# Patient Record
Sex: MALE | Race: WHITE | Employment: OTHER | URBAN - METROPOLITAN AREA
[De-identification: names, ages, dates, MRNs, and addresses within clinical notes are randomized per-mention and may not be internally consistent; named-entity substitution may affect disease eponyms.]

---

## 2020-11-24 ENCOUNTER — APPOINTMENT (OUTPATIENT)
Dept: GENERAL RADIOLOGY | Age: 85
End: 2020-11-24
Attending: PHYSICIAN ASSISTANT
Payer: MEDICARE

## 2020-11-24 ENCOUNTER — APPOINTMENT (OUTPATIENT)
Dept: CT IMAGING | Age: 85
End: 2020-11-24
Attending: PHYSICIAN ASSISTANT
Payer: MEDICARE

## 2020-11-24 ENCOUNTER — HOSPITAL ENCOUNTER (EMERGENCY)
Age: 85
Discharge: ANOTHER HOSPITAL | End: 2020-11-24
Payer: MEDICARE

## 2020-11-24 VITALS
BODY MASS INDEX: 29.59 KG/M2 | RESPIRATION RATE: 20 BRPM | SYSTOLIC BLOOD PRESSURE: 165 MMHG | HEART RATE: 85 BPM | DIASTOLIC BLOOD PRESSURE: 88 MMHG | WEIGHT: 167 LBS | TEMPERATURE: 97.5 F | OXYGEN SATURATION: 100 % | HEIGHT: 63 IN

## 2020-11-24 DIAGNOSIS — G91.2 NORMAL PRESSURE HYDROCEPHALUS (HCC): ICD-10-CM

## 2020-11-24 DIAGNOSIS — J44.1 COPD EXACERBATION (HCC): ICD-10-CM

## 2020-11-24 DIAGNOSIS — N30.00 ACUTE CYSTITIS WITHOUT HEMATURIA: ICD-10-CM

## 2020-11-24 DIAGNOSIS — I50.9 ACUTE ON CHRONIC CONGESTIVE HEART FAILURE, UNSPECIFIED HEART FAILURE TYPE (HCC): ICD-10-CM

## 2020-11-24 DIAGNOSIS — R41.0 CONFUSION: Primary | ICD-10-CM

## 2020-11-24 LAB
ALBUMIN SERPL-MCNC: 4 G/DL (ref 3.5–5)
ALBUMIN/GLOB SERPL: 1.2 {RATIO} (ref 1.1–2.2)
ALP SERPL-CCNC: 67 U/L (ref 45–117)
ALT SERPL-CCNC: 16 U/L (ref 12–78)
ANION GAP SERPL CALC-SCNC: 3 MMOL/L (ref 5–15)
APPEARANCE UR: ABNORMAL
ARTERIAL PATENCY WRIST A: ABNORMAL
AST SERPL W P-5'-P-CCNC: 35 U/L (ref 15–37)
BACTERIA URNS QL MICRO: ABNORMAL /HPF
BASE EXCESS BLDA CALC-SCNC: 4.7 MMOL/L (ref 0–2)
BASOPHILS # BLD: 0 K/UL (ref 0–0.1)
BASOPHILS NFR BLD: 0 % (ref 0–1)
BDY SITE: ABNORMAL
BILIRUB SERPL-MCNC: 1.7 MG/DL (ref 0.2–1)
BILIRUB UR QL: NEGATIVE
BNP SERPL-MCNC: 1008 PG/ML
BUN SERPL-MCNC: 23 MG/DL (ref 6–20)
BUN/CREAT SERPL: 25 (ref 12–20)
CA-I BLD-MCNC: 8.9 MG/DL (ref 8.5–10.1)
CHLORIDE SERPL-SCNC: 109 MMOL/L (ref 97–108)
CO2 SERPL-SCNC: 30 MMOL/L (ref 21–32)
COLOR UR: ABNORMAL
CREAT SERPL-MCNC: 0.93 MG/DL (ref 0.7–1.3)
DIFFERENTIAL METHOD BLD: ABNORMAL
EOSINOPHIL # BLD: 0 K/UL (ref 0–0.4)
EOSINOPHIL NFR BLD: 0 % (ref 0–7)
ERYTHROCYTE [DISTWIDTH] IN BLOOD BY AUTOMATED COUNT: 12.9 % (ref 11.5–14.5)
FIO2 ON VENT: 21 %
GLOBULIN SER CALC-MCNC: 3.4 G/DL (ref 2–4)
GLUCOSE SERPL-MCNC: 104 MG/DL (ref 65–100)
GLUCOSE UR STRIP.AUTO-MCNC: NEGATIVE MG/DL
HCO3 BLDA-SCNC: 29 MMOL/L (ref 22–26)
HCT VFR BLD AUTO: 37.8 % (ref 36.6–50.3)
HGB BLD-MCNC: 12.3 G/DL (ref 12.1–17)
HGB UR QL STRIP: ABNORMAL
IMM GRANULOCYTES # BLD AUTO: 0 K/UL (ref 0–0.04)
IMM GRANULOCYTES NFR BLD AUTO: 0 % (ref 0–0.5)
KETONES UR QL STRIP.AUTO: 5 MG/DL
LACTATE SERPL-SCNC: 1.2 MMOL/L (ref 0.4–2)
LEUKOCYTE ESTERASE UR QL STRIP.AUTO: ABNORMAL
LYMPHOCYTES # BLD: 1 K/UL (ref 0.8–3.5)
LYMPHOCYTES NFR BLD: 9 % (ref 12–49)
MCH RBC QN AUTO: 33 PG (ref 26–34)
MCHC RBC AUTO-ENTMCNC: 32.5 G/DL (ref 30–36.5)
MCV RBC AUTO: 101.3 FL (ref 80–99)
MONOCYTES # BLD: 1.4 K/UL (ref 0–1)
MONOCYTES NFR BLD: 13 % (ref 5–13)
MUCOUS THREADS URNS QL MICRO: ABNORMAL /LPF
NEUTS SEG # BLD: 8.5 K/UL (ref 1.8–8)
NEUTS SEG NFR BLD: 78 % (ref 32–75)
NITRITE UR QL STRIP.AUTO: POSITIVE
PCO2 BLDA: 40 MMHG (ref 35–45)
PH BLDA: 7.47 [PH] (ref 7.35–7.45)
PH UR STRIP: 7 [PH] (ref 5–8)
PLATELET # BLD AUTO: 175 K/UL (ref 150–400)
PMV BLD AUTO: 10.5 FL (ref 8.9–12.9)
PO2 BLDA: 73 MMHG (ref 75–100)
POTASSIUM SERPL-SCNC: 3.8 MMOL/L (ref 3.5–5.1)
PROT SERPL-MCNC: 7.4 G/DL (ref 6.4–8.2)
PROT UR STRIP-MCNC: NEGATIVE MG/DL
RBC # BLD AUTO: 3.73 M/UL (ref 4.1–5.7)
RBC #/AREA URNS HPF: ABNORMAL /HPF (ref 0–5)
SAO2 % BLD: 96 %
SAO2% DEVICE SAO2% SENSOR NAME: ABNORMAL
SODIUM SERPL-SCNC: 142 MMOL/L (ref 136–145)
SP GR UR REFRACTOMETRY: 1.01 (ref 1–1.03)
TROPONIN I SERPL-MCNC: 0.06 NG/ML
UROBILINOGEN UR QL STRIP.AUTO: 0.1 EU/DL (ref 0.1–1)
WBC # BLD AUTO: 10.8 K/UL (ref 4.1–11.1)
WBC URNS QL MICRO: >100 /HPF (ref 0–4)

## 2020-11-24 PROCEDURE — 71045 X-RAY EXAM CHEST 1 VIEW: CPT

## 2020-11-24 PROCEDURE — 99285 EMERGENCY DEPT VISIT HI MDM: CPT

## 2020-11-24 PROCEDURE — 96375 TX/PRO/DX INJ NEW DRUG ADDON: CPT

## 2020-11-24 PROCEDURE — 82803 BLOOD GASES ANY COMBINATION: CPT

## 2020-11-24 PROCEDURE — 70450 CT HEAD/BRAIN W/O DYE: CPT

## 2020-11-24 PROCEDURE — 36415 COLL VENOUS BLD VENIPUNCTURE: CPT

## 2020-11-24 PROCEDURE — 72125 CT NECK SPINE W/O DYE: CPT

## 2020-11-24 PROCEDURE — 80053 COMPREHEN METABOLIC PANEL: CPT

## 2020-11-24 PROCEDURE — 83880 ASSAY OF NATRIURETIC PEPTIDE: CPT

## 2020-11-24 PROCEDURE — 83605 ASSAY OF LACTIC ACID: CPT

## 2020-11-24 PROCEDURE — 94640 AIRWAY INHALATION TREATMENT: CPT

## 2020-11-24 PROCEDURE — 81001 URINALYSIS AUTO W/SCOPE: CPT

## 2020-11-24 PROCEDURE — 74011000258 HC RX REV CODE- 258: Performed by: PHYSICIAN ASSISTANT

## 2020-11-24 PROCEDURE — 74011250636 HC RX REV CODE- 250/636: Performed by: PHYSICIAN ASSISTANT

## 2020-11-24 PROCEDURE — 84484 ASSAY OF TROPONIN QUANT: CPT

## 2020-11-24 PROCEDURE — 74011000250 HC RX REV CODE- 250: Performed by: PHYSICIAN ASSISTANT

## 2020-11-24 PROCEDURE — 85025 COMPLETE CBC W/AUTO DIFF WBC: CPT

## 2020-11-24 PROCEDURE — 96374 THER/PROPH/DIAG INJ IV PUSH: CPT

## 2020-11-24 PROCEDURE — 93005 ELECTROCARDIOGRAM TRACING: CPT

## 2020-11-24 RX ORDER — FUROSEMIDE 10 MG/ML
40 INJECTION INTRAMUSCULAR; INTRAVENOUS ONCE
Status: COMPLETED | OUTPATIENT
Start: 2020-11-24 | End: 2020-11-24

## 2020-11-24 RX ORDER — IPRATROPIUM BROMIDE AND ALBUTEROL SULFATE 2.5; .5 MG/3ML; MG/3ML
3 SOLUTION RESPIRATORY (INHALATION)
Status: COMPLETED | OUTPATIENT
Start: 2020-11-24 | End: 2020-11-24

## 2020-11-24 RX ADMIN — FUROSEMIDE 40 MG: 10 INJECTION, SOLUTION INTRAMUSCULAR; INTRAVENOUS at 12:26

## 2020-11-24 RX ADMIN — IPRATROPIUM BROMIDE AND ALBUTEROL SULFATE 3 ML: .5; 2.5 SOLUTION RESPIRATORY (INHALATION) at 10:57

## 2020-11-24 RX ADMIN — METHYLPREDNISOLONE SODIUM SUCCINATE 125 MG: 125 INJECTION, POWDER, FOR SOLUTION INTRAMUSCULAR; INTRAVENOUS at 11:10

## 2020-11-24 RX ADMIN — CEFTRIAXONE 1 G: 1 INJECTION, POWDER, FOR SOLUTION INTRAMUSCULAR; INTRAVENOUS at 14:12

## 2020-11-24 NOTE — ED TRIAGE NOTES
EMS called for fall at home. Pt fell from couch to floor. Denies complaints. EMS states they were called yesterday for same. Pt has had increased confusion and weakness since Thursday. Incontinent on arrival. Auditory wheezes noted during triage. Usually wears 2L NC at home, increased to 3L due to O2 sats 87% per EMS.  Bilateral lower extremity edema 3+ noted

## 2020-11-24 NOTE — ED PROVIDER NOTES
EMERGENCY DEPARTMENT HISTORY AND PHYSICAL EXAM      Date: 11/24/2020  Patient Name: Sandeep Bishop    History of Presenting Illness     Chief Complaint   Patient presents with    Fall       History Provided By: Patient and EMS    HPI: Sandeep Bishop, 80 y.o. male with a past medical history significant hypertension and CHF, prostate cancer presents to the ED with cc of ground-level fall onset just prior to arrival, history of seen yesterday. EMS was called to his assisted living home where he was found on the floor after falling off of the couch, short amount of time on the ground. According to assisted living facility, patient has been increasingly confused and weak over the last few days. Incontinent of urine on arrival.  He is complaining of shortness of breath and wheezing with chills. History of COPD, wears 2 L of oxygen via nasal cannula at all times. Hypoxic when EMS arrived, placed on 3 L. He currently denies chest pain, abdominal pain, hip pain, shoulder pain, leg pain, recent illness, fever, chills, body aches, nausea, vomiting, diarrhea. There are no other complaints, changes, or physical findings at this time. PCP: UNKNOWN        Past History     Past Medical History:  Past Medical History:   Diagnosis Date    [facility]F (congestive heart failure) ([facility]     HTN (hypertension)     Prostate CA ([facility] [address]        Past Surgical History:  History reviewed. No pertinent surgical history. Family History:  History reviewed. No pertinent family history. Social History:  Social History     Tobacco Use    Smoking status: Former Smoker    Smokeless tobacco: Never Used   Substance Use Topics    Alcohol use: Never     Frequency: Never    Drug use: Never       Allergies:  No Known Allergies      Review of Systems   Review of Systems   Constitutional: Positive for activity change, chills and fatigue. Negative for fever. HENT: Negative for congestion, ear pain, rhinorrhea and trouble swallowing. Eyes: Negative for pain and visual disturbance. Respiratory: Positive for shortness of breath and wheezing. Negative for cough. Cardiovascular: Negative for chest pain. Gastrointestinal: Negative for abdominal pain, diarrhea, nausea and vomiting. Genitourinary: Positive for difficulty urinating. Negative for decreased urine volume, dysuria and hematuria. Musculoskeletal: Negative for arthralgias and myalgias. Skin: Negative for rash. Neurological: Positive for weakness. Negative for dizziness, syncope and headaches. Hematological: Negative for adenopathy. Psychiatric/Behavioral: The patient is not nervous/anxious. All other systems reviewed and are negative. Physical Exam   Physical Exam  Vitals signs and nursing note reviewed. Constitutional:       General: He is not in acute distress. Appearance: He is well-developed. HENT:      Head: Normocephalic and atraumatic. Mouth/Throat:      Mouth: Mucous membranes are moist.   Eyes:      Extraocular Movements: Extraocular movements intact. Pupils: Pupils are equal, round, and reactive to light. Cardiovascular:      Rate and Rhythm: Normal rate and regular rhythm. Heart sounds: Normal heart sounds. Pulmonary:      Effort: Pulmonary effort is normal. Tachypnea present. No respiratory distress. Breath sounds: Decreased breath sounds and wheezing (hi-pitched, right-sided, expiratory) present. No rhonchi. Abdominal:      General: Abdomen is flat. Bowel sounds are normal.      Palpations: Abdomen is soft. Tenderness: There is no abdominal tenderness. Musculoskeletal:      Right lower leg: 3+ Pitting Edema present. Left lower leg: 3+ Pitting Edema present. Skin:     General: Skin is warm and dry. Capillary Refill: Capillary refill takes less than 2 seconds. Findings: No rash. Neurological:      General: No focal deficit present.       Mental Status: He is alert and oriented to person, place, and time. He is confused. GCS: GCS eye subscore is 4. GCS verbal subscore is 5. GCS motor subscore is 6. Cranial Nerves: No cranial nerve deficit. Sensory: Sensation is intact. Motor: Motor function is intact. Coordination: Coordination is intact. Psychiatric:         Mood and Affect: Mood normal.         Behavior: Behavior normal.         Diagnostic Study Results     Labs -     Recent Results (from the past 48 hour(s))   BNP    Collection Time: 11/24/20  9:45 AM   Result Value Ref Range    NT pro-BNP 1,008 (H) <824 pg/mL   METABOLIC PANEL, COMPREHENSIVE    Collection Time: 11/24/20  9:45 AM   Result Value Ref Range    Sodium 142 136 - 145 mmol/L    Potassium 3.8 3.5 - 5.1 mmol/L    Chloride 109 (H) 97 - 108 mmol/L    CO2 30 21 - 32 mmol/L    Anion gap 3 (L) 5 - 15 mmol/L    Glucose 104 (H) 65 - 100 mg/dL    BUN 23 (H) 6 - 20 mg/dL    Creatinine 0.93 0.70 - 1.30 mg/dL    BUN/Creatinine ratio 25 (H) 12 - 20      GFR est AA >60 >60 ml/min/1.73m2    GFR est non-AA >60 >60 ml/min/1.73m2    Calcium 8.9 8.5 - 10.1 mg/dL    Bilirubin, total 1.7 (H) 0.2 - 1.0 mg/dL    AST (SGOT) 35 15 - 37 U/L    ALT (SGPT) 16 12 - 78 U/L    Alk.  phosphatase 67 45 - 117 U/L    Protein, total 7.4 6.4 - 8.2 g/dL    Albumin 4.0 3.5 - 5.0 g/dL    Globulin 3.4 2.0 - 4.0 g/dL    A-G Ratio 1.2 1.1 - 2.2     TROPONIN I    Collection Time: 11/24/20  9:45 AM   Result Value Ref Range    Troponin-I, Qt. 0.06 (H) <0.05 ng/mL   LACTIC ACID    Collection Time: 11/24/20  9:45 AM   Result Value Ref Range    Lactic acid 1.2 0.4 - 2.0 mmol/L   CBC WITH AUTOMATED DIFF    Collection Time: 11/24/20 11:30 AM   Result Value Ref Range    WBC 10.8 4.1 - 11.1 K/uL    RBC 3.73 (L) 4.10 - 5.70 M/uL    HGB 12.3 12.1 - 17.0 g/dL    HCT 37.8 36.6 - 50.3 %    .3 (H) 80.0 - 99.0 FL    MCH 33.0 26.0 - 34.0 PG    MCHC 32.5 30.0 - 36.5 g/dL    RDW 12.9 11.5 - 14.5 %    PLATELET 103 598 - 321 K/uL    MPV 10.5 8.9 - 12.9 FL    NEUTROPHILS 78 (H) 32 - 75 %    LYMPHOCYTES 9 (L) 12 - 49 %    MONOCYTES 13 5 - 13 %    EOSINOPHILS 0 0 - 7 %    BASOPHILS 0 0 - 1 %    IMMATURE GRANULOCYTES 0 0.0 - 0.5 %    ABS. NEUTROPHILS 8.5 (H) 1.8 - 8.0 K/UL    ABS. LYMPHOCYTES 1.0 0.8 - 3.5 K/UL    ABS. MONOCYTES 1.4 (H) 0.0 - 1.0 K/UL    ABS. EOSINOPHILS 0.0 0.0 - 0.4 K/UL    ABS. BASOPHILS 0.0 0.0 - 0.1 K/UL    ABS. IMM. GRANS. 0.0 0.00 - 0.04 K/UL    DF AUTOMATED     URINALYSIS W/MICROSCOPIC    Collection Time: 11/24/20 12:45 PM   Result Value Ref Range    Color Yellow/Straw      Appearance Turbid (A) Clear      Specific gravity 1.008 1.003 - 1.030      pH (UA) 7.0 5.0 - 8.0      Protein Negative Negative mg/dL    Glucose Negative Negative mg/dL    Ketone 5 (A) Negative mg/dL    Bilirubin Negative Negative      Blood Moderate (A) Negative      Urobilinogen 0.1 0.1 - 1.0 EU/dL    Nitrites Positive (A) Negative      Leukocyte Esterase Large (A) Negative      WBC >100 (H) 0 - 4 /hpf    RBC 0-5 0 - 5 /hpf    Bacteria 1+ (A) Negative /hpf    Mucus 1+ /lpf       Radiologic Studies -   XR Results (most recent):  Results from Hospital Encounter encounter on 11/24/20   XR CHEST PORT    Narrative Chest single view. Coarse reticular markings present through the lungs. No interstitial or alveolar  pulmonary edema. Elevation left hemidiaphragm. Cardiac silhouette within normal  limits. Atherosclerotic change ectatic thoracic aorta. No pneumothorax or  sizable pleural effusion. Right shoulder hardware noted. Impression IMPRESSION: No plain film evidence for CHF or pneumonia. Advanced  atherosclerotic change thoracic aorta. Elevation left hemidiaphragm. CT Results  (Last 48 hours)               11/24/20 1008  CT HEAD WO CONT Final result    Impression:  IMPRESSION: No acute intracranial abnormality. Mild ventriculomegaly. Recommendations as above. Narrative:  CT SCAN OF THE BRAIN WITHOUT CONTRAST:               CLINICAL HISTORY: Head injury.        Thin axial and coronal and sagittal reconstructions were obtained. All CT scans at this facility are performed using dose reduction optimization   techniques as appropriate to a performed exam including the following: Automated   exposure control, adjustments of the mA and/or kV according to patient size, or   use of iterative reconstruction technique. Mild central and cortical atrophy is noted. Ventricles are midline. Mild   ventriculomegaly mainly involving the lateral and third ventricles probably due   to atrophy rather than normal pressure hydrocephalus. Clinical correlation is   recommended. Mild microvascular ischemic changes are noted. No intra or   extra-axial hemorrhage or acute infarction in a major arterial distribution is   demonstrated. Bony calvarium is intact. Scans through the posterior fossa show no major infarction or hemorrhage. The cerebellar tonsils are above the foramen magnum in a satisfactory position. Mild mucosal changes in the posterior weight air cells are noted. Rest of the   paranasal sinuses are clear. Mastoid air cells are clear. Both orbits have a normal CT appearance. 11/24/20 1008  CT SPINE CERV WO CONT Final result    Impression:  IMPRESSION: Degenerative disc disease and mild cervical spondylolisthesis C3-C4,   C4-C5, C5-C6 and C6-C7 levels. 2 mm anterior subluxation C7 on T1 that is more pronounced on the left and   probably due to laxity of ligaments due to moderate to advanced degenerative   changes of the left facet joints. Mild compression deformity of the superior   endplate T1 vertebral body probably chronic. Recommendations as above. Narrative:  CT CERVICAL SPINE:       CLINICAL HISTORY: Injury to the neck.        All CT scans at this facility are performed using dose reduction optimization   techniques as appropriate to a performed exam including the following: Automated   exposure control, adjustments of the mA and/or kV according to patient size, or   use of iterative reconstruction technique. Thin axial and coronal and sagittal reconstructions were obtained. Narrowing of the C3-C4, C4-C5, C5-C6 and C6-C7 disc spaces is noted. Mild   foraminal stenoses at these levels is noted. There are moderate to advanced   degenerative changes of the left C7-T1 facet with 2 mm anterior subluxation of   the C7 on the T1 vertebral body that is more pronounced on the left. There is   mild compression deformity of the superior endplate of the T1 vertebral body   probably representing a chronic injury as no acute fracture lines are   identified. If the patient remains symptomatic MRI of the cervical spine is   recommended to exclude a subtle refracture of the superior endplate. Medical Decision Making and ED Course   I am the first provider for this patient. I reviewed the vital signs, available nursing notes, past medical history, past surgical history, family history and social history. Vital Signs-Reviewed the patient's vital signs.   Patient Vitals for the past 12 hrs:   Temp Pulse Resp BP SpO2   11/24/20 1226 -- 74 -- 137/78 --   11/24/20 1058 -- -- -- -- 100 %   11/24/20 0936 97.7 °F (36.5 °C) 90 26 (!) 161/101 100 %       EKG interpretation: (Preliminary)  EKG with wandering baseline, normal sinus rhythm at 88 bpm, incomplete right bundle branch block, abnormal EKG    Records Reviewed: Nursing Notes and Old Medical Records    Provider Notes (Medical Decision Making):       MDM  Number of Diagnoses or Management Options  Acute on chronic congestive heart failure, unspecified heart failure type [facility]):   Confusion:   COPD exacerbation ([address]):   Normal pressure hydrocephalus [facility]):   Diagnosis management comments: Differentials include UTI, pneumonia, COPD exacerbation, CHF exacerbation, electrolyte disturbance, normal pressure hydrocephalus, intracranial hemorrhage, subdural hematoma    49-year-old male presenting with mild confusion from baseline, ground-level fall x2, urinary incontinence on arrival.  CT head concerning for normal pressure hydrocephalus. Lower extremity edema bilaterally. BNP over 1000 with some shortness of breath on arrival and hypoxia. Improved after DuoNeb. Normal kidney function. Suspect CHF exacerbation. He has been given IV Lasix. He will be admitted to the hospitalist.       Amount and/or Complexity of Data Reviewed  Clinical lab tests: ordered and reviewed  Tests in the radiology section of CPT®: ordered and reviewed  Discuss the patient with other providers: yes          ED Course:   Initial assessment performed. The patients presenting problems have been discussed, and they are in agreement with the care plan formulated and outlined with them. I have encouraged them to ask questions as they arise throughout their visit. Consult Note:  12:49 PM  Ima Gudino PA-C spoke with Dr. Christine Camejo  Specialty: Internal Medicine  Discussed pt's hx, disposition, and available diagnostic and imaging results. Reviewed care plans. Agrees with admission. Admit Note:  12:49 PM  Pt is being admitted by Dr. Christine Camejo. The results of their tests and reason(s) for their admission have been discussed with pt and/or available family. They convey agreement and understanding for the need to be admitted and for admission diagnosis. 4:07 PM  Family requesting Transfer to [facility]    Consult Note:  4:07 PM  Iam Gudino PA-C spoke with Dr. Ruthann Goodwin  Specialty: ED Attending at [address] [facility]  Discussed pt's hx, disposition, and available diagnostic and imaging results. Reviewed care plans. Will accept pt for ED to ED transfer, requesting ABG.    4:07 PM  TRANSFER OUT NOTE  Patient has been accepted for transfer to [facility] by Dr. Kaden Guy for Confusion, UTI, Frequent falls. Patient is stable for transfer at this time.            Procedures       Aurelia Berg Gaetano Wilkerson PA-C    Procedures     Moisés Cardoso PA-C        Disposition       Transferred to Another Facility        Diagnosis     Clinical Impression:   1. Confusion    2. Normal pressure hydrocephalus (HCC)    3. Acute on chronic congestive heart failure, unspecified heart failure type ([address]    4. COPD exacerbation ([facility] [address]    5. Acute cystitis without hematuria        Attestations:    Moisés Cardoso PA-C    Please note that this dictation was completed with [employer], the computer voice recognition software. Quite often unanticipated grammatical, syntax, homophones, and other interpretive errors are inadvertently transcribed by the computer software. Please disregard these errors. Please excuse any errors that have escaped final proofreading. Thank you.

## 2020-11-25 LAB
ATRIAL RATE: 97 BPM
CALCULATED R AXIS, ECG10: -69 DEGREES
CALCULATED T AXIS, ECG11: 58 DEGREES
DIAGNOSIS, 93000: NORMAL
Q-T INTERVAL, ECG07: 396 MS
QRS DURATION, ECG06: 104 MS
QTC CALCULATION (BEZET), ECG08: 479 MS
VENTRICULAR RATE, ECG03: 88 BPM

## 2021-06-24 ENCOUNTER — APPOINTMENT (OUTPATIENT)
Dept: CT IMAGING | Age: 86
End: 2021-06-24
Attending: EMERGENCY MEDICINE
Payer: MEDICARE

## 2021-06-24 ENCOUNTER — APPOINTMENT (OUTPATIENT)
Dept: GENERAL RADIOLOGY | Age: 86
End: 2021-06-24
Attending: EMERGENCY MEDICINE
Payer: MEDICARE

## 2021-06-24 ENCOUNTER — HOSPITAL ENCOUNTER (EMERGENCY)
Age: 86
Discharge: HOME | End: 2021-06-24
Attending: EMERGENCY MEDICINE
Payer: MEDICARE

## 2021-06-24 VITALS
OXYGEN SATURATION: 100 % | SYSTOLIC BLOOD PRESSURE: 142 MMHG | TEMPERATURE: 97.8 F | DIASTOLIC BLOOD PRESSURE: 76 MMHG | RESPIRATION RATE: 20 BRPM | HEART RATE: 86 BPM

## 2021-06-24 DIAGNOSIS — S41.112A ARM LACERATION, LEFT, INITIAL ENCOUNTER: ICD-10-CM

## 2021-06-24 DIAGNOSIS — S09.90XA MINOR HEAD INJURY, INITIAL ENCOUNTER: Primary | ICD-10-CM

## 2021-06-24 LAB
ANION GAP SERPL CALC-SCNC: 5 MMOL/L (ref 5–15)
BASOPHILS # BLD: 0 K/UL (ref 0–0.1)
BASOPHILS NFR BLD: 0 % (ref 0–1)
BUN SERPL-MCNC: 20 MG/DL (ref 6–20)
BUN/CREAT SERPL: 19 (ref 12–20)
CA-I BLD-MCNC: 8.5 MG/DL (ref 8.5–10.1)
CHLORIDE SERPL-SCNC: 103 MMOL/L (ref 97–108)
CO2 SERPL-SCNC: 29 MMOL/L (ref 21–32)
CREAT SERPL-MCNC: 1.06 MG/DL (ref 0.7–1.3)
DIFFERENTIAL METHOD BLD: ABNORMAL
EOSINOPHIL # BLD: 0.1 K/UL (ref 0–0.4)
EOSINOPHIL NFR BLD: 1 % (ref 0–7)
ERYTHROCYTE [DISTWIDTH] IN BLOOD BY AUTOMATED COUNT: 13.3 % (ref 11.5–14.5)
GLUCOSE SERPL-MCNC: 91 MG/DL (ref 65–100)
HCT VFR BLD AUTO: 41.6 % (ref 36.6–50.3)
HGB BLD-MCNC: 13.5 G/DL (ref 12.1–17)
IMM GRANULOCYTES # BLD AUTO: 0 K/UL (ref 0–0.04)
IMM GRANULOCYTES NFR BLD AUTO: 0 % (ref 0–0.5)
LYMPHOCYTES # BLD: 0.9 K/UL (ref 0.8–3.5)
LYMPHOCYTES NFR BLD: 10 % (ref 12–49)
MCH RBC QN AUTO: 32.5 PG (ref 26–34)
MCHC RBC AUTO-ENTMCNC: 32.5 G/DL (ref 30–36.5)
MCV RBC AUTO: 100 FL (ref 80–99)
MONOCYTES # BLD: 0.7 K/UL (ref 0–1)
MONOCYTES NFR BLD: 8 % (ref 5–13)
NEUTS SEG # BLD: 7.2 K/UL (ref 1.8–8)
NEUTS SEG NFR BLD: 81 % (ref 32–75)
NRBC # BLD: 0 K/UL (ref 0–0.01)
NRBC BLD-RTO: 0 PER 100 WBC
PLATELET # BLD AUTO: 220 K/UL (ref 150–400)
PMV BLD AUTO: 9.4 FL (ref 8.9–12.9)
POTASSIUM SERPL-SCNC: 4.5 MMOL/L (ref 3.5–5.1)
RBC # BLD AUTO: 4.16 M/UL (ref 4.1–5.7)
SODIUM SERPL-SCNC: 137 MMOL/L (ref 136–145)
WBC # BLD AUTO: 8.8 K/UL (ref 4.1–11.1)

## 2021-06-24 PROCEDURE — 70450 CT HEAD/BRAIN W/O DYE: CPT

## 2021-06-24 PROCEDURE — 74011250636 HC RX REV CODE- 250/636: Performed by: EMERGENCY MEDICINE

## 2021-06-24 PROCEDURE — 73090 X-RAY EXAM OF FOREARM: CPT

## 2021-06-24 PROCEDURE — 36415 COLL VENOUS BLD VENIPUNCTURE: CPT

## 2021-06-24 PROCEDURE — 80048 BASIC METABOLIC PNL TOTAL CA: CPT

## 2021-06-24 PROCEDURE — 90471 IMMUNIZATION ADMIN: CPT

## 2021-06-24 PROCEDURE — 96374 THER/PROPH/DIAG INJ IV PUSH: CPT

## 2021-06-24 PROCEDURE — 75810000293 HC SIMP/SUPERF WND  RPR

## 2021-06-24 PROCEDURE — 85025 COMPLETE CBC W/AUTO DIFF WBC: CPT

## 2021-06-24 PROCEDURE — 74011000250 HC RX REV CODE- 250: Performed by: EMERGENCY MEDICINE

## 2021-06-24 PROCEDURE — 99284 EMERGENCY DEPT VISIT MOD MDM: CPT

## 2021-06-24 PROCEDURE — 90715 TDAP VACCINE 7 YRS/> IM: CPT | Performed by: EMERGENCY MEDICINE

## 2021-06-24 RX ORDER — CEPHALEXIN 500 MG/1
500 CAPSULE ORAL 3 TIMES DAILY
Qty: 21 CAPSULE | Refills: 0 | Status: SHIPPED | OUTPATIENT
Start: 2021-06-24 | End: 2021-06-24 | Stop reason: CLARIF

## 2021-06-24 RX ORDER — LIDOCAINE HYDROCHLORIDE AND EPINEPHRINE 10; 10 MG/ML; UG/ML
1.5 INJECTION, SOLUTION INFILTRATION; PERINEURAL
Status: COMPLETED | OUTPATIENT
Start: 2021-06-24 | End: 2021-06-24

## 2021-06-24 RX ORDER — CEPHALEXIN 500 MG/1
500 CAPSULE ORAL 3 TIMES DAILY
Qty: 21 CAPSULE | Refills: 0 | OUTPATIENT
Start: 2021-06-24 | End: 2021-06-24 | Stop reason: SDUPTHER

## 2021-06-24 RX ADMIN — CEFAZOLIN 2 G: 1 INJECTION, POWDER, FOR SOLUTION INTRAMUSCULAR; INTRAVENOUS at 09:46

## 2021-06-24 RX ADMIN — LIDOCAINE HYDROCHLORIDE,EPINEPHRINE BITARTRATE 15 MG: 10; .01 INJECTION, SOLUTION INFILTRATION; PERINEURAL at 08:48

## 2021-06-24 RX ADMIN — TETANUS TOXOID, REDUCED DIPHTHERIA TOXOID AND ACELLULAR PERTUSSIS VACCINE, ADSORBED 0.5 ML: 5; 2.5; 8; 8; 2.5 SUSPENSION INTRAMUSCULAR at 10:40

## 2021-06-24 NOTE — ED PROVIDER NOTES
HPI   Chief Complaint   Patient presents with    Fall   77-year-old male with history of CHF, COPD, hypertension, prostate cancer presenting with fall. Patient reports this morning he was trying to get up from the toilet in the bathroom, he lost balance and slid, in order to catch himself his left arm hit the countertop, patient has a large cut on the left arm that is bleeding. Patient reports he lost a great deal of blood, the blood was enough to cover his entire bathroom. Patient reports left forearm moderate intensity constant sharp pain. He denies any headache, neck pain, back pain, bilateral leg pain. Patient denies any blood thinners. Patient denies any loss of consciousness. Patient has a home health aide. Past Medical History:   Diagnosis Date    CHF (congestive heart failure) (HCC)     COPD (chronic obstructive pulmonary disease) (HCC)     HTN (hypertension)     Prostate CA (HCC)        No past surgical history on file. No family history on file. Social History     Socioeconomic History    Marital status:      Spouse name: Not on file    Number of children: Not on file    Years of education: Not on file    Highest education level: Not on file   Occupational History    Not on file   Tobacco Use    Smoking status: Former Smoker    Smokeless tobacco: Never Used   Substance and Sexual Activity    Alcohol use: Never    Drug use: Never    Sexual activity: Not on file   Other Topics Concern    Not on file   Social History Narrative    Not on file     Social Determinants of Health     Financial Resource Strain:     Difficulty of Paying Living Expenses:    Food Insecurity:     Worried About [address] in the Last Year:     [address] in the Last Year:    Transportation Needs:     Lack of Transportation (Medical):      Lack of Transportation (Non-Medical):    Physical Activity:     Days of Exercise per Week:     Minutes of Exercise per Session:    Stress:  Feeling of Stress :    Social Connections:     Frequency of Communication with Friends and Family:     Frequency of Social Gatherings with Friends and Family:     Attends [employer] Services:     Active Member of Clubs or Organizations:     Attends Club or Organization Meetings:     Marital Status:    Intimate Partner Violence:     Fear of Current or Ex-Partner:     Emotionally Abused:     Physically Abused:     Sexually Abused: ALLERGIES: Patient has no known allergies. Review of Systems   Musculoskeletal:        Left arm pain. Skin: Positive for wound. Hematological: Bruises/bleeds easily. All other systems reviewed and are negative. Vitals:    06/24/21 0835 06/24/21 0837 06/24/21 1044   BP: (!) 159/67  (!) 142/76   Pulse: 67  86   Resp: 20  20   Temp: 97.8 °F (36.6 °C)     SpO2: 94% 95% 100%            Physical Exam   Patient Vitals for the past 8 hrs:   Temp Pulse Resp BP SpO2   06/24/21 1044 -- 86 20 (!) 142/76 100 %   06/24/21 0837 -- -- -- -- 95 %   06/24/21 0835 97.8 °F (36.6 °C) 67 20 (!) 159/67 94 %        Nursing note and vitals reviewed. Constitutional: NAD. Head: Normocephalic. Left cheek small abrasion. Mouth/Throat: Airway patent. Moist mucous membranes. Eyes: EOMI. No scleral icterus. Neck: Neck supple. No C-spine tenderness or step-off. Cardiovascular: Normal rate, irregular rhythm. Distant heart sounds. Good pulses throughout. Pulmonary/Chest: No respiratory distress. Diffuse mild wheezing. Abdominal/GI: BS normal, Soft, non-tender, non-distended. No rebound or guarding. Musculoskeletal: Left forearm and hand ecchymosis and soft swelling. Left proximal forearm has a 15 cm curved laceration down to muscle, no joint or bony involvement. No gross injuries or deformities in all other extremities. No T or L-spine tenderness or step-off. No pelvic tenderness or instability. Neurological: Alert and oriented to person, place, and time.  Cranial Nerves 2-12 intact. Moving all extremities. No gross deficits. Psych: Pleasant, cooperative. Skin: Skin is warm and dry. No rash noted. MDM   Ddx = head injury, left forearm injury, anemia, metabolic derangements. I cleaned his left forearm laceration and sutured it. Patient is given 2 g Ancef. Tetanus is updated. CT head negative. Forearm left x-ray negative for bony injury. Patient is discharged with wound care instructions, return precautions, instructions to follow-up with PCP. Labs Reviewed   CBC WITH AUTOMATED DIFF - Abnormal; Notable for the following components:       Result Value    .0 (*)     NEUTROPHILS 81 (*)     LYMPHOCYTES 10 (*)     All other components within normal limits   METABOLIC PANEL, BASIC     CT HEAD WO CONT   Final Result   1. No acute intracranial findings. 2. Atrophy and small vessel ischemic disease. 3. Minimal right ethmoid sinus disease. XR FOREARM LT AP/LAT   Final Result   No acute osseous abnormality. Focal soft tissue swelling along the mid to distal   radial diaphysis. Medications   lidocaine-EPINEPHrine (XYLOCAINE) 1 %-1:100,000 injection 15 mg (15 mg IntraDERMal Given 6/24/21 0848)   ceFAZolin (ANCEF) 2 g in sterile water (preservative free) 20 mL IV syringe (2 g IntraVENous New Bag 6/24/21 0946)   diph,Pertuss(AC),Tet Vac-PF (BOOSTRIX) suspension 0.5 mL (0.5 mL IntraMUSCular Given 6/24/21 1040)     Kenan MENG MD, am  the first and primary ED provider for this patient.           Wound Repair    Date/Time: 6/24/2021 9:12 AM  Performed by: attendingLocation details: left arm  Wound length:12.6 - 20 cm  Anesthesia: local infiltration    Anesthesia:  Local Anesthetic: lidocaine 1% with epinephrine  Anesthetic total: 10 mL  Foreign bodies: no foreign bodies  Irrigation solution: saline  Irrigation method: tap  Skin closure: 4-0 nylon  Number of sutures: 12  Technique: running  Approximation: close  Patient tolerance: patient tolerated the procedure well with no immediate complications  My total time at bedside, performing this procedure was 16-30 minutes.

## 2021-06-24 NOTE — ED TRIAGE NOTES
Pt had a glf, this morning he fell getting off of toliet in bathroom, blind, caught arm on counter top , has a skin tear present on arm. NO LOC, NO blood thinners, NO head trauma. Bleeding is controlled. Pt is alert and oriented, hard of hearing.

## 2022-01-02 ENCOUNTER — HOSPITAL ENCOUNTER (INPATIENT)
Age: 87
LOS: 8 days | Discharge: NURSING, REHAB OR LONG-TERM CARE | DRG: 291 | End: 2022-01-10
Attending: STUDENT IN AN ORGANIZED HEALTH CARE EDUCATION/TRAINING PROGRAM | Admitting: HOSPITALIST
Payer: MEDICARE

## 2022-01-02 ENCOUNTER — APPOINTMENT (OUTPATIENT)
Dept: CT IMAGING | Age: 87
DRG: 291 | End: 2022-01-02
Attending: HOSPITALIST
Payer: MEDICARE

## 2022-01-02 ENCOUNTER — APPOINTMENT (OUTPATIENT)
Dept: GENERAL RADIOLOGY | Age: 87
DRG: 291 | End: 2022-01-02
Attending: STUDENT IN AN ORGANIZED HEALTH CARE EDUCATION/TRAINING PROGRAM
Payer: MEDICARE

## 2022-01-02 DIAGNOSIS — J44.9 CHRONIC OBSTRUCTIVE PULMONARY DISEASE, UNSPECIFIED COPD TYPE (HCC): ICD-10-CM

## 2022-01-02 DIAGNOSIS — J18.9 COMMUNITY ACQUIRED PNEUMONIA, UNSPECIFIED LATERALITY: Primary | ICD-10-CM

## 2022-01-02 PROBLEM — J96.90 RESPIRATORY FAILURE (HCC): Status: ACTIVE | Noted: 2022-01-02

## 2022-01-02 LAB
ALBUMIN SERPL-MCNC: 3.3 G/DL (ref 3.5–5)
ALBUMIN/GLOB SERPL: 0.7 {RATIO} (ref 1.1–2.2)
ALP SERPL-CCNC: 77 U/L (ref 45–117)
ALT SERPL-CCNC: 20 U/L (ref 12–78)
ANION GAP SERPL CALC-SCNC: 4 MMOL/L (ref 5–15)
ARTERIAL PATENCY WRIST A: ABNORMAL
AST SERPL W P-5'-P-CCNC: 42 U/L (ref 15–37)
BASE EXCESS BLDA CALC-SCNC: 0.9 MMOL/L (ref 0–2)
BASOPHILS # BLD: 0 K/UL (ref 0–0.1)
BASOPHILS NFR BLD: 0 % (ref 0–1)
BDY SITE: ABNORMAL
BILIRUB SERPL-MCNC: 0.8 MG/DL (ref 0.2–1)
BNP SERPL-MCNC: 1944 PG/ML
BUN SERPL-MCNC: 31 MG/DL (ref 6–20)
BUN/CREAT SERPL: 20 (ref 12–20)
CA-I BLD-MCNC: 8.6 MG/DL (ref 8.5–10.1)
CHLORIDE SERPL-SCNC: 106 MMOL/L (ref 97–108)
CO2 SERPL-SCNC: 27 MMOL/L (ref 21–32)
COVID-19 RAPID TEST, COVR: NOT DETECTED
CREAT SERPL-MCNC: 1.57 MG/DL (ref 0.7–1.3)
DIFFERENTIAL METHOD BLD: ABNORMAL
EOSINOPHIL # BLD: 0 K/UL (ref 0–0.4)
EOSINOPHIL NFR BLD: 0 % (ref 0–7)
EPAP/CPAP/PEEP, PAPEEP: 6
ERYTHROCYTE [DISTWIDTH] IN BLOOD BY AUTOMATED COUNT: 13.5 % (ref 11.5–14.5)
FIO2 ON VENT: 50 %
FLUAV AG NPH QL IA: NEGATIVE
FLUBV AG NOSE QL IA: NEGATIVE
GLOBULIN SER CALC-MCNC: 4.5 G/DL (ref 2–4)
GLUCOSE SERPL-MCNC: 123 MG/DL (ref 65–100)
HCO3 BLDA-SCNC: 25 MMOL/L (ref 22–26)
HCT VFR BLD AUTO: 41.7 % (ref 36.6–50.3)
HGB BLD-MCNC: 13.4 G/DL (ref 12.1–17)
IMM GRANULOCYTES # BLD AUTO: 0 K/UL (ref 0–0.04)
IMM GRANULOCYTES NFR BLD AUTO: 0 % (ref 0–0.5)
IPAP/PIP, IPAPIP: 16
LYMPHOCYTES # BLD: 0.5 K/UL (ref 0.8–3.5)
LYMPHOCYTES NFR BLD: 6 % (ref 12–49)
MCH RBC QN AUTO: 33.7 PG (ref 26–34)
MCHC RBC AUTO-ENTMCNC: 32.1 G/DL (ref 30–36.5)
MCV RBC AUTO: 104.8 FL (ref 80–99)
MONOCYTES # BLD: 1 K/UL (ref 0–1)
MONOCYTES NFR BLD: 11 % (ref 5–13)
NEUTS SEG # BLD: 7.5 K/UL (ref 1.8–8)
NEUTS SEG NFR BLD: 83 % (ref 32–75)
NRBC # BLD: 0 K/UL (ref 0–0.01)
NRBC BLD-RTO: 0 PER 100 WBC
PCO2 BLDA: 48 MMHG (ref 35–45)
PH BLDA: 7.36 [PH] (ref 7.35–7.45)
PLATELET # BLD AUTO: 172 K/UL (ref 150–400)
PMV BLD AUTO: 10.1 FL (ref 8.9–12.9)
PO2 BLDA: 88 MMHG (ref 75–100)
POTASSIUM SERPL-SCNC: 5.6 MMOL/L (ref 3.5–5.1)
PROCALCITONIN SERPL-MCNC: <0.05 NG/ML
PROT SERPL-MCNC: 7.8 G/DL (ref 6.4–8.2)
RBC # BLD AUTO: 3.98 M/UL (ref 4.1–5.7)
SAO2 % BLD: 97 %
SAO2% DEVICE SAO2% SENSOR NAME: ABNORMAL
SARS-COV-2, COV2: NORMAL
SODIUM SERPL-SCNC: 137 MMOL/L (ref 136–145)
SPECIMEN SOURCE: NORMAL
TROPONIN-HIGH SENSITIVITY: 47 NG/L (ref 0–76)
WBC # BLD AUTO: 9.1 K/UL (ref 4.1–11.1)

## 2022-01-02 PROCEDURE — 74011000250 HC RX REV CODE- 250: Performed by: HOSPITALIST

## 2022-01-02 PROCEDURE — 65270000029 HC RM PRIVATE

## 2022-01-02 PROCEDURE — 74011250636 HC RX REV CODE- 250/636: Performed by: HOSPITALIST

## 2022-01-02 PROCEDURE — 36600 WITHDRAWAL OF ARTERIAL BLOOD: CPT

## 2022-01-02 PROCEDURE — 74011000250 HC RX REV CODE- 250: Performed by: INTERNAL MEDICINE

## 2022-01-02 PROCEDURE — 84484 ASSAY OF TROPONIN QUANT: CPT

## 2022-01-02 PROCEDURE — 87804 INFLUENZA ASSAY W/OPTIC: CPT

## 2022-01-02 PROCEDURE — 84145 PROCALCITONIN (PCT): CPT

## 2022-01-02 PROCEDURE — 99284 EMERGENCY DEPT VISIT MOD MDM: CPT

## 2022-01-02 PROCEDURE — 74011250636 HC RX REV CODE- 250/636: Performed by: INTERNAL MEDICINE

## 2022-01-02 PROCEDURE — 87635 SARS-COV-2 COVID-19 AMP PRB: CPT

## 2022-01-02 PROCEDURE — 71045 X-RAY EXAM CHEST 1 VIEW: CPT

## 2022-01-02 PROCEDURE — 5A09457 ASSISTANCE WITH RESPIRATORY VENTILATION, 24-96 CONSECUTIVE HOURS, CONTINUOUS POSITIVE AIRWAY PRESSURE: ICD-10-PCS | Performed by: INTERNAL MEDICINE

## 2022-01-02 PROCEDURE — 96374 THER/PROPH/DIAG INJ IV PUSH: CPT

## 2022-01-02 PROCEDURE — 85025 COMPLETE CBC W/AUTO DIFF WBC: CPT

## 2022-01-02 PROCEDURE — 74011000258 HC RX REV CODE- 258: Performed by: HOSPITALIST

## 2022-01-02 PROCEDURE — 94640 AIRWAY INHALATION TREATMENT: CPT

## 2022-01-02 PROCEDURE — 74011000250 HC RX REV CODE- 250: Performed by: STUDENT IN AN ORGANIZED HEALTH CARE EDUCATION/TRAINING PROGRAM

## 2022-01-02 PROCEDURE — 82803 BLOOD GASES ANY COMBINATION: CPT

## 2022-01-02 PROCEDURE — 70450 CT HEAD/BRAIN W/O DYE: CPT

## 2022-01-02 PROCEDURE — 36415 COLL VENOUS BLD VENIPUNCTURE: CPT

## 2022-01-02 PROCEDURE — 74011250636 HC RX REV CODE- 250/636: Performed by: STUDENT IN AN ORGANIZED HEALTH CARE EDUCATION/TRAINING PROGRAM

## 2022-01-02 PROCEDURE — 80053 COMPREHEN METABOLIC PANEL: CPT

## 2022-01-02 PROCEDURE — 83880 ASSAY OF NATRIURETIC PEPTIDE: CPT

## 2022-01-02 RX ORDER — HEPARIN SODIUM 5000 [USP'U]/ML
5000 INJECTION, SOLUTION INTRAVENOUS; SUBCUTANEOUS EVERY 8 HOURS
Status: DISCONTINUED | OUTPATIENT
Start: 2022-01-02 | End: 2022-01-10 | Stop reason: HOSPADM

## 2022-01-02 RX ORDER — IPRATROPIUM BROMIDE AND ALBUTEROL SULFATE 2.5; .5 MG/3ML; MG/3ML
3 SOLUTION RESPIRATORY (INHALATION)
Status: DISCONTINUED | OUTPATIENT
Start: 2022-01-02 | End: 2022-01-10 | Stop reason: HOSPADM

## 2022-01-02 RX ORDER — IPRATROPIUM BROMIDE AND ALBUTEROL SULFATE 2.5; .5 MG/3ML; MG/3ML
3 SOLUTION RESPIRATORY (INHALATION)
Status: COMPLETED | OUTPATIENT
Start: 2022-01-02 | End: 2022-01-02

## 2022-01-02 RX ORDER — POLYETHYLENE GLYCOL 3350 17 G/17G
17 POWDER, FOR SOLUTION ORAL DAILY PRN
Status: DISCONTINUED | OUTPATIENT
Start: 2022-01-02 | End: 2022-01-10 | Stop reason: HOSPADM

## 2022-01-02 RX ORDER — SODIUM CHLORIDE 0.9 % (FLUSH) 0.9 %
5-40 SYRINGE (ML) INJECTION EVERY 8 HOURS
Status: DISCONTINUED | OUTPATIENT
Start: 2022-01-02 | End: 2022-01-10 | Stop reason: HOSPADM

## 2022-01-02 RX ORDER — ACETAMINOPHEN 650 MG/1
650 SUPPOSITORY RECTAL
Status: DISCONTINUED | OUTPATIENT
Start: 2022-01-02 | End: 2022-01-10 | Stop reason: HOSPADM

## 2022-01-02 RX ORDER — FUROSEMIDE 10 MG/ML
40 INJECTION INTRAMUSCULAR; INTRAVENOUS EVERY 8 HOURS
Status: DISCONTINUED | OUTPATIENT
Start: 2022-01-02 | End: 2022-01-07

## 2022-01-02 RX ORDER — BUDESONIDE AND FORMOTEROL FUMARATE DIHYDRATE 160; 4.5 UG/1; UG/1
2 AEROSOL RESPIRATORY (INHALATION)
Status: DISCONTINUED | OUTPATIENT
Start: 2022-01-02 | End: 2022-01-02

## 2022-01-02 RX ORDER — SODIUM CHLORIDE 0.9 % (FLUSH) 0.9 %
5-40 SYRINGE (ML) INJECTION AS NEEDED
Status: DISCONTINUED | OUTPATIENT
Start: 2022-01-02 | End: 2022-01-10 | Stop reason: HOSPADM

## 2022-01-02 RX ORDER — ACETAMINOPHEN 325 MG/1
650 TABLET ORAL
Status: DISCONTINUED | OUTPATIENT
Start: 2022-01-02 | End: 2022-01-10 | Stop reason: HOSPADM

## 2022-01-02 RX ORDER — ONDANSETRON 2 MG/ML
4 INJECTION INTRAMUSCULAR; INTRAVENOUS
Status: DISCONTINUED | OUTPATIENT
Start: 2022-01-02 | End: 2022-01-10 | Stop reason: HOSPADM

## 2022-01-02 RX ORDER — ONDANSETRON 4 MG/1
4 TABLET, ORALLY DISINTEGRATING ORAL
Status: DISCONTINUED | OUTPATIENT
Start: 2022-01-02 | End: 2022-01-10 | Stop reason: HOSPADM

## 2022-01-02 RX ADMIN — CEFTRIAXONE 1 G: 1 INJECTION, POWDER, FOR SOLUTION INTRAMUSCULAR; INTRAVENOUS at 17:55

## 2022-01-02 RX ADMIN — IPRATROPIUM BROMIDE AND ALBUTEROL SULFATE 3 ML: .5; 3 SOLUTION RESPIRATORY (INHALATION) at 15:35

## 2022-01-02 RX ADMIN — SODIUM CHLORIDE, PRESERVATIVE FREE 10 ML: 5 INJECTION INTRAVENOUS at 22:40

## 2022-01-02 RX ADMIN — METHYLPREDNISOLONE SODIUM SUCCINATE 125 MG: 125 INJECTION, POWDER, FOR SOLUTION INTRAMUSCULAR; INTRAVENOUS at 15:57

## 2022-01-02 RX ADMIN — IPRATROPIUM BROMIDE AND ALBUTEROL SULFATE 3 ML: .5; 2.5 SOLUTION RESPIRATORY (INHALATION) at 20:00

## 2022-01-02 RX ADMIN — AZITHROMYCIN MONOHYDRATE 500 MG: 500 INJECTION, POWDER, LYOPHILIZED, FOR SOLUTION INTRAVENOUS at 17:55

## 2022-01-02 RX ADMIN — PIPERACILLIN SODIUM AND TAZOBACTAM SODIUM 3.38 G: 3; .375 INJECTION, POWDER, LYOPHILIZED, FOR SOLUTION INTRAVENOUS at 19:28

## 2022-01-02 RX ADMIN — METHYLPREDNISOLONE SODIUM SUCCINATE 40 MG: 40 INJECTION, POWDER, FOR SOLUTION INTRAMUSCULAR; INTRAVENOUS at 18:03

## 2022-01-02 RX ADMIN — FUROSEMIDE 40 MG: 10 INJECTION, SOLUTION INTRAMUSCULAR; INTRAVENOUS at 21:30

## 2022-01-02 NOTE — ED PROVIDER NOTES
EMERGENCY DEPARTMENT HISTORY AND PHYSICAL EXAM      Date: 1/2/2022  Patient Name: Erickson Solorzano      History of Presenting Illness     Chief Complaint   Patient presents with    Shortness of Breath       History Provided By: Patient and EMS    HPI: Erickson Solorzano, 80 y.o. male with PMH of CHF, COPD, HTN, prostate cancer and recently diagnosed UTI on antibiotic presents to the ED with acute onset of shortness of breath has been worsening over the last 2 days that is worsened by exertion and no relieving factors with no associated chest pain. Patient reporting coughing with last several days. He does have lower extremity edema and that has not worsened from baseline. Reports compliance with his medications. There is no runny nose, congestion, fever, chest pain, abdominal pain or vomiting. There are no other complaints, changes, or physical findings at this time.     PCP: Unknown (Inactive)    Current Facility-Administered Medications   Medication Dose Route Frequency Provider Last Rate Last Admin    azithromycin (ZITHROMAX) 500 mg in 0.9% sodium chloride 250 mL (VIAL-MATE)  500 mg IntraVENous ONCE Al Alina Jeffery  mL/hr at 01/02/22 1755 500 mg at 01/02/22 1755    piperacillin-tazobactam (ZOSYN) 3.375 g in 0.9% sodium chloride (MBP/ADV) 100 mL MBP  3.375 g IntraVENous Q8H Anna Baldwin MD        methylPREDNISolone (PF) (SOLU-MEDROL) injection 40 mg  40 mg IntraVENous Q6H Jaison Cortez MD   40 mg at 01/02/22 1803    albuterol-ipratropium (DUO-NEB) 2.5 MG-0.5 MG/3 ML  3 mL Nebulization Q4H PRN Jaison Cortez MD        budesonide-formoteroL (SYMBICORT) 160-4.5 mcg/actuation HFA inhaler 2 Puff  2 Puff Inhalation BID RT Jaison Cortez MD        sodium chloride (NS) flush 5-40 mL  5-40 mL IntraVENous Q8H Jaison Cortez MD        sodium chloride (NS) flush 5-40 mL  5-40 mL IntraVENous PRN Jaison Cortez MD        acetaminophen (TYLENOL) tablet 650 mg  650 mg Oral Q6H PRN Alberto Sanchez MD        Or    acetaminophen (TYLENOL) suppository 650 mg  650 mg Rectal Q6H PRN Alberto Sanchez MD        polyethylene glycol (MIRALAX) packet 17 g  17 g Oral DAILY PRN Alberto Sanchez MD        ondansetron (ZOFRAN ODT) tablet 4 mg  4 mg Oral Q8H PRN Alberto Sanchez MD        Or    ondansetron [facility]) injection 4 mg  4 mg IntraVENous Q6H PRN Alberto Sanchez MD        heparin (porcine) injection 5,000 Units  5,000 Units SubCUTAneous Q8H Alberto Sanchez MD       Citlalli Serrano ON 1/3/2022] azithromycin (ZITHROMAX) 500 mg in 0.9% sodium chloride 250 mL (VIAL-MATE)  500 mg IntraVENous Q24H Alberto Sanchez MD           Past History     Past Medical History:  Past Medical History:   Diagnosis Date    CHF (congestive heart failure) (HCC)     COPD (chronic obstructive pulmonary disease) (HCC)     HTN (hypertension)     Prostate CA [facility])        Past Surgical History:  No past surgical history on file. Family History:  No family history on file. Social History:  Social History     Tobacco Use    Smoking status: Former Smoker    Smokeless tobacco: Never Used   Substance Use Topics    Alcohol use: Never    Drug use: Never       Allergies:  No Known Allergies      Review of Systems     Review of Systems   Constitutional: Negative for diaphoresis and fatigue. HENT: Negative for congestion and rhinorrhea. Eyes: Negative for discharge and redness. Respiratory: Positive for shortness of breath. Negative for chest tightness. Cardiovascular: Positive for leg swelling. Negative for chest pain. Gastrointestinal: Negative for abdominal pain and vomiting. Genitourinary: Negative for flank pain and hematuria. Musculoskeletal: Negative for back pain and gait problem. Skin: Negative for color change and pallor. Neurological: Negative for seizures and headaches. Physical Exam     Physical Exam  Constitutional:       General: He is in acute distress. Appearance: Normal appearance. HENT:      Head: Normocephalic and atraumatic. Eyes:      Extraocular Movements: Extraocular movements intact. Conjunctiva/sclera: Conjunctivae normal.   Cardiovascular:      Rate and Rhythm: Normal rate and regular rhythm. Pulmonary:      Effort: Tachypnea and respiratory distress present. No accessory muscle usage. Breath sounds: Wheezing present. Abdominal:      General: Abdomen is flat. Palpations: Abdomen is soft. Tenderness: There is no abdominal tenderness. Musculoskeletal:         General: No tenderness or deformity. Cervical back: Normal range of motion and neck supple. Right lower leg: No tenderness. Edema present. Left lower leg: No tenderness. Edema present. Neurological:      General: No focal deficit present. Mental Status: He is alert and oriented to person, place, and time. Lab and Diagnostic Study Results     Labs -     Recent Results (from the past 12 hour(s))   CBC WITH AUTOMATED DIFF    Collection Time: 01/02/22  3:45 PM   Result Value Ref Range    WBC 9.1 4.1 - 11.1 K/uL    RBC 3.98 (L) 4.10 - 5.70 M/uL    HGB 13.4 12.1 - 17.0 g/dL    HCT 41.7 36.6 - 50.3 %    .8 (H) 80.0 - 99.0 FL    MCH 33.7 26.0 - 34.0 PG    MCHC 32.1 30.0 - 36.5 g/dL    RDW 13.5 11.5 - 14.5 %    PLATELET 924 399 - 448 K/uL    MPV 10.1 8.9 - 12.9 FL    NRBC 0.0 0.0  WBC    ABSOLUTE NRBC 0.00 0.00 - 0.01 K/uL    NEUTROPHILS 83 (H) 32 - 75 %    LYMPHOCYTES 6 (L) 12 - 49 %    MONOCYTES 11 5 - 13 %    EOSINOPHILS 0 0 - 7 %    BASOPHILS 0 0 - 1 %    IMMATURE GRANULOCYTES 0 0 - 0.5 %    ABS. NEUTROPHILS 7.5 1.8 - 8.0 K/UL    ABS. LYMPHOCYTES 0.5 (L) 0.8 - 3.5 K/UL    ABS. MONOCYTES 1.0 0.0 - 1.0 K/UL    ABS. EOSINOPHILS 0.0 0.0 - 0.4 K/UL    ABS. BASOPHILS 0.0 0.0 - 0.1 K/UL    ABS. IMM.  GRANS. 0.0 0.00 - 0.04 K/UL    DF AUTOMATED     METABOLIC PANEL, COMPREHENSIVE    Collection Time: 01/02/22  3:45 PM   Result Value Ref Range Sodium 137 136 - 145 mmol/L    Potassium 5.6 (H) 3.5 - 5.1 mmol/L    Chloride 106 97 - 108 mmol/L    CO2 27 21 - 32 mmol/L    Anion gap 4 (L) 5 - 15 mmol/L    Glucose 123 (H) 65 - 100 mg/dL    BUN 31 (H) 6 - 20 mg/dL    Creatinine 1.57 (H) 0.70 - 1.30 mg/dL    BUN/Creatinine ratio 20 12 - 20      GFR est AA 50 (L) >60 ml/min/1.73m2    GFR est non-AA 42 (L) >60 ml/min/1.73m2    Calcium 8.6 8.5 - 10.1 mg/dL    Bilirubin, total 0.8 0.2 - 1.0 mg/dL    AST (SGOT) 42 (H) 15 - 37 U/L    ALT (SGPT) 20 12 - 78 U/L    Alk. phosphatase 77 45 - 117 U/L    Protein, total 7.8 6.4 - 8.2 g/dL    Albumin 3.3 (L) 3.5 - 5.0 g/dL    Globulin 4.5 (H) 2.0 - 4.0 g/dL    A-G Ratio 0.7 (L) 1.1 - 2.2     NT-PRO BNP    Collection Time: 01/02/22  3:45 PM   Result Value Ref Range    NT pro-BNP 1,944 (H) <450 pg/mL   SARS-COV-2    Collection Time: 01/02/22  3:45 PM   Result Value Ref Range    SARS-CoV-2 Nasopharyngeal     INFLUENZA A & B AG (RAPID TEST)    Collection Time: 01/02/22  3:45 PM   Result Value Ref Range    Influenza A Antigen Negative Negative      Influenza B Antigen Negative Negative     COVID-19 RAPID TEST    Collection Time: 01/02/22  3:45 PM   Result Value Ref Range    Specimen source Nasopharyngeal      COVID-19 rapid test Not Detected Not Detected     BLOOD GAS, ARTERIAL    Collection Time: 01/02/22  5:43 PM   Result Value Ref Range    pH 7.36 7.35 - 7.45      PCO2 48 (H) 35 - 45 mmHg    PO2 88 75 - 100 mmHg    O2 SAT 97 >95 %    BICARBONATE 25 22 - 26 mmol/L    BASE EXCESS 0.9 0 - 2 mmol/L    O2 METHOD Non-invasive ventilation      FIO2 50.0 %    MODE PENDING     IPAP/PIP 16      EPAP/CPAP/PEEP 6      SITE Right Radial      OLIVIA'S TEST PASS         Radiologic Studies -   [unfilled]  CT Results  (Last 48 hours)    None        CXR Results  (Last 48 hours)               01/02/22 1551  XR CHEST PORT Final result    Impression:  Faint patchy airspace disease within the right lung suspicious for   pneumonia. Narrative:  AP upright view of the chest compared to prior exam dated 11/24/2020. Elevation   of the left hemidiaphragm is again noted. Cardiac silhouette is magnified by   projection and is not significantly changed. Thoracic aorta is tortuous. There   is patchy airspace disease within the right lung not seen previously. No   pneumothorax or pleural effusion is seen. Postoperative changes involving the   right shoulder are again noted. No acute bony abnormality is demonstrated. Medical Decision Making and ED Course   - I am the first and primary provider for this patient AND AM THE PRIMARY PROVIDER OF RECORD. - I reviewed the vital signs, available nursing notes, past medical history, past surgical history, family history and social history. - Initial assessment performed. The patients presenting problems have been discussed, and the staff are in agreement with the care plan formulated and outlined with them. I have encouraged them to ask questions as they arise throughout their visit. Vital Signs-Reviewed the patient's vital signs. Patient Vitals for the past 24 hrs:   Temp Pulse Resp BP SpO2   01/02/22 1636 -- (!) 107 (!) 35 95/81 94 %   01/02/22 1600 -- -- -- -- 94 %   01/02/22 1536 -- -- -- (!) 150/83 --   01/02/22 1528 98.7 °F (37.1 °C) 91 (!) 35 -- --       Records Reviewed: Nursing Notes    Provider Notes (Medical Decision Making):     Presented to ED with respiratory distress. Has diffuse wheezing throughout lung exam with tachypnea but no accessory muscle use. I suspect this patient is having COPD exacerbation. Probably with history of CHF may be a component of both. So the plan is to obtain blood work, chest x-ray and start the patient with DuoNeb and intravenous methylprednisone and reassess the patient. Currently he is on oxygen and is connected to a cardiac monitor. He does have bilateral lower extremity swelling but reports no worsening from baseline.     ED Course: Chest x-ray showing findings consistent with pneumonia. Patient has some improvement with DuoNeb. However, I did note that the patient is still tachypneic and has some wheezing. Thus, started on BiPAP due to persistence of respiratory distress and will continue treatment with albuterol. Additionally, we will start the patient ceftriaxone azithromycin. Patient will need to be admitted to the hospital.          Disposition     Disposition: Condition stable  Admitted to Floor Medical Floor the case was discussed with the admitting physician Dr. Marley Rivera    Admitted      Diagnosis     Clinical Impression:   1. Community acquired pneumonia, unspecified laterality    2. Chronic obstructive pulmonary disease, unspecified [facility]        Attestations: Aakash Yanez MD    Please note that this dictation was completed with [employer], the computer voice recognition software. Quite often unanticipated grammatical, syntax, homophones, and other interpretive errors are inadvertently transcribed by the computer software. Please disregard these errors. Please excuse any errors that have escaped final proofreading. Thank you.

## 2022-01-02 NOTE — ED NOTES
Care assumed and bedside SBAR report endorsed on [place]. Pt cardiac monitoring continued , spO2 Monitoring continued, IV reassed, call bell within reach, side rails up x2, resting comfortable but easily arousable, no signs of acute distress. , bed in lowest position, MAR reviewed, Labs reviewed, will continue to monitor

## 2022-01-03 ENCOUNTER — APPOINTMENT (OUTPATIENT)
Dept: NON INVASIVE DIAGNOSTICS | Age: 87
DRG: 291 | End: 2022-01-03
Attending: INTERNAL MEDICINE
Payer: MEDICARE

## 2022-01-03 ENCOUNTER — APPOINTMENT (OUTPATIENT)
Dept: GENERAL RADIOLOGY | Age: 87
DRG: 291 | End: 2022-01-03
Attending: INTERNAL MEDICINE
Payer: MEDICARE

## 2022-01-03 LAB
ANION GAP SERPL CALC-SCNC: 5 MMOL/L (ref 5–15)
BUN SERPL-MCNC: 33 MG/DL (ref 6–20)
BUN/CREAT SERPL: 21 (ref 12–20)
CA-I BLD-MCNC: 7.9 MG/DL (ref 8.5–10.1)
CHLORIDE SERPL-SCNC: 106 MMOL/L (ref 97–108)
CO2 SERPL-SCNC: 29 MMOL/L (ref 21–32)
CREAT SERPL-MCNC: 1.58 MG/DL (ref 0.7–1.3)
ECHO AV MEAN GRADIENT: 4 MMHG
ECHO AV MEAN VELOCITY: 0.9 M/S
ECHO AV PEAK GRADIENT: 9 MMHG
ECHO AV PEAK VELOCITY: 1.5 M/S
ECHO AV VELOCITY RATIO: 0.93
ECHO AV VTI: 32.2 CM
ECHO LA AREA 2C: 17.5 CM2
ECHO LA AREA 4C: 16.5 CM2
ECHO LA MAJOR AXIS: 5.2 CM
ECHO LA MINOR AXIS: 6.2 CM
ECHO LA VOL BP: 35 ML (ref 18–58)
ECHO LA VOL/BSA BIPLANE: 18 ML/M2 (ref 16–34)
ECHO LV E' LATERAL VELOCITY: 7 CM/S
ECHO LV E' SEPTAL VELOCITY: 4 CM/S
ECHO LV EDV A2C: 26 ML
ECHO LV EDV A4C: 48 ML
ECHO LV EDV BP: 37 ML (ref 67–155)
ECHO LV EDV INDEX A4C: 25 ML/M2
ECHO LV EDV INDEX BP: 19 ML/M2
ECHO LV EDV NDEX A2C: 13 ML/M2
ECHO LV EJECTION FRACTION A2C: 62 %
ECHO LV EJECTION FRACTION A4C: 75 %
ECHO LV EJECTION FRACTION BIPLANE: 70 % (ref 55–100)
ECHO LV ESV A2C: 9 ML
ECHO LV ESV A4C: 12 ML
ECHO LV ESV INDEX A2C: 5 ML/M2
ECHO LV ESV INDEX A4C: 6 ML/M2
ECHO LV FRACTIONAL SHORTENING: 27 % (ref 28–44)
ECHO LV INTERNAL DIMENSION DIASTOLE INDEX: 1.55 CM/M2
ECHO LV INTERNAL DIMENSION DIASTOLIC: 3 CM (ref 4.2–5.9)
ECHO LV INTERNAL DIMENSION SYSTOLIC INDEX: 1.14 CM/M2
ECHO LV INTERNAL DIMENSION SYSTOLIC: 2.2 CM
ECHO LV IVSD: 1.1 CM (ref 0.6–1)
ECHO LV MASS 2D: 82.1 G (ref 88–224)
ECHO LV MASS INDEX 2D: 42.6 G/M2 (ref 49–115)
ECHO LV POSTERIOR WALL DIASTOLIC: 0.9 CM (ref 0.6–1)
ECHO LV RELATIVE WALL THICKNESS RATIO: 0.6
ECHO LVOT AV VTI INDEX: 0.88
ECHO LVOT MEAN GRADIENT: 4 MMHG
ECHO LVOT PEAK GRADIENT: 7 MMHG
ECHO LVOT PEAK VELOCITY: 1.4 M/S
ECHO LVOT VTI: 28.3 CM
ECHO MV A VELOCITY: 0.96 M/S
ECHO MV E VELOCITY: 1.12 M/S
ECHO MV E/A RATIO: 1.17
ECHO MV E/E' LATERAL: 16
ECHO MV E/E' RATIO (AVERAGED): 22
ECHO MV E/E' SEPTAL: 28
ECHO MV LVOT VTI INDEX: 1.46
ECHO MV MAX VELOCITY: 1.2 M/S
ECHO MV MEAN GRADIENT: 2 MMHG
ECHO MV MEAN VELOCITY: 0.7 M/S
ECHO MV PEAK GRADIENT: 6 MMHG
ECHO MV REGURGITANT PEAK GRADIENT: 27 MMHG
ECHO MV REGURGITANT PEAK VELOCITY: 2.6 M/S
ECHO MV VTI: 41.3 CM
ECHO RV TAPSE: 2.1 CM (ref 1.5–2)
ECHO TV REGURGITANT MAX VELOCITY: 3.31 M/S
ECHO TV REGURGITANT PEAK GRADIENT: 44 MMHG
ERYTHROCYTE [DISTWIDTH] IN BLOOD BY AUTOMATED COUNT: 13.5 % (ref 11.5–14.5)
GLUCOSE BLD STRIP.AUTO-MCNC: 100 MG/DL (ref 65–117)
GLUCOSE SERPL-MCNC: 131 MG/DL (ref 65–100)
HCT VFR BLD AUTO: 37.4 % (ref 36.6–50.3)
HGB BLD-MCNC: 11.9 G/DL (ref 12.1–17)
MAGNESIUM SERPL-MCNC: 2.4 MG/DL (ref 1.6–2.4)
MCH RBC QN AUTO: 33.1 PG (ref 26–34)
MCHC RBC AUTO-ENTMCNC: 31.8 G/DL (ref 30–36.5)
MCV RBC AUTO: 104.2 FL (ref 80–99)
NRBC # BLD: 0 K/UL (ref 0–0.01)
NRBC BLD-RTO: 0 PER 100 WBC
PERFORMED BY, TECHID: NORMAL
PHOSPHATE SERPL-MCNC: 3.1 MG/DL (ref 2.6–4.7)
PLATELET # BLD AUTO: 150 K/UL (ref 150–400)
PMV BLD AUTO: 9.7 FL (ref 8.9–12.9)
POTASSIUM SERPL-SCNC: 4 MMOL/L (ref 3.5–5.1)
RBC # BLD AUTO: 3.59 M/UL (ref 4.1–5.7)
SODIUM SERPL-SCNC: 140 MMOL/L (ref 136–145)
WBC # BLD AUTO: 7.2 K/UL (ref 4.1–11.1)

## 2022-01-03 PROCEDURE — 71045 X-RAY EXAM CHEST 1 VIEW: CPT

## 2022-01-03 PROCEDURE — 94640 AIRWAY INHALATION TREATMENT: CPT

## 2022-01-03 PROCEDURE — 93306 TTE W/DOPPLER COMPLETE: CPT

## 2022-01-03 PROCEDURE — 94660 CPAP INITIATION&MGMT: CPT

## 2022-01-03 PROCEDURE — 85027 COMPLETE CBC AUTOMATED: CPT

## 2022-01-03 PROCEDURE — 80048 BASIC METABOLIC PNL TOTAL CA: CPT

## 2022-01-03 PROCEDURE — 74011000250 HC RX REV CODE- 250: Performed by: HOSPITALIST

## 2022-01-03 PROCEDURE — 74011000250 HC RX REV CODE- 250: Performed by: INTERNAL MEDICINE

## 2022-01-03 PROCEDURE — 74011000258 HC RX REV CODE- 258: Performed by: HOSPITALIST

## 2022-01-03 PROCEDURE — 83735 ASSAY OF MAGNESIUM: CPT

## 2022-01-03 PROCEDURE — 74011250636 HC RX REV CODE- 250/636: Performed by: HOSPITALIST

## 2022-01-03 PROCEDURE — 84100 ASSAY OF PHOSPHORUS: CPT

## 2022-01-03 PROCEDURE — 74011250636 HC RX REV CODE- 250/636: Performed by: INTERNAL MEDICINE

## 2022-01-03 PROCEDURE — 87086 URINE CULTURE/COLONY COUNT: CPT

## 2022-01-03 PROCEDURE — 82962 GLUCOSE BLOOD TEST: CPT

## 2022-01-03 PROCEDURE — 65270000029 HC RM PRIVATE

## 2022-01-03 PROCEDURE — 36415 COLL VENOUS BLD VENIPUNCTURE: CPT

## 2022-01-03 RX ADMIN — METHYLPREDNISOLONE SODIUM SUCCINATE 40 MG: 40 INJECTION, POWDER, FOR SOLUTION INTRAMUSCULAR; INTRAVENOUS at 11:30

## 2022-01-03 RX ADMIN — PIPERACILLIN SODIUM AND TAZOBACTAM SODIUM 3.38 G: 3; .375 INJECTION, POWDER, LYOPHILIZED, FOR SOLUTION INTRAVENOUS at 02:50

## 2022-01-03 RX ADMIN — AZITHROMYCIN MONOHYDRATE 500 MG: 500 INJECTION, POWDER, LYOPHILIZED, FOR SOLUTION INTRAVENOUS at 20:52

## 2022-01-03 RX ADMIN — PIPERACILLIN SODIUM AND TAZOBACTAM SODIUM 3.38 G: 3; .375 INJECTION, POWDER, LYOPHILIZED, FOR SOLUTION INTRAVENOUS at 20:54

## 2022-01-03 RX ADMIN — SODIUM CHLORIDE, PRESERVATIVE FREE 10 ML: 5 INJECTION INTRAVENOUS at 08:04

## 2022-01-03 RX ADMIN — IPRATROPIUM BROMIDE AND ALBUTEROL SULFATE 3 ML: .5; 2.5 SOLUTION RESPIRATORY (INHALATION) at 21:00

## 2022-01-03 RX ADMIN — SODIUM CHLORIDE, PRESERVATIVE FREE 10 ML: 5 INJECTION INTRAVENOUS at 13:40

## 2022-01-03 RX ADMIN — FUROSEMIDE 40 MG: 10 INJECTION, SOLUTION INTRAMUSCULAR; INTRAVENOUS at 13:41

## 2022-01-03 RX ADMIN — SODIUM CHLORIDE, PRESERVATIVE FREE 20 MG: 5 INJECTION INTRAVENOUS at 20:52

## 2022-01-03 RX ADMIN — IPRATROPIUM BROMIDE AND ALBUTEROL SULFATE 3 ML: .5; 2.5 SOLUTION RESPIRATORY (INHALATION) at 14:08

## 2022-01-03 RX ADMIN — SODIUM CHLORIDE, PRESERVATIVE FREE 10 ML: 5 INJECTION INTRAVENOUS at 22:00

## 2022-01-03 RX ADMIN — HEPARIN SODIUM 5000 UNITS: 5000 INJECTION INTRAVENOUS; SUBCUTANEOUS at 22:17

## 2022-01-03 RX ADMIN — HEPARIN SODIUM 5000 UNITS: 5000 INJECTION INTRAVENOUS; SUBCUTANEOUS at 08:05

## 2022-01-03 RX ADMIN — IPRATROPIUM BROMIDE AND ALBUTEROL SULFATE 3 ML: .5; 2.5 SOLUTION RESPIRATORY (INHALATION) at 02:00

## 2022-01-03 RX ADMIN — METHYLPREDNISOLONE SODIUM SUCCINATE 40 MG: 40 INJECTION, POWDER, FOR SOLUTION INTRAMUSCULAR; INTRAVENOUS at 20:53

## 2022-01-03 RX ADMIN — HEPARIN SODIUM 5000 UNITS: 5000 INJECTION INTRAVENOUS; SUBCUTANEOUS at 17:17

## 2022-01-03 RX ADMIN — PIPERACILLIN SODIUM AND TAZOBACTAM SODIUM 3.38 G: 3; .375 INJECTION, POWDER, LYOPHILIZED, FOR SOLUTION INTRAVENOUS at 11:27

## 2022-01-03 RX ADMIN — IPRATROPIUM BROMIDE AND ALBUTEROL SULFATE 3 ML: .5; 3 SOLUTION RESPIRATORY (INHALATION) at 08:58

## 2022-01-03 RX ADMIN — METHYLPREDNISOLONE SODIUM SUCCINATE 40 MG: 40 INJECTION, POWDER, FOR SOLUTION INTRAMUSCULAR; INTRAVENOUS at 00:45

## 2022-01-03 NOTE — ED NOTES
Pt cleaned up. New male purwick placed. Pt placed on waffle mattress. No other needs at this time.  Tolerating bipap well

## 2022-01-03 NOTE — CONSULTS
Pulmonology and Critical Care Consult    Subjective:   Consult Note: 1/2/2022 8:43 PM    Chief Complaint:   Chief Complaint   Patient presents with    Shortness of Breath        This patient has been seen and evaluated at the request of Dr. Levy Lagos. Patient is a 72-year-old  male with a history of CHF (EF unknown), remote prostate cancer, and hypertension who gets most of his care through the [facility] and presented to the hospital with wheezing, altered mental status, and shortness of breath requiring BiPAP therapy. History is mainly limited to the chart as the patient is confused on BiPAP therapy. He does wake up and tries to answer questions but I cannot really understand what he saying. His COVID-19 testing is negative, influenza screen negative, CBC normal, BMP showing a potassium level 5.6 and a creatinine about 1.57, proBNP level 1944, ABG 7.36/48/88. Currently on BiPAP 14/8/50% with a sat of 88%, I increased his FiO2 to 60% in response. On exam he has significant lower extremity edema, likely a lot of his issues are related to acute on chronic heart failure. I will start him on Lasix 40 mg IV every 8 hours along with strict I's/O's and daily weights. Continue to monitor his renal function, his acute kidney injury is more likely related to cardiorenal physiology as opposed to him being dry. His urinalysis shows a large leuk esterase, greater than 100 white blood cells, and positive nitrates all consistent with a UTI, agree with IV Zosyn. Also added IV azithromycin for possible COPD exacerbation, primary team added Solu-Medrol 40 mg IV every 6 hours and I will start him on every 6 hours duo nebs. I will check a TTE as well. Admit to the ICU as he is critically ill. Repeat ABG and chest x-ray in the morning. His chest x-ray on admission showed right greater than left patchy airspace disease which is increased from his previous chest x-ray. Of note, patient is DNR/DNI.       Past Medical History:   Diagnosis Date    CHF (congestive heart failure) (HCC)     COPD (chronic obstructive pulmonary disease) (HCC)     HTN (hypertension)     Prostate CA (HCC)      No past surgical history on file. No family history on file.   Social History     Tobacco Use    Smoking status: Former Smoker    Smokeless tobacco: Never Used   Substance Use Topics    Alcohol use: Never      Current Facility-Administered Medications   Medication Dose Route Frequency Provider Last Rate Last Admin    piperacillin-tazobactam (ZOSYN) 3.375 g in 0.9% sodium chloride (MBP/ADV) 100 mL MBP  3.375 g IntraVENous Q8H Jessica Whittaker MD   IV Completed at 01/02/22 2020    methylPREDNISolone (PF) (SOLU-MEDROL) injection 40 mg  40 mg IntraVENous Q6H Jessica Whittaker MD   40 mg at 01/02/22 1803    albuterol-ipratropium (DUO-NEB) 2.5 MG-0.5 MG/3 ML  3 mL Nebulization Q4H PRN Jessica Whittaker MD        budesonide-formoteroL (SYMBICORT) 160-4.5 mcg/actuation HFA inhaler 2 Puff  2 Puff Inhalation BID RT Jessica Whittaker MD        sodium chloride (NS) flush 5-40 mL  5-40 mL IntraVENous Q8H Jessica Whittaker MD        sodium chloride (NS) flush 5-40 mL  5-40 mL IntraVENous PRN Jessica Whittaker MD        acetaminophen (TYLENOL) tablet 650 mg  650 mg Oral Q6H PRN Jessica Whittaker MD        Or    acetaminophen (TYLENOL) suppository 650 mg  650 mg Rectal Q6H PRN Jessica Whittaker MD        polyethylene glycol (MIRALAX) packet 17 g  17 g Oral DAILY PRN Jessica Whittaker MD        ondansetron (ZOFRAN ODT) tablet 4 mg  4 mg Oral Q8H PRN Jessica Whittaker MD        Or    ondansetron [facility]) injection 4 mg  4 mg IntraVENous Q6H PRN Jessica Whittaker MD        heparin (porcine) injection 5,000 Units  5,000 Units SubCUTAneous Tyrese David MD       [facility] ON 1/3/2022] azithromycin (ZITHROMAX) 500 mg in 0.9% sodium chloride 250 mL (VIAL-MATE)  500 mg IntraVENous Q24H Jessica Whittaker MD       [facility] albuterol-ipratropium (DUO-NEB) 2.5 MG-0.5 MG/3 ML  3 mL Nebulization Q6H RT Yadira Chi,             Home Meds:  No current facility-administered medications on file prior to encounter. No current outpatient medications on file prior to encounter. No Known Allergies    Review of Systems:  Review of systems not obtained due to patient factors. Objective:     Blood pressure 106/60, pulse 79, temperature 98.7 °F (37.1 °C), resp. rate 27, height 5' 8\" (1.727 m), weight 79.4 kg (175 lb), SpO2 99 %. Temp (24hrs), Av.7 °F (37.1 °C), Min:98.7 °F (37.1 °C), Max:98.7 °F (37.1 °C)      Intake and Output:  Current Shift: 1901 -  0700  In: 100 [I.V.:100]  Out: -   Last 3 Shifts: 701 - 1900  In: 250 [I.V.:250]  Out: -     Physical Exam:   General appearance: no distress, appears older than stated age, mildly obese, confused, disoriented  Head: Normocephalic, without obvious abnormality, atraumatic  Eyes: negative  Neck: supple, symmetrical, trachea midline and no adenopathy  Lungs: Mild rhonchi bilaterally, prolonged exhalation phase, currently on BiPAP  Heart: regular rate and rhythm, S1, S2 normal, no murmur, click, rub or gallop  Abdomen: soft, non-tender. Bowel sounds normal. No masses,  no organomegaly  Extremities: extremities normal, atraumatic, no cyanosis, +3 pitting edema in lower extremities bilaterally  Pulses: 2+ and symmetric  Skin: Skin color, texture, turgor normal. No rashes or lesions  Lymph nodes: Cervical, supraclavicular, and axillary nodes normal.  Neurologic: Altered mental status, does wake up to verbal stimulation however    Additional comments:None    Lab/Data Review: All lab results for the last 24 hours reviewed.   Recent Results (from the past 24 hour(s))   CBC WITH AUTOMATED DIFF    Collection Time: 22  3:45 PM   Result Value Ref Range    WBC 9.1 4.1 - 11.1 K/uL    RBC 3.98 (L) 4.10 - 5.70 M/uL    HGB 13.4 12.1 - 17.0 g/dL    HCT 41.7 36.6 - 50.3 % .8 (H) 80.0 - 99.0 FL    MCH 33.7 26.0 - 34.0 PG    MCHC 32.1 30.0 - 36.5 g/dL    RDW 13.5 11.5 - 14.5 %    PLATELET 755 934 - 232 K/uL    MPV 10.1 8.9 - 12.9 FL    NRBC 0.0 0.0  WBC    ABSOLUTE NRBC 0.00 0.00 - 0.01 K/uL    NEUTROPHILS 83 (H) 32 - 75 %    LYMPHOCYTES 6 (L) 12 - 49 %    MONOCYTES 11 5 - 13 %    EOSINOPHILS 0 0 - 7 %    BASOPHILS 0 0 - 1 %    IMMATURE GRANULOCYTES 0 0 - 0.5 %    ABS. NEUTROPHILS 7.5 1.8 - 8.0 K/UL    ABS. LYMPHOCYTES 0.5 (L) 0.8 - 3.5 K/UL    ABS. MONOCYTES 1.0 0.0 - 1.0 K/UL    ABS. EOSINOPHILS 0.0 0.0 - 0.4 K/UL    ABS. BASOPHILS 0.0 0.0 - 0.1 K/UL    ABS. IMM. GRANS. 0.0 0.00 - 0.04 K/UL    DF AUTOMATED     METABOLIC PANEL, COMPREHENSIVE    Collection Time: 01/02/22  3:45 PM   Result Value Ref Range    Sodium 137 136 - 145 mmol/L    Potassium 5.6 (H) 3.5 - 5.1 mmol/L    Chloride 106 97 - 108 mmol/L    CO2 27 21 - 32 mmol/L    Anion gap 4 (L) 5 - 15 mmol/L    Glucose 123 (H) 65 - 100 mg/dL    BUN 31 (H) 6 - 20 mg/dL    Creatinine 1.57 (H) 0.70 - 1.30 mg/dL    BUN/Creatinine ratio 20 12 - 20      GFR est AA 50 (L) >60 ml/min/1.73m2    GFR est non-AA 42 (L) >60 ml/min/1.73m2    Calcium 8.6 8.5 - 10.1 mg/dL    Bilirubin, total 0.8 0.2 - 1.0 mg/dL    AST (SGOT) 42 (H) 15 - 37 U/L    ALT (SGPT) 20 12 - 78 U/L    Alk.  phosphatase 77 45 - 117 U/L    Protein, total 7.8 6.4 - 8.2 g/dL    Albumin 3.3 (L) 3.5 - 5.0 g/dL    Globulin 4.5 (H) 2.0 - 4.0 g/dL    A-G Ratio 0.7 (L) 1.1 - 2.2     NT-PRO BNP    Collection Time: 01/02/22  3:45 PM   Result Value Ref Range    NT pro-BNP 1,944 (H) <450 pg/mL   SARS-COV-2    Collection Time: 01/02/22  3:45 PM   Result Value Ref Range    SARS-CoV-2 Nasopharyngeal     INFLUENZA A & B AG (RAPID TEST)    Collection Time: 01/02/22  3:45 PM   Result Value Ref Range    Influenza A Antigen Negative Negative      Influenza B Antigen Negative Negative     COVID-19 RAPID TEST    Collection Time: 01/02/22  3:45 PM   Result Value Ref Range    Specimen source Nasopharyngeal      COVID-19 rapid test Not Detected Not Detected     BLOOD GAS, ARTERIAL    Collection Time: 01/02/22  5:43 PM   Result Value Ref Range    pH 7.36 7.35 - 7.45      PCO2 48 (H) 35 - 45 mmHg    PO2 88 75 - 100 mmHg    O2 SAT 97 >95 %    BICARBONATE 25 22 - 26 mmol/L    BASE EXCESS 0.9 0 - 2 mmol/L    O2 METHOD Non-invasive ventilation      FIO2 50.0 %    MODE PENDING     IPAP/PIP 16      EPAP/CPAP/PEEP 6      SITE Right Radial      OLIVIA'S TEST PASS     TROPONIN-HIGH SENSITIVITY    Collection Time: 01/02/22  7:34 PM   Result Value Ref Range    Troponin-High Sensitivity 47 0 - 76 ng/L         Chest X-Ray:   XR CHEST PORT   Final Result   Faint patchy airspace disease within the right lung suspicious for   pneumonia. CT HEAD WO CONT    (Results Pending)   XR CHEST PORT    (Results Pending)   XR CHEST PORT    (Results Pending)       CT imaging:  CT Results  (Last 48 hours)    None          PFTs:   PFT Results  (Last 3 results in the past 10 years)    None            Assessment:     Patient is a 51-year-old  male with a history of CHF (EF unknown), remote prostate cancer, and hypertension who gets most of his care through the [facility] and presented to the hospital with wheezing, altered mental status, and shortness of breath requiring BiPAP therapy.     Plan:     1.)  Acute hypoxic respiratory failure  -Likely combination of acute on chronic heart failure as well as acute exacerbation of COPD, targeted therapies as outlined below  -Doing well on the BiPAP 14/8/60%, hopefully can get him to nasal cannula  -Patient is critically ill, requires ICU level of care at this time  -DNR/DNI    2.)  Acute on chronic heart failure  -EF unknown  -I feel that this is likely the main etiology of his breathing issues given that he is significantly volume overloaded on exam.  -We will start the patient on Lasix 40 mg IV every 8 hours and monitor strict I's/O's and daily weights  -Continue BiPAP therapy for now but his gas actually looks pretty good, repeat a chest x-ray and an ABG in the morning.  -Hopefully can get him to nasal cannula in the morning  -TTE pending    3.)  Acute exacerbation of COPD  -Unclear history of smoking/COPD, but this is what is in the record, can ask him more once his mental status improves.   -Agree with duo nebs every 6 hours and Solu-Medrol 40 mg IV every 6 hours, agree with IV azithromycin for now  -Continue to monitor on BiPAP, repeat chest x-ray and an ABG in the morning    4.)  Acute kidney injury/hyperkalemia  -Creatinine 1.57, potassium of 5.6 on arrival.  Previous creatinine in June 2021 was 1.06.  -He appears volume overloaded on exam, likely cardiorenal physiology  -Diuresis as above  -Repeat BMP in the morning    5.)  UTI  -Urinalysis very suspect for urinary tract infection, likely contributing to altered mental status  -Urine culture pending  -Agree with IV biotic choices          CODE STATUS: DNR/DNI    Lines/Tubes: Peripheral IV x1    Prophylaxis:  Stress Ulcer Protocol Active: Yes  Deep Vein Thrombosis Protocol Active: Yes  Nutrition: NPO  Activity: Bedrest      Disposition and Family: Admit to the ICU    Total time spent with patient: 60 minutes      Nicole Cyr DO  [facility]  1/2/2022  8:43 PM

## 2022-01-03 NOTE — ED NOTES
Care assumed and bedside SBAR report endorsed on [address]. Pt cardiac monitoring continued , spO2 Monitoring continued, IV reassed, call bell within reach, side rails up x2, resting comfortable but easily arousable, no signs of acute distress. , bed in lowest position, MAR reviewed, Labs reviewed, will continue to monitor

## 2022-01-03 NOTE — PROGRESS NOTES
Reason for Admission:  Respiratory Failure                     RUR Score:  12%                   Plan for utilizing home health: None. Daughter signed Choice Letter for pt to discharge to ( [id number]  ( [phone]) - arranged by family for pt to have admitted this week). Facility not connected to [place] - documents to be faxed to  Xiang Guillaume @ [phone])         PCP: First and Last name:   [facility] @ [address] [place] [address]. Name of Practice:    Are you a current patient: Yes/No: Yes   Approximate date of last visit: Seen in October. Can you participate in a virtual visit with your PCP: Yes/Call                    Current Advanced Directive/Advance Care Plan: DNR      Healthcare Decision Maker:              Primary Decision Maker: Kapil Loredo Daughter - [phone]                  Transition of Care Plan: D/C Plan is to [place] [address] [id number])  & pt will need transportation.

## 2022-01-03 NOTE — PROGRESS NOTES
1/3/21. PCP is Dr. Nuvia Mcdonald @ [address] [place] [address] - seen in October. D/C Plan is for pt to transfer to [facility] @ [place] [address]. #  [phone] fax # [phone]. Daughter verbally consented to Choice Letter & pt aware of 2 week trail @ Asst living. Facility Liaison ( Opal Clarke ) working form home today - will fax needed document list to CM on 1/4/21. CM informed pt will need Covid test/results & PPD/CXR - Nsg informed. Pt from [address] [place] [address] toured/met with staff @ [facility] last week for planned admit this week. Pt uses no DME/no home health.

## 2022-01-03 NOTE — PROGRESS NOTES
Pulmonary/CC Progress Note  Patient is a 60-year-old  male with a history of CHF (EF unknown), remote prostate cancer, and hypertension who gets most of his care through the [address] [place] [address] and presented to the hospital with wheezing, altered mental status, and shortness of breath requiring BiPAP therapy. Subjective:   Daily Progress Note: 1/3/2022 10:27 AM    Patient examined in the ER is awaiting for a bed. He is currently on BiPAP. He looks portable without any significant distress. Alert and responding appropriately. ABG and chest x-ray results were reviewed    Current Facility-Administered Medications   Medication Dose Route Frequency    piperacillin-tazobactam (ZOSYN) 3.375 g in 0.9% sodium chloride (MBP/ADV) 100 mL MBP  3.375 g IntraVENous Q8H    methylPREDNISolone (PF) (SOLU-MEDROL) injection 40 mg  40 mg IntraVENous Q6H    albuterol-ipratropium (DUO-NEB) 2.5 MG-0.5 MG/3 ML  3 mL Nebulization Q4H PRN    sodium chloride (NS) flush 5-40 mL  5-40 mL IntraVENous Q8H    sodium chloride (NS) flush 5-40 mL  5-40 mL IntraVENous PRN    acetaminophen (TYLENOL) tablet 650 mg  650 mg Oral Q6H PRN    Or    acetaminophen (TYLENOL) suppository 650 mg  650 mg Rectal Q6H PRN    polyethylene glycol (MIRALAX) packet 17 g  17 g Oral DAILY PRN    ondansetron (ZOFRAN ODT) tablet 4 mg  4 mg Oral Q8H PRN    Or    ondansetron (ZOFRAN) injection 4 mg  4 mg IntraVENous Q6H PRN    heparin (porcine) injection 5,000 Units  5,000 Units SubCUTAneous Q8H    azithromycin (ZITHROMAX) 500 mg in 0.9% sodium chloride 250 mL (VIAL-MATE)  500 mg IntraVENous Q24H    albuterol-ipratropium (DUO-NEB) 2.5 MG-0.5 MG/3 ML  3 mL Nebulization Q6H RT    furosemide (LASIX) injection 40 mg  40 mg IntraVENous Q8H    famotidine (PF) (PEPCID) 20 mg in 0.9% sodium chloride 10 mL injection  20 mg IntraVENous Q24H     No current outpatient medications on file. Review of Systems  Omitted review of system. Patient is alert.   Denied any chest pains. Objective:     Visit Vitals  BP (!) 91/50   Pulse 65   Temp 98.7 °F (37.1 °C)   Resp 20   Ht 5' 8\" (1.727 m)   Wt 79.4 kg (175 lb)   SpO2 (!) 71%   BMI 26.61 kg/m²      O2 Device: BIPAP    Temp (24hrs), Av.7 °F (37.1 °C), Min:98.7 °F (37.1 °C), Max:98.7 °F (37.1 °C)      No intake/output data recorded.  1901 -  0700  In: 350 [I.V.:350]  Out: -     General appearance: no distress, appears older than stated age, mildly obese, confused, disoriented  Head: Normocephalic, without obvious abnormality, atraumatic  Eyes: negative  Neck: supple, symmetrical, trachea midline and no adenopathy  Lungs: Mild rhonchi bilaterally, prolonged exhalation phase, currently on BiPAP  Heart: regular rate and rhythm, S1, S2 normal, no murmur, click, rub or gallop  Abdomen: soft, non-tender. Bowel sounds normal. No masses,  no organomegaly  Extremities: extremities normal, atraumatic, no cyanosis, +3 pitting edema in lower extremities bilaterally  Pulses: 2+ and symmetric  Skin: Skin color, texture, turgor normal. No rashes or lesions  Lymph nodes: Cervical, supraclavicular, and axillary nodes normal.  Neurologic: Altered mental status, does wake up to verbal stimulation however    Lab/Data Review:  Recent Labs     22  0728 22  1545   WBC 7.2 9.1   HGB 11.9* 13.4   HCT 37.4 41.7    172     Recent Labs     22  0728 22  1545    137   K 4.0 5.6*    106   CO2 29 27   * 123*   BUN 33* 31*   CREA 1.58* 1.57*   CA 7.9* 8.6   MG 2.4  --    PHOS 3.1  --    ALB  --  3.3*   TBILI  --  0.8   ALT  --  20     Recent Labs     22  1743   PH 7.36   PCO2 48*   PO2 88   HCO3 25   FIO2 50.0         Diagnostics   CXR Results  (Last 48 hours)               22 0611  XR CHEST PORT Final result    Impression:  Decreased lung volumes. No significant interval change otherwise. Narrative:  Chest, frontal view, 1/3/2022       History: BiPAP support. COPD. Comparison: Including chest 1/2/2022. Findings: The cardiac silhouette is stable. The lungs remain underexpanded with   bibasilar atelectasis and crowding of bronchovascular structures. Lung volumes   are decreased as compared to the study performed one day prior. Elevation of   the left hemidiaphragm is again noted. Hydrostatic edema is not excluded. No   pleural effusion or pneumothorax is identified. The osseous structures are   stable. 01/02/22 1551  XR CHEST PORT Final result    Impression:  Faint patchy airspace disease within the right lung suspicious for   pneumonia. Narrative:  AP upright view of the chest compared to prior exam dated 11/24/2020. Elevation   of the left hemidiaphragm is again noted. Cardiac silhouette is magnified by   projection and is not significantly changed. Thoracic aorta is tortuous. There   is patchy airspace disease within the right lung not seen previously. No   pneumothorax or pleural effusion is seen. Postoperative changes involving the   right shoulder are again noted. No acute bony abnormality is demonstrated. Assessment/Plan:     Active Problems:    Respiratory failure ([address] (1/2/2022)    Patient is a 80-year-old  male with a history of CHF (EF unknown), remote prostate cancer, and hypertension who gets most of his care through the [facility] and presented to the hospital with wheezing, altered mental status, and shortness of breath requiring BiPAP therapy.     Plan:      1.)  Acute hypoxic respiratory failure  -Likely combination of acute on chronic heart failure as well as acute exacerbation of COPD, targeted therapies as outlined below  -Doing well on the BiPAP 14/8/60%, hopefully can get nasal cannula oxygen and nasal cannula  -Patient is critically ill, requires ICU level of care at this time  Once patient is moved to ICU then will switch him to nasal cannula oxygen and assess his response.   -DNR/DNI     2.) Acute on chronic heart failure  -EF unknown  -I feel that this is likely the main etiology of his breathing issues given that he is significantly volume overloaded on exam.  -Got started on diuretics every 8 hours. monitor strict I's/O's and daily weights  -Continue BiPAP therapy for now but his gas actually looks pretty good, repeat a chest x-ray and an ABG in the morning.  -Hopefully can get him to nasal cannula later today   -TTE pending     3.)  Acute exacerbation of COPD  -Unclear history of smoking/COPD, but this is what is in the record, can ask him more once his mental status improves. -Agree with duo nebs every 6 hours and Solu-Medrol 40 mg IV every 6 hours, agree with IV azithromycin for now  -Continue to monitor on BiPAP, repeat chest x-ray and an ABG in the morning     4.)  Acute kidney injury/hyperkalemia  -Creatinine 1.58, potassium come down to 4 today. Previous creatinine in June 2021 was 1.06.  -He appears volume overloaded on exam, likely cardiorenal physiology  -Diuresis as above  -Repeat BMP in the morning     5.)  UTI  -Urinalysis very suspect for urinary tract infection,   Mentation has shown improvement. -Urine culture pending  -New IV Zosyn.           CODE STATUS: DNR/DNI     Lines/Tubes: Peripheral IV x1     Prophylaxis:  Stress Ulcer Protocol Active: Yes  Deep Vein Thrombosis Protocol Active: Yes  Nutrition: NPO  Activity: Bedrest           Care Plan discussed with: pt and covering nurse  Total time spent with patient: 50 minutes.     Thais Saldaña MD  [facility]

## 2022-01-03 NOTE — PROGRESS NOTES
Hospitalist Progress Note         Mendel Homer, MD          Daily Progress Note: 1/3/2022      Subjective: The patient is seen for follow  up.  49-year-old years old with history of CHF, prostate cancer and essential hypertension admitted for shortness of breath. Initial blood gas indicates a PCO2 of 48. He was placed on BIPAP with improvement. 2D Echo completed and results pending. Patient seen in follow-up. Awake but appears confused.     Problem List:  Problem List as of 1/3/2022 Never Reviewed          Codes Class Noted - Resolved    Respiratory failure [facility]) ICD-10-CM: J96.90  ICD-9-CM: 518.81  1/2/2022 - Present              Medications reviewed  Current Facility-Administered Medications   Medication Dose Route Frequency    piperacillin-tazobactam (ZOSYN) 3.375 g in 0.9% sodium chloride (MBP/ADV) 100 mL MBP  3.375 g IntraVENous Q8H    methylPREDNISolone (PF) (SOLU-MEDROL) injection 40 mg  40 mg IntraVENous Q6H    albuterol-ipratropium (DUO-NEB) 2.5 MG-0.5 MG/3 ML  3 mL Nebulization Q4H PRN    sodium chloride (NS) flush 5-40 mL  5-40 mL IntraVENous Q8H    sodium chloride (NS) flush 5-40 mL  5-40 mL IntraVENous PRN    acetaminophen (TYLENOL) tablet 650 mg  650 mg Oral Q6H PRN    Or    acetaminophen (TYLENOL) suppository 650 mg  650 mg Rectal Q6H PRN    polyethylene glycol (MIRALAX) packet 17 g  17 g Oral DAILY PRN    ondansetron (ZOFRAN ODT) tablet 4 mg  4 mg Oral Q8H PRN    Or    ondansetron (ZOFRAN) injection 4 mg  4 mg IntraVENous Q6H PRN    heparin (porcine) injection 5,000 Units  5,000 Units SubCUTAneous Q8H    azithromycin (ZITHROMAX) 500 mg in 0.9% sodium chloride 250 mL (VIAL-MATE)  500 mg IntraVENous Q24H    albuterol-ipratropium (DUO-NEB) 2.5 MG-0.5 MG/3 ML  3 mL Nebulization Q6H RT    furosemide (LASIX) injection 40 mg  40 mg IntraVENous Q8H    famotidine (PF) (PEPCID) 20 mg in 0.9% sodium chloride 10 mL injection  20 mg IntraVENous Q24H     No current outpatient medications on file. Review of Systems:   A comprehensive review of systems was negative except for that written in the HPI. Objective:   Physical Exam:     Visit Vitals  BP (!) 99/51   Pulse 66   Temp 98.7 °F (37.1 °C)   Resp 28   Ht 5' 8\" (1.727 m)   Wt 79.4 kg (175 lb)   SpO2 100%   BMI 26.61 kg/m²      O2 Device: BIPAP    Temp (24hrs), Av.7 °F (37.1 °C), Min:98.7 °F (37.1 °C), Max:98.7 °F (37.1 °C)    No intake/output data recorded.  1901 -  0700  In: 350 [I.V.:350]  Out: -     General:  Alert, cooperative, no distress, appears stated age. Lungs:   Clear to auscultation bilaterally. Chest wall:  No tenderness or deformity. Heart:  Regular rate and rhythm, S1, S2 normal, no murmur, click, rub or gallop. Abdomen:   Soft, non-tender. Bowel sounds normal. No masses,  No organomegaly. Extremities: Extremities normal, atraumatic, no cyanosis or edema. Pulses: 2+ and symmetric all extremities. Skin: Skin color, texture, turgor normal. No rashes or lesions   Neurologic: CNII-XII intact.  No gross sensory or motor deficits     Data Review:       Recent Days:  Recent Labs     22  0728 22  1545   WBC 7.2 9.1   HGB 11.9* 13.4   HCT 37.4 41.7    172     Recent Labs     22  0728 22  1545    137   K 4.0 5.6*    106   CO2 29 27   * 123*   BUN 33* 31*   CREA 1.58* 1.57*   CA 7.9* 8.6   MG 2.4  --    PHOS 3.1  --    ALB  --  3.3*   TBILI  --  0.8   ALT  --  20     Recent Labs     22  1743   PH 7.36   PCO2 48*   PO2 88   HCO3 25   FIO2 50.0       24 Hour Results:  Recent Results (from the past 24 hour(s))   CBC WITH AUTOMATED DIFF    Collection Time: 22  3:45 PM   Result Value Ref Range    WBC 9.1 4.1 - 11.1 K/uL    RBC 3.98 (L) 4.10 - 5.70 M/uL    HGB 13.4 12.1 - 17.0 g/dL    HCT 41.7 36.6 - 50.3 %    .8 (H) 80.0 - 99.0 FL    MCH 33.7 26.0 - 34.0 PG    MCHC 32.1 30.0 - 36.5 g/dL    RDW 13.5 11.5 - 14.5 % PLATELET 603 035 - 315 K/uL    MPV 10.1 8.9 - 12.9 FL    NRBC 0.0 0.0  WBC    ABSOLUTE NRBC 0.00 0.00 - 0.01 K/uL    NEUTROPHILS 83 (H) 32 - 75 %    LYMPHOCYTES 6 (L) 12 - 49 %    MONOCYTES 11 5 - 13 %    EOSINOPHILS 0 0 - 7 %    BASOPHILS 0 0 - 1 %    IMMATURE GRANULOCYTES 0 0 - 0.5 %    ABS. NEUTROPHILS 7.5 1.8 - 8.0 K/UL    ABS. LYMPHOCYTES 0.5 (L) 0.8 - 3.5 K/UL    ABS. MONOCYTES 1.0 0.0 - 1.0 K/UL    ABS. EOSINOPHILS 0.0 0.0 - 0.4 K/UL    ABS. BASOPHILS 0.0 0.0 - 0.1 K/UL    ABS. IMM. GRANS. 0.0 0.00 - 0.04 K/UL    DF AUTOMATED     METABOLIC PANEL, COMPREHENSIVE    Collection Time: 01/02/22  3:45 PM   Result Value Ref Range    Sodium 137 136 - 145 mmol/L    Potassium 5.6 (H) 3.5 - 5.1 mmol/L    Chloride 106 97 - 108 mmol/L    CO2 27 21 - 32 mmol/L    Anion gap 4 (L) 5 - 15 mmol/L    Glucose 123 (H) 65 - 100 mg/dL    BUN 31 (H) 6 - 20 mg/dL    Creatinine 1.57 (H) 0.70 - 1.30 mg/dL    BUN/Creatinine ratio 20 12 - 20      GFR est AA 50 (L) >60 ml/min/1.73m2    GFR est non-AA 42 (L) >60 ml/min/1.73m2    Calcium 8.6 8.5 - 10.1 mg/dL    Bilirubin, total 0.8 0.2 - 1.0 mg/dL    AST (SGOT) 42 (H) 15 - 37 U/L    ALT (SGPT) 20 12 - 78 U/L    Alk.  phosphatase 77 45 - 117 U/L    Protein, total 7.8 6.4 - 8.2 g/dL    Albumin 3.3 (L) 3.5 - 5.0 g/dL    Globulin 4.5 (H) 2.0 - 4.0 g/dL    A-G Ratio 0.7 (L) 1.1 - 2.2     NT-PRO BNP    Collection Time: 01/02/22  3:45 PM   Result Value Ref Range    NT pro-BNP 1,944 (H) <450 pg/mL   SARS-COV-2    Collection Time: 01/02/22  3:45 PM   Result Value Ref Range    SARS-CoV-2 Nasopharyngeal     INFLUENZA A & B AG (RAPID TEST)    Collection Time: 01/02/22  3:45 PM   Result Value Ref Range    Influenza A Antigen Negative Negative      Influenza B Antigen Negative Negative     COVID-19 RAPID TEST    Collection Time: 01/02/22  3:45 PM   Result Value Ref Range    Specimen source Nasopharyngeal      COVID-19 rapid test Not Detected Not Detected     BLOOD GAS, ARTERIAL    Collection Time: 01/02/22  5:43 PM   Result Value Ref Range    pH 7.36 7.35 - 7.45      PCO2 48 (H) 35 - 45 mmHg    PO2 88 75 - 100 mmHg    O2 SAT 97 >95 %    BICARBONATE 25 22 - 26 mmol/L    BASE EXCESS 0.9 0 - 2 mmol/L    O2 METHOD Non-invasive ventilation      FIO2 50.0 %    MODE PENDING     IPAP/PIP 16      EPAP/CPAP/PEEP 6      SITE Right Radial      OLIVIA'S TEST PASS     TROPONIN-HIGH SENSITIVITY    Collection Time: 01/02/22  7:34 PM   Result Value Ref Range    Troponin-High Sensitivity 47 0 - 76 ng/L   PROCALCITONIN    Collection Time: 01/02/22  7:34 PM   Result Value Ref Range    Procalcitonin <0.05 (H) 0 ng/mL   ECHO ADULT COMPLETE    Collection Time: 01/02/22  7:53 PM   Result Value Ref Range    LV EDV A2C 26 mL    LV EDV A4C 48 mL    LV EDV BP 37 (A) 67 - 155 mL    LV ESV A2C 9 mL    LV ESV A4C 12 mL    IVSd 1.1 (A) 0.6 - 1.0 cm    LVIDd 3.0 (A) 4.2 - 5.9 cm    LVIDs 2.2 cm    LVOT Mean Gradient 4 mmHg    LVOT VTI 28.3 cm    LVOT Peak Velocity 1.4 m/s    LVOT Peak Gradient 7 mmHg    LVPWd 0.9 0.6 - 1.0 cm    LV E' Lateral Velocity 7 cm/s    LV E' Septal Velocity 4 cm/s    LV Ejection Fraction A2C 62 %    LV Ejection Fraction A4C 75 %    EF BP 70 55 - 100 %    LA Minor Axis 6.2 cm    LA Major [place] 5.2 cm    LA Area 2C 17.5 cm2    LA Area 4C 16.5 cm2    LA Volume BP 44 18 - 58 mL    AV Mean Gradient 4 mmHg    AV VTI 32.2 cm    AV Mean Velocity 0.9 m/s    AV Peak Velocity 1.5 m/s    AV Peak Gradient 9 mmHg    MR Peak Velocity 2.6 m/s    MR Peak Gradient 27 mmHg    MV Max Velocity 1.2 m/s    MV Peak Gradient 6 mmHg    MV A Velocity 0.96 m/s    MV E Velocity 1.12 m/s    MV Mean Gradient 2 mmHg    MV VTI 41.3 cm    MV Mean Velocity 0.7 m/s    MV Area by PHT 3.2 cm2    TR Max Velocity 3.31 m/s    TR Peak Gradient 44 mmHg    TAPSE 2.1 (A) 1.5 - 2.0 cm   METABOLIC PANEL, BASIC    Collection Time: 01/03/22  7:28 AM   Result Value Ref Range    Sodium 140 136 - 145 mmol/L    Potassium 4.0 3.5 - 5.1 mmol/L    Chloride 106 97 - 108 mmol/L    CO2 29 21 - 32 mmol/L    Anion gap 5 5 - 15 mmol/L    Glucose 131 (H) 65 - 100 mg/dL    BUN 33 (H) 6 - 20 mg/dL    Creatinine 1.58 (H) 0.70 - 1.30 mg/dL    BUN/Creatinine ratio 21 (H) 12 - 20      GFR est AA 50 (L) >60 ml/min/1.73m2    GFR est non-AA 41 (L) >60 ml/min/1.73m2    Calcium 7.9 (L) 8.5 - 10.1 mg/dL   CBC W/O DIFF    Collection Time: 01/03/22  7:28 AM   Result Value Ref Range    WBC 7.2 4.1 - 11.1 K/uL    RBC 3.59 (L) 4.10 - 5.70 M/uL    HGB 11.9 (L) 12.1 - 17.0 g/dL    HCT 37.4 36.6 - 50.3 %    .2 (H) 80.0 - 99.0 FL    MCH 33.1 26.0 - 34.0 PG    MCHC 31.8 30.0 - 36.5 g/dL    RDW 13.5 11.5 - 14.5 %    PLATELET 486 154 - 130 K/uL    MPV 9.7 8.9 - 12.9 FL    NRBC 0.0 0.0  WBC    ABSOLUTE NRBC 0.00 0.00 - 0.01 K/uL   MAGNESIUM    Collection Time: 01/03/22  7:28 AM   Result Value Ref Range    Magnesium 2.4 1.6 - 2.4 mg/dL   PHOSPHORUS    Collection Time: 01/03/22  7:28 AM   Result Value Ref Range    Phosphorus 3.1 2.6 - 4.7 mg/dL       XR CHEST PORT   Final Result   Decreased lung volumes. No significant interval change otherwise. CT HEAD WO CONT   Final Result   Age-appropriate atrophy. No acute findings. XR CHEST PORT   Final Result   Faint patchy airspace disease within the right lung suspicious for   pneumonia. XR CHEST PORT    (Results Pending)   XR CHEST PORT    (Results Pending)   XR CHEST PORT    (Results Pending)         Assessment/     Acute hypoxic respiratory failure likely secondary to COPD exacerbation  Acute on chronic heart failure unspecified  Acute COPD exacerbation  Acute kidney injury  Urinary tract infection      Plan:  Continue supportive care including IV antibiotic  Monitor routine electrolyte  Follow-up 2D echocardiogram  Obtain PT OT evaluation    Care Plan discussed with: Nurse    Total time spent with patient: 30 minutes.     Dayne Zepeda MD

## 2022-01-03 NOTE — ACP (ADVANCE CARE PLANNING)
Advance Care Planning   Healthcare Decision Maker:       Primary Decision Maker: Fabián Thacker [facility] - [phone]

## 2022-01-04 ENCOUNTER — APPOINTMENT (OUTPATIENT)
Dept: GENERAL RADIOLOGY | Age: 87
DRG: 291 | End: 2022-01-04
Attending: INTERNAL MEDICINE
Payer: MEDICARE

## 2022-01-04 LAB
ANION GAP SERPL CALC-SCNC: 5 MMOL/L (ref 5–15)
ARTERIAL PATENCY WRIST A: ABNORMAL
BACTERIA SPEC CULT: NORMAL
BASE EXCESS BLDA CALC-SCNC: 4.8 MMOL/L (ref 0–2)
BASOPHILS # BLD: 0 K/UL (ref 0–0.1)
BASOPHILS NFR BLD: 0 % (ref 0–1)
BDY SITE: ABNORMAL
BUN SERPL-MCNC: 40 MG/DL (ref 6–20)
BUN/CREAT SERPL: 34 (ref 12–20)
CA-I BLD-MCNC: 7.7 MG/DL (ref 8.5–10.1)
CHLORIDE SERPL-SCNC: 109 MMOL/L (ref 97–108)
CO2 SERPL-SCNC: 30 MMOL/L (ref 21–32)
CREAT SERPL-MCNC: 1.18 MG/DL (ref 0.7–1.3)
DIFFERENTIAL METHOD BLD: ABNORMAL
EOSINOPHIL # BLD: 0 K/UL (ref 0–0.4)
EOSINOPHIL NFR BLD: 0 % (ref 0–7)
EPAP/CPAP/PEEP, PAPEEP: 9
ERYTHROCYTE [DISTWIDTH] IN BLOOD BY AUTOMATED COUNT: 13.4 % (ref 11.5–14.5)
FIO2 ON VENT: 50 %
GAS FLOW.O2 SETTING OXYMISER: 14 L/MIN
GLUCOSE SERPL-MCNC: 91 MG/DL (ref 65–100)
HCO3 BLDA-SCNC: 29 MMOL/L (ref 22–26)
HCT VFR BLD AUTO: 38.7 % (ref 36.6–50.3)
HGB BLD-MCNC: 12.3 G/DL (ref 12.1–17)
IMM GRANULOCYTES # BLD AUTO: 0.1 K/UL (ref 0–0.04)
IMM GRANULOCYTES NFR BLD AUTO: 1 % (ref 0–0.5)
IPAP/PIP, IPAPIP: 14
LYMPHOCYTES # BLD: 0.4 K/UL (ref 0.8–3.5)
LYMPHOCYTES NFR BLD: 4 % (ref 12–49)
MCH RBC QN AUTO: 32.9 PG (ref 26–34)
MCHC RBC AUTO-ENTMCNC: 31.8 G/DL (ref 30–36.5)
MCV RBC AUTO: 103.5 FL (ref 80–99)
MONOCYTES # BLD: 0.5 K/UL (ref 0–1)
MONOCYTES NFR BLD: 5 % (ref 5–13)
NEUTS SEG # BLD: 8.9 K/UL (ref 1.8–8)
NEUTS SEG NFR BLD: 90 % (ref 32–75)
NRBC # BLD: 0 K/UL (ref 0–0.01)
NRBC BLD-RTO: 0 PER 100 WBC
PCO2 BLDA: 44 MMHG (ref 35–45)
PH BLDA: 7.43 [PH] (ref 7.35–7.45)
PLATELET # BLD AUTO: 184 K/UL (ref 150–400)
PMV BLD AUTO: 9.8 FL (ref 8.9–12.9)
PO2 BLDA: 96 MMHG (ref 75–100)
POTASSIUM SERPL-SCNC: 4.1 MMOL/L (ref 3.5–5.1)
RBC # BLD AUTO: 3.74 M/UL (ref 4.1–5.7)
SAO2 % BLD: 99 %
SAO2% DEVICE SAO2% SENSOR NAME: ABNORMAL
SODIUM SERPL-SCNC: 144 MMOL/L (ref 136–145)
SPECIAL REQUESTS,SREQ: NORMAL
VENTILATION MODE VENT: ABNORMAL
WBC # BLD AUTO: 9.8 K/UL (ref 4.1–11.1)

## 2022-01-04 PROCEDURE — 74011250636 HC RX REV CODE- 250/636: Performed by: PHYSICIAN ASSISTANT

## 2022-01-04 PROCEDURE — 82803 BLOOD GASES ANY COMBINATION: CPT

## 2022-01-04 PROCEDURE — 94640 AIRWAY INHALATION TREATMENT: CPT

## 2022-01-04 PROCEDURE — 80048 BASIC METABOLIC PNL TOTAL CA: CPT

## 2022-01-04 PROCEDURE — 65270000029 HC RM PRIVATE

## 2022-01-04 PROCEDURE — 85025 COMPLETE CBC W/AUTO DIFF WBC: CPT

## 2022-01-04 PROCEDURE — 36600 WITHDRAWAL OF ARTERIAL BLOOD: CPT

## 2022-01-04 PROCEDURE — 94660 CPAP INITIATION&MGMT: CPT

## 2022-01-04 PROCEDURE — 74011000250 HC RX REV CODE- 250: Performed by: INTERNAL MEDICINE

## 2022-01-04 PROCEDURE — 74011000250 HC RX REV CODE- 250: Performed by: HOSPITALIST

## 2022-01-04 PROCEDURE — 74011000258 HC RX REV CODE- 258: Performed by: HOSPITALIST

## 2022-01-04 PROCEDURE — 74011250636 HC RX REV CODE- 250/636: Performed by: HOSPITALIST

## 2022-01-04 PROCEDURE — 74011250636 HC RX REV CODE- 250/636: Performed by: INTERNAL MEDICINE

## 2022-01-04 PROCEDURE — 36415 COLL VENOUS BLD VENIPUNCTURE: CPT

## 2022-01-04 PROCEDURE — 71045 X-RAY EXAM CHEST 1 VIEW: CPT

## 2022-01-04 RX ORDER — MORPHINE SULFATE 2 MG/ML
INJECTION, SOLUTION INTRAMUSCULAR; INTRAVENOUS
Status: DISPENSED
Start: 2022-01-04 | End: 2022-01-05

## 2022-01-04 RX ORDER — FUROSEMIDE 10 MG/ML
80 INJECTION INTRAMUSCULAR; INTRAVENOUS ONCE
Status: COMPLETED | OUTPATIENT
Start: 2022-01-04 | End: 2022-01-05

## 2022-01-04 RX ORDER — MORPHINE SULFATE 2 MG/ML
2 INJECTION, SOLUTION INTRAMUSCULAR; INTRAVENOUS ONCE
Status: COMPLETED | OUTPATIENT
Start: 2022-01-04 | End: 2022-01-04

## 2022-01-04 RX ORDER — MORPHINE SULFATE 2 MG/ML
2 INJECTION, SOLUTION INTRAMUSCULAR; INTRAVENOUS
Status: ACTIVE | OUTPATIENT
Start: 2022-01-04 | End: 2022-01-06

## 2022-01-04 RX ORDER — HYDROXYZINE HYDROCHLORIDE 25 MG/ML
25 INJECTION, SOLUTION INTRAMUSCULAR
Status: DISCONTINUED | OUTPATIENT
Start: 2022-01-04 | End: 2022-01-10 | Stop reason: HOSPADM

## 2022-01-04 RX ADMIN — METHYLPREDNISOLONE SODIUM SUCCINATE 40 MG: 40 INJECTION, POWDER, FOR SOLUTION INTRAMUSCULAR; INTRAVENOUS at 05:38

## 2022-01-04 RX ADMIN — HEPARIN SODIUM 5000 UNITS: 5000 INJECTION INTRAVENOUS; SUBCUTANEOUS at 23:09

## 2022-01-04 RX ADMIN — SODIUM CHLORIDE, PRESERVATIVE FREE 10 ML: 5 INJECTION INTRAVENOUS at 14:00

## 2022-01-04 RX ADMIN — HEPARIN SODIUM 5000 UNITS: 5000 INJECTION INTRAVENOUS; SUBCUTANEOUS at 05:38

## 2022-01-04 RX ADMIN — MORPHINE SULFATE 2 MG: 2 INJECTION, SOLUTION INTRAMUSCULAR; INTRAVENOUS at 18:34

## 2022-01-04 RX ADMIN — HYDROXYZINE HYDROCHLORIDE 25 MG: 25 INJECTION, SOLUTION INTRAMUSCULAR at 23:00

## 2022-01-04 RX ADMIN — IPRATROPIUM BROMIDE AND ALBUTEROL SULFATE 3 ML: .5; 2.5 SOLUTION RESPIRATORY (INHALATION) at 08:46

## 2022-01-04 RX ADMIN — HEPARIN SODIUM 5000 UNITS: 5000 INJECTION INTRAVENOUS; SUBCUTANEOUS at 15:17

## 2022-01-04 RX ADMIN — PIPERACILLIN SODIUM AND TAZOBACTAM SODIUM 3.38 G: 3; .375 INJECTION, POWDER, LYOPHILIZED, FOR SOLUTION INTRAVENOUS at 05:38

## 2022-01-04 RX ADMIN — METHYLPREDNISOLONE SODIUM SUCCINATE 40 MG: 40 INJECTION, POWDER, FOR SOLUTION INTRAMUSCULAR; INTRAVENOUS at 09:39

## 2022-01-04 RX ADMIN — IPRATROPIUM BROMIDE AND ALBUTEROL SULFATE 3 ML: .5; 2.5 SOLUTION RESPIRATORY (INHALATION) at 01:23

## 2022-01-04 RX ADMIN — IPRATROPIUM BROMIDE AND ALBUTEROL SULFATE 3 ML: .5; 2.5 SOLUTION RESPIRATORY (INHALATION) at 21:24

## 2022-01-04 RX ADMIN — FUROSEMIDE 40 MG: 10 INJECTION, SOLUTION INTRAMUSCULAR; INTRAVENOUS at 05:38

## 2022-01-04 RX ADMIN — METHYLPREDNISOLONE SODIUM SUCCINATE 40 MG: 40 INJECTION, POWDER, FOR SOLUTION INTRAMUSCULAR; INTRAVENOUS at 15:18

## 2022-01-04 RX ADMIN — SODIUM CHLORIDE, PRESERVATIVE FREE 10 ML: 5 INJECTION INTRAVENOUS at 06:00

## 2022-01-04 RX ADMIN — IPRATROPIUM BROMIDE AND ALBUTEROL SULFATE 3 ML: .5; 2.5 SOLUTION RESPIRATORY (INHALATION) at 14:10

## 2022-01-04 RX ADMIN — PIPERACILLIN SODIUM AND TAZOBACTAM SODIUM 3.38 G: 3; .375 INJECTION, POWDER, LYOPHILIZED, FOR SOLUTION INTRAVENOUS at 15:24

## 2022-01-04 RX ADMIN — FUROSEMIDE 40 MG: 10 INJECTION, SOLUTION INTRAMUSCULAR; INTRAVENOUS at 15:18

## 2022-01-04 NOTE — PROGRESS NOTES
Pulmonary/CC Progress Note  Patient is a 20-year-old  male with a history of CHF (EF unknown), remote prostate cancer, and hypertension who gets most of his care through the [facility] and presented to the hospital with wheezing, altered mental status, and shortness of breath requiring BiPAP therapy. Subjective:   Daily Progress Note: 1/4/2022 10:27 AM    Patient examined in the ER is awaiting for a bed. He is currently on BiPAP. He looks comfortable without any significant distress. Alert and responding appropriately. ABG were reviewed    Current Facility-Administered Medications   Medication Dose Route Frequency    piperacillin-tazobactam (ZOSYN) 3.375 g in 0.9% sodium chloride (MBP/ADV) 100 mL MBP  3.375 g IntraVENous Q8H    methylPREDNISolone (PF) (SOLU-MEDROL) injection 40 mg  40 mg IntraVENous Q6H    albuterol-ipratropium (DUO-NEB) 2.5 MG-0.5 MG/3 ML  3 mL Nebulization Q4H PRN    sodium chloride (NS) flush 5-40 mL  5-40 mL IntraVENous Q8H    sodium chloride (NS) flush 5-40 mL  5-40 mL IntraVENous PRN    acetaminophen (TYLENOL) tablet 650 mg  650 mg Oral Q6H PRN    Or    acetaminophen (TYLENOL) suppository 650 mg  650 mg Rectal Q6H PRN    polyethylene glycol (MIRALAX) packet 17 g  17 g Oral DAILY PRN    ondansetron (ZOFRAN ODT) tablet 4 mg  4 mg Oral Q8H PRN    Or    ondansetron (ZOFRAN) injection 4 mg  4 mg IntraVENous Q6H PRN    heparin (porcine) injection 5,000 Units  5,000 Units SubCUTAneous Q8H    azithromycin (ZITHROMAX) 500 mg in 0.9% sodium chloride 250 mL (VIAL-MATE)  500 mg IntraVENous Q24H    albuterol-ipratropium (DUO-NEB) 2.5 MG-0.5 MG/3 ML  3 mL Nebulization Q6H RT    furosemide (LASIX) injection 40 mg  40 mg IntraVENous Q8H    famotidine (PF) (PEPCID) 20 mg in 0.9% sodium chloride 10 mL injection  20 mg IntraVENous Q24H     No current outpatient medications on file. Review of Systems  Omitted review of system. Patient is alert.   Denied any chest pains.    Objective:     Visit Vitals  /71   Pulse 84   Temp 98.2 °F (36.8 °C)   Resp 20   Ht 5' 8\" (1.727 m)   Wt 79.4 kg (175 lb)   SpO2 96%   BMI 26.61 kg/m²      O2 Device: Ventimask    Temp (24hrs), Av.2 °F (36.8 °C), Min:98.2 °F (36.8 °C), Max:98.2 °F (36.8 °C)      No intake/output data recorded.  190 -  0700  In: 100 [I.V.:100]  Out: -     General appearance: no distress, appears older than stated age, mildly obese, confused, on BiPAP  Head: Normocephalic, without obvious abnormality, atraumatic  Eyes: negative  Neck: supple, symmetrical, trachea midline and no adenopathy  Lungs: Mild rhonchi bilaterally, prolonged exhalation phase, currently on BiPAP  Heart: regular rate and rhythm, S1, S2 normal, no murmur, click, rub or gallop  Abdomen: soft, non-tender.  Bowel sounds normal. No masses,  no organomegaly  Extremities: extremities normal, atraumatic, no cyanosis, +3 pitting edema in lower extremities bilaterally  Pulses: 2+ and symmetric  Skin: Skin color, texture, turgor normal. No rashes or lesions  Lymph nodes: Cervical, supraclavicular, and axillary nodes normal.  Neurologic: Altered mental status, does wake up to verbal stimulation however    Lab/Data Review:  Recent Labs     22  0406 22  0728 22  1545   WBC 9.8 7.2 9.1   HGB 12.3 11.9* 13.4   HCT 38.7 37.4 41.7    150 172     Recent Labs     22  0406 22  0728 22  1545    140 137   K 4.1 4.0 5.6*   * 106 106   CO2 30 29 27   GLU 91 131* 123*   BUN 40* 33* 31*   CREA 1.18 1.58* 1.57*   CA 7.7* 7.9* 8.6   MG  --  2.4  --    PHOS  --  3.1  --    ALB  --   --  3.3*   TBILI  --   --  0.8   ALT  --   --  20     Recent Labs     22  0430 22  1743   PH 7.43 7.36   PCO2 44 48*   PO2 96 88   HCO3 29* 25   FIO2 50.0 50.0         Diagnostics   CXR Results  (Last 48 hours)               22 0504  XR CHEST PORT Final result    Impression:  I suspect a degree of chronic interstitial lung disease which has   been present on multiple prior exams and without superimposed focal airspace   disease on today's exam. Other chronic findings as above. Narrative:  HISTORY:  Respiratory failure       TECHNIQUE:  XR CHEST PORT       COMPARISON: 1/3/2022   LIMITATIONS: None       TUBES/LINES: None       LUNG PARENCHYMA: Overall stable appearance of the lungs with persistent   interstitial marking prominence but no new focal airspace process. Overall   hypoinflated appearance as on prior with left hemidiaphragm elevation. TRACHEA/BRONCHI: Normal   PULMONARY VESSELS: Normal   PLEURA: Normal   HEART: Normal   AORTIC SHADOW:Normal.     MEDIASTINUM: Normal   BONE/SOFT TISSUES: No acute abnormality. Total right shoulder arthroplasty       OTHER: None           01/03/22 0611  XR CHEST PORT Final result    Impression:  Decreased lung volumes. No significant interval change otherwise. Narrative:  Chest, frontal view, 1/3/2022       History: BiPAP support. COPD. Comparison: Including chest 1/2/2022. Findings: The cardiac silhouette is stable. The lungs remain underexpanded with   bibasilar atelectasis and crowding of bronchovascular structures. Lung volumes   are decreased as compared to the study performed one day prior. Elevation of   the left hemidiaphragm is again noted. Hydrostatic edema is not excluded. No   pleural effusion or pneumothorax is identified. The osseous structures are   stable. 01/02/22 1551  XR CHEST PORT Final result    Impression:  Faint patchy airspace disease within the right lung suspicious for   pneumonia. Narrative:  AP upright view of the chest compared to prior exam dated 11/24/2020. Elevation   of the left hemidiaphragm is again noted. Cardiac silhouette is magnified by   projection and is not significantly changed. Thoracic aorta is tortuous. There   is patchy airspace disease within the right lung not seen previously.  No pneumothorax or pleural effusion is seen. Postoperative changes involving the   right shoulder are again noted. No acute bony abnormality is demonstrated. Assessment/Plan:     Active Problems:    Respiratory failure ([address] (1/2/2022)    Patient is a 20-year-old  male with a history of CHF (EF unknown), remote prostate cancer, and hypertension who gets most of his care through the [facility] and presented to the hospital with wheezing, altered mental status, and shortness of breath requiring BiPAP therapy.     Plan:      1.)  Acute hypoxic respiratory failure  -Likely combination of acute on chronic heart failure as well as acute exacerbation of COPD, targeted therapies as outlined below  -Doing well on the BiPAP 14/8/60%,   ABG from this morning showed 7.4 3/44/96/20 9/99% on 50% FiO2 with BiPAP setting of 14/9. I have discussed with respiratory therapist, will switch him to 40% Ventimask. -If he can tolerate Ventimask and he can be downgraded to telemetry unit. -DNR/DNI     2.)  Acute on chronic heart failure  -EF unknown  -I feel that this is likely the main etiology of his breathing issues given that he is significantly volume overloaded on exam.  -Patient on dialysis. Echocardiogram results were reviewed which showed EF of 60 to 65%. Normal diastolic function.  -Hopefully can get him to nasal cannula later today      3.)  Acute exacerbation of COPD  -Unclear history of smoking/COPD, but this is what is in the record, can ask him more once his mental status improves. -Agree with duo nebs every 6 hours and Solu-Medrol 40 mg IV every 6 hours, agree with IV azithromycin for now  -Continue to monitor on BiPAP, repeat chest x-ray and an ABG in the morning     4.)  Acute kidney injury/hyperkalemia  -Creatinine 1.58, potassium come down to 4 today.     Previous creatinine in June 2021 was 1.06.  -He appears volume overloaded on exam, likely cardiorenal physiology  -Diuresis as above  -Repeat BMP in the morning     5.)  UTI  -Urinalysis very suspect for urinary tract infection,   Mentation has shown improvement. -Urine culture pending  -Now IV Zosyn.           CODE STATUS: DNR/DNI     Lines/Tubes: Peripheral IV x1     Prophylaxis:  Stress Ulcer Protocol Active: Yes  Deep Vein Thrombosis Protocol Active: Yes  Nutrition: NPO  Activity: Bedrest  Discussed with covering nurse in the ER. Discussed with respiratory therapist and the      Care Plan discussed with: pt and covering nurse  Total time spent with patient: 50 minutes.     Yudelka Villa MD  [facility]

## 2022-01-04 NOTE — ED NOTES
Pt's wet linen changed at this time, pt cleaned and covered in warm blankets, positioned and left comfortable.

## 2022-01-04 NOTE — PROGRESS NOTES
1/4/22. CM received 8 page packet from [facility] to be completed upon discharge. Forms placed on pts chart for MD to complete for placement/discharge.

## 2022-01-04 NOTE — PROGRESS NOTES
Skin assessment completed with Malorie Raymond RN. Some bruising noted on both arms. Swelling of the left hip. Dry scaly skin on lower legs and bilateral feet. No pressure ulcers noted.

## 2022-01-04 NOTE — PROGRESS NOTES
Hospitalist Progress Note    Subjective:   Daily Progress Note: 1/4/2022 11:46 AM    Hospital Course:  Criss Dakins is a 42-year-old year male with history of CHF, prostate cancer and essential hypertension admitted for shortness of breath, wheezing, and altered mental status. Initial blood gas indicates a PCO2 of 48. He was placed on BIPAP with improvement. CT head negative for acute findings. Covid and Influenza negative. Urinalysis shows leuk esterase, > 100 WBCs, and positive nitrates all consistent with a UTI. Started on IV Zosyn. Pulmonary consulted for acute hypoxic respiratory failure. IV azithromycin added for possible COPD exacerbation. Also started on IV solu-medrol and scheduled duonebs. Patient admitted to ICU, however no beds available and therefor on hold in the ED. Of note, patient is a DNR/DNI. Echocardiogram showing LVEF 60-65%. Subjective:    Patient seen and examined in the ED. Remains on BiPAP. Blood gas improving. No acute distress.      Current Facility-Administered Medications   Medication Dose Route Frequency    piperacillin-tazobactam (ZOSYN) 3.375 g in 0.9% sodium chloride (MBP/ADV) 100 mL MBP  3.375 g IntraVENous Q8H    methylPREDNISolone (PF) (SOLU-MEDROL) injection 40 mg  40 mg IntraVENous Q6H    albuterol-ipratropium (DUO-NEB) 2.5 MG-0.5 MG/3 ML  3 mL Nebulization Q4H PRN    sodium chloride (NS) flush 5-40 mL  5-40 mL IntraVENous Q8H    sodium chloride (NS) flush 5-40 mL  5-40 mL IntraVENous PRN    acetaminophen (TYLENOL) tablet 650 mg  650 mg Oral Q6H PRN    Or    acetaminophen (TYLENOL) suppository 650 mg  650 mg Rectal Q6H PRN    polyethylene glycol (MIRALAX) packet 17 g  17 g Oral DAILY PRN    ondansetron (ZOFRAN ODT) tablet 4 mg  4 mg Oral Q8H PRN    Or    ondansetron (ZOFRAN) injection 4 mg  4 mg IntraVENous Q6H PRN    heparin (porcine) injection 5,000 Units  5,000 Units SubCUTAneous Q8H    azithromycin (ZITHROMAX) 500 mg in 0.9% sodium chloride 250 mL (VIAL-MATE)  500 mg IntraVENous Q24H    albuterol-ipratropium (DUO-NEB) 2.5 MG-0.5 MG/3 ML  3 mL Nebulization Q6H RT    furosemide (LASIX) injection 40 mg  40 mg IntraVENous Q8H    famotidine (PF) (PEPCID) 20 mg in 0.9% sodium chloride 10 mL injection  20 mg IntraVENous Q24H     No current outpatient medications on file. Review of Systems  Unable to obtain secondary to patient condition. Objective:     Visit Vitals  /71   Pulse 84   Temp 98.2 °F (36.8 °C)   Resp 20   Ht 5' 8\" (1.727 m)   Wt 79.4 kg (175 lb)   SpO2 96%   BMI 26.61 kg/m²      O2 Device: Ventimask    Temp (24hrs), Av.2 °F (36.8 °C), Min:98.2 °F (36.8 °C), Max:98.2 °F (36.8 °C)      No intake/output data recorded.  1901 -  0700  In: 100 [I.V.:100]  Out: -     PHYSICAL EXAM:  Constitutional: No acute distress  Skin: Extremities and face reveal no rashes. HEENT: Sclerae anicteric. PERRL. The neck is supple and no masses. Cardiovascular: Regular rate and rhythm. +S1/S2. NO murmur or gallop. Respiratory:  Symmetric chest wall expansion. Few rhonchi bilaterally. On BiPAP. No wheezing or rales. GI: Abdomen nondistended, soft, and nontender. Normal active bowel sounds. Rectal: Deferred   Musculoskeletal: No pitting edema of the lower legs. Able to move all ext  Neurological:  Patient with AMS.  Cranial nerves II-XII grossly intact  Psychiatric: Unable to obtain       Data Review    Recent Results (from the past 24 hour(s))   GLUCOSE, POC    Collection Time: 22  9:48 PM   Result Value Ref Range    Glucose (POC) 100 65 - 117 mg/dL    Performed by [address] [place] [address],[address], BASIC    Collection Time: 22  4:06 AM   Result Value Ref Range    Sodium 144 136 - 145 mmol/L    Potassium 4.1 3.5 - 5.1 mmol/L    Chloride 109 (H) 97 - 108 mmol/L    CO2 30 21 - 32 mmol/L    Anion gap 5 5 - 15 mmol/L    Glucose 91 65 - 100 mg/dL    BUN 40 (H) 6 - 20 mg/dL    Creatinine 1.18 0.70 - 1.30 mg/dL    BUN/Creatinine ratio 34 (H) 12 - 20      GFR est AA >60 >60 ml/min/1.73m2    GFR est non-AA 58 (L) >60 ml/min/1.73m2    Calcium 7.7 (L) 8.5 - 10.1 mg/dL   CBC WITH AUTOMATED DIFF    Collection Time: 01/04/22  4:06 AM   Result Value Ref Range    WBC 9.8 4.1 - 11.1 K/uL    RBC 3.74 (L) 4.10 - 5.70 M/uL    HGB 12.3 12.1 - 17.0 g/dL    HCT 38.7 36.6 - 50.3 %    .5 (H) 80.0 - 99.0 FL    MCH 32.9 26.0 - 34.0 PG    MCHC 31.8 30.0 - 36.5 g/dL    RDW 13.4 11.5 - 14.5 %    PLATELET 990 281 - 016 K/uL    MPV 9.8 8.9 - 12.9 FL    NRBC 0.0 0.0  WBC    ABSOLUTE NRBC 0.00 0.00 - 0.01 K/uL    NEUTROPHILS 90 (H) 32 - 75 %    LYMPHOCYTES 4 (L) 12 - 49 %    MONOCYTES 5 5 - 13 %    EOSINOPHILS 0 0 - 7 %    BASOPHILS 0 0 - 1 %    IMMATURE GRANULOCYTES 1 (H) 0 - 0.5 %    ABS. NEUTROPHILS 8.9 (H) 1.8 - 8.0 K/UL    ABS. LYMPHOCYTES 0.4 (L) 0.8 - 3.5 K/UL    ABS. MONOCYTES 0.5 0.0 - 1.0 K/UL    ABS. EOSINOPHILS 0.0 0.0 - 0.4 K/UL    ABS. BASOPHILS 0.0 0.0 - 0.1 K/UL    ABS. IMM. GRANS. 0.1 (H) 0.00 - 0.04 K/UL    DF AUTOMATED     BLOOD GAS, ARTERIAL    Collection Time: 01/04/22  4:30 AM   Result Value Ref Range    pH 7.43 7.35 - 7.45      PCO2 44 35 - 45 mmHg    PO2 96 75 - 100 mmHg    O2 SAT 99 >95 %    BICARBONATE 29 (H) 22 - 26 mmol/L    BASE EXCESS 4.8 (H) 0 - 2 mmol/L    O2 METHOD Non-invasive ventilation      FIO2 50.0 %    MODE BiPAP      SET RATE 14      IPAP/PIP 14      EPAP/CPAP/PEEP 9      SITE Left Radial      OLIVIA'S TEST PASS         XR CHEST PORT   Final Result   I suspect a degree of chronic interstitial lung disease which has   been present on multiple prior exams and without superimposed focal airspace   disease on today's exam. Other chronic findings as above. XR CHEST PORT   Final Result   Decreased lung volumes. No significant interval change otherwise. CT HEAD WO CONT   Final Result   Age-appropriate atrophy. No acute findings.          XR CHEST PORT   Final Result   Faint patchy airspace disease within the right lung suspicious for   pneumonia. XR CHEST PORT    (Results Pending)   XR CHEST PORT    (Results Pending)   XR CHEST PORT    (Results Pending)       Active Problems:    Respiratory failure ([address] (1/2/2022)        Assessment/Plan:     1. Acute hypoxic respiratory failure likely secondary to COPD exacerbation  - Remains on BiPAP  - Covid and Influenza negative  - Continue IV azithromycin and IV Zosyn  - Continue IV solu-medrol and scheduled duonebs  - Echocardiogram showing LVEF 60-65%. - Pulmonary consult  - Wean oxygen as tolerated    2. Acute on chronic heart failure unspecified  - Continue IV lasix 40 mg daily    3. Acute kidney injury  - Improving. Likely secondary to UTI. 4. Urinary tract infection  - Urinalysis shows leuk esterase, > 100 WBCs, and positive nitrates all consistent with a UTI  - Started on IV Zosyn  - Urine culture pending    DVT Prophylaxis: heparin subcu  Code Status: DNR/DNI  GI prophylaxis: Pepcid  POA:    Care Plan discussed with: patient and nursing    Total time spent with patient: >35 minutes.

## 2022-01-05 ENCOUNTER — APPOINTMENT (OUTPATIENT)
Dept: GENERAL RADIOLOGY | Age: 87
DRG: 291 | End: 2022-01-05
Attending: INTERNAL MEDICINE
Payer: MEDICARE

## 2022-01-05 LAB
ARTERIAL PATENCY WRIST A: ABNORMAL
BASE EXCESS BLDA CALC-SCNC: 7.9 MMOL/L (ref 0–2)
BDY SITE: ABNORMAL
FIO2 ON VENT: 35 %
GAS FLOW.O2 O2 DELIVERY SYS: 12 L/MIN
HCO3 BLDA-SCNC: 32 MMOL/L (ref 22–26)
PCO2 BLDA: 48 MMHG (ref 35–45)
PH BLDA: 7.45 [PH] (ref 7.35–7.45)
PO2 BLDA: 66 MMHG (ref 75–100)
SAO2 % BLD: 94 %
SAO2% DEVICE SAO2% SENSOR NAME: ABNORMAL

## 2022-01-05 PROCEDURE — 74011250636 HC RX REV CODE- 250/636: Performed by: HOSPITALIST

## 2022-01-05 PROCEDURE — 77010033678 HC OXYGEN DAILY

## 2022-01-05 PROCEDURE — 74011000250 HC RX REV CODE- 250: Performed by: HOSPITALIST

## 2022-01-05 PROCEDURE — 74011250636 HC RX REV CODE- 250/636: Performed by: INTERNAL MEDICINE

## 2022-01-05 PROCEDURE — 94640 AIRWAY INHALATION TREATMENT: CPT

## 2022-01-05 PROCEDURE — 94760 N-INVAS EAR/PLS OXIMETRY 1: CPT

## 2022-01-05 PROCEDURE — 36600 WITHDRAWAL OF ARTERIAL BLOOD: CPT

## 2022-01-05 PROCEDURE — 74011250636 HC RX REV CODE- 250/636: Performed by: PHYSICIAN ASSISTANT

## 2022-01-05 PROCEDURE — 74011000258 HC RX REV CODE- 258: Performed by: HOSPITALIST

## 2022-01-05 PROCEDURE — 82803 BLOOD GASES ANY COMBINATION: CPT

## 2022-01-05 PROCEDURE — 71045 X-RAY EXAM CHEST 1 VIEW: CPT

## 2022-01-05 PROCEDURE — 74011000250 HC RX REV CODE- 250: Performed by: INTERNAL MEDICINE

## 2022-01-05 PROCEDURE — 65270000029 HC RM PRIVATE

## 2022-01-05 RX ADMIN — PIPERACILLIN SODIUM AND TAZOBACTAM SODIUM 3.38 G: 3; .375 INJECTION, POWDER, LYOPHILIZED, FOR SOLUTION INTRAVENOUS at 23:56

## 2022-01-05 RX ADMIN — AZITHROMYCIN MONOHYDRATE 500 MG: 500 INJECTION, POWDER, LYOPHILIZED, FOR SOLUTION INTRAVENOUS at 02:58

## 2022-01-05 RX ADMIN — METHYLPREDNISOLONE SODIUM SUCCINATE 40 MG: 40 INJECTION, POWDER, FOR SOLUTION INTRAMUSCULAR; INTRAVENOUS at 10:17

## 2022-01-05 RX ADMIN — METHYLPREDNISOLONE SODIUM SUCCINATE 40 MG: 40 INJECTION, POWDER, FOR SOLUTION INTRAMUSCULAR; INTRAVENOUS at 22:30

## 2022-01-05 RX ADMIN — HEPARIN SODIUM 5000 UNITS: 5000 INJECTION INTRAVENOUS; SUBCUTANEOUS at 22:31

## 2022-01-05 RX ADMIN — IPRATROPIUM BROMIDE AND ALBUTEROL SULFATE 3 ML: .5; 2.5 SOLUTION RESPIRATORY (INHALATION) at 01:29

## 2022-01-05 RX ADMIN — PIPERACILLIN SODIUM AND TAZOBACTAM SODIUM 3.38 G: 3; .375 INJECTION, POWDER, LYOPHILIZED, FOR SOLUTION INTRAVENOUS at 17:27

## 2022-01-05 RX ADMIN — FUROSEMIDE 80 MG: 10 INJECTION, SOLUTION INTRAMUSCULAR; INTRAVENOUS at 03:06

## 2022-01-05 RX ADMIN — IPRATROPIUM BROMIDE AND ALBUTEROL SULFATE 3 ML: .5; 2.5 SOLUTION RESPIRATORY (INHALATION) at 07:44

## 2022-01-05 RX ADMIN — SODIUM CHLORIDE, PRESERVATIVE FREE 10 ML: 5 INJECTION INTRAVENOUS at 06:00

## 2022-01-05 RX ADMIN — PIPERACILLIN SODIUM AND TAZOBACTAM SODIUM 3.38 G: 3; .375 INJECTION, POWDER, LYOPHILIZED, FOR SOLUTION INTRAVENOUS at 06:33

## 2022-01-05 RX ADMIN — IPRATROPIUM BROMIDE AND ALBUTEROL SULFATE 3 ML: .5; 2.5 SOLUTION RESPIRATORY (INHALATION) at 20:13

## 2022-01-05 RX ADMIN — IPRATROPIUM BROMIDE AND ALBUTEROL SULFATE 3 ML: .5; 2.5 SOLUTION RESPIRATORY (INHALATION) at 13:47

## 2022-01-05 RX ADMIN — FUROSEMIDE 40 MG: 10 INJECTION, SOLUTION INTRAMUSCULAR; INTRAVENOUS at 17:28

## 2022-01-05 RX ADMIN — SODIUM CHLORIDE, PRESERVATIVE FREE 10 ML: 5 INJECTION INTRAVENOUS at 17:07

## 2022-01-05 RX ADMIN — SODIUM CHLORIDE, PRESERVATIVE FREE 20 MG: 5 INJECTION INTRAVENOUS at 22:30

## 2022-01-05 RX ADMIN — AZITHROMYCIN MONOHYDRATE 500 MG: 500 INJECTION, POWDER, LYOPHILIZED, FOR SOLUTION INTRAVENOUS at 22:31

## 2022-01-05 RX ADMIN — METHYLPREDNISOLONE SODIUM SUCCINATE 40 MG: 40 INJECTION, POWDER, FOR SOLUTION INTRAMUSCULAR; INTRAVENOUS at 02:57

## 2022-01-05 RX ADMIN — HEPARIN SODIUM 5000 UNITS: 5000 INJECTION INTRAVENOUS; SUBCUTANEOUS at 17:28

## 2022-01-05 RX ADMIN — METHYLPREDNISOLONE SODIUM SUCCINATE 40 MG: 40 INJECTION, POWDER, FOR SOLUTION INTRAMUSCULAR; INTRAVENOUS at 17:28

## 2022-01-05 RX ADMIN — HEPARIN SODIUM 5000 UNITS: 5000 INJECTION INTRAVENOUS; SUBCUTANEOUS at 06:32

## 2022-01-05 RX ADMIN — FUROSEMIDE 40 MG: 10 INJECTION, SOLUTION INTRAMUSCULAR; INTRAVENOUS at 22:31

## 2022-01-05 RX ADMIN — SODIUM CHLORIDE, PRESERVATIVE FREE 10 ML: 5 INJECTION INTRAVENOUS at 22:46

## 2022-01-05 NOTE — PROGRESS NOTES
Hospitalist called and informed that patient is very agitated and taking O2 off, sat's dropping into the 70s and attempting to get out of his bed. Orders were received for Vistaril 25 q 6hrs prn, Morphine 2 mg q 6hrs prn, 80 mg of lasix once, and restraints.

## 2022-01-05 NOTE — PROGRESS NOTES
Physician Progress Note      Kadeem Barajas  [facility] #:                  [id number]  :                       6/3/1929  ADMIT DATE:       2022 3:24 PM  [address] [place] DATE:  RESPONDING  PROVIDER #:        Ellen Covington MD          QUERY TEXT:    Pt admitted with Resp Failure and has encephalopathy documented. If possible, please document in progress notes and discharge summary further specificity regarding the type of encephalopathy:    The medical record reflects the following:  Risk Factors: Pneumonia, Resp Failure, COPD, CHF, HTN, UTI, LYNNETTE  Clinical Indicators: H&P: \"(1) Acute encephalopathy. \" \" Confusion and short of breath since 1 day. \"  Pulmo PN: \"Altered mental status, does wake up to verbal stimulation. \"  Treatment: Pulmo Consulted, BiPAP placed, CXR, CT of Head, Lab monitoring. Thank you,  LORIE Baumann, RN, [place], [facility] Specialist  [phone] or [email or web address]  Options provided:  -- Anoxic encephalopathy  -- Metabolic encephalopathy  -- Septic encephalopathy  -- Encephalopathy due to, please specify. -- Other - I will add my own diagnosis  -- Disagree - Not applicable / Not valid  -- Disagree - Clinically unable to determine / Unknown  -- Refer to Clinical Documentation Reviewer    PROVIDER RESPONSE TEXT:    This patient has metabolic encephalopathy. Query created by: Chen Marino on 2022 6:03 PM      QUERY TEXT:    Pt admitted with Pneumonia, Resp Failure and has CHF documented. If possible, please document in progress notes and discharge summary further specificity regarding the type and acuity of CHF:    The medical record reflects the following:  Risk Factors: CHF, LYNNETTE, RESP FAILURE, PNEUMONIA, UTI, HTN, COPD  Clinical Indicators:  PN: \"2. Acute on chronic heart failure unspecified. \" ECHO:  LVEF 60-65%. BNP: 1,944  Treatment: PULMO CONSULTED, EKG, CXR, LASIX IV, I&O MONITORING, LAB MONITORING.     Thank you,  LORIE Baumann, RN, [facility] [place] Specialist  [phone] or [email or web address]  Options provided:  -- Acute on Chronic Diastolic CHF/HFpEF  -- Acute on Chronic Systolic CHF/HFrEF  -- Acute on Chronic Systolic and Diastolic CHF  -- Other - I will add my own diagnosis  -- Disagree - Not applicable / Not valid  -- Disagree - Clinically unable to determine / Unknown  -- Refer to Clinical Documentation Reviewer    PROVIDER RESPONSE TEXT:    This patient is in acute on chronic diastolic CHF/HFpEF.     Query created by: Marlene Zepeda on 1/4/2022 6:17 PM      Electronically signed by:  Ignacio Boland MD 1/5/2022 2:54 PM

## 2022-01-05 NOTE — PROGRESS NOTES
Hospitalist Progress Note    Subjective:   Daily Progress Note: 1/5/2022 11:46 AM    Hospital Course:  Xenia Kolb is a 80-year-old year male with history of CHF, prostate cancer and essential hypertension admitted for shortness of breath, wheezing, and altered mental status. Initial blood gas indicates a PCO2 of 48. He was placed on BIPAP with improvement. CT head negative for acute findings. Covid and Influenza negative. Urinalysis shows leuk esterase, > 100 WBCs, and positive nitrates all consistent with a UTI. Started on IV Zosyn. Pulmonary consulted for acute hypoxic respiratory failure. IV azithromycin added for possible COPD exacerbation. Also started on IV solu-medrol and scheduled duonebs. Patient admitted to ICU, however no beds available and therefor on hold in the ED. Of note, patient is a DNR/DNI. Echocardiogram showing LVEF 60-65%. Subjective:    Patient seen and examined. On nc 4 l now 97%    No acute distress.      Current Facility-Administered Medications   Medication Dose Route Frequency    hydrOXYzine (VISTARIL) 25 mg/mL injection 25 mg  25 mg IntraMUSCular Q6H PRN    morphine injection 2 mg  2 mg IntraVENous Q6H PRN    piperacillin-tazobactam (ZOSYN) 3.375 g in 0.9% sodium chloride (MBP/ADV) 100 mL MBP  3.375 g IntraVENous Q8H    methylPREDNISolone (PF) (SOLU-MEDROL) injection 40 mg  40 mg IntraVENous Q6H    albuterol-ipratropium (DUO-NEB) 2.5 MG-0.5 MG/3 ML  3 mL Nebulization Q4H PRN    sodium chloride (NS) flush 5-40 mL  5-40 mL IntraVENous Q8H    sodium chloride (NS) flush 5-40 mL  5-40 mL IntraVENous PRN    acetaminophen (TYLENOL) tablet 650 mg  650 mg Oral Q6H PRN    Or    acetaminophen (TYLENOL) suppository 650 mg  650 mg Rectal Q6H PRN    polyethylene glycol (MIRALAX) packet 17 g  17 g Oral DAILY PRN    ondansetron (ZOFRAN ODT) tablet 4 mg  4 mg Oral Q8H PRN    Or    ondansetron (ZOFRAN) injection 4 mg  4 mg IntraVENous Q6H PRN    heparin (porcine) injection 5,000 Units  5,000 Units SubCUTAneous Q8H    azithromycin (ZITHROMAX) 500 mg in 0.9% sodium chloride 250 mL (VIAL-MATE)  500 mg IntraVENous Q24H    albuterol-ipratropium (DUO-NEB) 2.5 MG-0.5 MG/3 ML  3 mL Nebulization Q6H RT    furosemide (LASIX) injection 40 mg  40 mg IntraVENous Q8H    famotidine (PF) (PEPCID) 20 mg in 0.9% sodium chloride 10 mL injection  20 mg IntraVENous Q24H        Review of Systems  Unable to obtain secondary to patient condition. Objective:     Visit Vitals  /68 (BP 1 Location: Left upper arm, BP Patient Position: At rest)   Pulse 72   Temp 97.5 °F (36.4 °C)   Resp 20   Ht 5' 8\" (1.727 m)   Wt 77.6 kg (171 lb 1.2 oz)   SpO2 95%   BMI 26.01 kg/m²    O2 Flow Rate (L/min): 4 l/min O2 Device: Nasal cannula    Temp (24hrs), Av.5 °F (36.4 °C), Min:96.9 °F (36.1 °C), Max:98.1 °F (36.7 °C)      701 - 1900  In: -   Out: 265 [WGKYT:468]  1901 -  07  In: -   Out: 1300 [Urine:1300]    PHYSICAL EXAM:  Constitutional: No acute distress  Skin: Extremities and face reveal no rashes. HEENT: Sclerae anicteric. PERRL. The neck is supple and no masses. Cardiovascular: Regular rate and rhythm. +S1/S2. NO murmur or gallop. Respiratory:  Symmetric chest wall expansion. Few rhonchi bilaterally. No wheezing or rales. GI: Abdomen nondistended, soft, and nontender. Normal active bowel sounds. Rectal: Deferred   Musculoskeletal: No pitting edema of the lower legs. Able to move all ext  Neurological:  Patient with AMS.  Cranial nerves II-XII grossly intact  Psychiatric: Unable to obtain       Data Review    Recent Results (from the past 24 hour(s))   BLOOD GAS, ARTERIAL    Collection Time: 22  3:20 PM   Result Value Ref Range    pH 7.45 7.35 - 7.45      PCO2 48 (H) 35 - 45 mmHg    PO2 66 (L) 75 - 100 mmHg    O2 SAT 94 (L) >95 %    BICARBONATE 32 (H) 22 - 26 mmol/L    BASE EXCESS 7.9 (H) 0 - 2 mmol/L    O2 METHOD Venti mask      O2 FLOW RATE 12 L/min    FIO2 35.0 % SITE Right Radial      OLIVIA'S TEST PASS         XR CHEST PORT   Final Result      XR CHEST PORT   Final Result   I suspect a degree of chronic interstitial lung disease which has   been present on multiple prior exams and without superimposed focal airspace   disease on today's exam. Other chronic findings as above. XR CHEST PORT   Final Result   Decreased lung volumes. No significant interval change otherwise. CT HEAD WO CONT   Final Result   Age-appropriate atrophy. No acute findings. XR CHEST PORT   Final Result   Faint patchy airspace disease within the right lung suspicious for   pneumonia. XR CHEST PORT    (Results Pending)   XR CHEST PORT    (Results Pending)   XR CHEST PORT    (Results Pending)       Active Problems:    Respiratory failure ([facility] [address] (1/2/2022)        Assessment/Plan:     1. Acute hypoxic respiratory failure likely secondary to COPD exacerbation  - weaned to 4L  - Covid and Influenza negative  - Continue IV azithromycin and IV Zosyn  - Continue IV solu-medrol and scheduled duonebs  - Echocardiogram showing LVEF 60-65%. - Pulmonary consult  - Wean oxygen as tolerated    2. Acute on chronic heart failure unspecified, likely main etio of his breathing issues   - Continue IV lasix 40 mg daily  - echo ef 73-95%, nl diastolic fxn    3. Acute kidney injury  - Improving. Likely secondary to UTI. 4. Urinary tract infection  - Urinalysis shows leuk esterase, > 100 WBCs, and positive nitrates all consistent with a UTI  - ucs no growth  - cont IV Zosyn for now      DVT Prophylaxis: heparin subcu  Code Status: DNR/DNI  GI prophylaxis: Pepcid  POA:    Care Plan discussed with: patient and nursing    Total time spent with patient: >35 minutes.

## 2022-01-05 NOTE — PROGRESS NOTES
Pulmonary/CC Progress Note  Patient is a 20-year-old  male with a history of CHF (EF unknown), remote prostate cancer, and hypertension who gets most of his care through the [facility] and presented to the hospital with wheezing, altered mental status, and shortness of breath requiring BiPAP therapy. Subjective:   Daily Progress Note: 1/5/2022 10:27 AM    Patient examined at bedside. He is on telemetry unit. Patient has been on a Ventimask. Patient required a sitter at bedside because of fears of agitation. Current Facility-Administered Medications   Medication Dose Route Frequency    hydrOXYzine (VISTARIL) 25 mg/mL injection 25 mg  25 mg IntraMUSCular Q6H PRN    morphine injection 2 mg  2 mg IntraVENous Q6H PRN    piperacillin-tazobactam (ZOSYN) 3.375 g in 0.9% sodium chloride (MBP/ADV) 100 mL MBP  3.375 g IntraVENous Q8H    methylPREDNISolone (PF) (SOLU-MEDROL) injection 40 mg  40 mg IntraVENous Q6H    albuterol-ipratropium (DUO-NEB) 2.5 MG-0.5 MG/3 ML  3 mL Nebulization Q4H PRN    sodium chloride (NS) flush 5-40 mL  5-40 mL IntraVENous Q8H    sodium chloride (NS) flush 5-40 mL  5-40 mL IntraVENous PRN    acetaminophen (TYLENOL) tablet 650 mg  650 mg Oral Q6H PRN    Or    acetaminophen (TYLENOL) suppository 650 mg  650 mg Rectal Q6H PRN    polyethylene glycol (MIRALAX) packet 17 g  17 g Oral DAILY PRN    ondansetron (ZOFRAN ODT) tablet 4 mg  4 mg Oral Q8H PRN    Or    ondansetron (ZOFRAN) injection 4 mg  4 mg IntraVENous Q6H PRN    heparin (porcine) injection 5,000 Units  5,000 Units SubCUTAneous Q8H    azithromycin (ZITHROMAX) 500 mg in 0.9% sodium chloride 250 mL (VIAL-MATE)  500 mg IntraVENous Q24H    albuterol-ipratropium (DUO-NEB) 2.5 MG-0.5 MG/3 ML  3 mL Nebulization Q6H RT    furosemide (LASIX) injection 40 mg  40 mg IntraVENous Q8H    famotidine (PF) (PEPCID) 20 mg in 0.9% sodium chloride 10 mL injection  20 mg IntraVENous Q24H        Review of Systems  .   Patient is alert. Denied any chest pains. Still slightly confused. Objective:     Visit Vitals  /60 (BP 1 Location: Left upper arm, BP Patient Position: Semi fowlers)   Pulse 67   Temp 96.9 °F (36.1 °C)   Resp 18   Ht 5' 8\" (1.727 m)   Wt 77.6 kg (171 lb 1.2 oz)   SpO2 97%   BMI 26.01 kg/m²    O2 Flow Rate (L/min): 15 l/min O2 Device: Ventimask    Temp (24hrs), Av.7 °F (36.5 °C), Min:96.9 °F (36.1 °C), Max:98.6 °F (37 °C)      No intake/output data recorded.  1901 -  0700  In: -   Out: 1300 [Urine:1300]    General appearance: no distress, appears older than stated age, mildly obese, confused, on Ventimask   Head: Normocephalic, without obvious abnormality, atraumatic  Eyes: negative  Neck: supple, symmetrical, trachea midline and no adenopathy  Lungs: Mild rhonchi bilaterally, prolonged exhalation phase, currently on BiPAP  Heart: regular rate and rhythm, S1, S2 normal, no murmur, click, rub or gallop  Abdomen: soft, non-tender.  Bowel sounds normal. No masses,  no organomegaly  Extremities: extremities normal, atraumatic, no cyanosis, +3 pitting edema in lower extremities bilaterally  Pulses: 2+ and symmetric  Skin: Skin color, texture, turgor normal. No rashes or lesions  Lymph nodes: Cervical, supraclavicular, and axillary nodes normal.  Neurologic: Altered mental status, does wake up to verbal stimulation however    Lab/Data Review:  Recent Labs     22  0406 22  0728 22  1545   WBC 9.8 7.2 9.1   HGB 12.3 11.9* 13.4   HCT 38.7 37.4 41.7    150 172     Recent Labs     22  0406 22  0728 22  1545    140 137   K 4.1 4.0 5.6*   * 106 106   CO2 30 29 27   GLU 91 131* 123*   BUN 40* 33* 31*   CREA 1.18 1.58* 1.57*   CA 7.7* 7.9* 8.6   MG  --  2.4  --    PHOS  --  3.1  --    ALB  --   --  3.3*   TBILI  --   --  0.8   ALT  --   --  20     Recent Labs     22  0430 22  1743   PH 7.43 7.36   PCO2 44 48*   PO2 96 88   HCO3 29* 25   FIO2 50.0 50.0 Diagnostics   CXR Results  (Last 48 hours)               01/05/22 0913  XR CHEST PORT Final result    Narrative:  Chest single view. Comparison single view chest January 4, 2022. Reduced lung volumes. Coarse reticular markings through lungs similar compared   to prior imaging. Cardiac and mediastinal structures unchanged. Thoracic aorta   atherosclerosis. No pneumothorax or sizable pleural effusion. 01/04/22 0504  XR CHEST PORT Final result    Impression:  I suspect a degree of chronic interstitial lung disease which has   been present on multiple prior exams and without superimposed focal airspace   disease on today's exam. Other chronic findings as above. Narrative:  HISTORY:  Respiratory failure       TECHNIQUE:  XR CHEST PORT       COMPARISON: 1/3/2022   LIMITATIONS: None       TUBES/LINES: None       LUNG PARENCHYMA: Overall stable appearance of the lungs with persistent   interstitial marking prominence but no new focal airspace process. Overall   hypoinflated appearance as on prior with left hemidiaphragm elevation. TRACHEA/BRONCHI: Normal   PULMONARY VESSELS: Normal   PLEURA: Normal   HEART: Normal   AORTIC SHADOW:Normal.     MEDIASTINUM: Normal   BONE/SOFT TISSUES: No acute abnormality. Total right shoulder arthroplasty       OTHER: None                    Assessment/Plan:     Active Problems:    Respiratory failure ([address] (1/2/2022)    Patient is a 49-year-old  male with a history of CHF (EF unknown), remote prostate cancer, and hypertension who gets most of his care through the [facility] and presented to the hospital with wheezing, altered mental status, and shortness of breath requiring BiPAP therapy.     Plan:      1.)  Acute hypoxic respiratory failure  -Likely combination of acute on chronic heart failure as well as acute exacerbation of COPD, targeted therapies as outlined below  -Yesterday was switched to Ventimask. Patient is still confused.   He had periods of agitation therefore required sitter at bedside. Attempt to nasal cannula oxygen at 4 L/min. We will get a blood gas later today. -DNR/DNI       2.)  Acute on chronic heart failure  -EF unknown  -I feel that this is likely the main etiology of his breathing issues given that he is significantly volume overloaded on exam.  Echocardiogram results were reviewed which showed EF of 60 to 65%. Normal diastolic function.  -Hopefully can get him to nasal cannula later today      3.)  Acute exacerbation of COPD  -Unclear history of smoking/COPD, but this is what is in the record, can ask him more once his mental status improves. -Agree with duo nebs every 6 hours and Solu-Medrol 40 mg IV every 6 hours, agree with IV azithromycin for now  -Continue to monitor on BiPAP, repeat chest x-ray and an ABG in the morning     4.)  Acute kidney injury/hyperkalemia  -Creatinine 1.58, potassium come down to 4 today. Previous creatinine in June 2021 was 1.06.  -He appears volume overloaded on exam, likely cardiorenal physiology  -Diuresis as above     5.)  UTI  -Urinalysis very suspect for urinary tract infection,   On IV Zosyn.           CODE STATUS: DNR/DNI     Lines/Tubes: Peripheral IV x1     Prophylaxis:  Stress Ulcer Protocol Active: Yes  Deep Vein Thrombosis Protocol Active: Yes  Nutrition: NPO  Activity: Bedrest  Discussed with covering nurse in the ER. Discussed with respiratory therapist and the      Care Plan discussed with: pt and covering nurse  Total time spent with patient: 50 minutes.     Terrie Arreola MD  [facility]

## 2022-01-05 NOTE — PROGRESS NOTES
PARAMJIT received call from [place] at [facility] [phone] regarding patient. She asked that the H&P paperwork be completed and faxed to [phone]. Ms. Cushing requested that they come and visit patient to determine if he would be eligible for admission today or tomorrow, CM stated that would fine. CM contacted patient's daughter, Eunice Guzman [phone], to discuss DCP. PARAMJIT explained that patient will need to be evaluated by PT and OT in order to determine if patient will be strong enough to d/c directly to long-term but that patient may need to go to SNF prior to long-term, Ms. Eunice Anibalnedra verbalized understanding. DCP pending RALPH evaluation of patient and PT/OT evaluation.

## 2022-01-06 ENCOUNTER — APPOINTMENT (OUTPATIENT)
Dept: GENERAL RADIOLOGY | Age: 87
DRG: 291 | End: 2022-01-06
Attending: INTERNAL MEDICINE
Payer: MEDICARE

## 2022-01-06 LAB
ANION GAP SERPL CALC-SCNC: 8 MMOL/L (ref 5–15)
BASOPHILS # BLD: 0 K/UL (ref 0–0.1)
BASOPHILS NFR BLD: 0 % (ref 0–1)
BUN SERPL-MCNC: 43 MG/DL (ref 6–20)
BUN/CREAT SERPL: 28 (ref 12–20)
CA-I BLD-MCNC: 7.7 MG/DL (ref 8.5–10.1)
CHLORIDE SERPL-SCNC: 102 MMOL/L (ref 97–108)
CO2 SERPL-SCNC: 31 MMOL/L (ref 21–32)
CREAT SERPL-MCNC: 1.55 MG/DL (ref 0.7–1.3)
DIFFERENTIAL METHOD BLD: ABNORMAL
EOSINOPHIL # BLD: 0 K/UL (ref 0–0.4)
EOSINOPHIL NFR BLD: 0 % (ref 0–7)
ERYTHROCYTE [DISTWIDTH] IN BLOOD BY AUTOMATED COUNT: 13.1 % (ref 11.5–14.5)
GLUCOSE SERPL-MCNC: 118 MG/DL (ref 65–100)
HCT VFR BLD AUTO: 42.8 % (ref 36.6–50.3)
HGB BLD-MCNC: 13.9 G/DL (ref 12.1–17)
IMM GRANULOCYTES # BLD AUTO: 0.1 K/UL (ref 0–0.04)
IMM GRANULOCYTES NFR BLD AUTO: 1 % (ref 0–0.5)
LYMPHOCYTES # BLD: 0.6 K/UL (ref 0.8–3.5)
LYMPHOCYTES NFR BLD: 7 % (ref 12–49)
MCH RBC QN AUTO: 32.9 PG (ref 26–34)
MCHC RBC AUTO-ENTMCNC: 32.5 G/DL (ref 30–36.5)
MCV RBC AUTO: 101.4 FL (ref 80–99)
MONOCYTES # BLD: 0.5 K/UL (ref 0–1)
MONOCYTES NFR BLD: 6 % (ref 5–13)
NEUTS SEG # BLD: 7.7 K/UL (ref 1.8–8)
NEUTS SEG NFR BLD: 86 % (ref 32–75)
NRBC # BLD: 0 K/UL (ref 0–0.01)
NRBC BLD-RTO: 0 PER 100 WBC
PLATELET # BLD AUTO: 251 K/UL (ref 150–400)
PMV BLD AUTO: 9.5 FL (ref 8.9–12.9)
POTASSIUM SERPL-SCNC: 3.6 MMOL/L (ref 3.5–5.1)
RBC # BLD AUTO: 4.22 M/UL (ref 4.1–5.7)
SODIUM SERPL-SCNC: 141 MMOL/L (ref 136–145)
WBC # BLD AUTO: 8.9 K/UL (ref 4.1–11.1)

## 2022-01-06 PROCEDURE — 74011000250 HC RX REV CODE- 250: Performed by: HOSPITALIST

## 2022-01-06 PROCEDURE — 65270000029 HC RM PRIVATE

## 2022-01-06 PROCEDURE — 74011250636 HC RX REV CODE- 250/636: Performed by: PHYSICIAN ASSISTANT

## 2022-01-06 PROCEDURE — 71045 X-RAY EXAM CHEST 1 VIEW: CPT

## 2022-01-06 PROCEDURE — 74011000250 HC RX REV CODE- 250: Performed by: INTERNAL MEDICINE

## 2022-01-06 PROCEDURE — 94640 AIRWAY INHALATION TREATMENT: CPT

## 2022-01-06 PROCEDURE — 85025 COMPLETE CBC W/AUTO DIFF WBC: CPT

## 2022-01-06 PROCEDURE — 74011000258 HC RX REV CODE- 258: Performed by: HOSPITALIST

## 2022-01-06 PROCEDURE — 74011250636 HC RX REV CODE- 250/636: Performed by: HOSPITALIST

## 2022-01-06 PROCEDURE — 77010033678 HC OXYGEN DAILY

## 2022-01-06 PROCEDURE — 74011250636 HC RX REV CODE- 250/636: Performed by: INTERNAL MEDICINE

## 2022-01-06 PROCEDURE — 80048 BASIC METABOLIC PNL TOTAL CA: CPT

## 2022-01-06 PROCEDURE — 94760 N-INVAS EAR/PLS OXIMETRY 1: CPT

## 2022-01-06 PROCEDURE — 36415 COLL VENOUS BLD VENIPUNCTURE: CPT

## 2022-01-06 RX ADMIN — METHYLPREDNISOLONE SODIUM SUCCINATE 40 MG: 40 INJECTION, POWDER, FOR SOLUTION INTRAMUSCULAR; INTRAVENOUS at 09:00

## 2022-01-06 RX ADMIN — PIPERACILLIN SODIUM AND TAZOBACTAM SODIUM 3.38 G: 3; .375 INJECTION, POWDER, LYOPHILIZED, FOR SOLUTION INTRAVENOUS at 23:17

## 2022-01-06 RX ADMIN — HEPARIN SODIUM 5000 UNITS: 5000 INJECTION INTRAVENOUS; SUBCUTANEOUS at 05:07

## 2022-01-06 RX ADMIN — HYDROXYZINE HYDROCHLORIDE 25 MG: 25 INJECTION, SOLUTION INTRAMUSCULAR at 00:45

## 2022-01-06 RX ADMIN — HEPARIN SODIUM 5000 UNITS: 5000 INJECTION INTRAVENOUS; SUBCUTANEOUS at 13:25

## 2022-01-06 RX ADMIN — IPRATROPIUM BROMIDE AND ALBUTEROL SULFATE 3 ML: .5; 2.5 SOLUTION RESPIRATORY (INHALATION) at 07:39

## 2022-01-06 RX ADMIN — IPRATROPIUM BROMIDE AND ALBUTEROL SULFATE 3 ML: .5; 2.5 SOLUTION RESPIRATORY (INHALATION) at 20:27

## 2022-01-06 RX ADMIN — AZITHROMYCIN MONOHYDRATE 500 MG: 500 INJECTION, POWDER, LYOPHILIZED, FOR SOLUTION INTRAVENOUS at 21:22

## 2022-01-06 RX ADMIN — HEPARIN SODIUM 5000 UNITS: 5000 INJECTION INTRAVENOUS; SUBCUTANEOUS at 21:22

## 2022-01-06 RX ADMIN — FUROSEMIDE 40 MG: 10 INJECTION, SOLUTION INTRAMUSCULAR; INTRAVENOUS at 05:07

## 2022-01-06 RX ADMIN — IPRATROPIUM BROMIDE AND ALBUTEROL SULFATE 3 ML: .5; 2.5 SOLUTION RESPIRATORY (INHALATION) at 01:13

## 2022-01-06 RX ADMIN — FUROSEMIDE 40 MG: 10 INJECTION, SOLUTION INTRAMUSCULAR; INTRAVENOUS at 21:22

## 2022-01-06 RX ADMIN — SODIUM CHLORIDE, PRESERVATIVE FREE 10 ML: 5 INJECTION INTRAVENOUS at 21:35

## 2022-01-06 RX ADMIN — METHYLPREDNISOLONE SODIUM SUCCINATE 40 MG: 40 INJECTION, POWDER, FOR SOLUTION INTRAMUSCULAR; INTRAVENOUS at 21:22

## 2022-01-06 RX ADMIN — METHYLPREDNISOLONE SODIUM SUCCINATE 40 MG: 40 INJECTION, POWDER, FOR SOLUTION INTRAMUSCULAR; INTRAVENOUS at 05:07

## 2022-01-06 RX ADMIN — IPRATROPIUM BROMIDE AND ALBUTEROL SULFATE 3 ML: .5; 2.5 SOLUTION RESPIRATORY (INHALATION) at 13:09

## 2022-01-06 RX ADMIN — SODIUM CHLORIDE, PRESERVATIVE FREE 10 ML: 5 INJECTION INTRAVENOUS at 13:25

## 2022-01-06 RX ADMIN — HYDROXYZINE HYDROCHLORIDE 25 MG: 25 INJECTION, SOLUTION INTRAMUSCULAR at 13:42

## 2022-01-06 RX ADMIN — FUROSEMIDE 40 MG: 10 INJECTION, SOLUTION INTRAMUSCULAR; INTRAVENOUS at 13:25

## 2022-01-06 RX ADMIN — PIPERACILLIN SODIUM AND TAZOBACTAM SODIUM 3.38 G: 3; .375 INJECTION, POWDER, LYOPHILIZED, FOR SOLUTION INTRAVENOUS at 05:07

## 2022-01-06 RX ADMIN — SODIUM CHLORIDE, PRESERVATIVE FREE 20 MG: 5 INJECTION INTRAVENOUS at 21:22

## 2022-01-06 RX ADMIN — PIPERACILLIN SODIUM AND TAZOBACTAM SODIUM 3.38 G: 3; .375 INJECTION, POWDER, LYOPHILIZED, FOR SOLUTION INTRAVENOUS at 13:25

## 2022-01-06 RX ADMIN — SODIUM CHLORIDE, PRESERVATIVE FREE 10 ML: 5 INJECTION INTRAVENOUS at 05:16

## 2022-01-06 RX ADMIN — METHYLPREDNISOLONE SODIUM SUCCINATE 40 MG: 40 INJECTION, POWDER, FOR SOLUTION INTRAMUSCULAR; INTRAVENOUS at 15:02

## 2022-01-06 NOTE — PROGRESS NOTES
OT eval order received and acknowledged. Pt in restraints and currently on a 1:1. Per PT note, pt has been agitated this morning and inappropriate for therapy. OT will continue to follow and attempt eval at a later time. Thank you.

## 2022-01-06 NOTE — PROGRESS NOTES
Problem: Falls - Risk of  Goal: *Absence of Falls  Description: Document Sofia Fothergill Fall Risk and appropriate interventions in the flowsheet. Outcome: Progressing Towards Goal  Note: Fall Risk Interventions:  Mobility Interventions: Bed/chair exit alarm,Patient to call before getting OOB,Utilize walker, cane, or other assistive device    Mentation Interventions: Bed/chair exit alarm,Family/sitter at bedside,Door open when patient unattended,Adequate sleep, hydration, pain control    Medication Interventions: Bed/chair exit alarm,Patient to call before getting OOB    Elimination Interventions: Bed/chair exit alarm,Call light in reach,Patient to call for help with toileting needs              Problem: Patient Education: Go to Patient Education Activity  Goal: Patient/Family Education  Outcome: Progressing Towards Goal     Problem: Pressure Injury - Risk of  Goal: *Prevention of pressure injury  Description: Document Demarcus Scale and appropriate interventions in the flowsheet.   Outcome: Progressing Towards Goal  Note: Pressure Injury Interventions:  Sensory Interventions: Assess changes in LOC,Keep linens dry and wrinkle-free,Maintain/enhance activity level,Minimize linen layers    Moisture Interventions: Absorbent underpads,Apply protective barrier, creams and emollients,Internal/External urinary devices    Activity Interventions: Increase time out of bed,PT/OT evaluation    Mobility Interventions: HOB 30 degrees or less,PT/OT evaluation    Nutrition Interventions: Document food/fluid/supplement intake    Friction and Shear Interventions: Apply protective barrier, creams and emollients,HOB 30 degrees or less                Problem: Patient Education: Go to Patient Education Activity  Goal: Patient/Family Education  Outcome: Progressing Towards Goal

## 2022-01-06 NOTE — PROGRESS NOTES
Hospitalist Progress Note    Subjective:   Daily Progress Note: 1/6/2022 11:46 AM    Hospital Course:  Flora Ojeda is a 80-year-old year male with history of CHF, prostate cancer and essential hypertension admitted for shortness of breath, wheezing, and altered mental status. Initial blood gas indicates a PCO2 of 48. He was placed on BIPAP with improvement. CT head negative for acute findings. Covid and Influenza negative. Urinalysis shows leuk esterase, > 100 WBCs, and positive nitrates all consistent with a UTI. Started on IV Zosyn. Pulmonary consulted for acute hypoxic respiratory failure. IV azithromycin added for possible COPD exacerbation. Also started on IV solu-medrol and scheduled duonebs. Patient admitted to ICU, however no beds available and therefor on hold in the ED. Of note, patient is a DNR/DNI. Echocardiogram showing LVEF 60-65%. Subjective:    Patient seen and examined. On nc 4 l now 97%    No acute distress.      Current Facility-Administered Medications   Medication Dose Route Frequency    hydrOXYzine (VISTARIL) 25 mg/mL injection 25 mg  25 mg IntraMUSCular Q6H PRN    piperacillin-tazobactam (ZOSYN) 3.375 g in 0.9% sodium chloride (MBP/ADV) 100 mL MBP  3.375 g IntraVENous Q8H    methylPREDNISolone (PF) (SOLU-MEDROL) injection 40 mg  40 mg IntraVENous Q6H    albuterol-ipratropium (DUO-NEB) 2.5 MG-0.5 MG/3 ML  3 mL Nebulization Q4H PRN    sodium chloride (NS) flush 5-40 mL  5-40 mL IntraVENous Q8H    sodium chloride (NS) flush 5-40 mL  5-40 mL IntraVENous PRN    acetaminophen (TYLENOL) tablet 650 mg  650 mg Oral Q6H PRN    Or    acetaminophen (TYLENOL) suppository 650 mg  650 mg Rectal Q6H PRN    polyethylene glycol (MIRALAX) packet 17 g  17 g Oral DAILY PRN    ondansetron (ZOFRAN ODT) tablet 4 mg  4 mg Oral Q8H PRN    Or    ondansetron (ZOFRAN) injection 4 mg  4 mg IntraVENous Q6H PRN    heparin (porcine) injection 5,000 Units  5,000 Units SubCUTAneous Q8H    azithromycin (ZITHROMAX) 500 mg in 0.9% sodium chloride 250 mL (VIAL-MATE)  500 mg IntraVENous Q24H    albuterol-ipratropium (DUO-NEB) 2.5 MG-0.5 MG/3 ML  3 mL Nebulization Q6H RT    furosemide (LASIX) injection 40 mg  40 mg IntraVENous Q8H    famotidine (PF) (PEPCID) 20 mg in 0.9% sodium chloride 10 mL injection  20 mg IntraVENous Q24H        Review of Systems  Unable to obtain secondary to patient condition. Objective:     Visit Vitals  /74 (BP 1 Location: Left upper arm, BP Patient Position: At rest)   Pulse (!) 111   Temp 98.6 °F (37 °C)   Resp 18   Ht 5' 8\" (1.727 m)   Wt 74.2 kg (163 lb 9.3 oz)   SpO2 97%   BMI 24.87 kg/m²    O2 Flow Rate (L/min): 4 l/min O2 Device: Nasal cannula    Temp (24hrs), Av.1 °F (36.7 °C), Min:97.6 °F (36.4 °C), Max:98.6 °F (37 °C)      701 - 1900  In: 814 [P.O.:840]  Out: -   1901 -  0700  In: -   Out: 2900 [Urine:2900]    PHYSICAL EXAM:  Constitutional: No acute distress  Skin: Extremities and face reveal no rashes. HEENT: Sclerae anicteric. PERRL. The neck is supple and no masses. Cardiovascular: Regular rate and rhythm. +S1/S2. NO murmur or gallop. Respiratory:  Symmetric chest wall expansion. Few rhonchi bilaterally. No wheezing or rales. GI: Abdomen nondistended, soft, and nontender. Normal active bowel sounds. Rectal: Deferred   Musculoskeletal: No pitting edema of the lower legs. Able to move all ext  Neurological:  Patient with AMS.  Cranial nerves II-XII grossly intact  Psychiatric: Unable to obtain       Data Review    Recent Results (from the past 24 hour(s))   METABOLIC PANEL, BASIC    Collection Time: 22  7:38 AM   Result Value Ref Range    Sodium 141 136 - 145 mmol/L    Potassium 3.6 3.5 - 5.1 mmol/L    Chloride 102 97 - 108 mmol/L    CO2 31 21 - 32 mmol/L    Anion gap 8 5 - 15 mmol/L    Glucose 118 (H) 65 - 100 mg/dL    BUN 43 (H) 6 - 20 mg/dL    Creatinine 1.55 (H) 0.70 - 1.30 mg/dL    BUN/Creatinine ratio 28 (H) 12 - 20 GFR est AA 51 (L) >60 ml/min/1.73m2    GFR est non-AA 42 (L) >60 ml/min/1.73m2    Calcium 7.7 (L) 8.5 - 10.1 mg/dL   CBC WITH AUTOMATED DIFF    Collection Time: 01/06/22  7:38 AM   Result Value Ref Range    WBC 8.9 4.1 - 11.1 K/uL    RBC 4.22 4.10 - 5.70 M/uL    HGB 13.9 12.1 - 17.0 g/dL    HCT 42.8 36.6 - 50.3 %    .4 (H) 80.0 - 99.0 FL    MCH 32.9 26.0 - 34.0 PG    MCHC 32.5 30.0 - 36.5 g/dL    RDW 13.1 11.5 - 14.5 %    PLATELET 113 001 - 626 K/uL    MPV 9.5 8.9 - 12.9 FL    NRBC 0.0 0.0  WBC    ABSOLUTE NRBC 0.00 0.00 - 0.01 K/uL    NEUTROPHILS 86 (H) 32 - 75 %    LYMPHOCYTES 7 (L) 12 - 49 %    MONOCYTES 6 5 - 13 %    EOSINOPHILS 0 0 - 7 %    BASOPHILS 0 0 - 1 %    IMMATURE GRANULOCYTES 1 (H) 0 - 0.5 %    ABS. NEUTROPHILS 7.7 1.8 - 8.0 K/UL    ABS. LYMPHOCYTES 0.6 (L) 0.8 - 3.5 K/UL    ABS. MONOCYTES 0.5 0.0 - 1.0 K/UL    ABS. EOSINOPHILS 0.0 0.0 - 0.4 K/UL    ABS. BASOPHILS 0.0 0.0 - 0.1 K/UL    ABS. IMM. GRANS. 0.1 (H) 0.00 - 0.04 K/UL    DF AUTOMATED         XR CHEST PORT   Final Result   The cardiomediastinal silhouette is appropriate for age, technique,   and lung expansion. Pulmonary vasculature is not congested. The lungs are   essentially clear. No effusion or pneumothorax is seen. XR CHEST PORT   Final Result      XR CHEST PORT   Final Result   I suspect a degree of chronic interstitial lung disease which has   been present on multiple prior exams and without superimposed focal airspace   disease on today's exam. Other chronic findings as above. XR CHEST PORT   Final Result   Decreased lung volumes. No significant interval change otherwise. CT HEAD WO CONT   Final Result   Age-appropriate atrophy. No acute findings. XR CHEST PORT   Final Result   Faint patchy airspace disease within the right lung suspicious for   pneumonia.       XR CHEST PORT    (Results Pending)   XR CHEST PORT    (Results Pending)   XR CHEST PORT    (Results Pending)       Active Problems: Respiratory failure ([facility] [address] (1/2/2022)        Assessment/Plan:     1. Acute hypoxic respiratory failure likely secondary to COPD exacerbation  - weaned to 4L  - Covid and Influenza negative  - Continue IV azithromycin and IV Zosyn  - Continue IV solu-medrol and scheduled duonebs  - Echocardiogram showing LVEF 60-65%. - Pulmonary consult  - Wean oxygen as tolerated    2. Acute on chronic heart failure unspecified, likely main etio of his breathing issues   - Continue IV lasix 40 mg daily  - echo ef 96-10%, nl diastolic fxn    3. Acute kidney injury  - Improving. Likely secondary to UTI. 4. Urinary tract infection  - Urinalysis shows leuk esterase, > 100 WBCs, and positive nitrates all consistent with a UTI  - ucs no growth  - cont IV Zosyn for now      DVT Prophylaxis: heparin subcu  Code Status: DNR/DNI  GI prophylaxis: Pepcid  POA:    Care Plan discussed with: patient and nursing    Total time spent with patient: >35 minutes.

## 2022-01-06 NOTE — PROGRESS NOTES
PT consult received and eval attempted. Pt in restraints and currently on 1:1. Per sitter in room pt has been very agitated this morning and not appropriate to work with. Will attempt PT eval again at a later time.

## 2022-01-06 NOTE — PROGRESS NOTES
Pulmonary/CC Progress Note  Patient is a 49-year-old  male with a history of CHF (EF unknown), remote prostate cancer, and hypertension who gets most of his care through the [address] [place] [address] and presented to the hospital with wheezing, altered mental status, and shortness of breath requiring BiPAP therapy. Subjective:   Daily Progress Note: 1/6/2022 10:27 AM    Patient examined at bedside. He is on telemetry unit. Patient has been on a Ventimask. Patient required a sitter at bedside because of agitation , restlessness, confusion fears of agitation. Current Facility-Administered Medications   Medication Dose Route Frequency    hydrOXYzine (VISTARIL) 25 mg/mL injection 25 mg  25 mg IntraMUSCular Q6H PRN    piperacillin-tazobactam (ZOSYN) 3.375 g in 0.9% sodium chloride (MBP/ADV) 100 mL MBP  3.375 g IntraVENous Q8H    methylPREDNISolone (PF) (SOLU-MEDROL) injection 40 mg  40 mg IntraVENous Q6H    albuterol-ipratropium (DUO-NEB) 2.5 MG-0.5 MG/3 ML  3 mL Nebulization Q4H PRN    sodium chloride (NS) flush 5-40 mL  5-40 mL IntraVENous Q8H    sodium chloride (NS) flush 5-40 mL  5-40 mL IntraVENous PRN    acetaminophen (TYLENOL) tablet 650 mg  650 mg Oral Q6H PRN    Or    acetaminophen (TYLENOL) suppository 650 mg  650 mg Rectal Q6H PRN    polyethylene glycol (MIRALAX) packet 17 g  17 g Oral DAILY PRN    ondansetron (ZOFRAN ODT) tablet 4 mg  4 mg Oral Q8H PRN    Or    ondansetron (ZOFRAN) injection 4 mg  4 mg IntraVENous Q6H PRN    heparin (porcine) injection 5,000 Units  5,000 Units SubCUTAneous Q8H    azithromycin (ZITHROMAX) 500 mg in 0.9% sodium chloride 250 mL (VIAL-MATE)  500 mg IntraVENous Q24H    albuterol-ipratropium (DUO-NEB) 2.5 MG-0.5 MG/3 ML  3 mL Nebulization Q6H RT    furosemide (LASIX) injection 40 mg  40 mg IntraVENous Q8H    famotidine (PF) (PEPCID) 20 mg in 0.9% sodium chloride 10 mL injection  20 mg IntraVENous Q24H        Review of Systems  . Patient is alert.   Denied any chest pains. Still slightly confused. Objective:     Visit Vitals  /74 (BP 1 Location: Left upper arm, BP Patient Position: At rest)   Pulse 86   Temp 98.4 °F (36.9 °C)   Resp 20   Ht 5' 8\" (1.727 m)   Wt 74.2 kg (163 lb 9.3 oz)   SpO2 95%   BMI 24.87 kg/m²    O2 Flow Rate (L/min): 4 l/min O2 Device: Nasal cannula    Temp (24hrs), Av.9 °F (36.6 °C), Min:97.5 °F (36.4 °C), Max:98.4 °F (36.9 °C)      701 - 1900  In: 146 [P.O.:840]  Out: -   1901 -  07  In: -   Out: 2900 [Urine:2900]    General appearance: no distress, appears older than stated age, mildly obese, confused, on Ventimask   Head: Normocephalic, without obvious abnormality, atraumatic  Eyes: negative  Neck: supple, symmetrical, trachea midline and no adenopathy  Lungs: Mild rhonchi bilaterally, prolonged exhalation phase, currently on BiPAP  Heart: regular rate and rhythm, S1, S2 normal, no murmur, click, rub or gallop  Abdomen: soft, non-tender.  Bowel sounds normal. No masses,  no organomegaly  Extremities: extremities normal, atraumatic, no cyanosis, +3 pitting edema in lower extremities bilaterally  Pulses: 2+ and symmetric  Skin: Skin color, texture, turgor normal. No rashes or lesions  Lymph nodes: Cervical, supraclavicular, and axillary nodes normal.  Neurologic: Altered mental status, does wake up to verbal stimulation however    Lab/Data Review:  Recent Labs     22  0738 22  0406   WBC 8.9 9.8   HGB 13.9 12.3   HCT 42.8 38.7    184     Recent Labs     22  0738 22  0406    144   K 3.6 4.1    109*   CO2 31 30   * 91   BUN 43* 40*   CREA 1.55* 1.18   CA 7.7* 7.7*     Recent Labs     22  1520 22  0430   PH 7.45 7.43   PCO2 48* 44   PO2 66* 96   HCO3 32* 29*   FIO2 35.0 50.0         Diagnostics   CXR Results  (Last 48 hours)               22 0841  XR CHEST PORT Final result    Impression:  The cardiomediastinal silhouette is appropriate for age, technique,   and lung expansion. Pulmonary vasculature is not congested. The lungs are   essentially clear. No effusion or pneumothorax is seen. Narrative:  1 view comparison yesterday           01/05/22 0913  XR CHEST PORT Final result    Narrative:  Chest single view. Comparison single view chest January 4, 2022. Reduced lung volumes. Coarse reticular markings through lungs similar compared   to prior imaging. Cardiac and mediastinal structures unchanged. Thoracic aorta   atherosclerosis. No pneumothorax or sizable pleural effusion. Assessment/Plan:     Active Problems:    Respiratory failure ([address] (1/2/2022)    Patient is a 41-year-old  male with a history of CHF (EF unknown), remote prostate cancer, and hypertension who gets most of his care through the [address] [place] [address] and presented to the hospital with wheezing, altered mental status, and shortness of breath requiring BiPAP therapy.     Plan:      1.)  Acute hypoxic respiratory failure  -Likely combination of acute on chronic heart failure as well as acute exacerbation of COPD,  Patient has shown improvement in hypoxia. Continue nasal cannula oxygen. -DNR/DNI     2.)  Acute on chronic heart failure    Echocardiogram results were reviewed which showed EF of 60 to 65%. Normal diastolic function.  -Hopefully can get him to nasal cannula later today      3.)  Acute exacerbation of COPD  -Unclear history of smoking/COPD, but this is what is in the record, can ask him more once his mental status improves.   -Agree with duo nebs every 6 hours and Solu-Medrol 40 mg IV every 6 hours, agree with IV azithromycin for now  -Continue to monitor on BiPAP, repeat chest x-ray and an ABG in the morning     4.)  Acute kidney injury/hyperkalemia  -Creatinine 1.55,  Previous creatinine in June 2021 was 1.06.  -Diuresis as above     5.)  UTI  -Urinalysis very suspect for urinary tract infection,   On IV Zosyn.           CODE STATUS: DNR/DNI       Care Plan discussed with: pt and covering nurse  Total time spent with patient: 30 minutes.     Mert Gonzalez MD  [facility]

## 2022-01-07 ENCOUNTER — APPOINTMENT (OUTPATIENT)
Dept: GENERAL RADIOLOGY | Age: 87
DRG: 291 | End: 2022-01-07
Attending: INTERNAL MEDICINE
Payer: MEDICARE

## 2022-01-07 LAB
ANION GAP SERPL CALC-SCNC: 5 MMOL/L (ref 5–15)
BASOPHILS # BLD: 0 K/UL (ref 0–0.1)
BASOPHILS NFR BLD: 0 % (ref 0–1)
BUN SERPL-MCNC: 44 MG/DL (ref 6–20)
BUN/CREAT SERPL: 29 (ref 12–20)
CA-I BLD-MCNC: 7.5 MG/DL (ref 8.5–10.1)
CHLORIDE SERPL-SCNC: 103 MMOL/L (ref 97–108)
CO2 SERPL-SCNC: 32 MMOL/L (ref 21–32)
CREAT SERPL-MCNC: 1.54 MG/DL (ref 0.7–1.3)
DIFFERENTIAL METHOD BLD: ABNORMAL
EOSINOPHIL # BLD: 0 K/UL (ref 0–0.4)
EOSINOPHIL NFR BLD: 0 % (ref 0–7)
ERYTHROCYTE [DISTWIDTH] IN BLOOD BY AUTOMATED COUNT: 13.2 % (ref 11.5–14.5)
GLUCOSE SERPL-MCNC: 142 MG/DL (ref 65–100)
HCT VFR BLD AUTO: 43.3 % (ref 36.6–50.3)
HGB BLD-MCNC: 14 G/DL (ref 12.1–17)
IMM GRANULOCYTES # BLD AUTO: 0 K/UL
IMM GRANULOCYTES NFR BLD AUTO: 0 %
LYMPHOCYTES # BLD: 0.8 K/UL (ref 0.8–3.5)
LYMPHOCYTES NFR BLD: 10 % (ref 12–49)
MCH RBC QN AUTO: 32.9 PG (ref 26–34)
MCHC RBC AUTO-ENTMCNC: 32.3 G/DL (ref 30–36.5)
MCV RBC AUTO: 101.6 FL (ref 80–99)
MONOCYTES # BLD: 0.7 K/UL (ref 0–1)
MONOCYTES NFR BLD: 9 % (ref 5–13)
NEUTS SEG # BLD: 6.5 K/UL (ref 1.8–8)
NEUTS SEG NFR BLD: 81 % (ref 32–75)
NRBC # BLD: 0 K/UL (ref 0–0.01)
NRBC BLD-RTO: 0 PER 100 WBC
PLATELET # BLD AUTO: 248 K/UL (ref 150–400)
PMV BLD AUTO: 9.6 FL (ref 8.9–12.9)
POTASSIUM SERPL-SCNC: 3.5 MMOL/L (ref 3.5–5.1)
RBC # BLD AUTO: 4.26 M/UL (ref 4.1–5.7)
RBC MORPH BLD: ABNORMAL
SODIUM SERPL-SCNC: 140 MMOL/L (ref 136–145)
WBC # BLD AUTO: 8 K/UL (ref 4.1–11.1)

## 2022-01-07 PROCEDURE — 36415 COLL VENOUS BLD VENIPUNCTURE: CPT

## 2022-01-07 PROCEDURE — 80048 BASIC METABOLIC PNL TOTAL CA: CPT

## 2022-01-07 PROCEDURE — 97530 THERAPEUTIC ACTIVITIES: CPT

## 2022-01-07 PROCEDURE — 77010033678 HC OXYGEN DAILY

## 2022-01-07 PROCEDURE — 74011000250 HC RX REV CODE- 250: Performed by: HOSPITALIST

## 2022-01-07 PROCEDURE — 71045 X-RAY EXAM CHEST 1 VIEW: CPT

## 2022-01-07 PROCEDURE — 74011250636 HC RX REV CODE- 250/636: Performed by: INTERNAL MEDICINE

## 2022-01-07 PROCEDURE — 97165 OT EVAL LOW COMPLEX 30 MIN: CPT

## 2022-01-07 PROCEDURE — 74011000258 HC RX REV CODE- 258: Performed by: HOSPITALIST

## 2022-01-07 PROCEDURE — 65270000029 HC RM PRIVATE

## 2022-01-07 PROCEDURE — 85025 COMPLETE CBC W/AUTO DIFF WBC: CPT

## 2022-01-07 PROCEDURE — 74011250636 HC RX REV CODE- 250/636: Performed by: HOSPITALIST

## 2022-01-07 PROCEDURE — 94640 AIRWAY INHALATION TREATMENT: CPT

## 2022-01-07 PROCEDURE — 94760 N-INVAS EAR/PLS OXIMETRY 1: CPT

## 2022-01-07 PROCEDURE — 97161 PT EVAL LOW COMPLEX 20 MIN: CPT

## 2022-01-07 PROCEDURE — 74011000250 HC RX REV CODE- 250: Performed by: INTERNAL MEDICINE

## 2022-01-07 RX ORDER — FUROSEMIDE 10 MG/ML
40 INJECTION INTRAMUSCULAR; INTRAVENOUS EVERY 12 HOURS
Status: DISCONTINUED | OUTPATIENT
Start: 2022-01-07 | End: 2022-01-08

## 2022-01-07 RX ORDER — PREDNISONE 20 MG/1
40 TABLET ORAL
Status: DISCONTINUED | OUTPATIENT
Start: 2022-01-08 | End: 2022-01-09

## 2022-01-07 RX ORDER — FAMOTIDINE 20 MG/1
20 TABLET, FILM COATED ORAL DAILY
Status: DISCONTINUED | OUTPATIENT
Start: 2022-01-08 | End: 2022-01-10 | Stop reason: HOSPADM

## 2022-01-07 RX ADMIN — AZITHROMYCIN MONOHYDRATE 500 MG: 500 INJECTION, POWDER, LYOPHILIZED, FOR SOLUTION INTRAVENOUS at 21:54

## 2022-01-07 RX ADMIN — IPRATROPIUM BROMIDE AND ALBUTEROL SULFATE 3 ML: .5; 2.5 SOLUTION RESPIRATORY (INHALATION) at 08:01

## 2022-01-07 RX ADMIN — SODIUM CHLORIDE, PRESERVATIVE FREE 10 ML: 5 INJECTION INTRAVENOUS at 21:55

## 2022-01-07 RX ADMIN — FUROSEMIDE 40 MG: 10 INJECTION, SOLUTION INTRAMUSCULAR; INTRAVENOUS at 05:00

## 2022-01-07 RX ADMIN — IPRATROPIUM BROMIDE AND ALBUTEROL SULFATE 3 ML: .5; 2.5 SOLUTION RESPIRATORY (INHALATION) at 01:24

## 2022-01-07 RX ADMIN — HEPARIN SODIUM 5000 UNITS: 5000 INJECTION INTRAVENOUS; SUBCUTANEOUS at 14:17

## 2022-01-07 RX ADMIN — METHYLPREDNISOLONE SODIUM SUCCINATE 40 MG: 40 INJECTION, POWDER, FOR SOLUTION INTRAMUSCULAR; INTRAVENOUS at 04:38

## 2022-01-07 RX ADMIN — PIPERACILLIN SODIUM AND TAZOBACTAM SODIUM 3.38 G: 3; .375 INJECTION, POWDER, LYOPHILIZED, FOR SOLUTION INTRAVENOUS at 04:49

## 2022-01-07 RX ADMIN — METHYLPREDNISOLONE SODIUM SUCCINATE 40 MG: 40 INJECTION, POWDER, FOR SOLUTION INTRAMUSCULAR; INTRAVENOUS at 09:22

## 2022-01-07 RX ADMIN — IPRATROPIUM BROMIDE AND ALBUTEROL SULFATE 3 ML: .5; 2.5 SOLUTION RESPIRATORY (INHALATION) at 13:12

## 2022-01-07 RX ADMIN — SODIUM CHLORIDE, PRESERVATIVE FREE 10 ML: 5 INJECTION INTRAVENOUS at 05:00

## 2022-01-07 RX ADMIN — PIPERACILLIN SODIUM AND TAZOBACTAM SODIUM 3.38 G: 3; .375 INJECTION, POWDER, LYOPHILIZED, FOR SOLUTION INTRAVENOUS at 21:54

## 2022-01-07 RX ADMIN — HEPARIN SODIUM 5000 UNITS: 5000 INJECTION INTRAVENOUS; SUBCUTANEOUS at 21:55

## 2022-01-07 RX ADMIN — SODIUM CHLORIDE, PRESERVATIVE FREE 10 ML: 5 INJECTION INTRAVENOUS at 14:18

## 2022-01-07 RX ADMIN — METHYLPREDNISOLONE SODIUM SUCCINATE 40 MG: 40 INJECTION, POWDER, FOR SOLUTION INTRAMUSCULAR; INTRAVENOUS at 14:17

## 2022-01-07 RX ADMIN — HEPARIN SODIUM 5000 UNITS: 5000 INJECTION INTRAVENOUS; SUBCUTANEOUS at 05:00

## 2022-01-07 RX ADMIN — PIPERACILLIN SODIUM AND TAZOBACTAM SODIUM 3.38 G: 3; .375 INJECTION, POWDER, LYOPHILIZED, FOR SOLUTION INTRAVENOUS at 18:00

## 2022-01-07 RX ADMIN — IPRATROPIUM BROMIDE AND ALBUTEROL SULFATE 3 ML: .5; 2.5 SOLUTION RESPIRATORY (INHALATION) at 21:39

## 2022-01-07 NOTE — PROGRESS NOTES
OCCUPATIONAL THERAPY EVALUATION  Patient: aDnna Dixon (6 y.o. male)  Date: 1/7/2022  Primary Diagnosis: Respiratory failure ([facility] [address] [J96.90]        Precautions: fall risk       ASSESSMENT  Pt is a 81 y/o M with PMH of CHF, COPD, HTN, and prostate cancer presenting to [facility] with c/o SOB, admitted 01/02/22 and being treated for community acquired pneumonia and COPD. Pt received seated EOB with PT upon OT arrival, AXO to person, and agreeable to OT/PT evaluation at this time. Per pt report, pt lives house. Pt reports needing assistance with ADLs at [facility]. Pt appeared confused and is a poor historian. Pt currently has a 1:1. Based on current observations, pt presents with deficits in generalized strength/AROM, balance, functional activity tolerance, vision, and cognition impacting overall performance of ADLs and functional transfers/mobility. Per PT, pt currently requires CGA with additional time for bed mobility including rolling, sup>sit EOB, and scooting. Pt requires mod A with additional time for sit>stand from EOB. Pt ambulated to bathroom with min A/CGA and completed toilet transfer with mod A and additional time while using grab bar. Pt ambulated to sink with min A/CGA. Pt washed hands while standing at sink with CGA and holding sink for stability. Pt required verbal cuing while washing hands to locate soap dispenser and accurately turn off the water faucet. Pt stood at sink with CGA and brushed teeth with set up to put toothpaste on toothbrush. Pt ambulated to chair and completed stand with RW>sit in recliner with mod A and additional time. Pt able to eat jello with total A to open container, however, pt is IND to hold the spoon and bring the jello to his mouth. Overall, pt tolerates session fair. Pt would benefit from continued skilled OT services to address current impairments and improve IND and safety with self cares and functional transfers/mobility.  Recommend discharge to SNF once medically appropriate. Other factors to consider for discharge: severity of deficits, current level of assist     Patient will benefit from skilled therapy intervention to address the above noted impairments. PLAN :  Recommendations and Planned Interventions: self care training, functional mobility training, therapeutic exercise, therapeutic activities, endurance activities and patient education    Frequency/Duration: Patient will be followed by occupational therapy:  3-5x/week to address goals. Recommendation for discharge: (in order for the patient to meet his/her long term goals)  Dennis Pickard    This discharge recommendation:  Has been made in collaboration with the attending provider and/or case management    IF patient discharges home will need the following DME: bedside commode, shower chair and walker: rolling       SUBJECTIVE:   Patient stated I would love some coffee to drink.     OBJECTIVE DATA SUMMARY:   HISTORY:   Past Medical History:   Diagnosis Date    CHF (congestive heart failure) ([facility]     [facility]ch[facility] obstructive pulmonary disease) [facility]     HTN (hypertension)     Prostate CA (HCC)    No past surgical history on file. Expanded or extensive additional review of patient history:     Home Situation  Home Environment: Private residence  One/Two Story Residence: One story  Living Alone: Yes  Support Systems: Child(rio)  Patient Expects to be Discharged to[de-identified] Skilled nursing facility  Current DME Used/Available at Home: Walker    Hand dominance: Right    EXAMINATION OF PERFORMANCE DEFICITS:  Cognitive/Behavioral Status:  Neurologic State: Alert;Confused  Orientation Level: Oriented to person;Disoriented to place; Disoriented to situation;Disoriented to time  Cognition: Poor safety awareness; Follows commands         Hearing:   Auditory  Auditory Impairment: Hearing aid(s)      Range of Motion:  AROM: Generally decreased, functional  PROM: Generally decreased, functional Strength:  Strength: Generally decreased, functional                Coordination:     Fine Motor Skills-Upper: Left Intact; Right Intact    Gross Motor Skills-Upper: Left Intact; Right Intact                            Balance:  Sitting: Intact; Without support  Standing: Impaired; Without support  Standing - Static: Fair;Constant support  Standing - Dynamic : Fair;Constant support    Functional Mobility and Transfers for ADLs:  Bed Mobility:  Rolling: Contact guard assistance; Additional time  Supine to Sit: Contact guard assistance; Additional time  Scooting: Contact guard assistance; Additional time    Transfers:  Sit to Stand: Moderate assistance; Additional time  Stand to Sit: Moderate assistance; Additional time  Bathroom Mobility: Minimum assistance;Contact guard assistance  Toilet Transfer : Moderate assistance; Additional time      ADL Intervention and task modifications:  Feeding  Container Management: Total assistance (dependent)  Utensil Management: Independent  Food to Mouth: Independent    Grooming  Position Performed: Standing (At sink)  Washing Hands: Contact guard assistance  Brushing Teeth: Set-up (To put toothpaste on toothbrush)         Therapeutic Exercise:  Pt would benefit from UE HEP initiated at next session. Functional Measure:    [facility] AM-PACTM \"6 Clicks\"                                                       Daily Activity Inpatient Short Form  How much help from another person does the patient currently need. .. Total; A Lot A Little None   1. Putting on and taking off regular lower body clothing? [x]  1 []  2 []  3 []  4   2. Bathing (including washing, rinsing, drying)? []  1 [x]  2 []  3 []  4   3. Toileting, which includes using toilet, bedpan or urinal? [] 1 []  2 [x]  3 []  4   4. Putting on and taking off regular upper body clothing? []  1 []  2 []  3 [x]  4   5. Taking care of personal grooming such as brushing teeth? []  1 []  2 []  3 [x]  4   6. Eating meals? []  1 []  2 [x]  3 []  4   © , Trustees of [address] [place] [address] [address] [address], under license to [employer]. All rights reserved     Score: 17/24     Interpretation of Tool:  Represents clinically-significant functional categories (i.e. Activities of daily living). Percentage of Impairment CH    0%   CI    1-19% CJ    20-39% CK    40-59% CL    60-79% CM    80-99% CN     100%   AMPAC  Score 6-24 24 23 20-22 15-19 10-14 7-9 6         Occupational Therapy Evaluation Charge Determination   History Examination Decision-Making   LOW Complexity : Brief history review  LOW Complexity : 1-3 performance deficits relating to physical, cognitive , or psychosocial skils that result in activity limitations and / or participation restrictions  LOW Complexity : No comorbidities that affect functional and no verbal or physical assistance needed to complete eval tasks       Based on the above components, the patient evaluation is determined to be of the following complexity level: LOW   Pain Ratin/10    Activity Tolerance:   Fair  Please refer to the flowsheet for vital signs taken during this treatment. After treatment patient left in no apparent distress:    Sitting in chair, Call bell within reach and 1:1 present and eating jello    COMMUNICATION/EDUCATION:   The patients plan of care was discussed with: Physical therapist, Registered nurse and Certified nursing assistant/patient care technician. Patient/family agree to work toward stated goals and plan of care. This patients plan of care is appropriate for delegation to [facility]. PT/OT sessions occurred together for increased safety of pt and clinician. Thank you for this referral.  Francois Tate OT  Time Calculation: 23 mins    Problem: Self Care Deficits Care Plan (Adult)  Goal: *Acute Goals and Plan of Care (Insert Text)  Description: 1. Pt will be mod I sup <> sit in prep for EOB ADLs  2. Pt will be mod I grooming sitting EOB  3.  Pt will be mod I LE dressing sitting EOB/long sit  4. Pt will be mod I sit <>  prep for toileting LRAD  5. Pt will be mod I toileting/toilet transfer/cloth mgmt LRAD  6.  Pt will be IND following UE HEP in prep for self care tasks      Outcome: Not Met

## 2022-01-07 NOTE — PROGRESS NOTES
PHYSICAL THERAPY EVALUATION  Patient: Ashley Graf (47 y.o. male)  Date: 1/7/2022  Primary Diagnosis: Respiratory failure ([facility] [address] [J96.90]        Precautions: falls       ASSESSMENT  Pt is a 79 yo male admitted on 1/2/2022 for confusion and SOB x 1 day; pt currently being treated for acute encephalopathy, aspiration PNA, acute hypoxic respiratory failure, and UTI. PMH: CHF, COPD, HTN, prostate CA. Pt A&O x self and type of place but not city or year. Per pt report pt resides alone but was unable to give any information regarding PLOF or home envirnoment stating he \"wasn't sure where I live now\". Pt did report assistance from \"aide\" 1x/week. Pt also reports being on 2-3L O2 via NC at home, currently on 1.5L. Based on the objective data described below, the patient presents with generalized weakness, impaired functional mobility, impaired amb, impaired balance, and decreased activity tolerance. Pt semi-supine in bed with 1:1 present upon PT arrival, agreeable to evaluation. Bilateral mitts removed with permission from nsg given. Pt required CGA with additional time for bed mobility, CGA with additional time supine > sit, mod A with additional time sit <> stand transfers. Pt amb 10 feet x2 (bed>bathroom>sink>bedside recliner) with gt belt, RW, and CGA; demonstrating decreased stance time on left LE with short, shuffling, step to gt pattern with no LOB or knee buckling noted. Pt did fair with session today with SOB noted following amb to bathroom, O2 saturations 80%, nsg increased O2 to 4L, saturations improved to 92% with pursed lip breathing. Pt will benefit from continued skilled PT to address above deficits and return to PLOF. Current PT DC recommendation SNF.      Current Level of Function Impacting Discharge (mobility/balance): CGA to mod A with additional time    Other factors to consider for discharge: severity of deficits, unconfirmed PLOF     PLAN :  Recommendations and Planned Interventions: bed mobility training, transfer training, gait training, therapeutic exercises, patient and family training/education and therapeutic activities      Frequency/Duration: Patient will be followed by physical therapy:  3-5x/week to address goals. Recommendation for discharge: (in order for the patient to meet his/her long term goals)  Dennis Pickard    This discharge recommendation:  Has been made in collaboration with the attending provider and/or case management    IF patient discharges home will need the following DME: to be determined (TBD)         SUBJECTIVE:   Patient stated i'm not sure where I live at anymore.     OBJECTIVE DATA SUMMARY:   HISTORY:    Past Medical History:   Diagnosis Date    CHF (congestive heart failure) ([facility] [address]     COPD (chronic obstructive pulmonary disease) (HCC)     HTN (hypertension)     Prostate CA (HCC)    No past surgical history on file. Home Situation  Home Environment: Private residence  One/Two Story Residence: One story  Living Alone: Yes  Support Systems: Child(rio)  Patient Expects to be Discharged to[de-identified] Skilled nursing facility  Current DME Used/Available at Home: Gary Villatoro    EXAMINATION/PRESENTATION/DECISION MAKING:   Critical Behavior:  Neurologic State: Alert,Confused  Orientation Level: Oriented to person,Disoriented to place,Disoriented to situation,Disoriented to time  Cognition: Poor safety awareness,Follows commands     Hearing: Auditory  Auditory Impairment: Hearing aid(s)  Skin:  bruising noted along bilateral UE  Edema: right forearm in area of IV, nsg in room and aware  Range Of Motion:  AROM: Generally decreased, functional           PROM: Generally decreased, functional           Strength:    Strength: Generally decreased, functional                    Tone & Sensation:                                  Coordination:     Vision:      Functional Mobility:  Bed Mobility:  Rolling: Contact guard assistance; Additional time  Supine to Sit: Contact guard assistance; Additional time     Scooting: Contact guard assistance; Additional time  Transfers:  Sit to Stand: Moderate assistance; Additional time  Stand to Sit: Moderate assistance; Additional time                       Balance:   Sitting: Intact; Without support  Standing: Impaired; Without support  Standing - Static: Fair;Constant support  Standing - Dynamic : Fair;Constant support  Ambulation/Gait Training:  Distance (ft): 20 Feet (ft) (10 x2)  Assistive Device: Gait belt;Walker, rolling  Ambulation - Level of Assistance: Contact guard assistance; Additional time     Gait Description (WDL): Exceptions to [facility]           Base of Support: Narrowed     Speed/Mirian: Slow;Shuffled             Therapeutic Exercises:   Not completed this session    Functional Measure:  [facility] [address] [address] AM-PAC 6 Clicks         Basic Mobility Inpatient Short Form  How much difficulty does the patient currently have. .. Unable A Lot A Little None   1. Turning over in bed (including adjusting bedclothes, sheets and blankets)? [] 1   [] 2   [x] 3   [] 4   2. Sitting down on and standing up from a chair with arms ( e.g., wheelchair, bedside commode, etc.)   [] 1   [x] 2   [] 3   [] 4   3. Moving from lying on back to sitting on the side of the bed? [] 1   [] 2   [x] 3   [] 4          How much help from another person does the patient currently need. .. Total A Lot A Little None   4. Moving to and from a bed to a chair (including a wheelchair)? [] 1   [x] 2   [] 3   [] 4   5. Need to walk in hospital room? [] 1   [x] 2   [] 3   [] 4   6. Climbing 3-5 steps with a railing? [] 1   [x] 2   [] 3   [] 4   © 2007, Trustees of [facility] [address] [address], under license to [employer]. All rights reserved     Score:  Initial: 14/24 Most Recent: X (Date: 1/7/22 )   Interpretation of Tool:  Represents activities that are increasingly more difficult (i.e. Bed mobility, Transfers, Gait).   Score 24 23 22-20 19-15 14-10 9-7 6   Modifier CH CI CJ CK CL PARAMJIT ANTONIO         Physical Therapy Evaluation Charge Determination   History Examination Presentation Decision-Making   HIGH Complexity :3+ comorbidities / personal factors will impact the outcome/ POC  HIGH Complexity : 4+ Standardized tests and measures addressing body structure, function, activity limitation and / or participation in recreation  LOW Complexity : Stable, uncomplicated  Other outcome measures ampac 6  mod      Based on the above components, the patient evaluation is determined to be of the following complexity level: LOW     Pain Ratin/10 reported    Activity Tolerance:   Fair, desaturates with exertion and requires oxygen, requires rest breaks and observed SOB with activity    After treatment patient left in no apparent distress:   Sitting in chair, Call bell within reach and 1:1 present and nsg updated, nsg advised to leave mitts off of pt at this time    GOALS:    Problem: Mobility Impaired (Adult and Pediatric)  Goal: *Acute Goals and Plan of Care (Insert Text)  Description: Pt will be I with LE HEP in 7 days. Pt will perform bed mobility with mod I in 7 days. Pt will perform transfers with mod I in 7 days. Pt will amb  feet with LRAD safely with mod I in 7 days. Outcome: Not Met       COMMUNICATION/EDUCATION:   The patients plan of care was discussed with: Occupational therapist, Registered nurse, and Case management. Fall prevention education was provided and the patient/caregiver indicated understanding., Patient/family have participated as able in goal setting and plan of care. , and Patient/family agree to work toward stated goals and plan of care. PT/OT sessions occurred together for increased safety of pt and clinician.        Thank you for this referral.  Abdulkadir Babin, PT, DPT   Time Calculation: 46 mins

## 2022-01-07 NOTE — PROGRESS NOTES
Pulmonary/CC Progress Note  Patient is a 80-year-old  male with a history of CHF (EF unknown), remote prostate cancer, and hypertension who gets most of his care through the [address] [place] [address] and presented to the hospital with wheezing, altered mental status, and shortness of breath requiring BiPAP therapy. Subjective:   Daily Progress Note: 1/7/2022 10:27 AM    Patient examined at bedside. He is on telemetry unit. Patient is sitting out in the recliner. He is on nasal cannula oxygen. Looks much more composed today. Current Facility-Administered Medications   Medication Dose Route Frequency    furosemide (LASIX) injection 40 mg  40 mg IntraVENous Q12H    hydrOXYzine (VISTARIL) 25 mg/mL injection 25 mg  25 mg IntraMUSCular Q6H PRN    piperacillin-tazobactam (ZOSYN) 3.375 g in 0.9% sodium chloride (MBP/ADV) 100 mL MBP  3.375 g IntraVENous Q8H    methylPREDNISolone (PF) (SOLU-MEDROL) injection 40 mg  40 mg IntraVENous Q6H    albuterol-ipratropium (DUO-NEB) 2.5 MG-0.5 MG/3 ML  3 mL Nebulization Q4H PRN    sodium chloride (NS) flush 5-40 mL  5-40 mL IntraVENous Q8H    sodium chloride (NS) flush 5-40 mL  5-40 mL IntraVENous PRN    acetaminophen (TYLENOL) tablet 650 mg  650 mg Oral Q6H PRN    Or    acetaminophen (TYLENOL) suppository 650 mg  650 mg Rectal Q6H PRN    polyethylene glycol (MIRALAX) packet 17 g  17 g Oral DAILY PRN    ondansetron (ZOFRAN ODT) tablet 4 mg  4 mg Oral Q8H PRN    Or    ondansetron (ZOFRAN) injection 4 mg  4 mg IntraVENous Q6H PRN    heparin (porcine) injection 5,000 Units  5,000 Units SubCUTAneous Q8H    azithromycin (ZITHROMAX) 500 mg in 0.9% sodium chloride 250 mL (VIAL-MATE)  500 mg IntraVENous Q24H    albuterol-ipratropium (DUO-NEB) 2.5 MG-0.5 MG/3 ML  3 mL Nebulization Q6H RT    famotidine (PF) (PEPCID) 20 mg in 0.9% sodium chloride 10 mL injection  20 mg IntraVENous Q24H        Review of Systems  . Patient is alert. Denied any chest pains.   Still slightly confused. Objective:     Visit Vitals  /70   Pulse 64   Temp 97.3 °F (36.3 °C)   Resp 16   Ht 5' 8\" (1.727 m)   Wt 70.5 kg (155 lb 6.8 oz)   SpO2 96%   BMI 23.63 kg/m²    O2 Flow Rate (L/min): 1.5 l/min O2 Device: Nasal cannula    Temp (24hrs), Av °F (36.7 °C), Min:97.3 °F (36.3 °C), Max:98.6 °F (37 °C)      701 - 1900  In: -   Out: 800 [Urine:800]  1901 - 700  In: 840 [P.O.:840]  Out: 1800 [Urine:1800]    General appearance: no distress, confused, on Ventimask   Head: Normocephalic, without obvious abnormality, atraumatic  Eyes: negative  Neck: supple, symmetrical, trachea midline and no adenopathy  Lungs: Mild rhonchi bilaterally,  Heart: regular rate and rhythm, S1, S2 normal, no murmur, click, rub or gallop  Abdomen: soft, non-tender. Bowel sounds normal. No masses,  no organomegaly  Extremities: extremities normal, atraumatic, no cyanosis, +3 pitting edema in lower extremities bilaterally  Pulses: 2+ and symmetric  Skin: Skin color, texture, turgor normal. No rashes or lesions  Lymph nodes: Cervical, supraclavicular, and axillary nodes normal.  Neurologic: Altered mental status, does wake up to verbal stimulation however    Lab/Data Review:  Recent Labs     22  0706 22  0738   WBC 8.0 8.9   HGB 14.0 13.9   HCT 43.3 42.8    251     Recent Labs     22  0706 22  0738    141   K 3.5 3.6    102   CO2 32 31   * 118*   BUN 44* 43*   CREA 1.54* 1.55*   CA 7.5* 7.7*     Recent Labs     22  1520   PH 7.45   PCO2 48*   PO2 66*   HCO3 32*   FIO2 35.0         Diagnostics   CXR Results  (Last 48 hours)               22 0941  XR CHEST PORT Final result    Impression:  No significant interval change. Narrative:  Chest, 2 frontal view, 2022       History: Respiratory failure. Comparison: Including chest 2022. Findings: The cardiac silhouette is stable.   The lungs remain underexpanded with   bibasilar atelectasis/opacities and crowding of bronchovascular structures. Elevation of the left hemidiaphragm is again noted. Hydrostatic edema is not   excluded. No pleural effusion or pneumothorax is identified. The osseous   structures are stable. 01/06/22 0841  XR CHEST PORT Final result    Impression:  The cardiomediastinal silhouette is appropriate for age, technique,   and lung expansion. Pulmonary vasculature is not congested. The lungs are   essentially clear. No effusion or pneumothorax is seen. Narrative:  1 view comparison yesterday                    Assessment/Plan:     Active Problems:    Respiratory failure ([address] (1/2/2022)    Patient is a 42-year-old  male with a history of CHF (EF unknown), remote prostate cancer, and hypertension who gets most of his care through the [facility] and presented to the hospital with wheezing, altered mental status, and shortness of breath requiring BiPAP therapy.     Plan:      1.)  Acute hypoxic respiratory failure  -Likely combination of acute on chronic heart failure as well as acute exacerbation of COPD,  Patient has shown improvement in hypoxia. Continue nasal cannula oxygen. -DNR/DNI     2.)  Acute on chronic heart failure    Echocardiogram results were reviewed which showed EF of 60 to 65%. Normal diastolic function.  -Hopefully can get him to nasal cannula later today      3.)  Acute exacerbation of COPD  -Unclear history of smoking/COPD, but this is what is in the record, can ask him more once his mental status improves. -Agree with duo nebs every 6 hours and Solu-Medrol 40 mg IV every 6 hours, agree with IV azithromycin for now  -Continue to monitor on BiPAP, repeat chest x-ray and an ABG in the morning     4.)  Acute kidney injury/hyperkalemia  -Creatinine 1.55,  Previous creatinine in June 2021 was 1.06.  -Diuresis as above     5.)  UTI  -Urinalysis very suspect for urinary tract infection,   On IV Zosyn.     6. Confusion:  Patient has periods of confusion and agitation. Has a sitter at bedside. CODE STATUS: DNR/DNI       Care Plan discussed with: pt and covering nurse  Total time spent with patient: 20 minutes. Patient is stable from pulmonary perspective. We will see him on as-needed basis.   Nikky Kim MD  [facility]

## 2022-01-07 NOTE — PROGRESS NOTES
Hospitalist Progress Note    Subjective:   Daily Progress Note: 1/6/2022 11:46 AM    Hospital Course:  Angel Landry is a 80-year-old year male with history of CHF, prostate cancer and essential hypertension admitted for shortness of breath, wheezing, and altered mental status. Initial blood gas indicates a PCO2 of 48. He was placed on BIPAP with improvement. CT head negative for acute findings. Covid and Influenza negative. Urinalysis shows leuk esterase, > 100 WBCs, and positive nitrates all consistent with a UTI. Started on IV Zosyn. Pulmonary consulted for acute hypoxic respiratory failure. IV azithromycin added for possible COPD exacerbation. Also started on IV solu-medrol and scheduled duonebs. Patient admitted to ICU, however no beds available and therefor on hold in the ED. Of note, patient is a DNR/DNI. Echocardiogram showing LVEF 60-65%. Subjective:    Patient seen and examined. On nc 4 l now 97%    No acute distress.    Confused, no direct interaction  Sitter 2/2 agitation, getting oob    Current Facility-Administered Medications   Medication Dose Route Frequency    hydrOXYzine (VISTARIL) 25 mg/mL injection 25 mg  25 mg IntraMUSCular Q6H PRN    piperacillin-tazobactam (ZOSYN) 3.375 g in 0.9% sodium chloride (MBP/ADV) 100 mL MBP  3.375 g IntraVENous Q8H    methylPREDNISolone (PF) (SOLU-MEDROL) injection 40 mg  40 mg IntraVENous Q6H    albuterol-ipratropium (DUO-NEB) 2.5 MG-0.5 MG/3 ML  3 mL Nebulization Q4H PRN    sodium chloride (NS) flush 5-40 mL  5-40 mL IntraVENous Q8H    sodium chloride (NS) flush 5-40 mL  5-40 mL IntraVENous PRN    acetaminophen (TYLENOL) tablet 650 mg  650 mg Oral Q6H PRN    Or    acetaminophen (TYLENOL) suppository 650 mg  650 mg Rectal Q6H PRN    polyethylene glycol (MIRALAX) packet 17 g  17 g Oral DAILY PRN    ondansetron (ZOFRAN ODT) tablet 4 mg  4 mg Oral Q8H PRN    Or    ondansetron (ZOFRAN) injection 4 mg  4 mg IntraVENous Q6H PRN    heparin (porcine) injection 5,000 Units  5,000 Units SubCUTAneous Q8H    azithromycin (ZITHROMAX) 500 mg in 0.9% sodium chloride 250 mL (VIAL-MATE)  500 mg IntraVENous Q24H    albuterol-ipratropium (DUO-NEB) 2.5 MG-0.5 MG/3 ML  3 mL Nebulization Q6H RT    furosemide (LASIX) injection 40 mg  40 mg IntraVENous Q8H    famotidine (PF) (PEPCID) 20 mg in 0.9% sodium chloride 10 mL injection  20 mg IntraVENous Q24H        Review of Systems  Unable to obtain secondary to patient condition. Objective:     Visit Vitals  /74 (BP 1 Location: Left upper arm, BP Patient Position: At rest)   Pulse (!) 111   Temp 98.6 °F (37 °C)   Resp 18   Ht 5' 8\" (1.727 m)   Wt 74.2 kg (163 lb 9.3 oz)   SpO2 97%   BMI 24.87 kg/m²    O2 Flow Rate (L/min): 4 l/min O2 Device: Nasal cannula    Temp (24hrs), Av.1 °F (36.7 °C), Min:97.6 °F (36.4 °C), Max:98.6 °F (37 °C)      No intake/output data recorded.  0701 -  1900  In: 840 [P.O.:840]  Out: 1600 [Urine:1600]    PHYSICAL EXAM:  Constitutional: No acute distress  Skin: Extremities and face reveal no rashes. HEENT: Sclerae anicteric. PERRL. The neck is supple and no masses. Cardiovascular: Regular rate and rhythm. +S1/S2. NO murmur or gallop. Respiratory:  Symmetric chest wall expansion. Few rhonchi bilaterally. No wheezing or rales. GI: Abdomen nondistended, soft, and nontender. Normal active bowel sounds. Rectal: Deferred   Musculoskeletal: No pitting edema of the lower legs. Able to move all ext  Neurological:  Patient with AMS.  Cranial nerves II-XII grossly intact  Psychiatric: Unable to obtain       Data Review    Recent Results (from the past 24 hour(s))   METABOLIC PANEL, BASIC    Collection Time: 22  7:38 AM   Result Value Ref Range    Sodium 141 136 - 145 mmol/L    Potassium 3.6 3.5 - 5.1 mmol/L    Chloride 102 97 - 108 mmol/L    CO2 31 21 - 32 mmol/L    Anion gap 8 5 - 15 mmol/L    Glucose 118 (H) 65 - 100 mg/dL    BUN 43 (H) 6 - 20 mg/dL    Creatinine 1.55 (H) 0.70 - 1.30 mg/dL    BUN/Creatinine ratio 28 (H) 12 - 20      GFR est AA 51 (L) >60 ml/min/1.73m2    GFR est non-AA 42 (L) >60 ml/min/1.73m2    Calcium 7.7 (L) 8.5 - 10.1 mg/dL   CBC WITH AUTOMATED DIFF    Collection Time: 01/06/22  7:38 AM   Result Value Ref Range    WBC 8.9 4.1 - 11.1 K/uL    RBC 4.22 4.10 - 5.70 M/uL    HGB 13.9 12.1 - 17.0 g/dL    HCT 42.8 36.6 - 50.3 %    .4 (H) 80.0 - 99.0 FL    MCH 32.9 26.0 - 34.0 PG    MCHC 32.5 30.0 - 36.5 g/dL    RDW 13.1 11.5 - 14.5 %    PLATELET 238 155 - 868 K/uL    MPV 9.5 8.9 - 12.9 FL    NRBC 0.0 0.0  WBC    ABSOLUTE NRBC 0.00 0.00 - 0.01 K/uL    NEUTROPHILS 86 (H) 32 - 75 %    LYMPHOCYTES 7 (L) 12 - 49 %    MONOCYTES 6 5 - 13 %    EOSINOPHILS 0 0 - 7 %    BASOPHILS 0 0 - 1 %    IMMATURE GRANULOCYTES 1 (H) 0 - 0.5 %    ABS. NEUTROPHILS 7.7 1.8 - 8.0 K/UL    ABS. LYMPHOCYTES 0.6 (L) 0.8 - 3.5 K/UL    ABS. MONOCYTES 0.5 0.0 - 1.0 K/UL    ABS. EOSINOPHILS 0.0 0.0 - 0.4 K/UL    ABS. BASOPHILS 0.0 0.0 - 0.1 K/UL    ABS. IMM. GRANS. 0.1 (H) 0.00 - 0.04 K/UL    DF AUTOMATED         XR CHEST PORT   Final Result   The cardiomediastinal silhouette is appropriate for age, technique,   and lung expansion. Pulmonary vasculature is not congested. The lungs are   essentially clear. No effusion or pneumothorax is seen. XR CHEST PORT   Final Result      XR CHEST PORT   Final Result   I suspect a degree of chronic interstitial lung disease which has   been present on multiple prior exams and without superimposed focal airspace   disease on today's exam. Other chronic findings as above. XR CHEST PORT   Final Result   Decreased lung volumes. No significant interval change otherwise. CT HEAD WO CONT   Final Result   Age-appropriate atrophy. No acute findings. XR CHEST PORT   Final Result   Faint patchy airspace disease within the right lung suspicious for   pneumonia.       XR CHEST PORT    (Results Pending)   XR CHEST PORT    (Results Pending) XR CHEST PORT    (Results Pending)       Active Problems:    Respiratory failure ([facility] [address] (1/2/2022)        Assessment/Plan:     1. Acute hypoxic respiratory failure likely secondary to COPD exacerbation  - weaned to 4L  - Covid and Influenza negative  - Continue IV azithromycin and IV Zosyn  - Continue IV solu-medrol and scheduled duonebs  - Echocardiogram showing LVEF 60-65%. - Pulmonary consult appreciated  - Wean oxygen as tolerated    2. Acute on chronic heart failure unspecified, likely main etio of his breathing issues   - Continue IV lasix 40 mg daily  - echo ef 10-71%, nl diastolic fxn    3. Acute kidney injury  - cr 1.55, heber from 1.18    4. Urinary tract infection  - Urinalysis shows leuk esterase, > 100 WBCs, and positive nitrates all consistent with a UTI  - ucs no growth  - cont IV Zosyn for now      DVT Prophylaxis: heparin subcu  Code Status: DNR/DNI  GI prophylaxis: Pepcid  POA:    Care Plan discussed with: patient and nursing    Total time spent with patient: >35 minutes.

## 2022-01-07 NOTE — PROGRESS NOTES
CM went to speak with patient at his bedside to determine if he was able to give consent to placement. Pt was non-verbal and unable to give consent at the time. CM then proceeded to call Patsy Galloway [phone] (daughter) to inform her of her fathers continued need for care. .Pt's daughter informed CM that patient is blind and can only see shadows or silhouettes and if he does not have his hearing aid in he is unable to hear. Pt daughter was then educated on various SNF's and told of different ones within close proximity to her fathers residence. Pt's daughter requests that CM send referrals to both [facility] and [facility]. Referrals were sent to both and daughter to informed that both agencies will reach out to her for additional information.

## 2022-01-07 NOTE — PROGRESS NOTES
Comprehensive Nutrition Assessment    Type and Reason for Visit: Initial,NPO/clear liquid    Nutrition Recommendations/Plan:   Consult SLP for swallow eval    Advance to goal of Low sodium diet, chopped meats   Prune juice daily for bowel regularity     Nutrition Assessment:  Admitted for SOB, AMS. +UTI, possible COPD exacerbation. On 1.5 L NC. RD spoke to pt at bedside said his appetite was \"too good\" pta, likes foods like corned beef and cabbage, chicken soup, and Dr. Tara Bravo. Endorses intentional wt loss of 20# x3 months (12%, severe), ?veracity vs intentionality. RD observed 100% of Clear liquids lunch consumed, pt endorsed being ready to try solids. Discussed with MD, would like SLP richie first. Labs: BUN 44, CR 1.54, BG , Ca 7.5. Meds:  Azithromycin, pepcid, solumedrol, zosyn. Malnutrition Assessment:  Malnutrition Status:  Mild malnutrition    Context:  Chronic illness     Findings of the 6 clinical characteristics of malnutrition:   Energy Intake:  No significant decrease in energy intake  Weight Loss:  7.00 - Greater than 10% over 6 months     Body Fat Loss:  No significant body fat loss,     Muscle Mass Loss:  No significant muscle mass loss,    Fluid Accumulation:  No significant fluid accumulation,        Estimated Daily Nutrient Needs:  Energy (kcal): 1595kcals (MSJ x1.2); Weight Used for Energy Requirements: Current  Protein (g): 85g (1.2g/kg); Weight Used for Protein Requirements: Current  Fluid (ml/day): 1600ml; Method Used for Fluid Requirements: 1 ml/kcal      Nutrition Related Findings:  Unable to complete NFPE, pt eating at time of assessment. Endorses difficulty chewing whole cuts of meat, no issues with swallowing. No N/V/D/C, last BM pta? Will start prune juice. No edema.       Wounds:    None       Current Nutrition Therapies:  ADULT DIET Clear Liquid; 3 carb choices (45 gm/meal)    Anthropometric Measures:  · Height:  5' 7.99\" (172.7 cm)  · Current Body Wt:  70.5 kg (155 lb 6.8 oz) (1/7)   · Admission Body Wt:  155 lb 6.8 oz (1/7)    · Usual Body Wt:  77.1 kg (170 lb) (3 months ago per pt)     · Ideal Body Wt:  154 lbs:  100.9 %   · BMI Category:  Normal weight (BMI 22.0-24.9) age over 72     Wt Readings from Last 5 Encounters:   01/07/22 70.5 kg (155 lb 6.8 oz)   11/24/20 75.8 kg (167 lb)       Nutrition Diagnosis:   · Unintended weight loss related to catabolic illness,inadequate protein-energy intake as evidenced by weight loss greater than or equal to 10% in 6 months      Nutrition Interventions:   Food and/or Nutrient Delivery: Modify current diet  Nutrition Education and Counseling: No recommendations at this time  Coordination of Nutrition Care: Continue to monitor while inpatient,Swallow evaluation    Goals:  Initiate diet capable of meeting >/=75% of EENs within 3 days, Intakes >/=50% of EENs in >5 days, Wt maintenance within +/-0.5kg in >5 days       Nutrition Monitoring and Evaluation:   Behavioral-Environmental Outcomes: None identified  Food/Nutrient Intake Outcomes: Diet advancement/tolerance,Food and nutrient intake  Physical Signs/Symptoms Outcomes: Chewing or swallowing,Weight,Constipation    Discharge Planning:     Too soon to determine     Electronically signed by [employer] on 1/7/2022 at 12:27 PM    Contact: Ext [phone], or via "[employer]

## 2022-01-07 NOTE — PROGRESS NOTES
Problem: Falls - Risk of  Goal: *Absence of Falls  Description: Document Sofia Fothergill Fall Risk and appropriate interventions in the flowsheet. Outcome: Progressing Towards Goal  Note: Fall Risk Interventions:  Mobility Interventions: Bed/chair exit alarm,Patient to call before getting OOB,PT Consult for mobility concerns    Mentation Interventions: Adequate sleep, hydration, pain control,Bed/chair exit alarm,Family/sitter at bedside,More frequent rounding,Room close to nurse's station    Medication Interventions: Bed/chair exit alarm    Elimination Interventions: Bed/chair exit alarm,Call light in reach              Problem: Patient Education: Go to Patient Education Activity  Goal: Patient/Family Education  Outcome: Progressing Towards Goal     Problem: Pressure Injury - Risk of  Goal: *Prevention of pressure injury  Description: Document Demarcus Scale and appropriate interventions in the flowsheet.   Outcome: Progressing Towards Goal  Note: Pressure Injury Interventions:  Sensory Interventions: Assess changes in LOC,Keep linens dry and wrinkle-free,Maintain/enhance activity level,Minimize linen layers    Moisture Interventions: Absorbent underpads,Apply protective barrier, creams and emollients,Internal/External urinary devices    Activity Interventions: Assess need for specialty bed,PT/OT evaluation    Mobility Interventions: Assess need for specialty bed,HOB 30 degrees or less    Nutrition Interventions: Document food/fluid/supplement intake,Offer support with meals,snacks and hydration    Friction and Shear Interventions: Apply protective barrier, creams and emollients,Minimize layers                Problem: Patient Education: Go to Patient Education Activity  Goal: Patient/Family Education  Outcome: Progressing Towards Goal

## 2022-01-08 ENCOUNTER — APPOINTMENT (OUTPATIENT)
Dept: GENERAL RADIOLOGY | Age: 87
DRG: 291 | End: 2022-01-08
Attending: INTERNAL MEDICINE
Payer: MEDICARE

## 2022-01-08 PROCEDURE — 97530 THERAPEUTIC ACTIVITIES: CPT

## 2022-01-08 PROCEDURE — 92610 EVALUATE SWALLOWING FUNCTION: CPT

## 2022-01-08 PROCEDURE — 74011250637 HC RX REV CODE- 250/637: Performed by: INTERNAL MEDICINE

## 2022-01-08 PROCEDURE — 65270000029 HC RM PRIVATE

## 2022-01-08 PROCEDURE — 74011636637 HC RX REV CODE- 636/637: Performed by: INTERNAL MEDICINE

## 2022-01-08 PROCEDURE — 74011000250 HC RX REV CODE- 250: Performed by: INTERNAL MEDICINE

## 2022-01-08 PROCEDURE — 71045 X-RAY EXAM CHEST 1 VIEW: CPT

## 2022-01-08 PROCEDURE — 74011000250 HC RX REV CODE- 250: Performed by: HOSPITALIST

## 2022-01-08 PROCEDURE — 74011000258 HC RX REV CODE- 258: Performed by: HOSPITALIST

## 2022-01-08 PROCEDURE — 74011250636 HC RX REV CODE- 250/636: Performed by: HOSPITALIST

## 2022-01-08 PROCEDURE — 94640 AIRWAY INHALATION TREATMENT: CPT

## 2022-01-08 RX ORDER — FUROSEMIDE 40 MG/1
40 TABLET ORAL 2 TIMES DAILY
Status: DISCONTINUED | OUTPATIENT
Start: 2022-01-08 | End: 2022-01-10 | Stop reason: HOSPADM

## 2022-01-08 RX ADMIN — HEPARIN SODIUM 5000 UNITS: 5000 INJECTION INTRAVENOUS; SUBCUTANEOUS at 13:53

## 2022-01-08 RX ADMIN — IPRATROPIUM BROMIDE AND ALBUTEROL SULFATE 3 ML: .5; 2.5 SOLUTION RESPIRATORY (INHALATION) at 07:34

## 2022-01-08 RX ADMIN — IPRATROPIUM BROMIDE AND ALBUTEROL SULFATE 3 ML: .5; 2.5 SOLUTION RESPIRATORY (INHALATION) at 02:25

## 2022-01-08 RX ADMIN — SODIUM CHLORIDE, PRESERVATIVE FREE 10 ML: 5 INJECTION INTRAVENOUS at 07:40

## 2022-01-08 RX ADMIN — PREDNISONE 40 MG: 20 TABLET ORAL at 09:08

## 2022-01-08 RX ADMIN — FAMOTIDINE 20 MG: 20 TABLET ORAL at 09:08

## 2022-01-08 RX ADMIN — SODIUM CHLORIDE, PRESERVATIVE FREE 10 ML: 5 INJECTION INTRAVENOUS at 13:53

## 2022-01-08 RX ADMIN — PIPERACILLIN SODIUM AND TAZOBACTAM SODIUM 3.38 G: 3; .375 INJECTION, POWDER, LYOPHILIZED, FOR SOLUTION INTRAVENOUS at 07:39

## 2022-01-08 RX ADMIN — FUROSEMIDE 40 MG: 40 TABLET ORAL at 21:57

## 2022-01-08 RX ADMIN — HEPARIN SODIUM 5000 UNITS: 5000 INJECTION INTRAVENOUS; SUBCUTANEOUS at 07:39

## 2022-01-08 RX ADMIN — SODIUM CHLORIDE, PRESERVATIVE FREE 10 ML: 5 INJECTION INTRAVENOUS at 21:58

## 2022-01-08 RX ADMIN — FUROSEMIDE 40 MG: 40 TABLET ORAL at 15:29

## 2022-01-08 RX ADMIN — IPRATROPIUM BROMIDE AND ALBUTEROL SULFATE 3 ML: .5; 2.5 SOLUTION RESPIRATORY (INHALATION) at 21:32

## 2022-01-08 RX ADMIN — HEPARIN SODIUM 5000 UNITS: 5000 INJECTION INTRAVENOUS; SUBCUTANEOUS at 21:58

## 2022-01-08 RX ADMIN — IPRATROPIUM BROMIDE AND ALBUTEROL SULFATE 3 ML: .5; 2.5 SOLUTION RESPIRATORY (INHALATION) at 13:05

## 2022-01-08 NOTE — PROGRESS NOTES
[address] SWALLOW EVALUATIONS  Patient: Anahi Oneill  (42 y.o. )  Date: 1/8/2022  Primary Diagnosis: <principal problem not specified>   Precautions:  Aspiration    ASSESSMENT :  Bedside swallow evaluation completed. Nsg reports patient tolerating clear liquid diet without difficulty. Patient in bedside chair, sitting upright, oriented to self and place. Patient reports occasional coughing with dry/crumbly foods; difficulty with mastication of tougher meats and uncooked vegetables but reports self-selecting and cooking consistencies able to tolerate. Patient aware to alternate solids/liquids during meals. Per chart review/patient board, patient blind in both eyes; however, patient establishing eye contact with clinician during evaluation. Per patient report, very [place] but no difficulty hearing when clinician raised voice volume; note added to patient board. Patient observed with coughing prior to po. Patient presents w/ mild oropharyngeal dysphagia. Oral phase c/b prolonged but adequate mastication with hard solid, timely a-p transit. Pharyngeal phase c/b timely swallow initiation and HLE appears WFL upon palpation. No clinical indicators aspiration and/or penetration observed. Patient will benefit from skilled intervention to address the above impairments. Patients rehabilitation potential is considered to be Good     PLAN :  Recommendations and Planned Interventions:  Patient appears appropriate for initiation of Soft & Bite-Sized diet and thin liquids. STRICT aspiration precautions: intermittent supervision with po, alternate solids and liquids, slow rate of intake, small sips and bites, avoid dry/crumbly textures, maintain HOB elevation 90 degrees for at least 30 minutes after meals. MBS if/as indicated. Patient will require assistance with setting up meal tray s/t visual deficits.      Frequency/Duration: Patient will be followed by speech-language pathology 3 times a week to address goals. Discharge Recommendations: To Be Determined     SUBJECTIVE:   Patient alert, pleasant demeanor, agreeable to bedside swallow evaluation. OBJECTIVE:     Past Medical History:   Diagnosis Date    CHF (congestive heart failure) (HCC)     COPD (chronic obstructive pulmonary disease) (HCC)     HTN (hypertension)     Prostate CA (HCC)        CXR Results  (Last 48 hours)                 01/08/22 0818  XR CHEST PORT Final result    Impression:  Little interval change. Narrative:  Study: XR CHEST PORT       Clinical indication: Respiratory failure       Comparison: 1/7/2022. Findings:       Ongoing scattered interstitial airspace disease. No evidence of pleural effusion   or pneumothorax. Elevation the left hemidiaphragm. Cardiomediastinal contours are stable. Visualized osseous structures are unchanged. 01/07/22 0941  XR CHEST PORT Final result    Impression:  No significant interval change. Narrative:  Chest, 2 frontal view, 1/7/2022       History: Respiratory failure. Comparison: Including chest 1/6/2022. Findings: The cardiac silhouette is stable. The lungs remain underexpanded with   bibasilar atelectasis/opacities and crowding of bronchovascular structures. Elevation of the left hemidiaphragm is again noted. Hydrostatic edema is not   excluded. No pleural effusion or pneumothorax is identified. The osseous   structures are stable.                    Diet prior to admission: per patient- regular/thin; difficulty tolerating dry/crumbly foods but able to self-select  Current Diet:  DIET ADULT clear liquid diet     Cognitive and Communication Status:  Neurologic State: Alert,Eyes open spontaneously,Confused  Orientation Level: Oriented to person,Oriented to place,Disoriented to situation,Disoriented to time  Cognition: Appropriate for age attention/concentration,Follows commands,Poor safety awareness  Perseveration: No perseveration noted     Swallowing Evaluation:   Oral Assessment:  Oral Assessment  Labial: No impairment  Dentition: Limited  Oral Hygiene: fair  Lingual: No impairment  Velum: No impairment  Mandible: No impairment    P.O. Trials:  Patient Position: upright in bedside chair  Vocal quality prior to P.O.: No impairment  Consistency Presented: Thin liquid;Pudding; Solid  How Presented: Self-fed/presented;Cup/sip;Straw;Successive swallows;Spoon  Bolus Acceptance: No impairment  Bolus Formation/Control: Impaired  Type of Impairment: Mastication  Propulsion: No impairment  Oral Residue: None  Initiation of Swallow: No impairment  Laryngeal Elevation: Functional  Aspiration Signs/Symptoms: None  Pharyngeal Phase Characteristics: No impairment, issues, or problems   Effective Modifications: Alternate liquids/solids;Small sips and bites  Cues for Modifications: Minimal       Oral Phase Severity: Mild  Pharyngeal Phase Severity : Mild      Voice:                 Vocal Quality: No impairment                               Pain:  Pain Scale 1: Numeric (0 - 10)  Pain Intensity 1: 0         After treatment:   Patient left in no apparent distress sitting up in chair, Call bell within reach, Nursing notified, and Safety precautions posted at beside. COMMUNICATION/EDUCATION:   Patient was educated regarding dysphagia, diet consistencies, diet recs, safe swallow precautions, and ST POC. He demonstrated Good understanding as evidenced by engagement, acknowledgment. The patient's plan of care including recommendations, planned interventions, and recommended diet changes were discussed with: Registered nurse. Patient/family agree to work toward stated goals and plan of care.     Thank you for this referral.  Chung Gaspar M.S., CCC-SLP          Problem: Dysphagia (Adult)  Goal: *Acute Goals and Plan of Care (Insert Text)  Description: Speech Therapy Swallow Goals  Initiated 1/8/2022  -Patient will tolerate SBS diet with thin liquids without clinical indicators of aspiration given minimal cues within 7 day(s). -Patient will tolerate PO trials without clinical indicators of aspiration given minimal cues within 7 day(s). -Patient will participate in modified barium swallow study within 7 day(s). -Patient will demonstrate understanding of swallow safety precautions and aspiration precautions, diet recs with minimal cues within 7 day(s).   Outcome: Not Met

## 2022-01-08 NOTE — PROGRESS NOTES
OCCUPATIONAL THERAPY TREATMENT  Patient: Katty Turpin (89 y.o. male)  Date: 1/8/2022  Diagnosis: Respiratory failure ([facility] [address] [J96.90] <principal problem not specified>       Precautions:    Chart, occupational therapy assessment, plan of care, and goals were reviewed. ASSESSMENT  Patient continues with skilled OT services and is progressing towards goals. Pt. Received reclined in chair and agreeable to tx session. Pt. Performed sit-> stand from chair Min A, functional ambulation close CGA with use of RW. Pt. Able to ambulate from chair to window and back to chair. Pt. Able to tolerate standing for additional time while pt performed avery-care with set-up assistance and CGA for stability and balance. Pt. Returned seated in chair with Min A . Pt. Able to tolerate seated UE therex 4 sets of 15 reps. Pt. Demonstrated decreased shoulder flexion and functional reaching on L side. Pt. Left reclined in chair with needs met and call bell within reach. REC. SNF when medically appropriate     Current Level of Function Impacting Discharge (ADLs): assistance with transfers, limited in shoulder flexion and anterior reach limiting use of left UE to assist with ADl's    Other factors to consider for discharge: PLOF, time since on set         PLAN :  Patient continues to benefit from skilled intervention to address the above impairments. Continue treatment per established plan of care. to address goals. Recommendation for discharge: (in order for the patient to meet his/her long term goals)  Therapy up to 5 days/week in SNF setting    This discharge recommendation:  Has been made in collaboration with the attending provider and/or case management    IF patient discharges home will need the following DME: TBD       SUBJECTIVE:   Patient stated  I'm feeling a lot better.     OBJECTIVE DATA SUMMARY:   Cognitive/Behavioral Status:  Neurologic State: Alert  Orientation Level: Oriented to person;Oriented to place; Disoriented to situation;Disoriented to time  Cognition: Appropriate for age attention/concentration; Follows commands     Perseveration: No perseveration noted  Transfers:  Sit to Stand: Minimum assistance      Balance:  Sitting: Intact; Without support  Standing: Impaired; With support  Standing - Static: Constant support; Fair  Standing - Dynamic : Constant support; Fair    ADL Intervention:  Mee-care: standing with set- up assistance    Therapeutic Exercises:   Exercise Sets Reps AROM AAROM PROM Self PROM Comments   Shoulder flex/ext 1 15 [x] [x] [] []    Elbow flex/ext 1 15 [x] [] [] []    Wrist flex/ext 1 15 [x] [] [] []    Anterior reaching 1 15 [x] [x] [] []          Pain:  0/ 10 pain reported    Activity Tolerance:   Fair  Please refer to the flowsheet for vital signs taken during this treatment. After treatment patient left in no apparent distress:   Sitting in chair, Heels elevated for pressure relief, and Call bell within reach    COMMUNICATION/COLLABORATION:   The patients plan of care was discussed with: Registered nurseDon Nathan  Time Calculation: 29 mins    Problem: Self Care Deficits Care Plan (Adult)  Goal: *Acute Goals and Plan of Care (Insert Text)  Description: 1. Pt will be mod I sup <> sit in prep for EOB ADLs  2. Pt will be mod I grooming sitting EOB  3. Pt will be mod I LE dressing sitting EOB/long sit  4. Pt will be mod I sit <>  prep for toileting LRAD  5. Pt will be mod I toileting/toilet transfer/cloth mgmt LRAD  6.  Pt will be IND following UE HEP in prep for self care tasks      Outcome: Progressing Towards Goal

## 2022-01-08 NOTE — PROGRESS NOTES
Bedside swallow evaluation completed. Patient appears appropriate for initiation of Soft & Bite-Sized diet and thin liquids. STRICT aspiration precautions: intermittent supervision with po, alternate solids and liquids, slow rate of intake, small sips and bites, avoid dry/crumbly textures, maintain HOB elevation 90 degrees for at least 30 minutes after meals. MBS if/as indicated. Patient will require assistance with setting up meal tray s/t visual deficits. Please see full note for details. Thank you for this consult.

## 2022-01-08 NOTE — PROGRESS NOTES
Spoke to Dr. Marian Quiñones about suitable IV access point and requested pt to be switched from IV lasix to PO. Dr. Marian Quiñones accepted requested; will change to Lasix PO 40mg BID.

## 2022-01-08 NOTE — PROGRESS NOTES
Hospitalist Progress Note    Subjective:   Daily Progress Note: 1/7/2022 11:46 AM    Hospital Course:  Sally Cronin is a 41-year-old year male with history of CHF, prostate cancer and essential hypertension admitted for shortness of breath, wheezing, and altered mental status. Initial blood gas indicates a PCO2 of 48. He was placed on BIPAP with improvement. CT head negative for acute findings. Covid and Influenza negative. Urinalysis shows leuk esterase, > 100 WBCs, and positive nitrates all consistent with a UTI. Started on IV Zosyn. Pulmonary consulted for acute hypoxic respiratory failure. IV azithromycin added for possible COPD exacerbation. Also started on IV solu-medrol and scheduled duonebs. Patient admitted to ICU, however no beds available and therefor on hold in the ED. Of note, patient is a DNR/DNI. Echocardiogram showing LVEF 60-65%. Subjective:    Patient seen and examined. On nc 1.5 l now 96%  He is much more alert for me this am and no complaints, calm per sitter.   Pt without complaints,         Current Facility-Administered Medications   Medication Dose Route Frequency    furosemide (LASIX) injection 40 mg  40 mg IntraVENous Q12H    hydrOXYzine (VISTARIL) 25 mg/mL injection 25 mg  25 mg IntraMUSCular Q6H PRN    piperacillin-tazobactam (ZOSYN) 3.375 g in 0.9% sodium chloride (MBP/ADV) 100 mL MBP  3.375 g IntraVENous Q8H    methylPREDNISolone (PF) (SOLU-MEDROL) injection 40 mg  40 mg IntraVENous Q6H    albuterol-ipratropium (DUO-NEB) 2.5 MG-0.5 MG/3 ML  3 mL Nebulization Q4H PRN    sodium chloride (NS) flush 5-40 mL  5-40 mL IntraVENous Q8H    sodium chloride (NS) flush 5-40 mL  5-40 mL IntraVENous PRN    acetaminophen (TYLENOL) tablet 650 mg  650 mg Oral Q6H PRN    Or    acetaminophen (TYLENOL) suppository 650 mg  650 mg Rectal Q6H PRN    polyethylene glycol (MIRALAX) packet 17 g  17 g Oral DAILY PRN    ondansetron (ZOFRAN ODT) tablet 4 mg  4 mg Oral Q8H PRN    Or    [facility]) injection 4 mg  4 mg IntraVENous Q6H PRN    heparin (porcine) injection 5,000 Units  5,000 Units SubCUTAneous Q8H    azithromycin (ZITHROMAX) 500 mg in 0.9% sodium chloride 250 mL (VIAL-MATE)  500 mg IntraVENous Q24H    albuterol-ipratropium (DUO-NEB) 2.5 MG-0.5 MG/3 ML  3 mL Nebulization Q6H RT    famotidine (PF) (PEPCID) 20 mg in 0.9% sodium chloride 10 mL injection  20 mg IntraVENous Q24H        Review of Systems  Unable to obtain secondary to patient condition. Objective:     Visit Vitals  /70   Pulse 64   Temp 97.3 °F (36.3 °C)   Resp 16   Ht 5' 7.99\" (1.727 m)   Wt 70.5 kg (155 lb 6.8 oz)   SpO2 96%   BMI 23.64 kg/m²    O2 Flow Rate (L/min): 1.5 l/min O2 Device: Nasal cannula    Temp (24hrs), Av.8 °F (36.6 °C), Min:97.3 °F (36.3 °C), Max:98.1 °F (36.7 °C)      No intake/output data recorded.  0701 -  1900  In: 840 [P.O.:840]  Out: 1700 [Urine:1700]    PHYSICAL EXAM:  Constitutional: No acute distress  Skin: Extremities and face reveal no rashes. HEENT: Sclerae anicteric. PERRL. The neck is supple and no masses. Cardiovascular: Regular rate and rhythm. +S1/S2. NO murmur or gallop. Respiratory:  Symmetric chest wall expansion. Few rhonchi bilaterally. No wheezing or rales. GI: Abdomen nondistended, soft, and nontender. Normal active bowel sounds. Rectal: Deferred   Musculoskeletal: No pitting edema of the lower legs.  Able to move all ext  Neurological:  aao x 2-3, nfd  Psychiatric: calm and not agitated      Data Review    Recent Results (from the past 24 hour(s))   METABOLIC PANEL, BASIC    Collection Time: 22  7:06 AM   Result Value Ref Range    Sodium 140 136 - 145 mmol/L    Potassium 3.5 3.5 - 5.1 mmol/L    Chloride 103 97 - 108 mmol/L    CO2 32 21 - 32 mmol/L    Anion gap 5 5 - 15 mmol/L    Glucose 142 (H) 65 - 100 mg/dL    BUN 44 (H) 6 - 20 mg/dL    Creatinine 1.54 (H) 0.70 - 1.30 mg/dL    BUN/Creatinine ratio 29 (H) 12 - 20      GFR est AA 51 (L) >60 ml/min/1.73m2    GFR est non-AA 42 (L) >60 ml/min/1.73m2    Calcium 7.5 (L) 8.5 - 10.1 mg/dL   CBC WITH AUTOMATED DIFF    Collection Time: 01/07/22  7:06 AM   Result Value Ref Range    WBC 8.0 4.1 - 11.1 K/uL    RBC 4.26 4.10 - 5.70 M/uL    HGB 14.0 12.1 - 17.0 g/dL    HCT 43.3 36.6 - 50.3 %    .6 (H) 80.0 - 99.0 FL    MCH 32.9 26.0 - 34.0 PG    MCHC 32.3 30.0 - 36.5 g/dL    RDW 13.2 11.5 - 14.5 %    PLATELET 109 364 - 521 K/uL    MPV 9.6 8.9 - 12.9 FL    NRBC 0.0 0.0  WBC    ABSOLUTE NRBC 0.00 0.00 - 0.01 K/uL    NEUTROPHILS 81 (H) 32 - 75 %    LYMPHOCYTES 10 (L) 12 - 49 %    MONOCYTES 9 5 - 13 %    EOSINOPHILS 0 0 - 7 %    BASOPHILS 0 0 - 1 %    IMMATURE GRANULOCYTES 0 %    ABS. NEUTROPHILS 6.5 1.8 - 8.0 K/UL    ABS. LYMPHOCYTES 0.8 0.8 - 3.5 K/UL    ABS. MONOCYTES 0.7 0.0 - 1.0 K/UL    ABS. EOSINOPHILS 0.0 0.0 - 0.4 K/UL    ABS. BASOPHILS 0.0 0.0 - 0.1 K/UL    ABS. IMM. GRANS. 0.0 K/UL    DF Manual      RBC COMMENTS Normocytic, Normochromic         XR CHEST PORT   Final Result   No significant interval change. XR CHEST PORT   Final Result   The cardiomediastinal silhouette is appropriate for age, technique,   and lung expansion. Pulmonary vasculature is not congested. The lungs are   essentially clear. No effusion or pneumothorax is seen. XR CHEST PORT   Final Result      XR CHEST PORT   Final Result   I suspect a degree of chronic interstitial lung disease which has   been present on multiple prior exams and without superimposed focal airspace   disease on today's exam. Other chronic findings as above. XR CHEST PORT   Final Result   Decreased lung volumes. No significant interval change otherwise. CT HEAD WO CONT   Final Result   Age-appropriate atrophy. No acute findings. XR CHEST PORT   Final Result   Faint patchy airspace disease within the right lung suspicious for   pneumonia.       XR CHEST PORT    (Results Pending)   XR CHEST PORT    (Results Pending)   XR CHEST PORT    (Results Pending)       Active Problems:    Respiratory failure ([facility] [address] (1/2/2022)        Assessment/Plan:     1. Acute hypoxic respiratory failure likely secondary to COPD exacerbation  - wean o2, now to 1.5 l  - Covid and Influenza negative  - Continue IV azithromycin completed, continue IV Zosyn  - Continue IV solu-medrol and scheduled duonebs  - Echocardiogram showing LVEF 60-65%. - Pulmonary consult appreciated  - Wean oxygen as tolerated    2. Acute on chronic heart failure unspecified, likely main etio of his breathing issues   - Continue IV lasix 40 mg daily  - echo ef 36-89%, nl diastolic fxn    3. Acute kidney injury  - cr 1.54  -follow    4. Urinary tract infection  - Urinalysis shows leuk esterase, > 100 WBCs, and positive nitrates all consistent with a UTI  - ucs no growth  - cont IV Zosyn for now      DVT Prophylaxis: heparin subcu  Code Status: DNR/DNI  GI prophylaxis: Pepcid  POA:    Dispo: FPC vs snf-1-2d? Care Plan discussed with: patient and nursing    Total time spent with patient: >35 minutes.

## 2022-01-08 NOTE — PROGRESS NOTES
Problem: Falls - Risk of  Goal: *Absence of Falls  Description: Document Zena Don Fall Risk and appropriate interventions in the flowsheet. Outcome: Progressing Towards Goal  Note: Fall Risk Interventions:  Mobility Interventions: Bed/chair exit alarm    Mentation Interventions: Adequate sleep, hydration, pain control    Medication Interventions: Bed/chair exit alarm    Elimination Interventions: Bed/chair exit alarm              Problem: Pressure Injury - Risk of  Goal: *Prevention of pressure injury  Description: Document Demarcus Scale and appropriate interventions in the flowsheet.   Outcome: Progressing Towards Goal  Note: Pressure Injury Interventions:  Sensory Interventions: Assess changes in LOC,Chair cushion    Moisture Interventions: Internal/External urinary devices,Moisture barrier    Activity Interventions: Increase time out of bed,PT/OT evaluation    Mobility Interventions: HOB 30 degrees or less,PT/OT evaluation    Nutrition Interventions: Document food/fluid/supplement intake    Friction and Shear Interventions: HOB 30 degrees or less

## 2022-01-08 NOTE — PROGRESS NOTES
Hospitalist Progress Note    Subjective:   Daily Progress Note: 1/8/2022 11:46 AM    Hospital Course:  Eboni Moseley is a 58-year-old year male with history of CHF, prostate cancer and essential hypertension admitted for shortness of breath, wheezing, and altered mental status. Initial blood gas indicates a PCO2 of 48. He was placed on BIPAP with improvement. CT head negative for acute findings. Covid and Influenza negative. Urinalysis shows leuk esterase, > 100 WBCs, and positive nitrates all consistent with a UTI. Started on IV Zosyn. Pulmonary consulted for acute hypoxic respiratory failure. IV azithromycin added for possible COPD exacerbation. Also started on IV solu-medrol and scheduled duonebs. Patient admitted to ICU, however no beds available and therefor on hold in the ED. Of note, patient is a DNR/DNI. Echocardiogram showing LVEF 60-65%. Subjective:    Patient seen and examined. On nc 1 l now 96%  Sitting up in chair, interactive, nad, denies complaints. No acute event overnight. Sitter dc 1/7, not acting up. Pt/ot recommended snf, cm working on arrangement.         Current Facility-Administered Medications   Medication Dose Route Frequency    furosemide (LASIX) injection 40 mg  40 mg IntraVENous Q12H    predniSONE (DELTASONE) tablet 40 mg  40 mg Oral DAILY WITH BREAKFAST    famotidine (PEPCID) tablet 20 mg  20 mg Oral DAILY    hydrOXYzine (VISTARIL) 25 mg/mL injection 25 mg  25 mg IntraMUSCular Q6H PRN    piperacillin-tazobactam (ZOSYN) 3.375 g in 0.9% sodium chloride (MBP/ADV) 100 mL MBP  3.375 g IntraVENous Q8H    albuterol-ipratropium (DUO-NEB) 2.5 MG-0.5 MG/3 ML  3 mL Nebulization Q4H PRN    sodium chloride (NS) flush 5-40 mL  5-40 mL IntraVENous Q8H    sodium chloride (NS) flush 5-40 mL  5-40 mL IntraVENous PRN    acetaminophen (TYLENOL) tablet 650 mg  650 mg Oral Q6H PRN    Or    acetaminophen (TYLENOL) suppository 650 mg  650 mg Rectal Q6H PRN    polyethylene glycol (MIRALAX) packet 17 g  17 g Oral DAILY PRN    ondansetron (ZOFRAN ODT) tablet 4 mg  4 mg Oral Q8H PRN    Or    ondansetron (ZOFRAN) injection 4 mg  4 mg IntraVENous Q6H PRN    heparin (porcine) injection 5,000 Units  5,000 Units SubCUTAneous Q8H    albuterol-ipratropium (DUO-NEB) 2.5 MG-0.5 MG/3 ML  3 mL Nebulization Q6H RT        Review of Systems  Unable to obtain secondary to patient condition. Objective:     Visit Vitals  /73 (BP 1 Location: Left upper arm, BP Patient Position: Sitting)   Pulse 72   Temp 98.2 °F (36.8 °C)   Resp 18   Ht 5' 7.99\" (1.727 m)   Wt 70.5 kg (155 lb 6.8 oz)   SpO2 93%   BMI 23.64 kg/m²    O2 Flow Rate (L/min): 1 l/min O2 Device: Nasal cannula    Temp (24hrs), Av.7 °F (36.5 °C), Min:97.3 °F (36.3 °C), Max:98.2 °F (36.8 °C)      No intake/output data recorded.  1901 -  0700  In: -   Out: 1700 [Urine:1700]    PHYSICAL EXAM:  Constitutional: No acute distress, limited vision and wears hearing aids-not in. Skin: Extremities and face reveal no rashes. HEENT: Sclerae anicteric. PERRL. The neck is supple and no masses. Cardiovascular: Regular rate and rhythm. +S1/S2. NO murmur or gallop. Respiratory:  Symmetric chest wall expansion. Few rhonchi bilaterally. No wheezing or rales. GI: Abdomen nondistended, soft, and nontender. Normal active bowel sounds. Rectal: Deferred   Musculoskeletal: No pitting edema of the lower legs. Able to move all ext  Neurological:  aao x 2-3, nfd  Psychiatric: calm and not agitated      Data Review    No results found for this or any previous visit (from the past 24 hour(s)). XR CHEST PORT   Final Result   No significant interval change. XR CHEST PORT   Final Result   The cardiomediastinal silhouette is appropriate for age, technique,   and lung expansion. Pulmonary vasculature is not congested. The lungs are   essentially clear. No effusion or pneumothorax is seen.          XR CHEST PORT   Final Result      XR CHEST PORT   Final Result   I suspect a degree of chronic interstitial lung disease which has   been present on multiple prior exams and without superimposed focal airspace   disease on today's exam. Other chronic findings as above. XR CHEST PORT   Final Result   Decreased lung volumes. No significant interval change otherwise. CT HEAD WO CONT   Final Result   Age-appropriate atrophy. No acute findings. XR CHEST PORT   Final Result   Faint patchy airspace disease within the right lung suspicious for   pneumonia. XR CHEST PORT    (Results Pending)   XR CHEST PORT    (Results Pending)   XR CHEST PORT    (Results Pending)   XR CHEST PORT    (Results Pending)       Active Problems:    Respiratory failure ([address] (1/2/2022)        Assessment/Plan:     1. Acute hypoxic respiratory failure likely secondary to COPD exacerbation + chf  - wean o2, now to 1.1 l  - Covid and Influenza negative  - Continue IV azithromycin completed, continue IV Zosyn  - switched to oral prednisone & taper and continue scheduled duonebs  - Echocardiogram showing LVEF 60-65%. - Pulmonary consult appreciated  - Wean oxygen as tolerated,     2. Acute on chronic heart failure unspecified, likely main etio of his breathing issues   - Continue IV lasix 40 mg bid pending repeat  cxr today- overall clinically inproving, decreased  o2 requirements and improved mentation. Cxr 1/7 underepanded/atelectasis, crowding of bronchovascular structures, hydrostatic edema not excluded. - echo ef 57-97%, nl diastolic fxn    3. Acute kidney injury/ckd 2-3  - cr 1.54, cr remaining flat around this level, baseline?  -follow    4. Urinary tract infection  - Urinalysis shows leuk esterase, > 100 WBCs, and positive nitrates all consistent with a UTI  - ucs no growth  - stop zosyn    Diet: remains on clears, mentation better, slp eval before avdancing to assess aspiration risk and consistency rec's.   DVT Prophylaxis: heparin subcu  Code Status: DNR/DNI  GI prophylaxis: Pepcid  POA:Tamra Nassar [phone] (daughter    Dispo: snf recommended, cm appreciated, can dc when facility/bed available. Care Plan discussed with: patient and nursing    Total time spent with patient: 30 minutes.

## 2022-01-09 ENCOUNTER — APPOINTMENT (OUTPATIENT)
Dept: GENERAL RADIOLOGY | Age: 87
DRG: 291 | End: 2022-01-09
Attending: INTERNAL MEDICINE
Payer: MEDICARE

## 2022-01-09 PROCEDURE — 74011000250 HC RX REV CODE- 250: Performed by: INTERNAL MEDICINE

## 2022-01-09 PROCEDURE — 71045 X-RAY EXAM CHEST 1 VIEW: CPT

## 2022-01-09 PROCEDURE — 94640 AIRWAY INHALATION TREATMENT: CPT

## 2022-01-09 PROCEDURE — 74011000250 HC RX REV CODE- 250: Performed by: HOSPITALIST

## 2022-01-09 PROCEDURE — 74011636637 HC RX REV CODE- 636/637: Performed by: INTERNAL MEDICINE

## 2022-01-09 PROCEDURE — 74011250636 HC RX REV CODE- 250/636: Performed by: HOSPITALIST

## 2022-01-09 PROCEDURE — 65270000029 HC RM PRIVATE

## 2022-01-09 PROCEDURE — 97530 THERAPEUTIC ACTIVITIES: CPT

## 2022-01-09 PROCEDURE — 74011250637 HC RX REV CODE- 250/637: Performed by: INTERNAL MEDICINE

## 2022-01-09 RX ORDER — PREDNISONE 20 MG/1
20 TABLET ORAL
Status: DISCONTINUED | OUTPATIENT
Start: 2022-01-10 | End: 2022-01-10 | Stop reason: HOSPADM

## 2022-01-09 RX ADMIN — IPRATROPIUM BROMIDE AND ALBUTEROL SULFATE 3 ML: .5; 2.5 SOLUTION RESPIRATORY (INHALATION) at 01:10

## 2022-01-09 RX ADMIN — IPRATROPIUM BROMIDE AND ALBUTEROL SULFATE 3 ML: .5; 2.5 SOLUTION RESPIRATORY (INHALATION) at 14:44

## 2022-01-09 RX ADMIN — HEPARIN SODIUM 5000 UNITS: 5000 INJECTION INTRAVENOUS; SUBCUTANEOUS at 07:04

## 2022-01-09 RX ADMIN — SODIUM CHLORIDE, PRESERVATIVE FREE 10 ML: 5 INJECTION INTRAVENOUS at 13:28

## 2022-01-09 RX ADMIN — HEPARIN SODIUM 5000 UNITS: 5000 INJECTION INTRAVENOUS; SUBCUTANEOUS at 22:16

## 2022-01-09 RX ADMIN — SODIUM CHLORIDE, PRESERVATIVE FREE 10 ML: 5 INJECTION INTRAVENOUS at 07:00

## 2022-01-09 RX ADMIN — FUROSEMIDE 40 MG: 40 TABLET ORAL at 09:14

## 2022-01-09 RX ADMIN — IPRATROPIUM BROMIDE AND ALBUTEROL SULFATE 3 ML: .5; 2.5 SOLUTION RESPIRATORY (INHALATION) at 20:23

## 2022-01-09 RX ADMIN — FAMOTIDINE 20 MG: 20 TABLET ORAL at 09:14

## 2022-01-09 RX ADMIN — FUROSEMIDE 40 MG: 40 TABLET ORAL at 22:16

## 2022-01-09 RX ADMIN — PREDNISONE 40 MG: 20 TABLET ORAL at 08:00

## 2022-01-09 RX ADMIN — IPRATROPIUM BROMIDE AND ALBUTEROL SULFATE 3 ML: .5; 2.5 SOLUTION RESPIRATORY (INHALATION) at 08:32

## 2022-01-09 RX ADMIN — HEPARIN SODIUM 5000 UNITS: 5000 INJECTION INTRAVENOUS; SUBCUTANEOUS at 13:28

## 2022-01-09 NOTE — PROGRESS NOTES
Hospitalist Progress Note    Subjective:   Daily Progress Note: 1/9/2022 11:46 AM    Hospital Course:  Loida Waldron is a 41-year-old year male with history of CHF, prostate cancer and essential hypertension admitted for shortness of breath, wheezing, and altered mental status. Initial blood gas indicates a PCO2 of 48. He was placed on BIPAP with improvement. CT head negative for acute findings. Covid and Influenza negative. Urinalysis shows leuk esterase, > 100 WBCs, and positive nitrates all consistent with a UTI. Started on IV Zosyn. Pulmonary consulted for acute hypoxic respiratory failure. IV azithromycin added for possible COPD exacerbation. Also started on IV solu-medrol and scheduled duonebs. Patient admitted to ICU, however no beds available and therefor on hold in the ED. Of note, patient is a DNR/DNI. Echocardiogram showing LVEF 60-65%. Subjective:    Patient seen and examined. No acute events overnight. Remains on 2 l/m nc spo2>93%  Denies sob or chest pain. Feels much improved though weak. Lost iv site last night and is continued on oral lasix. Afebrile  bp stable  Slp noted, ok for advancing to soft & bite sized, thin, tolerated today  cxr 1/9 Multifocal patchy interstitial airspace disease. No evidence of pleural effusion  or pneumothorax. No sitter and remains calm and cooperative, lucid but hard of hearing. Pt/ot recommended snf, cm working on arrangement. Pt states he is agreeable for snf, cm working on it.   He can be dis        Current Facility-Administered Medications   Medication Dose Route Frequency    furosemide (LASIX) tablet 40 mg  40 mg Oral BID    predniSONE (DELTASONE) tablet 40 mg  40 mg Oral DAILY WITH BREAKFAST    famotidine (PEPCID) tablet 20 mg  20 mg Oral DAILY    hydrOXYzine (VISTARIL) 25 mg/mL injection 25 mg  25 mg IntraMUSCular Q6H PRN    albuterol-ipratropium (DUO-NEB) 2.5 MG-0.5 MG/3 ML  3 mL Nebulization Q4H PRN    sodium chloride (NS) flush 5-40 mL 5-40 mL IntraVENous Q8H    sodium chloride (NS) flush 5-40 mL  5-40 mL IntraVENous PRN    acetaminophen (TYLENOL) tablet 650 mg  650 mg Oral Q6H PRN    Or    acetaminophen (TYLENOL) suppository 650 mg  650 mg Rectal Q6H PRN    polyethylene glycol (MIRALAX) packet 17 g  17 g Oral DAILY PRN    ondansetron (ZOFRAN ODT) tablet 4 mg  4 mg Oral Q8H PRN    Or    ondansetron (ZOFRAN) injection 4 mg  4 mg IntraVENous Q6H PRN    heparin (porcine) injection 5,000 Units  5,000 Units SubCUTAneous Q8H    albuterol-ipratropium (DUO-NEB) 2.5 MG-0.5 MG/3 ML  3 mL Nebulization Q6H RT        Review of Systems  Unable to obtain secondary to patient condition. Objective:     Visit Vitals  /86   Pulse 72   Temp 97.7 °F (36.5 °C)   Resp 20   Ht 5' 7.99\" (1.727 m)   Wt 70.5 kg (155 lb 6.8 oz)   SpO2 94%   BMI 23.64 kg/m²    O2 Flow Rate (L/min): 1 l/min O2 Device: Nasal cannula    Temp (24hrs), Av.8 °F (36.6 °C), Min:97.3 °F (36.3 °C), Max:98.2 °F (36.8 °C)      No intake/output data recorded.  1901 -  0700  In: -   Out: 200 [Urine:200]    PHYSICAL EXAM:  Constitutional: No acute distress, limited vision and wears hearing aids-not in. Skin: Extremities and face reveal no rashes. HEENT: Sclerae anicteric. PERRL. The neck is supple and no masses. Cardiovascular: Regular rate and rhythm. +S1/S2. NO murmur or gallop. Respiratory:  Symmetric chest wall expansion. Few rhonchi bilaterally. No wheezing or rales. GI: Abdomen nondistended, soft, and nontender. Normal active bowel sounds. Rectal: Deferred   Musculoskeletal: No pitting edema of the lower legs. Able to move all ext  Neurological:  aao x 2-3, nfd  Psychiatric: calm and not agitated      Data Review    No results found for this or any previous visit (from the past 24 hour(s)). XR CHEST PORT   Final Result   Little interval change. XR CHEST PORT   Final Result   No significant interval change.       XR CHEST PORT   Final Result   The cardiomediastinal silhouette is appropriate for age, technique,   and lung expansion. Pulmonary vasculature is not congested. The lungs are   essentially clear. No effusion or pneumothorax is seen. XR CHEST PORT   Final Result      XR CHEST PORT   Final Result   I suspect a degree of chronic interstitial lung disease which has   been present on multiple prior exams and without superimposed focal airspace   disease on today's exam. Other chronic findings as above. XR CHEST PORT   Final Result   Decreased lung volumes. No significant interval change otherwise. CT HEAD WO CONT   Final Result   Age-appropriate atrophy. No acute findings. XR CHEST PORT   Final Result   Faint patchy airspace disease within the right lung suspicious for   pneumonia. XR CHEST PORT    (Results Pending)   XR CHEST PORT    (Results Pending)   XR CHEST PORT    (Results Pending)   XR CHEST PORT    (Results Pending)       Active Problems:    Respiratory failure ([address] (1/2/2022)        Assessment/Plan:     1. Acute hypoxic respiratory failure likely secondary to COPD exacerbation + chf  - wean o2, now to 2 L. He reports he has home O2 he uses as needed. - Covid and Influenza negative  - Continue IV azithromycin completed,   - switched to oral prednisone & taper and continue scheduled duonebs  - Echocardiogram showing LVEF 60-65%. - Pulmonary consult appreciated  - Wean oxygen as tolerated,     2. Acute on chronic heart failure unspecified, likely main etio of his breathing issues   - lasix to po 40 mg bid. - clinically much improved  - echo ef 51-75%, nl diastolic fxn    3. Acute kidney injury/ckd 2-3  - cr 1.54, cr remaining flat around this level, baseline?  -follow    4. Urinary tract infection  - Urinalysis shows leuk esterase, > 100 WBCs, and positive nitrates all consistent with a UTI  - ucs no growth  - stopped zosyn    Diet:per slp 1/8, advanced to mince and moist and tolerated thisd morning.    DVT Prophylaxis: heparin subcu  Code Status: DNR/DNI  GI prophylaxis: Pepcid  POA:Tamra Lopez Fillers [phone] (daughter    Dispo: snf recommended, cm appreciated, can dc when facility/bed available. Care Plan discussed with: patient and nursing    Total time spent with patient: 30 minutes.

## 2022-01-09 NOTE — PROGRESS NOTES
OCCUPATIONAL THERAPY TREATMENT  Patient: Karyle Crook (89 y.o. male)  Date: 1/9/2022  Diagnosis: Respiratory failure ([facility] [J96.90] <principal problem not specified>       Precautions:    Chart, occupational therapy assessment, plan of care, and goals were reviewed. ASSESSMENT  Patient continues with skilled OT services and is progressing towards goals. Pt. Received sleeping in the chair and woke with name called and agreeable to tx session. Pt. Ruby and decreased vision requiring increased verbal cueing to perform tasks. Pt. Performed sit-> stand from chair with Mod A and use of RW upon standing for balance/ stability. Pt. Perform functional ambulation from chair to bathroom with close CGA with use of RW and assistance with line management, toilet transfer Min A with use of grab bars <> commode. Pt required additional time to perform toileting tasks. Facial grooming performed while seated on commode with set-up assistance. Pt performed ambulation in pt room with close CGA with use of RW and assistance with line management. Pt returned seated in chair and pt able to re-cite UE therex perform during prior tx session. Pt. Educated on performing 3-4x daily to maintain and increase strength- pt understanding. Continues to demonstrate decreased ROM in left shoulder d/t pt stating prior shoulder surgery . Pt. Left reclined in chair with needs met and call bell within reach. REC. SNF when medially appropriate for discharge. Current Level of Function Impacting Discharge (ADLs): assistance with transfers and functional ambulation to perform OOB ADL's    Other factors to consider for discharge: PLOF, time since on set         PLAN :  Patient continues to benefit from skilled intervention to address the above impairments. Continue treatment per established plan of care. to address goals.     Recommendation for discharge: (in order for the patient to meet his/her long term goals)  Therapy up to 5 days/week in SNF setting    This discharge recommendation:  Has been made in collaboration with the attending provider and/or case management    IF patient discharges home will need the following DME: TBD       SUBJECTIVE:   Patient stated \" I'm doing good today.     OBJECTIVE DATA SUMMARY:   Cognitive/Behavioral Status:  Neurologic State: Alert  Orientation Level: Oriented X4  Cognition: Follows commands; Impulsive;Poor safety awareness    Functional Mobility and Transfers for ADLs:  Transfers:  Sit to Stand: Moderate assistance  Functional Transfers  Bathroom Mobility: Contact guard assistance  Toilet Transfer : Minimum assistance       Balance:  Sitting: Intact; Without support  Standing: Impaired;Pull to stand; With support  Standing - Static: Constant support; Fair  Standing - Dynamic : Constant support; Fair    ADL Intervention:  Grooming  Grooming Assistance: Stand-by assistance  Position Performed: Other (comment) (seated on commode)  Washing Face: Stand-by assistance    Toileting  Toileting Assistance: Stand-by assistance  Bladder Hygiene: Stand-by assistance    Therapeutic Exercises: clifford UE's  Exercise Sets Reps AROM AAROM PROM Self PROM Comments   Shoulder flex/ext 1 10 [x] [] [] [] RUE only   Elbow flex/ext 1 10 [x] [] [] []    Wrist flex/ext 1 10 [x] [] [] []    Shoulder circles 1 10 [x] [] [] []          Pain:  0/10     Activity Tolerance:   Fair  Please refer to the flowsheet for vital signs taken during this treatment. After treatment patient left in no apparent distress:   Sitting in chair, Heels elevated for pressure relief and Call bell within reach, and family member present in pts room    COMMUNICATION/COLLABORATION:   The patients plan of care was discussed with: Registered nurse. Thomas Chau  Time Calculation: 26 mins  Problem: Self Care Deficits Care Plan (Adult)  Goal: *Acute Goals and Plan of Care (Insert Text)  Description: 1. Pt will be mod I sup <> sit in prep for EOB ADLs  2.  Pt will be mod I grooming sitting EOB  3. Pt will be mod I LE dressing sitting EOB/long sit  4. Pt will be mod I sit <>  prep for toileting LRAD  5. Pt will be mod I toileting/toilet transfer/cloth mgmt LRAD  6.  Pt will be IND following UE HEP in prep for self care tasks      Outcome: Progressing Towards Goal

## 2022-01-10 ENCOUNTER — APPOINTMENT (OUTPATIENT)
Dept: GENERAL RADIOLOGY | Age: 87
DRG: 291 | End: 2022-01-10
Attending: INTERNAL MEDICINE
Payer: MEDICARE

## 2022-01-10 VITALS
OXYGEN SATURATION: 95 % | DIASTOLIC BLOOD PRESSURE: 81 MMHG | TEMPERATURE: 97.7 F | HEART RATE: 65 BPM | HEIGHT: 68 IN | BODY MASS INDEX: 23.56 KG/M2 | WEIGHT: 155.42 LBS | SYSTOLIC BLOOD PRESSURE: 140 MMHG | RESPIRATION RATE: 20 BRPM

## 2022-01-10 PROCEDURE — 74011000250 HC RX REV CODE- 250: Performed by: HOSPITALIST

## 2022-01-10 PROCEDURE — 94640 AIRWAY INHALATION TREATMENT: CPT

## 2022-01-10 PROCEDURE — 94760 N-INVAS EAR/PLS OXIMETRY 1: CPT

## 2022-01-10 PROCEDURE — 74011636637 HC RX REV CODE- 636/637: Performed by: INTERNAL MEDICINE

## 2022-01-10 PROCEDURE — 74011000250 HC RX REV CODE- 250: Performed by: INTERNAL MEDICINE

## 2022-01-10 PROCEDURE — 74011250636 HC RX REV CODE- 250/636: Performed by: HOSPITALIST

## 2022-01-10 PROCEDURE — 74011250637 HC RX REV CODE- 250/637: Performed by: INTERNAL MEDICINE

## 2022-01-10 PROCEDURE — 71045 X-RAY EXAM CHEST 1 VIEW: CPT

## 2022-01-10 RX ORDER — PREDNISONE 20 MG/1
20 TABLET ORAL
Qty: 7 TABLET | Refills: 0 | Status: SHIPPED | OUTPATIENT
Start: 2022-01-11 | End: 2022-01-18

## 2022-01-10 RX ORDER — FUROSEMIDE 40 MG/1
40 TABLET ORAL DAILY
Qty: 30 TABLET | Refills: 0 | Status: SHIPPED | OUTPATIENT
Start: 2022-01-10 | End: 2022-02-09

## 2022-01-10 RX ORDER — DEXTROSE 50 % IN WATER (D50W) INTRAVENOUS SYRINGE
Status: DISCONTINUED
Start: 2022-01-10 | End: 2022-01-10 | Stop reason: CLARIF

## 2022-01-10 RX ADMIN — IPRATROPIUM BROMIDE AND ALBUTEROL SULFATE 3 ML: .5; 2.5 SOLUTION RESPIRATORY (INHALATION) at 01:17

## 2022-01-10 RX ADMIN — HEPARIN SODIUM 5000 UNITS: 5000 INJECTION INTRAVENOUS; SUBCUTANEOUS at 06:25

## 2022-01-10 RX ADMIN — HEPARIN SODIUM 5000 UNITS: 5000 INJECTION INTRAVENOUS; SUBCUTANEOUS at 13:34

## 2022-01-10 RX ADMIN — FAMOTIDINE 20 MG: 20 TABLET ORAL at 13:34

## 2022-01-10 RX ADMIN — SODIUM CHLORIDE, PRESERVATIVE FREE 10 ML: 5 INJECTION INTRAVENOUS at 13:35

## 2022-01-10 RX ADMIN — PREDNISONE 20 MG: 20 TABLET ORAL at 13:34

## 2022-01-10 RX ADMIN — IPRATROPIUM BROMIDE AND ALBUTEROL SULFATE 3 ML: .5; 2.5 SOLUTION RESPIRATORY (INHALATION) at 10:20

## 2022-01-10 RX ADMIN — SODIUM CHLORIDE, PRESERVATIVE FREE 10 ML: 5 INJECTION INTRAVENOUS at 00:48

## 2022-01-10 RX ADMIN — SODIUM CHLORIDE, PRESERVATIVE FREE 10 ML: 5 INJECTION INTRAVENOUS at 06:25

## 2022-01-10 RX ADMIN — FUROSEMIDE 40 MG: 40 TABLET ORAL at 13:35

## 2022-01-10 NOTE — DISCHARGE SUMMARY
Physician Discharge Summary     Patient ID:    Kirt Busby  [id number]  80 y.o.  6/3/1929    Admit date: 1/2/2022    Discharge date : 1/10/2022    Chronic Diagnoses:    Problem List as of 1/10/2022 Never Reviewed          Codes Class Noted - Resolved    Respiratory failure [facility]) ICD-10-CM: J96.90  ICD-9-CM: 518.81  1/2/2022 - Present          22    Final Diagnoses:   Respiratory failure ([address] [J96.90]  Acute on chronic diastolic heart failure  COPD with acute exacerbation  Reason for Hospitalization:  Shortness of breath and generalized weakness      Hospital Course:     Sonja Fry is a 55-year-old year male with history of CHF, prostate cancer and essential hypertension admitted for shortness of breath, wheezing, and altered mental status. Initial blood gas indicates a PCO2 of 48. He was placed on BIPAP with improvement. CT head negative for acute findings. Covid and Influenza negative. Urinalysis shows leuk esterase, > 100 WBCs, and positive nitrates all consistent with a UTI. Started on IV Zosyn. Pulmonary consulted for acute hypoxic respiratory failure. IV azithromycin added for possible COPD exacerbation. Also started on IV solu-medrol and scheduled duonebs. Over the course of hospitalization, breathing has improved significantly. Evaluated by PT OT and recommended skilled care facility. Once bed available he will be discharged. Discharge Medications:   Current Discharge Medication List      START taking these medications    Details   predniSONE (DELTASONE) 20 mg tablet Take 20 mg by mouth daily (with breakfast) for 7 days. Qty: 7 Tablet, Refills: 0  Start date: 1/11/2022, End date: 1/18/2022      furosemide (LASIX) 40 mg tablet Take 1 Tablet by mouth daily for 30 days. Qty: 30 Tablet, Refills: 0  Start date: 1/10/2022, End date: 2/9/2022               Follow up Care:    1. Unknown (Inactive) in 1-2 weeks.   Please call to set up an appointment shortly after discharge. Diet:  Cardiac Diet    Disposition:  SNF. Advanced Directive:   FULL    DNR      Discharge Exam:  Visit Vitals  /72 (BP 1 Location: Left upper arm, BP Patient Position: At rest;Supine)   Pulse 99   Temp 97.6 °F (36.4 °C)   Resp 18   Ht 5' 7.99\" (1.727 m)   Wt 70.5 kg (155 lb 6.8 oz)   SpO2 99%   BMI 23.64 kg/m²    O2 Flow Rate (L/min): 2 l/min O2 Device: None (Room air)    Temp (24hrs), Av.6 °F (36.4 °C), Min:97.4 °F (36.3 °C), Max:97.7 °F (36.5 °C)    No intake/output data recorded. No intake/output data recorded. General:  Alert, cooperative, no distress, appears stated age. Lungs:   Clear to auscultation bilaterally. Chest wall:  No tenderness or deformity. Heart:  Regular rate and rhythm, S1, S2 normal, no murmur, click, rub or gallop. Abdomen:   Soft, non-tender. Bowel sounds normal. No masses,  No organomegaly. Extremities: Extremities normal, atraumatic, no cyanosis or edema. Pulses: 2+ and symmetric all extremities. Skin: Skin color, texture, turgor normal. No rashes or lesions   Neurologic: CNII-XII intact. No gross sensory or motor deficits         CONSULTATIONS: Cardiology    Significant Diagnostic Studies:   2022: BUN 31 mg/dL (H; Ref range: 6 - 20 mg/dL); Calcium 8.6 mg/dL (Ref range: 8.5 - 10.1 mg/dL); CO2 27 mmol/L (Ref range: 21 - 32 mmol/L); Creatinine 1.57 mg/dL (H; Ref range: 0.70 - 1.30 mg/dL); Glucose 123 mg/dL (H; Ref range: 65 - 100 mg/dL); HCT 41.7 % (Ref range: 36.6 - 50.3 %); HGB 13.4 g/dL (Ref range: 12.1 - 17.0 g/dL); Potassium 5.6 mmol/L (H; Ref range: 3.5 - 5.1 mmol/L); Sodium 137 mmol/L (Ref range: 136 - 145 mmol/L)  1/3/2022: BUN 33 mg/dL (H; Ref range: 6 - 20 mg/dL); Calcium 7.9 mg/dL (L; Ref range: 8.5 - 10.1 mg/dL); CO2 29 mmol/L (Ref range: 21 - 32 mmol/L); Creatinine 1.58 mg/dL (H; Ref range: 0.70 - 1.30 mg/dL); Glucose 131 mg/dL (H; Ref range: 65 - 100 mg/dL); HCT 37.4 % (Ref range: 36.6 - 50.3 %);  HGB 11.9 g/dL (L; Ref range: 12.1 - 17.0 g/dL); Potassium 4.0 mmol/L (Ref range: 3.5 - 5.1 mmol/L); Sodium 140 mmol/L (Ref range: 136 - 145 mmol/L)  No results for input(s): WBC, HGB, HCT, PLT, HGBEXT, HCTEXT, PLTEXT in the last 72 hours. No results for input(s): NA, K, CL, CO2, BUN, CREA, GLU, CA, MG, PHOS, URICA in the last 72 hours. No results for input(s): ALT, AP, TBIL, TBILI, TP, ALB, GLOB, GGT, AML, LPSE in the last 72 hours. No lab exists for component: SGOT, GPT, AMYP, HLPSE  No results for input(s): INR, PTP, APTT, INREXT in the last 72 hours. No results for input(s): FE, TIBC, PSAT, FERR in the last 72 hours. No results for input(s): PH, PCO2, PO2 in the last 72 hours. No results for input(s): CPK, CKMB in the last 72 hours.     No lab exists for component: TROPONINI  Lab Results   Component Value Date/Time    Glucose (POC) 100 01/03/2022 09:48 PM       Total Time: 35 minutes    Signed:  Jenna Nash MD  1/10/2022  1:36 PM

## 2022-01-10 NOTE — PROGRESS NOTES
CM received call from patient's daughter, Louie Hearn, she stated that she was told by a friend that she should not send patient to [facility] and she would no longer like for him to d/c to that facility. Ms. Louie Hearn asked about [facility], CM explained that patient would not qualify. At this time [facility] not have any male beds available, CM notified Ms. Louie Hearn, she asked if she could have an hour to research more facilities since she is not from this area. CM awaiting callback.

## 2022-01-10 NOTE — PROGRESS NOTES
Patient is clear to d/c from [facility] to [facility], room 204B, nurse report can be called to [phone]. PARAMJIT spoke with patient's daughter, Emily Rendon [phone], she is agreeable, Medicare pt has received, reviewed, and signed 2nd IM letter informing them of their right to appeal the discharge. Signed copied has been placed on pt bedside chart. She is going to contact patient's friend to inquire about transport to the facility, CM awaiting a callback.

## 2022-01-10 NOTE — PROGRESS NOTES
Hospitalist Progress Note    Subjective:   Daily Progress Note: 1/10/2022 11:46 AM    Hospital Course:  Christophe Richards is a 59-year-old year male with history of CHF, prostate cancer and essential hypertension admitted for shortness of breath, wheezing, and altered mental status. Initial blood gas indicates a PCO2 of 48. He was placed on BIPAP with improvement. CT head negative for acute findings. Covid and Influenza negative. Urinalysis shows leuk esterase, > 100 WBCs, and positive nitrates all consistent with a UTI. Started on IV Zosyn. Pulmonary consulted for acute hypoxic respiratory failure. IV azithromycin added for possible COPD exacerbation. Also started on IV solu-medrol and scheduled duonebs. Patient admitted to ICU, however no beds available and therefor on hold in the ED. Of note, patient is a DNR/DNI. Echocardiogram showing LVEF 60-65%. Subjective:    Patient seen and examined. No acute events overnight. Remains on 2 l/m nc spo2>93%  Denies sob or chest pain. Feels much improved though weak. Lost iv site last night and is continued on oral lasix. Afebrile  bp stable  Slp noted, ok for advancing to soft & bite sized, thin, tolerated today  cxr 1/9 Multifocal patchy interstitial airspace disease. No evidence of pleural effusion  or pneumothorax. No sitter and remains calm and cooperative, lucid but hard of hearing. Pt/ot recommended snf, cm working on arrangement. Pt states he is agreeable for snf, cm working on it.   He can be dis        Current Facility-Administered Medications   Medication Dose Route Frequency    predniSONE (DELTASONE) tablet 20 mg  20 mg Oral DAILY WITH BREAKFAST    furosemide (LASIX) tablet 40 mg  40 mg Oral BID    famotidine (PEPCID) tablet 20 mg  20 mg Oral DAILY    hydrOXYzine (VISTARIL) 25 mg/mL injection 25 mg  25 mg IntraMUSCular Q6H PRN    albuterol-ipratropium (DUO-NEB) 2.5 MG-0.5 MG/3 ML  3 mL Nebulization Q4H PRN    sodium chloride (NS) flush 5-40 mL 5-40 mL IntraVENous Q8H    sodium chloride (NS) flush 5-40 mL  5-40 mL IntraVENous PRN    acetaminophen (TYLENOL) tablet 650 mg  650 mg Oral Q6H PRN    Or    acetaminophen (TYLENOL) suppository 650 mg  650 mg Rectal Q6H PRN    polyethylene glycol (MIRALAX) packet 17 g  17 g Oral DAILY PRN    ondansetron (ZOFRAN ODT) tablet 4 mg  4 mg Oral Q8H PRN    Or    ondansetron (ZOFRAN) injection 4 mg  4 mg IntraVENous Q6H PRN    heparin (porcine) injection 5,000 Units  5,000 Units SubCUTAneous Q8H    albuterol-ipratropium (DUO-NEB) 2.5 MG-0.5 MG/3 ML  3 mL Nebulization Q6H RT        Review of Systems  Unable to obtain secondary to patient condition. Objective:     Visit Vitals  BP (!) 150/78 (BP 1 Location: Left upper arm, BP Patient Position: At rest;Supine)   Pulse 64   Temp 97.7 °F (36.5 °C)   Resp 20   Ht 5' 7.99\" (1.727 m)   Wt 70.5 kg (155 lb 6.8 oz)   SpO2 92%   BMI 23.64 kg/m²    O2 Flow Rate (L/min): 2 l/min O2 Device: None (Room air)    Temp (24hrs), Av.6 °F (36.4 °C), Min:97.4 °F (36.3 °C), Max:97.9 °F (36.6 °C)      No intake/output data recorded. No intake/output data recorded. PHYSICAL EXAM:  Constitutional: No acute distress, limited vision and wears hearing aids-not in. Skin: Extremities and face reveal no rashes. HEENT: Sclerae anicteric. PERRL. The neck is supple and no masses. Cardiovascular: Regular rate and rhythm. +S1/S2. NO murmur or gallop. Respiratory:  Symmetric chest wall expansion. Few rhonchi bilaterally. No wheezing or rales. GI: Abdomen nondistended, soft, and nontender. Normal active bowel sounds. Rectal: Deferred   Musculoskeletal: No pitting edema of the lower legs. Able to move all ext  Neurological:  aao x 2-3, nfd  Psychiatric: calm and not agitated      Data Review    No results found for this or any previous visit (from the past 24 hour(s)). XR CHEST PORT   Final Result   Findings/impression:      Multifocal patchy interstitial airspace disease.  No evidence of pleural effusion   or pneumothorax. Hyperlucency along the right lateral lung base, favored to   represent a skinfold. Cardiomediastinal contours are stable. Mild central vascular congestion. Visualized osseous structures are unchanged. XR CHEST PORT   Final Result   Little interval change. XR CHEST PORT   Final Result   No significant interval change. XR CHEST PORT   Final Result   The cardiomediastinal silhouette is appropriate for age, technique,   and lung expansion. Pulmonary vasculature is not congested. The lungs are   essentially clear. No effusion or pneumothorax is seen. XR CHEST PORT   Final Result      XR CHEST PORT   Final Result   I suspect a degree of chronic interstitial lung disease which has   been present on multiple prior exams and without superimposed focal airspace   disease on today's exam. Other chronic findings as above. XR CHEST PORT   Final Result   Decreased lung volumes. No significant interval change otherwise. CT HEAD WO CONT   Final Result   Age-appropriate atrophy. No acute findings. XR CHEST PORT   Final Result   Faint patchy airspace disease within the right lung suspicious for   pneumonia. XR CHEST PORT    (Results Pending)   XR CHEST PORT    (Results Pending)   XR CHEST PORT    (Results Pending)   XR CHEST PORT    (Results Pending)       Active Problems:    Respiratory failure ([address] (1/2/2022)        Assessment/Plan:     1. Acute hypoxic respiratory failure likely secondary to COPD exacerbation + chf  - wean o2, now to 2 L. He reports he has home O2 he uses as needed. - Covid and Influenza negative  - Continue IV azithromycin completed,   - switched to oral prednisone & taper and continue scheduled duonebs  - Echocardiogram showing LVEF 60-65%. - Pulmonary consult appreciated  - Wean oxygen as tolerated,     2.  Acute on chronic heart failure unspecified, likely main etio of his breathing issues   - lasix to po 40 mg bid. - clinically much improved  - echo ef 15-19%, nl diastolic fxn    3. Acute kidney injury/ckd 2-3  - cr 1.54, cr remaining flat around this level, baseline?  -follow    4. Urinary tract infection  - Urinalysis shows leuk esterase, > 100 WBCs, and positive nitrates all consistent with a UTI  - ucs no growth  - stopped zosyn    Diet:per slp 1/8, advanced to mince and moist and tolerated thisd morning. DVT Prophylaxis: heparin subcu  Code Status: DNR/DNI  GI prophylaxis: Pepcid  POA:Tamra Abraham [phone] (daughter    Dispo: snf recommended, cm appreciated, can dc when facility/bed available. Care Plan discussed with: patient and nursing    Total time spent with patient: 30 minutes.

## 2022-01-12 NOTE — ADT AUTH CERT NOTES
Comments  Comment            Patient Demographics    Patient Name   July Ch   [id number] Legal Sex   Male    6/3/1929 Address   [address]   [place] [facility] Phone   [phone] (Home) *Preferred*   [phone] (Mobile)     Patient Demographics    Patient Name   July Ch   [id number] Legal Sex   Male    6/3/1929 Address   [address]   [place] [facility] Phone   [phone] (Home) *Preferred*   [phone] (Mobile)   CSN:   [id number]     [address] [place] [address] Date: Admit Time Room Bed   2022  3:24  [phone] [id number]       Attending Providers    Provider Pager From To   Dane Cruz MD  22   Guadalupe Cash MD  22   Fredrick Blanco MD  22   Beverly Epley, MD  22   Guadalupe Cash MD  22   Hawa Carlos MD  01/05/22 01/10/22   Fredrick Blanco MD  01/10/22 01/10/22     Emergency Contact(s)    Name Relation Home Work Christian Han   [phone]   Saturnino Carcamo   [phone]     Utilization Reviews         Chronic Obstructive Pulmonary Disease - Care Day 8 (2022) by Davida Ruiz       Review Entered Review Status   1/10/2022 12:44 Completed      Criteria Review      Care Day: 8 Care Date: 2022 Level of Care: Telemetry    Guideline Day 3    Clinical Status    (X) * Hemodynamic stability    1/10/2022 12:44:00 EST by Davida Ruiz      97.6-67-/65-94% 1 L O2 via NC    ( ) * Mental status at baseline    1/10/2022 12:44:00 EST by Davida Ruiz      No sitter and remains calm and cooperative, lucid but hard of hearing    ( ) * No evidence of infection, or outpatient treatment planned    (X) * Uncompensated acidosis absent    1/10/2022 12:44:00 EST by Davida Ruiz      No documentation of uncompensated acidosis    ( ) * Oxygenation at baseline or acceptable for next level of care 1/10/2022 12:44:00 EST by Yan Laird      Pt on 1-2L O2 via NC    ( ) * Signs and symptoms of COPD (eg, dyspnea, Tachypnea, cough, sputum production) at baseline or acceptable for next level of care    ( ) * Discharge plans and education understood    Activity    ( ) * Ambulatory or acceptable for next level of care    Routes    ( ) * Oral hydration    ( ) * Oral medications or regimen acceptable for next level of care    1/10/2022 12:44:00 EST by Yan Garciab 3ml q6h, Pepcid 20mg po qd, Lasix 40 mg po 2x qd, Heparin 5000 un q8h sc, Prednisone 40mg po qd    ( ) * Oral diet or acceptable for next level of care    Interventions    (X) Oxygen as needed    1/10/2022 12:44:00 EST by Yan Laird Pt on 1-2L O2 via NC    Medications    ( ) * Inhaled bronchodilator regimen established and feasible at next level of care    1/10/2022 12:44:00 EST by Yan Saldana 3ml q6h    * Milestone   Additional Notes   DATE: 1/9/2022            CXR    Findings/impression:       Multifocal patchy interstitial airspace disease. No evidence of pleural effusion   or pneumothorax. Hyperlucency along the right lateral lung base, favored to   represent a skinfold.       Cardiomediastinal contours are stable. Mild central vascular congestion.       Visualized osseous structures are unchanged.          Patient seen and examined. No acute events overnight. Remains on 2 l/m nc spo2>93%   Denies sob or chest pain. Feels much improved though weak. Lost iv site last night and is continued on oral lasix.       Afebrile   bp stable   Slp noted, ok for advancing to soft & bite sized, thin, tolerated today   cxr 1/9 Multifocal patchy interstitial airspace disease. No evidence of pleural effusion   or pneumothorax.       No sitter and remains calm and cooperative, lucid but hard of hearing. Pt/ot recommended snf, cm working on arrangement. Pt states he is agreeable for snf, cm working on it.    He can be dis         97.6-/65-18-94% 1 L O2      Constitutional: No acute distress, limited vision and wears hearing aids-not in. Skin: Extremities and face reveal no rashes. HEENT: Sclerae anicteric. PERRL. The neck is supple and no masses. Cardiovascular: Regular rate and rhythm. +S1/S2. NO murmur or gallop. Respiratory:  Symmetric chest wall expansion. Few rhonchi bilaterally. No wheezing or rales.     GI: Abdomen nondistended, soft, and nontender. Normal active bowel sounds. Rectal: Deferred    Musculoskeletal: No pitting edema of the lower legs. Able to move all ext   Neurological:  aao x 2-3, nfd   Psychiatric: calm and not agitated      Internal Medicine Note   Active Problems:     Respiratory failure ([address] (1/2/2022)       Assessment/Plan:       1. Acute hypoxic respiratory failure likely secondary to COPD exacerbation + chf   - wean o2, now to 2 L. He reports he has home O2 he uses as needed. - Covid and Influenza negative   - Continue IV azithromycin completed,    - switched to oral prednisone & taper and continue scheduled duonebs   - Echocardiogram showing LVEF 60-65%. - Pulmonary consult appreciated   - Wean oxygen as tolerated,        2. Acute on chronic heart failure unspecified, likely main etio of his breathing issues    - lasix to po 40 mg bid.     - clinically much improved   - echo ef 11-43%, nl diastolic fxn       3. Acute kidney injury/ckd 2-3   - cr 1.54, cr remaining flat around this level, baseline?   -follow       4. Urinary tract infection   - Urinalysis shows leuk esterase, > 100 WBCs, and positive nitrates all consistent with a UTI   - ucs no growth   - stopped zosyn       Diet:per slp 1/8, advanced to mince and moist and tolerated thisd morning. DVT Prophylaxis: heparin subcu   Code Status: DNR/DNI   GI prophylaxis: Pepcid      OT Note      ASSESSMENT   Patient continues with skilled OT services and is progressing towards goals.  Pt.  Received sleeping in the chair and woke with name called and agreeable to tx session. Pt. Circle and decreased vision requiring increased verbal cueing to perform tasks. Pt. Performed sit-> stand from chair with Mod A and use of RW upon standing for balance/ stability. Pt. Perform functional ambulation from chair to bathroom with close CGA with use of RW and assistance with line management, toilet transfer Min A with use of grab bars <> commode. Pt required additional time to perform toileting tasks. Facial grooming performed while seated on commode with set-up assistance.  Pt performed ambulation in pt room with close CGA with use of RW and assistance with line management. Pt returned seated in chair and pt able to re-cite UE therex perform during prior tx session. Pt. Educated on performing 3-4x daily to maintain and increase strength- pt understanding. Continues to demonstrate decreased ROM in left shoulder d/t pt stating prior shoulder surgery . Pt. Left reclined in chair with needs met and call bell within reach. REC. SNF when medially appropriate for discharge.        Current Level of Function Impacting Discharge (ADLs): assistance with transfers and functional ambulation to perform OOB ADL's       Other factors to consider for discharge: PLOF, time since on set        PLAN :   Patient continues to benefit from skilled intervention to address the above impairments.  Continue treatment per established plan of care.    to address goals.       Recommendation for discharge: (in order for the patient to meet his/her long term goals)   Therapy up to 5 days/week in SNF setting       This discharge recommendation:   Has been made in collaboration with the attending provider and/or case management       IF patient discharges home will need the following DME: TBD                    Chronic Obstructive Pulmonary Disease - Care Day 7 (1/8/2022) by Russell Rios       Review Entered Review Status   1/10/2022 12:07 Completed      Criteria Review      Care Day: 7 Care Date: 1/8/2022 Level of Care: Telemetry    Guideline Day 3    Clinical Status    (X) * Hemodynamic stability    1/10/2022 12:07:20 EST by Desiree Llanos      97.8 °F (36.6 °C) 77 134/78 97 -- 16 95 % Nasal cannula 1 l/min    ( ) * Mental status at baseline    1/10/2022 12:07:20 EST by Desiree Llanos      Neurological:  aao x 2-3, nfd  Psychiatric: calm and not agitated    ( ) * No evidence of infection, or outpatient treatment planned    (X) * Uncompensated acidosis absent    1/10/2022 12:07:20 EST by Desiree Llanos      No documentation of Uncompensated acidosis noted    ( ) * Oxygenation at baseline or acceptable for next level of care    1/10/2022 12:07:20 EST by Desiree Llanos      O2 1L/min via NC    ( ) * Signs and symptoms of COPD (eg, dyspnea, Tachypnea, cough, sputum production) at baseline or acceptable for next level of care    ( ) * Discharge plans and education understood    Activity    ( ) * Ambulatory or acceptable for next level of care    1/10/2022 12:07:20 EST by Desiree Llanos      Activity as tolerated with assist    Routes    ( ) * Oral hydration    ( ) * Oral medications or regimen acceptable for next level of care    1/10/2022 12:07:20 EST by Desiree Llanos      , Pepcid 20mg po qd, Lasix 40mg po 2x qd, Heparin 5000 un q8h sc, IV Zosyn 3.375 g q8h, Deltasone 40mg po qd    ( ) * Oral diet or acceptable for next level of care    Interventions    (X) Oxygen as needed    1/10/2022 12:07:20 EST by Desiree Llanos      1 L O2 via NC    Medications    ( ) * Inhaled bronchodilator regimen established and feasible at next level of care    1/10/2022 12:07:20 EST by Desiree Llanos      Duo Neb 3ml q6h    * Milestone   Additional Notes   DATE: 1/8/2022         CXR Findings:       Ongoing scattered interstitial airspace disease. No evidence of pleural effusion   or pneumothorax.  Elevation the left hemidiaphragm.       Cardiomediastinal contours are stable.       Visualized osseous structures are unchanged.           IMPRESSION   Little interval change          Patient seen and examined. On nc 1 l now 96%   Sitting up in chair, interactive, nad, denies complaints. No acute event overnight. Sitter dc 1/7, not acting up.            97.8 °F (36.6 °C) 77 134/78 97 - - 16 95 % Nasal cannula 1 l/min       Constitutional: No acute distress, limited vision and wears hearing aids-not in. Skin: Extremities and face reveal no rashes. HEENT: Sclerae anicteric. PERRL. The neck is supple and no masses. Cardiovascular: Regular rate and rhythm. +S1/S2. NO murmur or gallop. Respiratory:  Symmetric chest wall expansion. Few rhonchi bilaterally. No wheezing or rales.     GI: Abdomen nondistended, soft, and nontender. Normal active bowel sounds. Rectal: Deferred    Musculoskeletal: No pitting edema of the lower legs. Able to move all ext   Neurological:  aao x 2-3, nfd   Psychiatric: calm and not agitated       IP Consult to PICC Team      MD Consults/Assessments & Plans:   Internal Medicine Note      Active Problems:     Respiratory failure ([address] (1/2/2022)               Assessment/Plan:       1. Acute hypoxic respiratory failure likely secondary to COPD exacerbation + chf   - wean o2, now to 1.1 l   - Covid and Influenza negative   - Continue IV azithromycin completed, continue IV Zosyn   - switched to oral prednisone & taper and continue scheduled duonebs   - Echocardiogram showing LVEF 60-65%. - Pulmonary consult appreciated   - Wean oxygen as tolerated,        2. Acute on chronic heart failure unspecified, likely main etio of his breathing issues    - Continue IV lasix 40 mg bid pending repeat  cxr today- overall clinically inproving, decreased  o2 requirements and improved mentation. Cxr 1/7 underepanded/atelectasis, crowding of bronchovascular structures, hydrostatic edema not excluded. - echo ef 07-70%, nl diastolic fxn       3.  Acute kidney injury/ckd 2-3   - cr 1.54, cr remaining flat around this level, baseline?   -follow       4. Urinary tract infection   - Urinalysis shows leuk esterase, > 100 WBCs, and positive nitrates all consistent with a UTI   - ucs no growth   - stop zosyn       Diet: remains on clears, mentation better, slp eval before avdancing to assess aspiration risk and consistency rec's. DVT Prophylaxis: heparin subcu   Code Status: DNR/DNI   GI prophylaxis: Pepcid   Holly Sage [phone] (daughter       Dispo: snf recommended, cm appreciated, can dc when facility/bed available.          ST Note      ASSESSMENT :   Bedside swallow evaluation completed. Nsg reports patient tolerating clear liquid diet without difficulty. Patient in bedside chair, sitting upright, oriented to self and place. Patient reports occasional coughing with dry/crumbly foods; difficulty with mastication of tougher meats and uncooked vegetables but reports self-selecting and cooking consistencies able to tolerate. Patient aware to alternate solids/liquids during meals. Per chart review/patient board, patient blind in both eyes; however, patient establishing eye contact with clinician during evaluation. Per patient report, very [place] but no difficulty hearing when clinician raised voice volume; note added to patient board. Patient observed with coughing prior to po.        Patient presents w/ mild oropharyngeal dysphagia. Oral phase c/b prolonged but adequate mastication with hard solid, timely a-p transit. Pharyngeal phase c/b timely swallow initiation and HLE appears WFL upon palpation. No clinical indicators aspiration and/or penetration observed.       Patient will benefit from skilled intervention to address the above impairments. Patient's rehabilitation potential is considered to be Good       PLAN :   Recommendations and Planned Interventions:   Patient appears appropriate for initiation of Soft & Bite-Sized diet and thin liquids.  STRICT aspiration precautions: intermittent supervision with po, alternate solids and liquids, slow rate of intake, small sips and bites, avoid dry/crumbly textures, maintain HOB elevation 90 degrees for at least 30 minutes after meals. MBS if/as indicated. Patient will require assistance with setting up meal tray s/t visual deficits.        Frequency/Duration: Patient will be followed by speech-language pathology 3 times a week to address goals.       Discharge Recommendations: To Be Determined       SUBJECTIVE:   Patient alert, pleasant demeanor, agreeable to bedside swallow evaluation.       OT Note      ASSESSMENT   Patient continues with skilled OT services and is progressing towards goals. Pt. Received reclined in chair and agreeable to tx session. Pt. Performed sit-> stand from chair Min A, functional ambulation close CGA with use of RW. Pt. Able to ambulate from chair to window and back to chair. Pt. Able to tolerate standing for additional time while pt performed avery-care with set-up assistance and CGA for stability and balance. Pt. Returned seated in chair with Min A . Pt. Able to tolerate seated UE therex 4 sets of 15 reps. Pt. Demonstrated decreased shoulder flexion and functional reaching on L side.  Pt. Left reclined in chair with needs met and call bell within reach. REC. SNF when medically appropriate        Current Level of Function Impacting Discharge (ADLs): assistance with transfers, limited in shoulder flexion and anterior reach limiting use of left UE to assist with ADl's       Other factors to consider for discharge: PLOF, time since on set            PLAN :   Patient continues to benefit from skilled intervention to address the above impairments.  Continue treatment per established plan of care.    to address goals.       Recommendation for discharge: (in order for the patient to meet his/her long term goals)   Therapy up to 5 days/week in SNF setting       This discharge recommendation:   Has been made in collaboration with the attending provider and/or case management       IF patient discharges home will need the following DME: TBD                    Chronic Obstructive Pulmonary Disease - Care Day 6 (1/7/2022) by Jonah Allen       Review Entered Review Status   1/10/2022 11:22 Completed      Criteria Review      Care Day: 6 Care Date: 1/7/2022 Level of Care: Telemetry    Guideline Day 2    Level Of Care    (X) Floor or intermediate care    Clinical Status    (X) * Hemodynamic stability    1/10/2022 11:22:16 EST by Jonah Allen      V/S 97.3-64-/70-96% 1.5L O2    (X) * Mechanical and noninvasive ventilation absent    (X) * Uncompensated acidosis absent    1/10/2022 11:22:16 EST by Jonah Allen      No documentation of acidosis noted    Routes    (X) Diet as tolerated    1/10/2022 11:22:16 EST by Jonah Allen      Activity as tolerated with assist    (X) IV medications    1/10/2022 11:22:16 EST by Jonah Allen      Duo neb 3 ml q6h,  IV Zithromax 500mg q24h, IV Lasix 40mg q8h,    Interventions    (X) Oxygen    1/10/2022 11:22:16 EST by Jonah Allen      1.5L O2 via NC    (X) DVT prophylaxis    1/10/2022 11:22:16 EST by Jonah Allen      Heparin 5000 un q8h sc    Medications    (X) Possible IV antibiotics    1/10/2022 11:22:16 EST by Jonah Allen      IV Zithromax 500mg q24h, IV Solu medrol 40mg q6h,    * Milestone   Additional Notes   DATE: 1/7/2022      Patient seen and examined. On nc 1.5 l now 96%   He is much more alert for me this am and no complaints, calm per sitter. Pt without complaints,        97.3-64-/70-96% 1.5L O2      Constitutional: No acute distress   Skin: Extremities and face reveal no rashes. HEENT: Sclerae anicteric. PERRL. The neck is supple and no masses. Cardiovascular: Regular rate and rhythm. +S1/S2. NO murmur or gallop. Respiratory:  Symmetric chest wall expansion. Few rhonchi bilaterally.  No wheezing or rales.     GI: Abdomen nondistended, soft, and nontender. Normal active bowel sounds. Rectal: Deferred    Musculoskeletal: No pitting edema of the lower legs. Able to move all ext   Neurological:  aao x 2-3, nfd   Psychiatric: calm and not agitated       Glu 142, BUN 44, Creat 1.54, BUN/Creat ratio 29, Ca+ 7.5         Internal Medicine Note      Assessment/Plan:       1. Acute hypoxic respiratory failure likely secondary to COPD exacerbation   - wean o2, now to 1.5 l   - Covid and Influenza negative   - Continue IV azithromycin completed, continue IV Zosyn   - Continue IV solu-medrol and scheduled duonebs   - Echocardiogram showing LVEF 60-65%. - Pulmonary consult appreciated   - Wean oxygen as tolerated       2. Acute on chronic heart failure unspecified, likely main etio of his breathing issues    - Continue IV lasix 40 mg daily   - echo ef 92-41%, nl diastolic fxn       3. Acute kidney injury   - cr 1.54   -follow       4. Urinary tract infection   - Urinalysis shows leuk esterase, > 100 WBCs, and positive nitrates all consistent with a UTI   - ucs no growth   - cont IV Zosyn for now           DVT Prophylaxis: heparin subcu   Code Status: DNR/DNI   GI prophylaxis: Pepcid   POA:       Dispo: [facility] vs snf-1-2d?       Pulmonary Note          Assessment/Plan:       Active Problems:     Respiratory failure ([address] (1/2/2022)       Patient is a 80-year-old  male with a history of CHF (EF unknown), remote prostate cancer, and hypertension who gets most of his care through the [facility] and presented to the hospital with wheezing, altered mental status, and shortness of breath requiring BiPAP therapy.       Plan:       1.)  Acute hypoxic respiratory failure   -Likely combination of acute on chronic heart failure as well as acute exacerbation of COPD,   Patient has shown improvement in hypoxia.  Continue nasal cannula oxygen.     -DNR/DNI        2.)  Acute on chronic heart failure       Echocardiogram results were reviewed which showed EF of 60 to 65%.  Normal diastolic function.  -Hopefully can get him to nasal cannula later today        3.)  Acute exacerbation of COPD   -Unclear history of smoking/COPD, but this is what is in the record, can ask him more once his mental status improves. -Agree with duo nebs every 6 hours and Solu-Medrol 40 mg IV every 6 hours, agree with IV azithromycin for now   -Continue to monitor on BiPAP, repeat chest x-ray and an ABG in the morning       4.)  Acute kidney injury/hyperkalemia   -Creatinine 1.55,  Previous creatinine in June 2021 was 1.06.   -Diuresis as above       5.)  UTI   -Urinalysis very suspect for urinary tract infection,    On IV Zosyn.     6. Confusion:   Patient has periods of confusion and agitation. Has a sitter at bedside. CODE STATUS: DNR/DNI          PT Note   ASSESSMENT   Pt is a 81 yo male admitted on 1/2/2022 for confusion and SOB x 1 day; pt currently being treated for acute encephalopathy, aspiration PNA, acute hypoxic respiratory failure, and UTI. PMH: CHF, COPD, HTN, prostate CA.  Pt A&O x self and type of place but not city or year. Per pt report pt resides alone but was unable to give any information regarding PLOF or home envirnoment stating he \"wasn't sure where I live now\". Pt did report assistance from \"aide\" 1x/week. Pt also reports being on 2-3L O2 via NC at home, currently on 1.5L.       Based on the objective data described below, the patient presents with generalized weakness, impaired functional mobility, impaired amb, impaired balance, and decreased activity tolerance. Pt semi-supine in bed with 1:1 present upon PT arrival, agreeable to evaluation. Bilateral mitts removed with [place] [facility] [place] Pt required CGA with additional time for bed mobility, CGA with additional time supine > sit, mod A with additional time sit <> stand transfers.  Pt amb 10 feet x2 (bed>bathroom>sink>bedside recliner) with gt belt, RW, and CGA; demonstrating decreased stance time on left LE with short, shuffling, step to gt pattern with no LOB or knee buckling noted. Pt did fair with session today with SOB noted following amb to bathroom, O2 saturations 80%, nsg increased O2 to 4L, saturations improved to 92% with pursed lip breathing. Pt will benefit from continued skilled PT to address above deficits and return to PLOF. Current PT DC recommendation SNF.        Current Level of Function Impacting Discharge (mobility/balance): CGA to mod A with additional time       Other factors to consider for discharge: severity of deficits, unconfirmed PLOF       PLAN :   Recommendations and Planned Interventions: bed mobility training, transfer training, gait training, therapeutic exercises, patient and family training/education and therapeutic activities         Frequency/Duration: Patient will be followed by physical therapy:  3-5x/week to address goals.       Recommendation for discharge: (in order for the patient to meet his/her long term goals)   Dennis Pickard       This discharge recommendation:   Has been made in collaboration with the attending provider and/or case management       IF patient discharges home will need the following DME: to be determined (TBD)       OT Note      ASSESSMENT   Pt is a 79 y/o M with PMH of CHF, COPD, HTN, and prostate cancer presenting to [facility] with c/o SOB, admitted 01/02/22 and being treated for community acquired pneumonia and COPD.       Pt received seated EOB with PT upon OT arrival, AXO to person, and agreeable to OT/PT evaluation at this time. Per pt report, pt lives house. Pt reports needing assistance with ADLs at PLOF. Pt appeared confused and is a poor historian. Pt currently has a 1:1.       Based on current observations, pt presents with deficits in generalized strength/AROM, balance, functional activity tolerance, vision, and cognition impacting overall performance of ADLs and functional transfers/mobility.  Per PT, pt currently requires CGA with additional time for bed mobility including rolling, sup>sit EOB, and scooting. Pt requires mod A with additional time for sit>stand from EOB. Pt ambulated to bathroom with min A/CGA and completed toilet transfer with mod A and additional time while using grab bar. Pt ambulated to sink with min A/CGA. Pt washed hands while standing at sink with CGA and holding sink for stability. Pt required verbal cuing while washing hands to locate soap dispenser and accurately turn off the water faucet. Pt stood at sink with CGA and brushed teeth with set up to put toothpaste on toothbrush. Pt ambulated to chair and completed stand with RW>sit in recliner with mod A and additional time. Pt able to eat jello with total A to open container, however, pt is IND to hold the spoon and bring the jello to his mouth. Overall, pt tolerates session fair. Pt would benefit from continued skilled OT services to address current impairments and improve IND and safety with self cares and functional transfers/mobility.  Recommend discharge to SNF once medically appropriate.       Other factors to consider for discharge: severity of deficits, current level of assist       Patient will benefit from skilled therapy intervention to address the above noted impairments.         PLAN :   Recommendations and Planned Interventions: self care training, functional mobility training, therapeutic exercise, therapeutic activities, endurance activities and patient education       Frequency/Duration: Patient will be followed by occupational therapy:  3-5x/week to address goals.       Recommendation for discharge: (in order for the patient to meet his/her long term goals)   Dennis Pickard       This discharge recommendation:   Has been made in collaboration with the attending provider and/or case management       IF patient discharges home will need the following DME: bedside commode, shower chair and walker: rolling       Nutrition Note          Nutrition Diagnosis:    · Unintended weight loss related to catabolic illness,inadequate protein-energy intake as evidenced by weight loss greater than or equal to 10% in 6 months           Nutrition Interventions:    Food and/or Nutrient Delivery: Modify current diet   Nutrition Education and Counseling: No recommendations at this time   Coordination of Nutrition Care: Continue to monitor while inpatient,Swallow evaluation       Goals:   Initiate diet capable of meeting >/=75% of EENs within 3 days, Intakes >/=50% of EENs in >5 days, Wt maintenance within +/-0.5kg in >5 days          Nutrition Monitoring and Evaluation:    Behavioral-Environmental Outcomes: None identified   Food/Nutrient Intake Outcomes: Diet advancement/tolerance,Food and nutrient intake   Physical Signs/Symptoms Outcomes: Chewing or swallowing,Weight,Constipation               Chronic Obstructive Pulmonary Disease - Care Day 5 (1/6/2022) by Marta Herring RN       Review Entered Review Status   1/7/2022 13:20 Completed      Criteria Review      Care Day: 5 Care Date: 1/6/2022 Level of Care: Telemetry    Guideline Day 2    Level Of Care    (X) Floor or intermediate care    1/7/2022 13:20:36 EST by Phuong Luo      1/6 tele    Clinical Status    ( ) * Hemodynamic stability    1/7/2022 13:20:36 EST by Phuong Luo      98.6 111 138/74 20 97% 4l nc    (X) * Mechanical and noninvasive ventilation absent    1/7/2022 13:20:36 EST by Phuong Luo      4l nc    ( ) * Uncompensated acidosis absent    1/7/2022 13:20:36 EST by Phuong Luo      gluc 118 bun 43 cr 1.55    Activity    (X) Ambulatory    1/7/2022 13:20:36 EST by Phuong Luo      aat w assist   PT OT    Routes    (X) Diet as tolerated    1/7/2022 13:20:36 EST by Phuong Luo      cld    (X) IV medications    1/7/2022 13:20:36 EST by Phuong Luo      iv pepcid 20mg q24h  im vistaril 25 mg q6h prn mod pain given x2  iv lasix 40mg q8h    Interventions    (X) Oxygen    1/7/2022 13:20:36 EST by Phuong Luo      4l nc    (X) Pulse oximetry    1/7/2022 13:20:36 EST by Phuong Luo      cont    Medications    (X) Systemic corticosteroids    1/7/2022 13:20:36 EST by Phuong Luo      iv solumedrol 40mg q6h    (X) Inhaled bronchodilators    1/7/2022 13:20:36 EST by Phuong Luo      dounebs q6h    (X) Possible IV antibiotics    1/7/2022 13:20:36 EST by Phuong Luo      iv zithromax 500mg q24h  iv zisyn 3.375 gm q8h    * Milestone   Additional Notes   1/6/21 LOC IP TELE       Per attending    No acute distress.     Confused, no direct interaction   Sitter 2/2 agitation, getting oob   Few rhonchi bilaterally   cr 1.55, heber from 1.18   Pulmonary consult appreciated   Wean oxygen as tolerated      Per pulmonary    Patient has shown improvement in hypoxia.  Continue nasal cannula oxygen.     -DNR/DNI   Agree with duo nebs every 6 hours and Solu-Medrol 40 mg IV every 6 hours, agree with IV azithromycin for now        Chronic Obstructive Pulmonary Disease - Care Day 4 (1/5/2022) by Marta Herring RN       Review Entered Review Status   1/6/2022 09:16 Completed      Criteria Review      Care Day: 4 Care Date: 1/5/2022 Level of Care: Telemetry    Guideline Day 2    Level Of Care    (X) Floor or intermediate care    1/6/2022 09:00:27 EST by Phuong Luo      1/5 tele    Clinical Status    ( ) * Hemodynamic stability    1/6/2022 09:00:27 EST by Phuong Luo      97.5 99/56 20 97% 4l nc 74.2 kg   bp 121/61 99/56 109/60 135/68   weaned from 15l ventimask    (X) * Mechanical and noninvasive ventilation absent    1/6/2022 09:00:27 EST by Phuong Luo      4l nc    ( ) * Uncompensated acidosis absent    Activity    (X) Ambulatory    1/6/2022 09:00:27 EST by Beatris Ogden pt ot consult   AAT    Routes    ( ) Diet as tolerated    1/6/2022 09:00:27 EST by Phuong Luo      cld    (X) IV medications    1/6/2022 09:16:02 EST by Phuong Luo      iv lasix 40mg q8h  iv lasix 80mg given x1    Interventions    (X) Oxygen    1/6/2022 09:00:27 EST by Jed Ross      4l nc  abg pco2 48 hco3 32 so2 94 ventimask 12 l flow    (X) Pulse oximetry    1/6/2022 09:00:27 EST by Jed Ross      pulse ox continuous    (X) DVT prophylaxis    1/6/2022 09:16:02 EST by Jed Ross      heparin sc 5000units q8h    Medications    (X) Systemic corticosteroids    1/6/2022 09:16:03 EST by Jed Ross      iv solumedrol 40mg q6h    (X) Inhaled bronchodilators    1/6/2022 09:16:03 EST by Jed Ross      dounebs q6h    (X) Possible IV antibiotics    1/6/2022 09:16:03 EST by Jed Ross      iv zithromax 500mg q24h  iv zosyn 3.375 gm q8h    * Milestone   Additional Notes   1//21 LOC IP TELE       Per attending   No acute distress    On nc 4 l now 97%   Few rhonchi bilaterally   1. Acute hypoxic respiratory failure likely secondary to COPD exacerbation   - weaned to 4L   - Covid and Influenza negative   - Continue IV azithromycin and IV Zosyn   - Continue IV solu-medrol and scheduled duonebs   - Echocardiogram showing LVEF 60-65%. - Pulmonary consult   - Wean oxygen as tolerated    2. Acute on chronic heart failure unspecified, likely main etio of his breathing issues    - Continue IV lasix 40 mg daily   - echo ef 08-40%, nl diastolic fxn    3. Acute kidney injury   - Improving. Likely secondary to UTI.    4. Urinary tract infection   - Urinalysis shows leuk esterase, > 100 WBCs, and positive nitrates all consistent with a UTI   - ucs no growth   - cont IV Zosyn for now    DVT Prophylaxis: heparin subcu   Code Status: DNR/DNI      Per pulmonary    Patient required a sitter at bedside because of fears of agitation.    -Yesterday was switched to Ventimask. Patient is still confused. Pinky Cosby had periods of agitation therefore required sitter at bedside.     Attempt to nasal cannula oxygen at 4 L/min.    We will get a blood gas later today.

## 2022-02-20 ENCOUNTER — APPOINTMENT (OUTPATIENT)
Dept: GENERAL RADIOLOGY | Age: 87
End: 2022-02-20
Attending: STUDENT IN AN ORGANIZED HEALTH CARE EDUCATION/TRAINING PROGRAM
Payer: OTHER GOVERNMENT

## 2022-02-20 ENCOUNTER — APPOINTMENT (OUTPATIENT)
Dept: CT IMAGING | Age: 87
End: 2022-02-20
Attending: EMERGENCY MEDICINE
Payer: OTHER GOVERNMENT

## 2022-02-20 ENCOUNTER — HOSPITAL ENCOUNTER (OUTPATIENT)
Age: 87
Setting detail: OBSERVATION
Discharge: HOME HEALTH | End: 2022-02-22
Attending: EMERGENCY MEDICINE | Admitting: HOSPITALIST
Payer: OTHER GOVERNMENT

## 2022-02-20 DIAGNOSIS — R41.0 DISORIENTATION: Primary | ICD-10-CM

## 2022-02-20 DIAGNOSIS — R77.8 ELEVATED TROPONIN: ICD-10-CM

## 2022-02-20 LAB
ALBUMIN SERPL-MCNC: 3.6 G/DL (ref 3.5–5)
ALBUMIN/GLOB SERPL: 0.9 {RATIO} (ref 1.1–2.2)
ALP SERPL-CCNC: 61 U/L (ref 45–117)
ALT SERPL-CCNC: 17 U/L (ref 12–78)
ANION GAP SERPL CALC-SCNC: 9 MMOL/L (ref 5–15)
APPEARANCE UR: ABNORMAL
AST SERPL W P-5'-P-CCNC: 35 U/L (ref 15–37)
BACTERIA URNS QL MICRO: NEGATIVE /HPF
BASOPHILS # BLD: 0.1 K/UL (ref 0–0.1)
BASOPHILS NFR BLD: 1 % (ref 0–1)
BILIRUB SERPL-MCNC: 1 MG/DL (ref 0.2–1)
BILIRUB UR QL: NEGATIVE
BUN SERPL-MCNC: 29 MG/DL (ref 6–20)
BUN/CREAT SERPL: 19 (ref 12–20)
CA-I BLD-MCNC: 8.8 MG/DL (ref 8.5–10.1)
CHLORIDE SERPL-SCNC: 106 MMOL/L (ref 97–108)
CO2 SERPL-SCNC: 26 MMOL/L (ref 21–32)
COLOR UR: ABNORMAL
CREAT SERPL-MCNC: 1.52 MG/DL (ref 0.7–1.3)
DIFFERENTIAL METHOD BLD: ABNORMAL
EOSINOPHIL # BLD: 0 K/UL (ref 0–0.4)
EOSINOPHIL NFR BLD: 0 % (ref 0–7)
ERYTHROCYTE [DISTWIDTH] IN BLOOD BY AUTOMATED COUNT: 13 % (ref 11.5–14.5)
GLOBULIN SER CALC-MCNC: 4 G/DL (ref 2–4)
GLUCOSE SERPL-MCNC: 94 MG/DL (ref 65–100)
GLUCOSE UR STRIP.AUTO-MCNC: 50 MG/DL
HCT VFR BLD AUTO: 46.1 % (ref 36.6–50.3)
HGB BLD-MCNC: 15.1 G/DL (ref 12.1–17)
HGB UR QL STRIP: ABNORMAL
HYALINE CASTS URNS QL MICRO: >20 /LPF (ref 0–5)
IMM GRANULOCYTES # BLD AUTO: 0 K/UL (ref 0–0.04)
IMM GRANULOCYTES NFR BLD AUTO: 0 % (ref 0–0.5)
KETONES UR QL STRIP.AUTO: 20 MG/DL
LACTATE SERPL-SCNC: 2 MMOL/L (ref 0.4–2)
LEUKOCYTE ESTERASE UR QL STRIP.AUTO: NEGATIVE
LYMPHOCYTES # BLD: 1.1 K/UL (ref 0.8–3.5)
LYMPHOCYTES NFR BLD: 12 % (ref 12–49)
MCH RBC QN AUTO: 33.4 PG (ref 26–34)
MCHC RBC AUTO-ENTMCNC: 32.8 G/DL (ref 30–36.5)
MCV RBC AUTO: 102 FL (ref 80–99)
MONOCYTES # BLD: 1 K/UL (ref 0–1)
MONOCYTES NFR BLD: 12 % (ref 5–13)
MUCOUS THREADS URNS QL MICRO: ABNORMAL /LPF
NEUTS SEG # BLD: 6.7 K/UL (ref 1.8–8)
NEUTS SEG NFR BLD: 75 % (ref 32–75)
NITRITE UR QL STRIP.AUTO: NEGATIVE
NRBC # BLD: 0 K/UL (ref 0–0.01)
NRBC BLD-RTO: 0 PER 100 WBC
PH UR STRIP: 5 [PH] (ref 5–8)
PLATELET # BLD AUTO: 218 K/UL (ref 150–400)
PMV BLD AUTO: 9.7 FL (ref 8.9–12.9)
POTASSIUM SERPL-SCNC: 4.1 MMOL/L (ref 3.5–5.1)
PROT SERPL-MCNC: 7.6 G/DL (ref 6.4–8.2)
PROT UR STRIP-MCNC: 100 MG/DL
RBC # BLD AUTO: 4.52 M/UL (ref 4.1–5.7)
RBC #/AREA URNS HPF: ABNORMAL /HPF (ref 0–5)
SODIUM SERPL-SCNC: 141 MMOL/L (ref 136–145)
SP GR UR REFRACTOMETRY: 1.02 (ref 1–1.03)
TROPONIN-HIGH SENSITIVITY: 123 NG/L (ref 0–76)
TROPONIN-HIGH SENSITIVITY: 132 NG/L (ref 0–76)
UROBILINOGEN UR QL STRIP.AUTO: 2 EU/DL (ref 0.1–1)
WBC # BLD AUTO: 9 K/UL (ref 4.1–11.1)
WBC URNS QL MICRO: ABNORMAL /HPF (ref 0–4)

## 2022-02-20 PROCEDURE — 74011250637 HC RX REV CODE- 250/637: Performed by: HOSPITALIST

## 2022-02-20 PROCEDURE — 36415 COLL VENOUS BLD VENIPUNCTURE: CPT

## 2022-02-20 PROCEDURE — 71045 X-RAY EXAM CHEST 1 VIEW: CPT

## 2022-02-20 PROCEDURE — 74011250637 HC RX REV CODE- 250/637: Performed by: EMERGENCY MEDICINE

## 2022-02-20 PROCEDURE — 81001 URINALYSIS AUTO W/SCOPE: CPT

## 2022-02-20 PROCEDURE — 99285 EMERGENCY DEPT VISIT HI MDM: CPT

## 2022-02-20 PROCEDURE — 85025 COMPLETE CBC W/AUTO DIFF WBC: CPT

## 2022-02-20 PROCEDURE — 93005 ELECTROCARDIOGRAM TRACING: CPT

## 2022-02-20 PROCEDURE — 84484 ASSAY OF TROPONIN QUANT: CPT

## 2022-02-20 PROCEDURE — 80053 COMPREHEN METABOLIC PANEL: CPT

## 2022-02-20 PROCEDURE — 70450 CT HEAD/BRAIN W/O DYE: CPT

## 2022-02-20 PROCEDURE — 83605 ASSAY OF LACTIC ACID: CPT

## 2022-02-20 PROCEDURE — G0378 HOSPITAL OBSERVATION PER HR: HCPCS

## 2022-02-20 RX ORDER — SODIUM CHLORIDE 0.9 % (FLUSH) 0.9 %
5-40 SYRINGE (ML) INJECTION EVERY 8 HOURS
Status: DISCONTINUED | OUTPATIENT
Start: 2022-02-20 | End: 2022-02-23 | Stop reason: HOSPADM

## 2022-02-20 RX ORDER — QUETIAPINE FUMARATE 25 MG/1
25 TABLET, FILM COATED ORAL 2 TIMES DAILY
COMMUNITY
Start: 2022-02-18 | End: 2022-02-22

## 2022-02-20 RX ORDER — CEFDINIR 300 MG/1
300 CAPSULE ORAL 2 TIMES DAILY
Status: DISCONTINUED | OUTPATIENT
Start: 2022-02-20 | End: 2022-02-21

## 2022-02-20 RX ORDER — ONDANSETRON 4 MG/1
4 TABLET, ORALLY DISINTEGRATING ORAL
Status: DISCONTINUED | OUTPATIENT
Start: 2022-02-20 | End: 2022-02-23 | Stop reason: HOSPADM

## 2022-02-20 RX ORDER — QUETIAPINE FUMARATE 25 MG/1
25 TABLET, FILM COATED ORAL 2 TIMES DAILY
Status: DISCONTINUED | OUTPATIENT
Start: 2022-02-20 | End: 2022-02-22

## 2022-02-20 RX ORDER — ACETAMINOPHEN 325 MG/1
650 TABLET ORAL
Status: DISCONTINUED | OUTPATIENT
Start: 2022-02-20 | End: 2022-02-23 | Stop reason: HOSPADM

## 2022-02-20 RX ORDER — CEFDINIR 300 MG/1
300 CAPSULE ORAL 2 TIMES DAILY
COMMUNITY
Start: 2022-02-18 | End: 2022-02-22

## 2022-02-20 RX ORDER — ASPIRIN 81 MG/1
81 TABLET ORAL DAILY
Status: DISCONTINUED | OUTPATIENT
Start: 2022-02-21 | End: 2022-02-23 | Stop reason: HOSPADM

## 2022-02-20 RX ORDER — ASPIRIN 325 MG
325 TABLET ORAL
Status: COMPLETED | OUTPATIENT
Start: 2022-02-20 | End: 2022-02-20

## 2022-02-20 RX ORDER — NITROGLYCERIN 0.4 MG/1
0.4 TABLET SUBLINGUAL
Status: DISCONTINUED | OUTPATIENT
Start: 2022-02-20 | End: 2022-02-23 | Stop reason: HOSPADM

## 2022-02-20 RX ORDER — SODIUM CHLORIDE 0.9 % (FLUSH) 0.9 %
5-40 SYRINGE (ML) INJECTION AS NEEDED
Status: DISCONTINUED | OUTPATIENT
Start: 2022-02-20 | End: 2022-02-23 | Stop reason: HOSPADM

## 2022-02-20 RX ADMIN — CEFDINIR 300 MG: 300 CAPSULE ORAL at 23:58

## 2022-02-20 RX ADMIN — ASPIRIN 325 MG ORAL TABLET 325 MG: 325 PILL ORAL at 21:29

## 2022-02-20 RX ADMIN — QUETIAPINE FUMARATE 25 MG: 25 TABLET ORAL at 23:25

## 2022-02-21 LAB
ANION GAP SERPL CALC-SCNC: 6 MMOL/L (ref 5–15)
ATRIAL RATE: 90 BPM
BUN SERPL-MCNC: 29 MG/DL (ref 6–20)
BUN/CREAT SERPL: 24 (ref 12–20)
CA-I BLD-MCNC: 8.5 MG/DL (ref 8.5–10.1)
CALCULATED P AXIS, ECG09: 100 DEGREES
CALCULATED R AXIS, ECG10: -72 DEGREES
CALCULATED T AXIS, ECG11: 65 DEGREES
CHLORIDE SERPL-SCNC: 110 MMOL/L (ref 97–108)
CHOLEST SERPL-MCNC: 159 MG/DL
CK SERPL-CCNC: 358 U/L (ref 39–308)
CO2 SERPL-SCNC: 27 MMOL/L (ref 21–32)
CREAT SERPL-MCNC: 1.2 MG/DL (ref 0.7–1.3)
DIAGNOSIS, 93000: NORMAL
GLUCOSE SERPL-MCNC: 82 MG/DL (ref 65–100)
HDLC SERPL-MCNC: 50 MG/DL
HDLC SERPL: 3.2 {RATIO} (ref 0–5)
LDLC SERPL CALC-MCNC: 91.2 MG/DL (ref 0–100)
LIPID PROFILE,FLP: NORMAL
P-R INTERVAL, ECG05: 184 MS
POTASSIUM SERPL-SCNC: 3.9 MMOL/L (ref 3.5–5.1)
Q-T INTERVAL, ECG07: 406 MS
QRS DURATION, ECG06: 106 MS
QTC CALCULATION (BEZET), ECG08: 496 MS
SODIUM SERPL-SCNC: 143 MMOL/L (ref 136–145)
TRIGL SERPL-MCNC: 89 MG/DL (ref ?–150)
TROPONIN-HIGH SENSITIVITY: 126 NG/L (ref 0–76)
TSH SERPL DL<=0.05 MIU/L-ACNC: 2.91 UIU/ML (ref 0.36–3.74)
VENTRICULAR RATE, ECG03: 90 BPM
VLDLC SERPL CALC-MCNC: 17.8 MG/DL

## 2022-02-21 PROCEDURE — 82550 ASSAY OF CK (CPK): CPT

## 2022-02-21 PROCEDURE — 74011250636 HC RX REV CODE- 250/636: Performed by: HOSPITALIST

## 2022-02-21 PROCEDURE — 36415 COLL VENOUS BLD VENIPUNCTURE: CPT

## 2022-02-21 PROCEDURE — 74011250637 HC RX REV CODE- 250/637: Performed by: HOSPITALIST

## 2022-02-21 PROCEDURE — 80061 LIPID PANEL: CPT

## 2022-02-21 PROCEDURE — 87040 BLOOD CULTURE FOR BACTERIA: CPT

## 2022-02-21 PROCEDURE — 74011250636 HC RX REV CODE- 250/636: Performed by: EMERGENCY MEDICINE

## 2022-02-21 PROCEDURE — G0378 HOSPITAL OBSERVATION PER HR: HCPCS

## 2022-02-21 PROCEDURE — 74011250636 HC RX REV CODE- 250/636: Performed by: INTERNAL MEDICINE

## 2022-02-21 PROCEDURE — 96376 TX/PRO/DX INJ SAME DRUG ADON: CPT

## 2022-02-21 PROCEDURE — 84484 ASSAY OF TROPONIN QUANT: CPT

## 2022-02-21 PROCEDURE — 84443 ASSAY THYROID STIM HORMONE: CPT

## 2022-02-21 PROCEDURE — 80048 BASIC METABOLIC PNL TOTAL CA: CPT

## 2022-02-21 PROCEDURE — 74011000250 HC RX REV CODE- 250: Performed by: HOSPITALIST

## 2022-02-21 PROCEDURE — 96374 THER/PROPH/DIAG INJ IV PUSH: CPT

## 2022-02-21 PROCEDURE — 96375 TX/PRO/DX INJ NEW DRUG ADDON: CPT

## 2022-02-21 RX ORDER — LORAZEPAM 2 MG/ML
1 INJECTION INTRAMUSCULAR
Status: DISCONTINUED | OUTPATIENT
Start: 2022-02-21 | End: 2022-02-21 | Stop reason: SDUPTHER

## 2022-02-21 RX ORDER — SODIUM CHLORIDE 9 MG/ML
75 INJECTION, SOLUTION INTRAVENOUS CONTINUOUS
Status: DISCONTINUED | OUTPATIENT
Start: 2022-02-21 | End: 2022-02-23 | Stop reason: HOSPADM

## 2022-02-21 RX ORDER — HALOPERIDOL 5 MG/ML
2 INJECTION INTRAMUSCULAR
Status: DISCONTINUED | OUTPATIENT
Start: 2022-02-21 | End: 2022-02-21

## 2022-02-21 RX ORDER — DIPHENHYDRAMINE HYDROCHLORIDE 50 MG/ML
50 INJECTION, SOLUTION INTRAMUSCULAR; INTRAVENOUS
Status: COMPLETED | OUTPATIENT
Start: 2022-02-21 | End: 2022-02-21

## 2022-02-21 RX ORDER — LORAZEPAM 2 MG/ML
0.5 INJECTION INTRAMUSCULAR ONCE
Status: COMPLETED | OUTPATIENT
Start: 2022-02-21 | End: 2022-02-21

## 2022-02-21 RX ORDER — LORAZEPAM 2 MG/ML
2 INJECTION INTRAMUSCULAR
Status: DISCONTINUED | OUTPATIENT
Start: 2022-02-21 | End: 2022-02-22

## 2022-02-21 RX ADMIN — QUETIAPINE FUMARATE 25 MG: 25 TABLET ORAL at 20:47

## 2022-02-21 RX ADMIN — SODIUM CHLORIDE, PRESERVATIVE FREE 10 ML: 5 INJECTION INTRAVENOUS at 20:47

## 2022-02-21 RX ADMIN — DIPHENHYDRAMINE HYDROCHLORIDE 50 MG: 50 INJECTION, SOLUTION INTRAMUSCULAR; INTRAVENOUS at 03:06

## 2022-02-21 RX ADMIN — CEFDINIR 300 MG: 300 CAPSULE ORAL at 09:12

## 2022-02-21 RX ADMIN — SODIUM CHLORIDE, PRESERVATIVE FREE 10 ML: 5 INJECTION INTRAVENOUS at 14:00

## 2022-02-21 RX ADMIN — QUETIAPINE FUMARATE 25 MG: 25 TABLET ORAL at 09:12

## 2022-02-21 RX ADMIN — ASPIRIN 81 MG: 81 TABLET, COATED ORAL at 09:12

## 2022-02-21 RX ADMIN — LORAZEPAM 0.5 MG: 2 INJECTION INTRAMUSCULAR; INTRAVENOUS at 01:30

## 2022-02-21 RX ADMIN — LORAZEPAM 2 MG: 2 INJECTION INTRAMUSCULAR; INTRAVENOUS at 09:27

## 2022-02-21 NOTE — PROGRESS NOTES
Subjective :   Chief Complaint : Wandering outside on the street found by the neighbors. Elevated troponin      Admission Hx:   80 y.o. male with history of hypertension, heart failure, COPD is brought to the emergency room by the emergency crew as he found wandering at the [facility] home area outside with no sleep was wearing only for short. I was unable to get the EMS record, I am not sure where patient is living at this time but there is 2 bottles of medications that were brought by the emergency crew. Try to call the daughter and left a message to call me for information. This patient is stable at this time, on evaluation found with elevated troponin. Admitted for close monitoring overnight. Recently on January 2 admitted as patient presented with significant respiratory distress, found with community-acquired pneumonia and exacerbation of COPD. Also required oxygen supplementation. He was treated with improvement, discharged on January 10 2 [address]. See follow-up conversation by Dr. Maritza Blanton with daughter. ------------------------    SUBJECTIVE: Denies active CP. Confusion. Past Medical History:   Diagnosis Date    CHF (congestive heart failure) (HCC)     COPD (chronic obstructive pulmonary disease) (HCC)     HTN (hypertension)     Prostate CA (HCC)      Past surgical history: Unable to obtain information. Family History   Family history unknown: Yes      Social History     Tobacco Use    Smoking status: Former Smoker    Smokeless tobacco: Never Used   Substance Use Topics    Alcohol use: Never       Prior to Admission medications    Medication Sig Start Date End Date Taking? Authorizing Provider   cefdinir (OMNICEF) 300 mg capsule Take 300 mg by mouth two (2) times a day. 2/18/22   Provider, Historical   QUEtiapine (SEROquel) 25 mg tablet Take 25 mg by mouth two (2) times a day.  2/18/22   Provider, Historical     No Known Allergies Review of Systems: Not feasible      Vitals:     Patient Vitals for the past 12 hrs:   Temp Pulse Resp BP SpO2   02/21/22 1245 -- 78 20 (!) 162/92 92 %   02/21/22 0724 97.7 °F (36.5 °C) 85 20 134/70 93 %       Physical Exam:   General : Looks comfortable, no respiratory distress noted. HEENT : PERRLA, normal oral mucosa, atraumatic normocephalic, Normal ear and nose. Neck : Supple, no JVD, no masses noted, no carotid bruit. Lungs : Breath sounds with moderate air entry bilaterally, prolonged expirations, no accessory muscle use, no distress. CVS : Rhythm rate regular, S1+, S2+,   Abdomen : Soft, nontender,  bowel sounds active. Extremities : No edema noted,  pedal pulses not palpable. Musculoskeletal : Fair range of motion, no joint swelling or effusion, muscle tone and power appears fair. Skin : Moist, warm, skin turgor, no pathological rash. Lymphatic : No cervical lymphadenopathy. Neurological : Awake, alert, not sure about his orientation. Psychiatric : He was just talking but unable to evaluate, no psychosis or anxiety or agitation noted. .       Data Review:   Recent Results (from the past 24 hour(s))   CBC WITH AUTOMATED DIFF    Collection Time: 02/20/22  7:35 PM   Result Value Ref Range    WBC 9.0 4.1 - 11.1 K/uL    RBC 4.52 4.10 - 5.70 M/uL    HGB 15.1 12.1 - 17.0 g/dL    HCT 46.1 36.6 - 50.3 %    .0 (H) 80.0 - 99.0 FL    MCH 33.4 26.0 - 34.0 PG    MCHC 32.8 30.0 - 36.5 g/dL    RDW 13.0 11.5 - 14.5 %    PLATELET 565 632 - 755 K/uL    MPV 9.7 8.9 - 12.9 FL    NRBC 0.0 0.0  WBC    ABSOLUTE NRBC 0.00 0.00 - 0.01 K/uL    NEUTROPHILS 75 32 - 75 %    LYMPHOCYTES 12 12 - 49 %    MONOCYTES 12 5 - 13 %    EOSINOPHILS 0 0 - 7 %    BASOPHILS 1 0 - 1 %    IMMATURE GRANULOCYTES 0 0 - 0.5 %    ABS. NEUTROPHILS 6.7 1.8 - 8.0 K/UL    ABS. LYMPHOCYTES 1.1 0.8 - 3.5 K/UL    ABS. MONOCYTES 1.0 0.0 - 1.0 K/UL    ABS. EOSINOPHILS 0.0 0.0 - 0.4 K/UL    ABS. BASOPHILS 0.1 0.0 - 0.1 K/UL    ABS. IMM. GRANS. 0.0 0.00 - 0.04 K/UL    DF AUTOMATED     METABOLIC PANEL, COMPREHENSIVE    Collection Time: 02/20/22  7:35 PM   Result Value Ref Range    Sodium 141 136 - 145 mmol/L    Potassium 4.1 3.5 - 5.1 mmol/L    Chloride 106 97 - 108 mmol/L    CO2 26 21 - 32 mmol/L    Anion gap 9 5 - 15 mmol/L    Glucose 94 65 - 100 mg/dL    BUN 29 (H) 6 - 20 mg/dL    Creatinine 1.52 (H) 0.70 - 1.30 mg/dL    BUN/Creatinine ratio 19 12 - 20      GFR est AA 52 (L) >60 ml/min/1.73m2    GFR est non-AA 43 (L) >60 ml/min/1.73m2    Calcium 8.8 8.5 - 10.1 mg/dL    Bilirubin, total 1.0 0.2 - 1.0 mg/dL    AST (SGOT) 35 15 - 37 U/L    ALT (SGPT) 17 12 - 78 U/L    Alk.  phosphatase 61 45 - 117 U/L    Protein, total 7.6 6.4 - 8.2 g/dL    Albumin 3.6 3.5 - 5.0 g/dL    Globulin 4.0 2.0 - 4.0 g/dL    A-G Ratio 0.9 (L) 1.1 - 2.2     LACTIC ACID    Collection Time: 02/20/22  7:35 PM   Result Value Ref Range    Lactic acid 2.0 0.4 - 2.0 mmol/L   TROPONIN-HIGH SENSITIVITY    Collection Time: 02/20/22  7:35 PM   Result Value Ref Range    Troponin-High Sensitivity 132 (HH) 0 - 76 ng/L   URINALYSIS W/MICROSCOPIC    Collection Time: 02/20/22  9:25 PM   Result Value Ref Range    Color Dark Yellow      Appearance Turbid (A) Clear      Specific gravity 1.025 1.003 - 1.030      pH (UA) 5.0 5.0 - 8.0      Protein 100 (A) Negative mg/dL    Glucose 50 (A) Negative mg/dL    Ketone 20 (A) Negative mg/dL    Bilirubin Negative Negative      Blood Small (A) Negative      Urobilinogen 2.0 (H) 0.1 - 1.0 EU/dL    Nitrites Negative Negative      Leukocyte Esterase Negative Negative      WBC 20-50 0 - 4 /hpf    RBC 20-50 0 - 5 /hpf    Bacteria Negative Negative /hpf    Hyaline cast >20 (H) 0 - 5 /lpf    Mucus 4+ /lpf   TROPONIN-HIGH SENSITIVITY    Collection Time: 02/20/22  9:37 PM   Result Value Ref Range    Troponin-High Sensitivity 123 (HH) 0 - 76 ng/L   METABOLIC PANEL, BASIC    Collection Time: 02/21/22  5:13 AM   Result Value Ref Range    Sodium 143 136 - 145 mmol/L Potassium 3.9 3.5 - 5.1 mmol/L    Chloride 110 (H) 97 - 108 mmol/L    CO2 27 21 - 32 mmol/L    Anion gap 6 5 - 15 mmol/L    Glucose 82 65 - 100 mg/dL    BUN 29 (H) 6 - 20 mg/dL    Creatinine 1.20 0.70 - 1.30 mg/dL    BUN/Creatinine ratio 24 (H) 12 - 20      GFR est AA >60 >60 ml/min/1.73m2    GFR est non-AA 57 (L) >60 ml/min/1.73m2    Calcium 8.5 8.5 - 10.1 mg/dL   LIPID PANEL    Collection Time: 02/21/22  5:13 AM   Result Value Ref Range    LIPID PROFILE        Cholesterol, total 159 <200 mg/dL    Triglyceride 89 <150 mg/dL    HDL Cholesterol 50 mg/dL    LDL, calculated 91.2 0 - 100 mg/dL    VLDL, calculated 17.8 mg/dL    CHOL/HDL Ratio 3.2 0.0 - 5.0     TSH 3RD GENERATION    Collection Time: 02/21/22  5:13 AM   Result Value Ref Range    TSH 2.91 0.36 - 3.74 uIU/mL   CK    Collection Time: 02/21/22  5:13 AM   Result Value Ref Range     (H) 39 - 308 U/L   TROPONIN-HIGH SENSITIVITY    Collection Time: 02/21/22  5:13 AM   Result Value Ref Range    Troponin-High Sensitivity 126 (HH) 0 - 76 ng/L       Radiologic Studies :   CT Results  (Last 48 hours)               02/20/22 1948  CT HEAD WO CONT Final result    Impression:  No interval change or acute process. Diffuse atrophy and sequelae of chronic small vessel disease again demonstrated. Narrative:  CT HEAD WO CONT       Comparison: January 2, 2022. Axial images were obtained without administration of IV contrast with coronal   and sagittal reconstructions. Dose Reduction: All CT scans at this facility are performed using dose reduction   optimization techniques as appropriate to a performed exam including the   following: Automated exposure control, adjustments of the mA and/or kV according   to patient size, or use of iterative reconstruction technique. Moderate diffuse enlargement of the ventricular system as well as the peripheral   CSF spaces consistent with moderate age related atrophy. There is mild   periventricular leukomalacia. There is no new focal brain parenchymal   abnormality. There is no intracranial hemorrhage or extra-axial fluid   collection. There is no mass. Posterior fossa and skull base are unremarkable. CXR Results  (Last 48 hours)               02/20/22 1936  XR CHEST PORT Final result    Impression:  No interval change or acute process. Narrative:  XR CHEST PORT       Comparison: January 10, 2022. No developing infiltrate or consolidation. Mild scattered linear fibrotic change   in both lungs. Mild eventration left hemidiaphragm. Heart and mediastinal   contours are stable. The bony structures are unchanged with right shoulder   replacement again noted. Assessment and Plan :     Encephalopathy     Brain MRI (CT shows chronic vascular changes)   C/s Neuro    UTI     Cx. IV Rocephin    LYNNETTE      IVF    Elevated troponin: We will repeat the troponin in the morning, monitor closely. Doubt Cardiac. Cardiology consulted. EF 65%.     COPD    Hx Dementia        Signed By: Anirudh Wiggins MD     February 21, 2022

## 2022-02-21 NOTE — ACP (ADVANCE CARE PLANNING)
Advance Care Planning   Healthcare Decision Maker:       Primary Decision Maker: Julia Morgan [facility] - [phone]

## 2022-02-21 NOTE — PROGRESS NOTES
Patient's daughter just called in response to my message left on the phone. Stated patient is at senior apartments in [facility], has a caregiver with him. When he walked out caregiver went to get him the food at that time. States that he is having episodes of intermittent confusion and forgetting things, was admitted at [facility] on 15th Tuesday as he was not talking or responding. They told that he had a urinary tract infection. Was there for 4 days in the hospital and just released on Friday. According to the daughter patient is completely with normal conversation with her over the phone, and noticing intermittent episodes of confusion happening. She confirmed that he is very hard of hearing, and cannot see and only shadows he can see. States her dad has a best friend named Claudette Brewster, she wants him to get the information about her dad as he is the one going to help out for the transportation are any issues comes up. She is going to call back with that person's complete information with the phone number. States he was on a lot of medications but was on only these 2 medications recently. Has a doctor's appointment to evaluate him at this point, but misses the doctor's appointment as he was admitted at [facility] and discharged on Friday.

## 2022-02-21 NOTE — PROGRESS NOTES
2/21/22. CM spoke with daughter Mimi Joel @ [phone]) & informed of admission via OBS. Per daughter pt lives in [address] . Pt uses cane/walker/w/c & has [facility]. Daughter declined placement - stated she just set up 24/7 caregivers for pt in his home - to start upon discharge. Daughter consented to Choice Letter - to continue service with [facility] & inform them of caregivers. Daughter also requested friend Sharri Anthony @ [phone]) to be added - so that he may receive information on pt d/t her living in [facility] . Cloria Blades to transport pt home upon discharge - possibly - daughter to recall CM - to confirm.

## 2022-02-21 NOTE — ACP (ADVANCE CARE PLANNING)
Advance Care Planning   Healthcare Decision Maker:       Primary Decision Maker: Patric Jones [facility] - [phone]    Secondary Decision Maker: Peter Carter - [phone]

## 2022-02-21 NOTE — ED PROVIDER NOTES
EMERGENCY DEPARTMENT HISTORY AND PHYSICAL EXAM      Date: 2/20/2022  Patient Name: Kristel Clemente      History of Presenting Illness     Chief Complaint   Patient presents with    Altered mental status       History Provided By: Patient    HPI: Kristel Clemente, 80 y.o. male with a past medical history significant hypertension and prostate cancer, COPD, CHF presents to the ED with cc of her mental status. Patient was found wandering outside without shoes or coat on. He is a resident at the [facility]. He does not recall the events that brought him here and states he is here because he \"stole someone's key\". Patient is confused and altered which is limiting the history. He denies any pain at present. There are no other complaints, changes, or physical findings at this time. PCP: Unknown (Inactive)    Current Facility-Administered Medications   Medication Dose Route Frequency Provider Last Rate Last Admin    aspirin tablet 325 mg  325 mg Oral NOW Juan Monroe MD           Past History     Past Medical History:  Past Medical History:   Diagnosis Date    [facility]     COPD [facility] obstructive [facility] disease) [facility]     HTN (hypertension)     Prostate CA ([facility]        Past Surgical History:  No past surgical history on file. Family History:  No family history on file. Social History:  Social History     Tobacco Use    Smoking status: Former Smoker    Smokeless tobacco: Never Used   Substance Use Topics    Alcohol use: Never    Drug use: Never       Allergies:  No Known Allergies      Review of Systems     Review of Systems   Unable to perform ROS: Mental status change       Physical Exam     Physical Exam  Vitals and nursing note reviewed. Constitutional:       General: He is not in acute distress. Appearance: Normal appearance. HENT:      Head: Normocephalic and atraumatic. Eyes:      Pupils: Pupils are equal, round, and reactive to light. Cardiovascular:      Rate and Rhythm: Normal rate and regular rhythm. Pulses: Normal pulses. Pulmonary:      Effort: Pulmonary effort is normal.   Musculoskeletal:         General: No swelling, tenderness or deformity. Skin:     Coloration: Skin is not pale. Findings: No rash. Neurological:      General: No focal deficit present. Mental Status: He is alert. He is disoriented. Sensory: No sensory deficit. Motor: No weakness. Comments: Alert and oriented x1   Psychiatric:         Mood and Affect: Mood normal.         Behavior: Behavior normal.         Lab and Diagnostic Study Results     Labs -     Recent Results (from the past 12 hour(s))   CBC WITH AUTOMATED DIFF    Collection Time: 02/20/22  7:35 PM   Result Value Ref Range    WBC 9.0 4.1 - 11.1 K/uL    RBC 4.52 4.10 - 5.70 M/uL    HGB 15.1 12.1 - 17.0 g/dL    HCT 46.1 36.6 - 50.3 %    .0 (H) 80.0 - 99.0 FL    MCH 33.4 26.0 - 34.0 PG    MCHC 32.8 30.0 - 36.5 g/dL    RDW 13.0 11.5 - 14.5 %    PLATELET 993 098 - 019 K/uL    MPV 9.7 8.9 - 12.9 FL    NRBC 0.0 0.0  WBC    ABSOLUTE NRBC 0.00 0.00 - 0.01 K/uL    NEUTROPHILS 75 32 - 75 %    LYMPHOCYTES 12 12 - 49 %    MONOCYTES 12 5 - 13 %    EOSINOPHILS 0 0 - 7 %    BASOPHILS 1 0 - 1 %    IMMATURE GRANULOCYTES 0 0 - 0.5 %    ABS. NEUTROPHILS 6.7 1.8 - 8.0 K/UL    ABS. LYMPHOCYTES 1.1 0.8 - 3.5 K/UL    ABS. MONOCYTES 1.0 0.0 - 1.0 K/UL    ABS. EOSINOPHILS 0.0 0.0 - 0.4 K/UL    ABS. BASOPHILS 0.1 0.0 - 0.1 K/UL    ABS. IMM.  GRANS. 0.0 0.00 - 0.04 K/UL    DF AUTOMATED     METABOLIC PANEL, COMPREHENSIVE    Collection Time: 02/20/22  7:35 PM   Result Value Ref Range    Sodium 141 136 - 145 mmol/L    Potassium 4.1 3.5 - 5.1 mmol/L    Chloride 106 97 - 108 mmol/L    CO2 26 21 - 32 mmol/L    Anion gap 9 5 - 15 mmol/L    Glucose 94 65 - 100 mg/dL    BUN 29 (H) 6 - 20 mg/dL    Creatinine 1.52 (H) 0.70 - 1.30 mg/dL    BUN/Creatinine ratio 19 12 - 20      GFR est AA 52 (L) >60 ml/min/1.73m2    GFR est non-AA 43 (L) >60 ml/min/1.73m2    Calcium 8.8 8.5 - 10.1 mg/dL    Bilirubin, total 1.0 0.2 - 1.0 mg/dL    AST (SGOT) 35 15 - 37 U/L    ALT (SGPT) 17 12 - 78 U/L    Alk. phosphatase 61 45 - 117 U/L    Protein, total 7.6 6.4 - 8.2 g/dL    Albumin 3.6 3.5 - 5.0 g/dL    Globulin 4.0 2.0 - 4.0 g/dL    A-G Ratio 0.9 (L) 1.1 - 2.2     LACTIC ACID    Collection Time: 02/20/22  7:35 PM   Result Value Ref Range    Lactic acid 2.0 0.4 - 2.0 mmol/L   TROPONIN-HIGH SENSITIVITY    Collection Time: 02/20/22  7:35 PM   Result Value Ref Range    Troponin-High Sensitivity 132 (HH) 0 - 76 ng/L       Radiologic Studies -   [unfilled]  CT Results  (Last 48 hours)               02/20/22 1948  CT HEAD WO CONT Final result    Impression:  No interval change or acute process. Diffuse atrophy and sequelae of chronic small vessel disease again demonstrated. Narrative:  CT HEAD WO CONT       Comparison: January 2, 2022. Axial images were obtained without administration of IV contrast with coronal   and sagittal reconstructions. Dose Reduction: All CT scans at this facility are performed using dose reduction   optimization techniques as appropriate to a performed exam including the   following: Automated exposure control, adjustments of the mA and/or kV according   to patient size, or use of iterative reconstruction technique. Moderate diffuse enlargement of the ventricular system as well as the peripheral   CSF spaces consistent with moderate age related atrophy. There is mild   periventricular leukomalacia. There is no new focal brain parenchymal   abnormality. There is no intracranial hemorrhage or extra-axial fluid   collection. There is no mass. Posterior fossa and skull base are unremarkable. CXR Results  (Last 48 hours)               02/20/22 1936  XR CHEST PORT Final result    Impression:  No interval change or acute process.        Narrative:  XR CHEST PORT Comparison: January 10, 2022. No developing infiltrate or consolidation. Mild scattered linear fibrotic change   in both lungs. Mild eventration left hemidiaphragm. Heart and mediastinal   contours are stable. The bony structures are unchanged with right shoulder   replacement again noted. Medical Decision Making and ED Course   - I am the first and primary provider for this patient AND AM THE PRIMARY PROVIDER OF RECORD. - I reviewed the vital signs, available nursing notes, past medical history, past surgical history, family history and social history. - Initial assessment performed. The patients presenting problems have been discussed, and the staff are in agreement with the care plan formulated and outlined with them. I have encouraged them to ask questions as they arise throughout their visit. Vital Signs-Reviewed the patient's vital signs. Patient Vitals for the past 12 hrs:   Temp Pulse Resp BP SpO2   02/20/22 2012 97.9 °F (36.6 °C) -- -- -- --   02/20/22 1938 -- 92 16 120/77 94 %         Records Reviewed: Nursing Notes      ED Course:       ED Course as of 02/20/22 2131   Sun Feb 20, 2022   247 80year-old male found outside exposed to the cold and inappropriate clothing. He is confused but nonfocal on exam.  He has a resident at the [address] This is by report the second time he has wandered outside. He denies any pain. Does not recall the events that lead him to being outside. Differential diagnosis includes altered mental status versus infection versus electrolyte disarray versus ACS versus ICH versus dementia. .  Obtained labs including CBC, CMP, troponin UA and head CT. Labs did reveal a mildly elevated troponin of 132. He has no chest pain or shortness of breath. We will continue to trend. Likely related to demand ischemia. Did give a dose of aspirin here. EKG performed at 1931, read at 1933. Normal sinus rhythm with a rate of 90.   Normal MS, normal QRS, normal QTC. Right bundle branch block and left anterior fascicular block. Shivering causing artifact in baseline [LW]   2131 Insetting of elevated troponin and altered mental status, patient is admitted to Dr. Funmilayo Wagner. [LW]      ED Course User Index  [LW] Nancy Corey MD             Consultations:       Consultations: -  Hospitalist Consultant: Dr. Funmilayo Wagner: We have asked for emergent assistance with regard to this patient. We have discussed the patients HPI, ROS, PE and results this far. They will come and evaluate the patient for admission. Disposition     Disposition: Admitted to Floor Medical Floor the case was discussed with the admitting physician Funmilayo Wagner. Admitted    Diagnosis     Clinical Impression:   1. Disorientation    2. Elevated troponin        Attestations:    Gosia Ashford MD    Please note that this dictation was completed with [employer], the computer voice recognition software. Quite often unanticipated grammatical, syntax, homophones, and other interpretive errors are inadvertently transcribed by the computer software. Please disregard these errors. Please excuse any errors that have escaped final proofreading. Thank you.

## 2022-02-21 NOTE — PROGRESS NOTES
2/21/22. Daughter Dusty Lentz @ [phone]) will f/u with CM if pt will need transportation home prior to discharge.

## 2022-02-21 NOTE — PROGRESS NOTES
2/21/22. Daughter  Montse Kiser @ [phone]) returned call. Pt will need transportation upon discharge & requested to be called ahead of time to set up caregiver service to be available upon discharge.

## 2022-02-21 NOTE — PROGRESS NOTES
SON  provided to patient/representative with verbal explanation of the notice. Time allotted for questions regarding the notice. Patient /representative provided a completed copy of the SON  notice. Copy placed on bedside chart. Daughter Lana Estrella @ [phone]) informed & verbally consented via phone.

## 2022-02-21 NOTE — H&P
History and Physical              Subjective :   Chief Complaint : Wandering outside on the street found by the neighbors. Elevated troponin    Source of information : Previous records, ED provider. History of present illness:   80 y.o. male with history of hypertension, heart failure, COPD is brought to the emergency room by the emergency crew as he found wandering at the [facility] home area outside with no sleep was wearing only for short. I was unable to get the EMS record, I am not sure where patient is living at this time but there is 2 bottles of medications that were brought by the emergency crew. Try to call the daughter and left a message to call me for information. This patient is stable at this time, on evaluation found with elevated troponin. Admitted for close monitoring overnight. Recently on January 2 admitted as patient presented with significant respiratory distress, found with community-acquired pneumonia and exacerbation of COPD. Also required oxygen supplementation. He was treated with improvement, discharged on January 10 2 [address]. Past Medical History:   Diagnosis Date    CHF (congestive heart failure) (HCC)     COPD (chronic obstructive pulmonary disease) (HCC)     HTN (hypertension)     Prostate CA (HCC)      Past surgical history: Unable to obtain information. Family History   Family history unknown: Yes      Social History     Tobacco Use    Smoking status: Former Smoker    Smokeless tobacco: Never Used   Substance Use Topics    Alcohol use: Never       Prior to Admission medications    Medication Sig Start Date End Date Taking? Authorizing Provider   cefdinir (OMNICEF) 300 mg capsule Take 300 mg by mouth two (2) times a day. 2/18/22   Provider, Historical   QUEtiapine (SEROquel) 25 mg tablet Take 25 mg by mouth two (2) times a day.  2/18/22   Provider, Historical     No Known Allergies          Review of Systems: Unable to get any information from the patient due to his dementia and hard of hearing. Vitals:     Patient Vitals for the past 12 hrs:   Temp Pulse Resp BP SpO2   02/20/22 2012 97.9 °F (36.6 °C) -- -- -- --   02/20/22 1938 -- 92 16 120/77 94 %       Physical Exam:   General : Looks comfortable, no respiratory distress noted. HEENT : PERRLA, normal oral mucosa, atraumatic normocephalic, Normal ear and nose. Neck : Supple, no JVD, no masses noted, no carotid bruit. Lungs : Breath sounds with moderate air entry bilaterally, prolonged expirations, no accessory muscle use, no distress. CVS : Rhythm rate regular, S1+, S2+,   Abdomen : Soft, nontender,  bowel sounds active. Extremities : No edema noted,  pedal pulses not palpable. Musculoskeletal : Fair range of motion, no joint swelling or effusion, muscle tone and power appears fair. Skin : Moist, warm, skin turgor, no pathological rash. Lymphatic : No cervical lymphadenopathy. Neurological : Awake, alert, not sure about his orientation. Psychiatric : He was just talking but unable to evaluate, no psychosis or anxiety or agitation noted. .       Data Review:   Recent Results (from the past 24 hour(s))   CBC WITH AUTOMATED DIFF    Collection Time: 02/20/22  7:35 PM   Result Value Ref Range    WBC 9.0 4.1 - 11.1 K/uL    RBC 4.52 4.10 - 5.70 M/uL    HGB 15.1 12.1 - 17.0 g/dL    HCT 46.1 36.6 - 50.3 %    .0 (H) 80.0 - 99.0 FL    MCH 33.4 26.0 - 34.0 PG    MCHC 32.8 30.0 - 36.5 g/dL    RDW 13.0 11.5 - 14.5 %    PLATELET 625 505 - 207 K/uL    MPV 9.7 8.9 - 12.9 FL    NRBC 0.0 0.0  WBC    ABSOLUTE NRBC 0.00 0.00 - 0.01 K/uL    NEUTROPHILS 75 32 - 75 %    LYMPHOCYTES 12 12 - 49 %    MONOCYTES 12 5 - 13 %    EOSINOPHILS 0 0 - 7 %    BASOPHILS 1 0 - 1 %    IMMATURE GRANULOCYTES 0 0 - 0.5 %    ABS. NEUTROPHILS 6.7 1.8 - 8.0 K/UL    ABS. LYMPHOCYTES 1.1 0.8 - 3.5 K/UL    ABS. MONOCYTES 1.0 0.0 - 1.0 K/UL    ABS. EOSINOPHILS 0.0 0.0 - 0.4 K/UL    ABS.  BASOPHILS 0.1 0.0 - 0.1 K/UL    ABS. IMM. GRANS. 0.0 0.00 - 0.04 K/UL    DF AUTOMATED     METABOLIC PANEL, COMPREHENSIVE    Collection Time: 02/20/22  7:35 PM   Result Value Ref Range    Sodium 141 136 - 145 mmol/L    Potassium 4.1 3.5 - 5.1 mmol/L    Chloride 106 97 - 108 mmol/L    CO2 26 21 - 32 mmol/L    Anion gap 9 5 - 15 mmol/L    Glucose 94 65 - 100 mg/dL    BUN 29 (H) 6 - 20 mg/dL    Creatinine 1.52 (H) 0.70 - 1.30 mg/dL    BUN/Creatinine ratio 19 12 - 20      GFR est AA 52 (L) >60 ml/min/1.73m2    GFR est non-AA 43 (L) >60 ml/min/1.73m2    Calcium 8.8 8.5 - 10.1 mg/dL    Bilirubin, total 1.0 0.2 - 1.0 mg/dL    AST (SGOT) 35 15 - 37 U/L    ALT (SGPT) 17 12 - 78 U/L    Alk. phosphatase 61 45 - 117 U/L    Protein, total 7.6 6.4 - 8.2 g/dL    Albumin 3.6 3.5 - 5.0 g/dL    Globulin 4.0 2.0 - 4.0 g/dL    A-G Ratio 0.9 (L) 1.1 - 2.2     LACTIC ACID    Collection Time: 02/20/22  7:35 PM   Result Value Ref Range    Lactic acid 2.0 0.4 - 2.0 mmol/L   TROPONIN-HIGH SENSITIVITY    Collection Time: 02/20/22  7:35 PM   Result Value Ref Range    Troponin-High Sensitivity 132 (HH) 0 - 76 ng/L       Radiologic Studies :   CT Results  (Last 48 hours)               02/20/22 1948  CT HEAD WO CONT Final result    Impression:  No interval change or acute process. Diffuse atrophy and sequelae of chronic small vessel disease again demonstrated. Narrative:  CT HEAD WO CONT       Comparison: January 2, 2022. Axial images were obtained without administration of IV contrast with coronal   and sagittal reconstructions. Dose Reduction: All CT scans at this facility are performed using dose reduction   optimization techniques as appropriate to a performed exam including the   following: Automated exposure control, adjustments of the mA and/or kV according   to patient size, or use of iterative reconstruction technique.        Moderate diffuse enlargement of the ventricular system as well as the peripheral   CSF spaces consistent with moderate age related atrophy. There is mild   periventricular leukomalacia. There is no new focal brain parenchymal   abnormality. There is no intracranial hemorrhage or extra-axial fluid   collection. There is no mass. Posterior fossa and skull base are unremarkable. CXR Results  (Last 48 hours)               02/20/22 1936  XR CHEST PORT Final result    Impression:  No interval change or acute process. Narrative:  XR CHEST PORT       Comparison: January 10, 2022. No developing infiltrate or consolidation. Mild scattered linear fibrotic change   in both lungs. Mild eventration left hemidiaphragm. Heart and mediastinal   contours are stable. The bony structures are unchanged with right shoulder   replacement again noted. Assessment and Plan :     Elevated troponin: We will repeat the troponin in the morning, monitor closely. History of heart failure with preserved ejection fraction: No evidence of heart failure at this time, echocardiogram January 3 with ejection fraction of 60 to 65% with normal diastolic function. 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 As per previous records as per daughter patient cannot see well except shortness, and he is very hard of hearing. Admitted for observation, DO NOT RESUSCITATE as per the records, patient cannot make any decisions with understanding. Daughter is the POA whose information is on the file. Home medications there is 2 bottles that was reviewed. CC : Unknown (Inactive)  Signed By: Jose Miguel Phillips MD     February 20, 2022      This dictation was done by [employer], computer voice recognition software.   Often unanticipated grammatical, syntax, [employer] phones and other interpretive errors are inadvertently transcribed. Please excuse errors that have escaped final proofreading.

## 2022-02-21 NOTE — ED TRIAGE NOTES
[facility] called for neighbors finding him outside in cold, altered no coat or shoes on. Lawernce Roughen Hx copd and UTI. Neighbors said he was recently hospitalized somewhere for confusion, not sure where and what happened.

## 2022-02-21 NOTE — CONSULTS
Consult    Patient: Anahi Oneill MRN: [id number]  SSN: [id number]    YOB: 1929  Age: 80 y.o. Sex: male      Subjective:      Anahi Oneill is a 80 y.o. male who is being seen for abnormal troponin. Patient with history of COPD, prostate cancer, hypertension from [place] [address]. He was brought to ED after he was found to be wandering around the lab home. He was only wearing shorts. Troponins were mildly elevated. Apparently patient was combative upon arrival.  He is currently with a sitter and he is asleep. Was recently treated for community-acquired pneumonia and COPD exacerbation. Past Medical History:   Diagnosis Date    CHF (congestive heart failure) (HCC)     COPD (chronic obstructive pulmonary disease) (HCC)     HTN (hypertension)     Prostate CA ([address]      History reviewed. No pertinent surgical history.    Family History   Family history unknown: Yes     Social History     Tobacco Use    Smoking status: Former Smoker    Smokeless tobacco: Never Used   Substance Use Topics    Alcohol use: Never      Current Facility-Administered Medications   Medication Dose Route Frequency Provider Last Rate Last Admin    LORazepam (ATIVAN) injection 2 mg  2 mg IntraVENous Q4H PRN Farzana Barragan MD   2 mg at 02/21/22 0098    cefdinir (OMNICEF) capsule 300 mg  300 mg Oral BID Amy Santo MD   300 mg at 02/21/22 0912    QUEtiapine (SEROquel) tablet 25 mg  25 mg Oral BID Amy Santo MD   25 mg at 02/21/22 0912    sodium chloride (NS) flush 5-40 mL  5-40 mL IntraVENous Q8H Amy Santo MD        sodium chloride (NS) flush 5-40 mL  5-40 mL IntraVENous PRN Amy Santo MD        aspirin delayed-release tablet 81 mg  81 mg Oral DAILY Amy Santo MD   81 mg at 02/21/22 0912    nitroglycerin (NITROSTAT) tablet 0.4 mg  0.4 mg SubLINGual Q5MIN PRN Amy Santo MD        acetaminophen (TYLENOL) tablet 650 mg  650 mg Oral Q6H PRN Dianna Vann MD        ondansetron (ZOFRAN ODT) tablet 4 mg  4 mg Oral Q6H PRN Dianna Vann MD         Current Outpatient Medications   Medication Sig Dispense Refill    cefdinir (OMNICEF) 300 mg capsule Take 300 mg by mouth two (2) times a day.  QUEtiapine (SEROquel) 25 mg tablet Take 25 mg by mouth two (2) times a day. No Known Allergies    Review of Systems:  Unable to obtain    Objective:     Vitals:    02/20/22 1940 02/20/22 2012 02/20/22 2325 02/21/22 0724   BP:   123/74 134/70   Pulse:   91 85   Resp:   29 20   Temp:  97.9 °F (36.6 °C)  97.7 °F (36.5 °C)   SpO2:    93%   Weight: 70.3 kg (155 lb)      Height: 5' 7\" (1.702 m)           Physical Exam:  General:   Sleepy. Eyes:  Conjunctivae/corneas clear. PERRL, EOMs intact. Fundi benign   Ears:  Normal TMs and external ear canals both ears. Nose: Nares normal. Septum midline. Mucosa normal. No drainage or sinus tenderness. Mouth/Throat: Lips, mucosa, and tongue normal. Teeth and gums normal.   Neck: Supple, symmetrical, trachea midline, no adenopathy, thyroid: no enlargment/tenderness/nodules, no carotid bruit and no JVD. Back:   Symmetric, no curvature. ROM normal. No CVA tenderness. Lungs:   Clear to auscultation bilaterally. Heart:  Regular rate and rhythm, S1, S2 normal, no murmur, click, rub or gallop. Abdomen:   Soft, non-tender. Bowel sounds normal. No masses,  No organomegaly. Extremities: Extremities normal, atraumatic, no cyanosis or edema. Pulses: 2+ and symmetric all extremities. Skin: Skin color, texture, turgor normal. No rashes or lesions   Lymph nodes: Cervical, supraclavicular, and axillary nodes normal.   Neurologic:  Asleep. Assessment:     Hospital Problems  Date Reviewed: 2/20/2022          Codes Class Noted POA    Elevated troponin ICD-10-CM: R77.8  ICD-9-CM: 790.6  2/20/2022 Yes            Patient is a 80-year-old white male with:  1.   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 Altered mental status  3. Mildly elevated troponin, flat readings  4. UTI  5. Moderate TR  6. COPD  7. Prostate cancer      Plan:     First troponin was 123 and the second troponin is 126. This does not fit the criteria for non-STEMI. Sodium 143, potassium is 3.9, creatinine is 1.2. LDL 91, TSH is 2.91. White count is okay, hemoglobin 15.1, platelet is 067. CT head was okay. Nonacute. Diffuse atrophy and sequela of chronic small vessel disease. Chest x-ray was nonacute. Echo recently showed EF of 60 to 65% with normal diastolic function. Left atrium was dilated. Moderate TR. He is currently on aspirin and Seroquel. No further cardiac testing is planned at this time as it would not change my management. Continue supportive care. Blood pressure is normal.    Thank you  For involving me in Mr. Snehal king.     Signed By: Modesta Og MD     February 21, 2022

## 2022-02-22 VITALS
SYSTOLIC BLOOD PRESSURE: 140 MMHG | BODY MASS INDEX: 24.33 KG/M2 | HEIGHT: 67 IN | DIASTOLIC BLOOD PRESSURE: 80 MMHG | HEART RATE: 79 BPM | RESPIRATION RATE: 18 BRPM | WEIGHT: 155 LBS | TEMPERATURE: 97.5 F | OXYGEN SATURATION: 93 %

## 2022-02-22 LAB
ALBUMIN SERPL-MCNC: 3.1 G/DL (ref 3.5–5)
ALBUMIN/GLOB SERPL: 1 {RATIO} (ref 1.1–2.2)
ALP SERPL-CCNC: 55 U/L (ref 45–117)
ALT SERPL-CCNC: 16 U/L (ref 12–78)
AMMONIA PLAS-SCNC: <10 UMOL/L
ANION GAP SERPL CALC-SCNC: 10 MMOL/L (ref 5–15)
AST SERPL W P-5'-P-CCNC: 32 U/L (ref 15–37)
BASOPHILS # BLD: 0.1 K/UL (ref 0–0.1)
BASOPHILS NFR BLD: 1 % (ref 0–1)
BILIRUB SERPL-MCNC: 1 MG/DL (ref 0.2–1)
BUN SERPL-MCNC: 22 MG/DL (ref 6–20)
BUN/CREAT SERPL: 27 (ref 12–20)
CA-I BLD-MCNC: 8.4 MG/DL (ref 8.5–10.1)
CHLORIDE SERPL-SCNC: 110 MMOL/L (ref 97–108)
CO2 SERPL-SCNC: 24 MMOL/L (ref 21–32)
CREAT SERPL-MCNC: 0.82 MG/DL (ref 0.7–1.3)
CRP SERPL-MCNC: 3.23 MG/DL (ref 0–0.6)
DIFFERENTIAL METHOD BLD: ABNORMAL
EOSINOPHIL # BLD: 0.1 K/UL (ref 0–0.4)
EOSINOPHIL NFR BLD: 2 % (ref 0–7)
ERYTHROCYTE [DISTWIDTH] IN BLOOD BY AUTOMATED COUNT: 13.2 % (ref 11.5–14.5)
FOLATE SERPL-MCNC: 28.6 NG/ML (ref 5–21)
GLOBULIN SER CALC-MCNC: 3 G/DL (ref 2–4)
GLUCOSE SERPL-MCNC: 66 MG/DL (ref 65–100)
HCT VFR BLD AUTO: 46.8 % (ref 36.6–50.3)
HGB BLD-MCNC: 15 G/DL (ref 12.1–17)
IMM GRANULOCYTES # BLD AUTO: 0 K/UL (ref 0–0.04)
IMM GRANULOCYTES NFR BLD AUTO: 0 % (ref 0–0.5)
LYMPHOCYTES # BLD: 1 K/UL (ref 0.8–3.5)
LYMPHOCYTES NFR BLD: 18 % (ref 12–49)
MCH RBC QN AUTO: 33 PG (ref 26–34)
MCHC RBC AUTO-ENTMCNC: 32.1 G/DL (ref 30–36.5)
MCV RBC AUTO: 102.9 FL (ref 80–99)
MONOCYTES # BLD: 0.6 K/UL (ref 0–1)
MONOCYTES NFR BLD: 10 % (ref 5–13)
NEUTS SEG # BLD: 4 K/UL (ref 1.8–8)
NEUTS SEG NFR BLD: 69 % (ref 32–75)
NRBC # BLD: 0 K/UL (ref 0–0.01)
NRBC BLD-RTO: 0 PER 100 WBC
PLATELET # BLD AUTO: 197 K/UL (ref 150–400)
PMV BLD AUTO: 10.2 FL (ref 8.9–12.9)
POTASSIUM SERPL-SCNC: 4.4 MMOL/L (ref 3.5–5.1)
PROCALCITONIN SERPL-MCNC: <0.05 NG/ML
PROT SERPL-MCNC: 6.1 G/DL (ref 6.4–8.2)
RBC # BLD AUTO: 4.55 M/UL (ref 4.1–5.7)
SODIUM SERPL-SCNC: 144 MMOL/L (ref 136–145)
VIT B12 SERPL-MCNC: 548 PG/ML (ref 193–986)
WBC # BLD AUTO: 5.8 K/UL (ref 4.1–11.1)

## 2022-02-22 PROCEDURE — 80053 COMPREHEN METABOLIC PANEL: CPT

## 2022-02-22 PROCEDURE — 82306 VITAMIN D 25 HYDROXY: CPT

## 2022-02-22 PROCEDURE — G0378 HOSPITAL OBSERVATION PER HR: HCPCS

## 2022-02-22 PROCEDURE — 74011250637 HC RX REV CODE- 250/637: Performed by: HOSPITALIST

## 2022-02-22 PROCEDURE — 85025 COMPLETE CBC W/AUTO DIFF WBC: CPT

## 2022-02-22 PROCEDURE — 82607 VITAMIN B-12: CPT

## 2022-02-22 PROCEDURE — 86140 C-REACTIVE PROTEIN: CPT

## 2022-02-22 PROCEDURE — 74011250636 HC RX REV CODE- 250/636: Performed by: INTERNAL MEDICINE

## 2022-02-22 PROCEDURE — 82140 ASSAY OF AMMONIA: CPT

## 2022-02-22 PROCEDURE — 84145 PROCALCITONIN (PCT): CPT

## 2022-02-22 PROCEDURE — 36415 COLL VENOUS BLD VENIPUNCTURE: CPT

## 2022-02-22 PROCEDURE — 74011000250 HC RX REV CODE- 250: Performed by: HOSPITALIST

## 2022-02-22 RX ORDER — QUETIAPINE FUMARATE 25 MG/1
12.5 TABLET, FILM COATED ORAL
Status: DISCONTINUED | OUTPATIENT
Start: 2022-02-22 | End: 2022-02-23 | Stop reason: HOSPADM

## 2022-02-22 RX ORDER — FAMOTIDINE 10 MG/1
10 TABLET ORAL
Qty: 15 TABLET | Refills: 0 | Status: SHIPPED | OUTPATIENT
Start: 2022-02-22

## 2022-02-22 RX ORDER — GUAIFENESIN 100 MG/5ML
81 LIQUID (ML) ORAL DAILY
Qty: 30 TABLET | Refills: 0 | Status: SHIPPED | OUTPATIENT
Start: 2022-02-22

## 2022-02-22 RX ORDER — QUETIAPINE FUMARATE 25 MG/1
12.5 TABLET, FILM COATED ORAL
Qty: 30 TABLET | Refills: 0 | Status: SHIPPED | OUTPATIENT
Start: 2022-02-22

## 2022-02-22 RX ADMIN — SODIUM CHLORIDE 75 ML/HR: 9 INJECTION, SOLUTION INTRAVENOUS at 07:11

## 2022-02-22 RX ADMIN — QUETIAPINE FUMARATE 12.5 MG: 25 TABLET ORAL at 20:52

## 2022-02-22 RX ADMIN — SODIUM CHLORIDE, PRESERVATIVE FREE 10 ML: 5 INJECTION INTRAVENOUS at 14:00

## 2022-02-22 RX ADMIN — ASPIRIN 81 MG: 81 TABLET, COATED ORAL at 09:00

## 2022-02-22 RX ADMIN — SODIUM CHLORIDE, PRESERVATIVE FREE 10 ML: 5 INJECTION INTRAVENOUS at 05:20

## 2022-02-22 RX ADMIN — QUETIAPINE FUMARATE 25 MG: 25 TABLET ORAL at 08:34

## 2022-02-22 NOTE — PROGRESS NOTES
Dual RN skin assessment revealed skin warm dry and intact. Reddened bilateral heels. No open wounds noted.

## 2022-02-22 NOTE — DISCHARGE INSTRUCTIONS
Patient Education        Learning About Delirium  What is delirium? Delirium is a sudden change in mental condition. It leads to confusion and unusual behavior. Delirium is also called acute confusional state. Delirium affects all age groups. It can result from problems that affect the brain, such as stroke. It can also happen after an infection or when using certain medicines. Pain may also cause the problem. Seeing delirium in a loved one can be scary and sad. But it will go away most of the time. It usually lasts hours to days. The doctor will look for a cause and take steps to treat it and keep your loved one comfortable. What are the symptoms? Symptoms of delirium usually develop over several hours to a few days. Symptoms may change and be more or less severe. Symptoms include:  · A short attention span. · Confusion. This is not knowing where you are, what time it is, or who others are. · Hallucinations. This usually is seeing or hearing things that are not really there. · Delusions. This is believing things that aren't true. · Illusions. This is making a mistake in what you think is real. For example, you think a child is crying, but it's a pillow. · Disorganized thinking. How is delirium treated? The doctor may:  · Find and treat the cause. This could be:  ? Not getting enough fluids. ? An infection. ? A medicine or combination of medicines. ? Another medical problem. · Prescribe a medicine. · Make the hospital room as quiet as possible. You may be able to help your loved one by being present and talking to and touching him or her. Follow-up care is a key part of your treatment and safety. Be sure to make and go to all appointments, and call your doctor if you are having problems. It's also a good idea to know your test results and keep a list of the medicines you take. Where can you learn more?   Go to http://www.gray.com/  Enter Z511 in the search box to learn more about \"Learning About Delirium. \"  Current as of: June 16, 2021               Content Version: 13.0  © 9724-6520 Healthwise, [facility]. Care instructions adapted under license by [employer] (which disclaims liability or warranty for this information). If you have questions about a medical condition or this instruction, always ask your healthcare professional. [address] any warranty or liability for your use of this information. INSTRUCTIONS ON DISCHARGE    (1) please note that we have decreased the dose of SEROQUEL to 12.5 mg at bed time only. (2) Continue to take ASPIRIN.  Please note that this medication may cause heart burn- take FAMOTIDINE as needed for heart burn

## 2022-02-22 NOTE — CONSULTS
Consult Date: 2/22/2022    Consults Mr. Linda Cannon is a 80year old man discharged last month after a COPD exacerbation, has history of HTN, CHF who comes in because he was found wandering in his shorts outside. He was discharged to a SNF last time and I am not sure if he is still living there. He is wake and talking but disoriented to time and place. Ct head showed generalized cerebral atrophy. Subjective     Past Medical History:   Diagnosis Date    [facility]     COPD (chronic obstructive pulmonary disease) (HCC)     HTN (hypertension)     Prostate CA ([facility]       History reviewed. No pertinent surgical history.   Family History   Family history unknown: Yes      Social History     Tobacco Use    Smoking status: Former Smoker    Smokeless tobacco: Never Used   Substance Use Topics    Alcohol use: Never       Current Facility-Administered Medications   Medication Dose Route Frequency Provider Last Rate Last Admin    QUEtiapine (SEROquel) tablet 12.5 mg  12.5 mg Oral QHS David Nayak MD        0.9% sodium chloride infusion  75 mL/hr IntraVENous CONTINUOUS Sherrell Landaverde MD 75 mL/hr at 02/22/22 0711 75 mL/hr at 02/22/22 0711    sodium chloride (NS) flush 5-40 mL  5-40 mL IntraVENous Q8H Vinh Carbajal MD   10 mL at 02/22/22 0520    sodium chloride (NS) flush 5-40 mL  5-40 mL IntraVENous PRN Vinh Carbajal MD        aspirin delayed-release tablet 81 mg  81 mg Oral DAILY Vinh Carbajal MD   81 mg at 02/22/22 0900    nitroglycerin (NITROSTAT) tablet 0.4 mg  0.4 mg SubLINGual Q5MIN PRN Vinh Carbajal MD        acetaminophen (TYLENOL) tablet 650 mg  650 mg Oral Q6H PRN Vinh Carbajal MD        ondansetron (ZOFRAN ODT) tablet 4 mg  4 mg Oral Q6H PRN Vinh Carbajal MD            Review of Systems   Unable to perform ROS: Mental status change       Objective     Vital signs for last 24 hours:  Visit Vitals  BP (!) 139/94 (BP Patient Position: At rest;Lying)   Pulse 84   Temp 97.6 °F (36.4 °C)   Resp 20   Ht 5' 7\" (1.702 m)   Wt 70.3 kg (155 lb)   SpO2 92%   BMI 24.28 kg/m²       Intake/Output this shift:  Current Shift: No intake/output data recorded. Last 3 Shifts: No intake/output data recorded. Data Review:   Recent Results (from the past 24 hour(s))   AMMONIA    Collection Time: 02/22/22  7:55 AM   Result Value Ref Range    Ammonia <10 <32 umol/L   C REACTIVE PROTEIN, QT    Collection Time: 02/22/22  7:55 AM   Result Value Ref Range    C-Reactive protein 3.23 (H) 0.00 - 0.60 mg/dL   CBC WITH AUTOMATED DIFF    Collection Time: 02/22/22  7:55 AM   Result Value Ref Range    WBC 5.8 4.1 - 11.1 K/uL    RBC 4.55 4.10 - 5.70 M/uL    HGB 15.0 12.1 - 17.0 g/dL    HCT 46.8 36.6 - 50.3 %    .9 (H) 80.0 - 99.0 FL    MCH 33.0 26.0 - 34.0 PG    MCHC 32.1 30.0 - 36.5 g/dL    RDW 13.2 11.5 - 14.5 %    PLATELET 262 624 - 532 K/uL    MPV 10.2 8.9 - 12.9 FL    NRBC 0.0 0.0  WBC    ABSOLUTE NRBC 0.00 0.00 - 0.01 K/uL    NEUTROPHILS 69 32 - 75 %    LYMPHOCYTES 18 12 - 49 %    MONOCYTES 10 5 - 13 %    EOSINOPHILS 2 0 - 7 %    BASOPHILS 1 0 - 1 %    IMMATURE GRANULOCYTES 0 0 - 0.5 %    ABS. NEUTROPHILS 4.0 1.8 - 8.0 K/UL    ABS. LYMPHOCYTES 1.0 0.8 - 3.5 K/UL    ABS. MONOCYTES 0.6 0.0 - 1.0 K/UL    ABS. EOSINOPHILS 0.1 0.0 - 0.4 K/UL    ABS. BASOPHILS 0.1 0.0 - 0.1 K/UL    ABS. IMM.  GRANS. 0.0 0.00 - 0.04 K/UL    DF AUTOMATED     METABOLIC PANEL, COMPREHENSIVE    Collection Time: 02/22/22  7:55 AM   Result Value Ref Range    Sodium 144 136 - 145 mmol/L    Potassium 4.4 3.5 - 5.1 mmol/L    Chloride 110 (H) 97 - 108 mmol/L    CO2 24 21 - 32 mmol/L    Anion gap 10 5 - 15 mmol/L    Glucose 66 65 - 100 mg/dL    BUN 22 (H) 6 - 20 mg/dL    Creatinine 0.82 0.70 - 1.30 mg/dL    BUN/Creatinine ratio 27 (H) 12 - 20      GFR est AA >60 >60 ml/min/1.73m2    GFR est non-AA >60 >60 ml/min/1.73m2    Calcium 8.4 (L) 8.5 - 10.1 mg/dL    Bilirubin, total 1.0 0.2 - 1.0 mg/dL    AST (SGOT) 32 15 - 37 U/L    ALT (SGPT) 16 12 - 78 U/L    Alk. phosphatase 55 45 - 117 U/L    Protein, total 6.1 (L) 6.4 - 8.2 g/dL    Albumin 3.1 (L) 3.5 - 5.0 g/dL    Globulin 3.0 2.0 - 4.0 g/dL    A-G Ratio 1.0 (L) 1.1 - 2.2     PROCALCITONIN    Collection Time: 02/22/22  7:55 AM   Result Value Ref Range    Procalcitonin <0.05 (H) 0 ng/mL       Physical Exam    Neuro Physical Exam      General: Well developed, well nourished. Patient in no apparent distress. Cardiac: Regular rate and rhythm with no murmurs. Neurological Exam:  Mental Status: Awake, alert, oriented to self, disoriented to place, time and problem   Cranial Nerves:   Intact visual fields. PERRL, EOM's full, no nystagmus, no ptosis. Facial sensation is normal. Facial movement is symmetric. Palate is midline. Normal sternocleidomastoid strength. Tongue is midline. Hearing is intact bilaterally. Motor:  Moves all 4 ext against gravity    Reflexes:   Deep tendon reflexes 2+/4 and symmetrical.   Sensory:   Normal to light touch in all 4 ext    Gait:  Not tested    Tremor:   No tremor noted. Cerebellar:  No cerebellar signs present. Babinski:      Down b/l     Assessment and Plan: Mr. Kristal Alejandre is a 80year old man who was found wandering around outside and now is not oriented to place and time and does not know why he is here. No family at beside to corroborate story. Recommend dish charge back to SNF or 24 hr care with family. Follow up outpatient for dementia workup.

## 2022-02-22 NOTE — PROGRESS NOTES
Pt was brought to room 482 via stretcher by the ED nurse and tech. Pt was sleeping, no distress noted. Pt did not talk. Per ED nurse pt received 2 mg of Ativan IV in the ED and a IV Fluid bolus. Pt unable answer questions at this time. Report at the bed side.

## 2022-02-22 NOTE — DISCHARGE SUMMARY
Physician Discharge Summary     Patient ID:    Erickson Solorzano  [id number]  63 y.o.  6/3/1929    Admit date: 2/20/2022    Discharge date : 2/22/2022    Chronic Diagnoses:    Problem List as of 2/22/2022 Date Reviewed: 2/20/2022          Codes Class Noted - Resolved    Elevated troponin ICD-10-CM: R77.8  ICD-9-CM: 790.6  2/20/2022 - Present        Respiratory failure [facility]) ICD-10-CM: J96.90  ICD-9-CM: 518.81  1/2/2022 - Present          22    Final Diagnoses:   Elevated troponin [R77.8]    Reason for Hospitalization:    Acute encephalopathy s/t seroquel use  LYNNETTE  COPD  NSTEMI type II      Hospital Course:   80 y.o. male with history of hypertension, heart failure, COPD is brought to the emergency room by the emergency crew as he found wandering at the [facility] home area outside with no sleep was wearing only for short. I was unable to get the EMS record, I am not sure where patient is living at this time but there is 2 bottles of medications that were brought by the emergency crew. Try to call the daughter and left a message to call me for information. This patient is stable at this time, on evaluation found with elevated troponin. Admitted for close monitoring overnight.     Recently on January 2 admitted as patient presented with significant respiratory distress, found with community-acquired pneumonia and exacerbation of COPD. Also required oxygen supplementation. He was treated with improvement, discharged on January 10 2 [address]       Talking to the daughter- he was in [facility] and was discharged on January after UTI/ pneumonia. And was prescribed quetipine 25 mg twice a day. He has been lethargic since then,. Upon arrival- infectious causes were ruled out- he received Abx for three days. Workup for nutritional deficiencies were carried out.  Neurology was consulted and was planned for discharge for outpatient dementia workup. INSTRUCTIONS ON DISCHARGE    (1) please note that we have decreased the dose of SEROQUEL to 12.5 mg at bed time only. (2) Continue to take ASPIRIN. Please note that this medication may cause heart burn- take FAMOTIDINE as needed for heart burn        Discharge Medications:   Current Discharge Medication List      START taking these medications    Details   aspirin 81 mg chewable tablet Take 1 Tablet by mouth daily. Qty: 30 Tablet, Refills: 0  Start date: 2/22/2022      famotidine (PEPCID) 10 mg tablet Take 1 Tablet by mouth nightly as needed for Heartburn. Qty: 15 Tablet, Refills: 0  Start date: 2/22/2022         CONTINUE these medications which have CHANGED    Details   QUEtiapine (SEROquel) 25 mg tablet Take 0.5 Tablets by mouth nightly. Qty: 30 Tablet, Refills: 0  Start date: 2/22/2022         STOP taking these medications       cefdinir (OMNICEF) 300 mg capsule Comments:   Reason for Stopping: Follow up Care:    1. Unknown (Inactive) in 1-2 weeks. Please call to set up an appointment shortly after discharge. Diet:  {regular    Home with HH PT OT SN disease management    Advanced Directive:   FULL    DNR x     Discharge Exam:  General : Looks comfortable, no respiratory distress noted. HEENT : PERRLA, normal oral mucosa, atraumatic normocephalic, Normal ear and nose. Neck : Supple, no JVD, no masses noted, no carotid bruit. Lungs : Breath sounds with moderate air entry bilaterally, prolonged expirations, no accessory muscle use, no distress. CVS : Rhythm rate regular, S1+, S2+,   Abdomen : Soft, nontender,  bowel sounds active. Extremities : No edema noted,  pedal pulses not palpable. Musculoskeletal : Fair range of motion, no joint swelling or effusion, muscle tone and power appears fair. Skin : Moist, warm, skin turgor, no pathological rash. Lymphatic : No cervical lymphadenopathy. Neurological : Awake, alert, not sure about his orientation.   Psychiatric : He was just talking but unable to evaluate, no psychosis or anxiety or agitation noted. .        CONSULTATIONS: neuro    Significant Diagnostic Studies:     Radiologic Studies -   Results from Hospital Encounter encounter on 02/20/22    XR CHEST PORT    Narrative  XR CHEST PORT    Comparison: January 10, 2022. No developing infiltrate or consolidation. Mild scattered linear fibrotic change  in both lungs. Mild eventration left hemidiaphragm. Heart and mediastinal  contours are stable. The bony structures are unchanged with right shoulder  replacement again noted. Impression  No interval change or acute process. CT Results  (Last 48 hours)               02/20/22 1948  CT HEAD WO CONT Final result    Impression:  No interval change or acute process. Diffuse atrophy and sequelae of chronic small vessel disease again demonstrated. Narrative:  CT HEAD WO CONT       Comparison: January 2, 2022. Axial images were obtained without administration of IV contrast with coronal   and sagittal reconstructions. Dose Reduction: All CT scans at this facility are performed using dose reduction   optimization techniques as appropriate to a performed exam including the   following: Automated exposure control, adjustments of the mA and/or kV according   to patient size, or use of iterative reconstruction technique. Moderate diffuse enlargement of the ventricular system as well as the peripheral   CSF spaces consistent with moderate age related atrophy. There is mild   periventricular leukomalacia. There is no new focal brain parenchymal   abnormality. There is no intracranial hemorrhage or extra-axial fluid   collection. There is no mass. Posterior fossa and skull base are unremarkable.                      Discharge time spent 35 minutes    Signed:  Silvestre Ayala MD  2/22/2022  10:44 AM

## 2022-02-22 NOTE — PROGRESS NOTES
CM spoke to patient daughter in [place] and informed patient would be [facility] via ambulance this evening. Patient will have [facility] and 24/7 care arranged by daughter.

## 2022-02-22 NOTE — PROGRESS NOTES
Progress Note    Patient: Zohreh Rothman MRN: [id number]  SSN: [id number]    YOB: 1929  Age: 80 y.o. Sex: male      Admit Date: 2/20/2022    LOS: 0 days     Subjective:     No acute events overnight. He is more awake and alert and talkative. Anyhow he is disoriented. Objective:     Vitals:    02/21/22 2000 02/21/22 2335 02/22/22 0733 02/22/22 1112   BP:  (!) 149/78 (!) 139/94 134/74   Pulse: 74 73 84 80   Resp:  18 20 20   Temp:  97.4 °F (36.3 °C) 97.6 °F (36.4 °C) 97.6 °F (36.4 °C)   SpO2:  91% 92% 94%   Weight:       Height:            Intake and Output:  Current Shift: No intake/output data recorded. Last three shifts: No intake/output data recorded. Physical Exam:   General:  Alert, cooperative, no distress, appears stated age. Eyes:  Conjunctivae/corneas clear. PERRL, EOMs intact. Fundi benign   Ears:  Normal TMs and external ear canals both ears. Nose: Nares normal. Septum midline. Mucosa normal. No drainage or sinus tenderness. Mouth/Throat: Lips, mucosa, and tongue normal. Teeth and gums normal.   Neck: Supple, symmetrical, trachea midline, no adenopathy, thyroid: no enlargment/tenderness/nodules, no carotid bruit and no JVD. Back:   Symmetric, no curvature. ROM normal. No CVA tenderness. Lungs:   Clear to auscultation bilaterally. Heart:  Regular rate and rhythm, S1, S2 normal, no murmur, click, rub or gallop. Abdomen:   Soft, non-tender. Bowel sounds normal. No masses,  No organomegaly. Extremities: Extremities normal, atraumatic, no cyanosis or edema. Pulses: 2+ and symmetric all extremities. Skin: Skin color, texture, turgor normal. No rashes or lesions   Lymph nodes: Cervical, supraclavicular, and axillary nodes normal.   Neurologic: CNII-XII intact. Normal strength, sensation and reflexes throughout. Lab/Data Review: All lab results for the last 24 hours reviewed.          Assessment:     Active Problems:    Elevated troponin (2/20/2022)      Patient is a 49-year-old white male with:  1. 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 Altered mental status  3. Mildly elevated troponin, flat readings  4. UTI  5. Moderate TR  6. COPD  7. Prostate cancer       Plan:     Patient mental status improved tremendously. No further cardiac testing planned at this time. Continue supportive care. I'll be happy to see in 2 weeks after discharge or sooner if needed.   Okay to discharge from cardiac standpoint    Signed By: Dean Alfonso MD     February 22, 2022

## 2022-02-22 NOTE — PROGRESS NOTES
Discharge paperwork given to patient and daughter made aware of discharge. Transportation to be set up and telemetry and iv taken out. Daughter notified that transport will be a couple hours.

## 2022-02-23 LAB — 25(OH)D3 SERPL-MCNC: 27.7 NG/ML (ref 30–100)

## 2022-02-28 LAB
BACTERIA SPEC CULT: NORMAL
SPECIAL REQUESTS,SREQ: NORMAL

## 2022-03-06 ENCOUNTER — APPOINTMENT (OUTPATIENT)
Dept: GENERAL RADIOLOGY | Age: 87
DRG: 192 | End: 2022-03-06
Attending: EMERGENCY MEDICINE
Payer: MEDICARE

## 2022-03-06 ENCOUNTER — HOSPITAL ENCOUNTER (INPATIENT)
Age: 87
LOS: 3 days | Discharge: HOME | DRG: 192 | End: 2022-03-09
Attending: EMERGENCY MEDICINE | Admitting: HOSPITALIST
Payer: MEDICARE

## 2022-03-06 DIAGNOSIS — J44.1 ACUTE EXACERBATION OF CHRONIC OBSTRUCTIVE PULMONARY DISEASE (COPD) (HCC): Primary | ICD-10-CM

## 2022-03-06 LAB
ALBUMIN SERPL-MCNC: 3.3 G/DL (ref 3.5–5)
ALBUMIN/GLOB SERPL: 1.2 {RATIO} (ref 1.1–2.2)
ALP SERPL-CCNC: 49 U/L (ref 45–117)
ALT SERPL-CCNC: 14 U/L (ref 12–78)
ANION GAP SERPL CALC-SCNC: 4 MMOL/L (ref 5–15)
AST SERPL W P-5'-P-CCNC: 18 U/L (ref 15–37)
BASOPHILS # BLD: 0 K/UL (ref 0–0.1)
BASOPHILS NFR BLD: 0 % (ref 0–1)
BILIRUB SERPL-MCNC: 0.5 MG/DL (ref 0.2–1)
BLASTS NFR BLD MANUAL: 1 %
BUN SERPL-MCNC: 22 MG/DL (ref 6–20)
BUN/CREAT SERPL: 24 (ref 12–20)
CA-I BLD-MCNC: 8.6 MG/DL (ref 8.5–10.1)
CHLORIDE SERPL-SCNC: 109 MMOL/L (ref 97–108)
CO2 SERPL-SCNC: 29 MMOL/L (ref 21–32)
CREAT SERPL-MCNC: 0.93 MG/DL (ref 0.7–1.3)
DIFFERENTIAL METHOD BLD: ABNORMAL
EOSINOPHIL # BLD: 0.2 K/UL (ref 0–0.4)
EOSINOPHIL NFR BLD: 2 % (ref 0–7)
ERYTHROCYTE [DISTWIDTH] IN BLOOD BY AUTOMATED COUNT: 14 % (ref 11.5–14.5)
GLOBULIN SER CALC-MCNC: 2.7 G/DL (ref 2–4)
GLUCOSE SERPL-MCNC: 96 MG/DL (ref 65–100)
HCT VFR BLD AUTO: 39.8 % (ref 36.6–50.3)
HGB BLD-MCNC: 12.7 G/DL (ref 12.1–17)
IMM GRANULOCYTES # BLD AUTO: 0 K/UL
IMM GRANULOCYTES NFR BLD AUTO: 0 %
INR PPP: 1 (ref 0.9–1.1)
LYMPHOCYTES # BLD: 1.3 K/UL (ref 0.8–3.5)
LYMPHOCYTES NFR BLD: 17 % (ref 12–49)
MAGNESIUM SERPL-MCNC: 2.2 MG/DL (ref 1.6–2.4)
MCH RBC QN AUTO: 33.5 PG (ref 26–34)
MCHC RBC AUTO-ENTMCNC: 31.9 G/DL (ref 30–36.5)
MCV RBC AUTO: 105 FL (ref 80–99)
MONOCYTES # BLD: 0.9 K/UL (ref 0–1)
MONOCYTES NFR BLD: 12 % (ref 5–13)
NEUTS SEG # BLD: 5.1 K/UL (ref 1.8–8)
NEUTS SEG NFR BLD: 68 % (ref 32–75)
NRBC # BLD: 0 K/UL (ref 0–0.01)
NRBC BLD-RTO: 0 PER 100 WBC
PLATELET # BLD AUTO: 216 K/UL (ref 150–400)
PMV BLD AUTO: 9.4 FL (ref 8.9–12.9)
POTASSIUM SERPL-SCNC: 4.4 MMOL/L (ref 3.5–5.1)
PROT SERPL-MCNC: 6 G/DL (ref 6.4–8.2)
PROTHROMBIN TIME: 12.9 SEC (ref 11.9–14.6)
RBC # BLD AUTO: 3.79 M/UL (ref 4.1–5.7)
RBC MORPH BLD: ABNORMAL
RBC MORPH BLD: ABNORMAL
SODIUM SERPL-SCNC: 142 MMOL/L (ref 136–145)
TROPONIN-HIGH SENSITIVITY: 66 NG/L (ref 0–76)
WBC # BLD AUTO: 7.5 K/UL (ref 4.1–11.1)

## 2022-03-06 PROCEDURE — 36415 COLL VENOUS BLD VENIPUNCTURE: CPT

## 2022-03-06 PROCEDURE — 85610 PROTHROMBIN TIME: CPT

## 2022-03-06 PROCEDURE — 85025 COMPLETE CBC W/AUTO DIFF WBC: CPT

## 2022-03-06 PROCEDURE — 80053 COMPREHEN METABOLIC PANEL: CPT

## 2022-03-06 PROCEDURE — 99285 EMERGENCY DEPT VISIT HI MDM: CPT

## 2022-03-06 PROCEDURE — 94640 AIRWAY INHALATION TREATMENT: CPT

## 2022-03-06 PROCEDURE — 96374 THER/PROPH/DIAG INJ IV PUSH: CPT

## 2022-03-06 PROCEDURE — 83735 ASSAY OF MAGNESIUM: CPT

## 2022-03-06 PROCEDURE — 93005 ELECTROCARDIOGRAM TRACING: CPT

## 2022-03-06 PROCEDURE — 74011250636 HC RX REV CODE- 250/636: Performed by: EMERGENCY MEDICINE

## 2022-03-06 PROCEDURE — 84484 ASSAY OF TROPONIN QUANT: CPT

## 2022-03-06 PROCEDURE — 74011000250 HC RX REV CODE- 250: Performed by: EMERGENCY MEDICINE

## 2022-03-06 PROCEDURE — 71045 X-RAY EXAM CHEST 1 VIEW: CPT

## 2022-03-06 PROCEDURE — 65270000029 HC RM PRIVATE

## 2022-03-06 RX ORDER — DOCUSATE SODIUM 100 MG/1
100 CAPSULE, LIQUID FILLED ORAL 2 TIMES DAILY
Status: DISCONTINUED | OUTPATIENT
Start: 2022-03-06 | End: 2022-03-09 | Stop reason: HOSPADM

## 2022-03-06 RX ORDER — BUDESONIDE AND FORMOTEROL FUMARATE DIHYDRATE 160; 4.5 UG/1; UG/1
2 AEROSOL RESPIRATORY (INHALATION)
Status: DISCONTINUED | OUTPATIENT
Start: 2022-03-06 | End: 2022-03-07

## 2022-03-06 RX ORDER — ONDANSETRON 4 MG/1
4 TABLET, ORALLY DISINTEGRATING ORAL
Status: DISCONTINUED | OUTPATIENT
Start: 2022-03-06 | End: 2022-03-09 | Stop reason: HOSPADM

## 2022-03-06 RX ORDER — SODIUM CHLORIDE 0.9 % (FLUSH) 0.9 %
5-40 SYRINGE (ML) INJECTION AS NEEDED
Status: DISCONTINUED | OUTPATIENT
Start: 2022-03-06 | End: 2022-03-09 | Stop reason: HOSPADM

## 2022-03-06 RX ORDER — IPRATROPIUM BROMIDE AND ALBUTEROL SULFATE 2.5; .5 MG/3ML; MG/3ML
3 SOLUTION RESPIRATORY (INHALATION)
Status: DISCONTINUED | OUTPATIENT
Start: 2022-03-07 | End: 2022-03-07

## 2022-03-06 RX ORDER — ALBUTEROL SULFATE 0.83 MG/ML
2.5 SOLUTION RESPIRATORY (INHALATION)
Status: DISCONTINUED | OUTPATIENT
Start: 2022-03-06 | End: 2022-03-09 | Stop reason: HOSPADM

## 2022-03-06 RX ORDER — GUAIFENESIN 100 MG/5ML
81 LIQUID (ML) ORAL DAILY
Status: DISCONTINUED | OUTPATIENT
Start: 2022-03-07 | End: 2022-03-09 | Stop reason: HOSPADM

## 2022-03-06 RX ORDER — QUETIAPINE FUMARATE 25 MG/1
12.5 TABLET, FILM COATED ORAL
Status: DISCONTINUED | OUTPATIENT
Start: 2022-03-06 | End: 2022-03-09 | Stop reason: HOSPADM

## 2022-03-06 RX ORDER — FAMOTIDINE 20 MG/1
10 TABLET, FILM COATED ORAL
Status: DISCONTINUED | OUTPATIENT
Start: 2022-03-07 | End: 2022-03-09 | Stop reason: HOSPADM

## 2022-03-06 RX ORDER — ACETAMINOPHEN 325 MG/1
650 TABLET ORAL
Status: DISCONTINUED | OUTPATIENT
Start: 2022-03-06 | End: 2022-03-09 | Stop reason: HOSPADM

## 2022-03-06 RX ORDER — IPRATROPIUM BROMIDE AND ALBUTEROL SULFATE 2.5; .5 MG/3ML; MG/3ML
3 SOLUTION RESPIRATORY (INHALATION)
Status: DISCONTINUED | OUTPATIENT
Start: 2022-03-06 | End: 2022-03-06 | Stop reason: SDUPTHER

## 2022-03-06 RX ORDER — SODIUM CHLORIDE 0.9 % (FLUSH) 0.9 %
5-40 SYRINGE (ML) INJECTION EVERY 8 HOURS
Status: DISCONTINUED | OUTPATIENT
Start: 2022-03-06 | End: 2022-03-09 | Stop reason: HOSPADM

## 2022-03-06 RX ADMIN — IPRATROPIUM BROMIDE AND ALBUTEROL SULFATE 3 ML: .5; 3 SOLUTION RESPIRATORY (INHALATION) at 21:30

## 2022-03-06 RX ADMIN — METHYLPREDNISOLONE SODIUM SUCCINATE 125 MG: 125 INJECTION, POWDER, FOR SOLUTION INTRAMUSCULAR; INTRAVENOUS at 21:34

## 2022-03-06 RX ADMIN — SODIUM CHLORIDE, PRESERVATIVE FREE 10 ML: 5 INJECTION INTRAVENOUS at 21:38

## 2022-03-06 NOTE — ED TRIAGE NOTES
Pt came in from home via EMS. Pt received 2 nebulizer treatments from [address] [place] [address]. The pt began to show signs of difficulty breathing. Ambulance was called. Hx of CHF and COPD. Blood sugar at 128 on ambulance. Pt denies CP. Pt is showing signs of SOB. Crackling and wheezing. Pt O2 sat is 100% on 4L. Pt is alert and oriented x4. Pt is cooperative with care. Pt denies pain. Pt uses walker for ambulation.

## 2022-03-07 LAB
ALBUMIN SERPL-MCNC: 2.8 G/DL (ref 3.5–5)
ANION GAP SERPL CALC-SCNC: 0 MMOL/L (ref 5–15)
ATRIAL RATE: 74 BPM
BUN SERPL-MCNC: 21 MG/DL (ref 6–20)
BUN/CREAT SERPL: 27 (ref 12–20)
CA-I BLD-MCNC: 8.3 MG/DL (ref 8.5–10.1)
CALCULATED P AXIS, ECG09: 66 DEGREES
CALCULATED R AXIS, ECG10: -77 DEGREES
CALCULATED T AXIS, ECG11: 53 DEGREES
CHLORIDE SERPL-SCNC: 109 MMOL/L (ref 97–108)
CO2 SERPL-SCNC: 32 MMOL/L (ref 21–32)
CREAT SERPL-MCNC: 0.77 MG/DL (ref 0.7–1.3)
DIAGNOSIS, 93000: NORMAL
ERYTHROCYTE [DISTWIDTH] IN BLOOD BY AUTOMATED COUNT: 13.7 % (ref 11.5–14.5)
GLUCOSE SERPL-MCNC: 143 MG/DL (ref 65–100)
HCT VFR BLD AUTO: 39.4 % (ref 36.6–50.3)
HGB BLD-MCNC: 12.5 G/DL (ref 12.1–17)
MCH RBC QN AUTO: 33.1 PG (ref 26–34)
MCHC RBC AUTO-ENTMCNC: 31.7 G/DL (ref 30–36.5)
MCV RBC AUTO: 104.2 FL (ref 80–99)
NRBC # BLD: 0 K/UL (ref 0–0.01)
NRBC BLD-RTO: 0 PER 100 WBC
P-R INTERVAL, ECG05: 186 MS
PHOSPHATE SERPL-MCNC: 2.4 MG/DL (ref 2.6–4.7)
PLATELET # BLD AUTO: 228 K/UL (ref 150–400)
PMV BLD AUTO: 9.5 FL (ref 8.9–12.9)
POTASSIUM SERPL-SCNC: 4.4 MMOL/L (ref 3.5–5.1)
Q-T INTERVAL, ECG07: 410 MS
QRS DURATION, ECG06: 120 MS
QTC CALCULATION (BEZET), ECG08: 455 MS
RBC # BLD AUTO: 3.78 M/UL (ref 4.1–5.7)
SODIUM SERPL-SCNC: 141 MMOL/L (ref 136–145)
TSH SERPL DL<=0.05 MIU/L-ACNC: 0.64 UIU/ML (ref 0.36–3.74)
VENTRICULAR RATE, ECG03: 74 BPM
WBC # BLD AUTO: 5.3 K/UL (ref 4.1–11.1)

## 2022-03-07 PROCEDURE — 80069 RENAL FUNCTION PANEL: CPT

## 2022-03-07 PROCEDURE — 94760 N-INVAS EAR/PLS OXIMETRY 1: CPT

## 2022-03-07 PROCEDURE — 77010033678 HC OXYGEN DAILY

## 2022-03-07 PROCEDURE — 74011250636 HC RX REV CODE- 250/636: Performed by: HOSPITALIST

## 2022-03-07 PROCEDURE — 74011250637 HC RX REV CODE- 250/637: Performed by: HOSPITALIST

## 2022-03-07 PROCEDURE — 84443 ASSAY THYROID STIM HORMONE: CPT

## 2022-03-07 PROCEDURE — 74011000250 HC RX REV CODE- 250: Performed by: EMERGENCY MEDICINE

## 2022-03-07 PROCEDURE — 74011000250 HC RX REV CODE- 250: Performed by: HOSPITALIST

## 2022-03-07 PROCEDURE — 94640 AIRWAY INHALATION TREATMENT: CPT

## 2022-03-07 PROCEDURE — 65270000029 HC RM PRIVATE

## 2022-03-07 PROCEDURE — 36415 COLL VENOUS BLD VENIPUNCTURE: CPT

## 2022-03-07 PROCEDURE — 85027 COMPLETE CBC AUTOMATED: CPT

## 2022-03-07 RX ORDER — BUDESONIDE 0.5 MG/2ML
500 INHALANT ORAL
Status: DISCONTINUED | OUTPATIENT
Start: 2022-03-07 | End: 2022-03-09 | Stop reason: HOSPADM

## 2022-03-07 RX ORDER — IPRATROPIUM BROMIDE AND ALBUTEROL SULFATE 2.5; .5 MG/3ML; MG/3ML
3 SOLUTION RESPIRATORY (INHALATION)
Status: DISCONTINUED | OUTPATIENT
Start: 2022-03-07 | End: 2022-03-09 | Stop reason: HOSPADM

## 2022-03-07 RX ADMIN — SODIUM CHLORIDE, PRESERVATIVE FREE 10 ML: 5 INJECTION INTRAVENOUS at 06:05

## 2022-03-07 RX ADMIN — METHYLPREDNISOLONE SODIUM SUCCINATE 37.5 MG: 125 INJECTION, POWDER, FOR SOLUTION INTRAMUSCULAR; INTRAVENOUS at 06:04

## 2022-03-07 RX ADMIN — DOCUSATE SODIUM 100 MG: 100 CAPSULE, LIQUID FILLED ORAL at 02:37

## 2022-03-07 RX ADMIN — SODIUM CHLORIDE, PRESERVATIVE FREE 10 ML: 5 INJECTION INTRAVENOUS at 06:04

## 2022-03-07 RX ADMIN — SODIUM CHLORIDE, PRESERVATIVE FREE 10 ML: 5 INJECTION INTRAVENOUS at 15:38

## 2022-03-07 RX ADMIN — QUETIAPINE FUMARATE 12.5 MG: 25 TABLET ORAL at 02:36

## 2022-03-07 RX ADMIN — IPRATROPIUM BROMIDE AND ALBUTEROL SULFATE 3 ML: .5; 3 SOLUTION RESPIRATORY (INHALATION) at 13:16

## 2022-03-07 RX ADMIN — METHYLPREDNISOLONE SODIUM SUCCINATE 37.5 MG: 125 INJECTION, POWDER, FOR SOLUTION INTRAMUSCULAR; INTRAVENOUS at 15:36

## 2022-03-07 RX ADMIN — ASPIRIN 81 MG CHEWABLE TABLET 81 MG: 81 TABLET CHEWABLE at 10:44

## 2022-03-07 RX ADMIN — DOCUSATE SODIUM 100 MG: 100 CAPSULE, LIQUID FILLED ORAL at 10:44

## 2022-03-07 RX ADMIN — IPRATROPIUM BROMIDE AND ALBUTEROL SULFATE 3 ML: .5; 3 SOLUTION RESPIRATORY (INHALATION) at 20:40

## 2022-03-07 RX ADMIN — BUDESONIDE 500 MCG: 0.5 SUSPENSION RESPIRATORY (INHALATION) at 20:41

## 2022-03-07 RX ADMIN — BUDESONIDE AND FORMOTEROL FUMARATE DIHYDRATE 2 PUFF: 160; 4.5 AEROSOL RESPIRATORY (INHALATION) at 08:16

## 2022-03-07 RX ADMIN — FAMOTIDINE 10 MG: 20 TABLET, FILM COATED ORAL at 10:44

## 2022-03-07 RX ADMIN — CEFTRIAXONE SODIUM 1 G: 1 INJECTION, POWDER, FOR SOLUTION INTRAMUSCULAR; INTRAVENOUS at 10:49

## 2022-03-07 RX ADMIN — IPRATROPIUM BROMIDE AND ALBUTEROL SULFATE 3 ML: .5; 3 SOLUTION RESPIRATORY (INHALATION) at 08:16

## 2022-03-07 RX ADMIN — CEFTRIAXONE SODIUM 1 G: 1 INJECTION, POWDER, FOR SOLUTION INTRAMUSCULAR; INTRAVENOUS at 02:37

## 2022-03-07 NOTE — PROGRESS NOTES
Problem: Pressure Injury - Risk of  Goal: *Prevention of pressure injury  Description: Document Demarcus Scale and appropriate interventions in the flowsheet.   Outcome: Progressing Towards Goal  Note: Pressure Injury Interventions:  Sensory Interventions: Assess changes in LOC         Activity Interventions: PT/OT evaluation    Mobility Interventions: PT/OT evaluation    Nutrition Interventions: Document food/fluid/supplement intake                     Problem: Patient Education: Go to Patient Education Activity  Goal: Patient/Family Education  Outcome: Progressing Towards Goal

## 2022-03-07 NOTE — PROGRESS NOTES
Reason for Admission:   COPD w/acute exacerbation                    RUR Score:    17% Moderate             PCP: First and Last name:   [facility]    Name of Practice:    Are you a current patient: Yes/No:    Approximate date of last visit:    Can you participate in a virtual visit if needed:     Do you (patient/family) have any concerns for transition/discharge? Plan for utilizing home health:       Current Advanced Directive/Advance Care Plan:  Full Code      Healthcare Decision Maker:   Click here to complete [employer] including selection of the Healthcare Decision Maker Relationship (ie \"Primary\")            Primary Decision Maker: Kapil Loredo Daughter - [phone]    Secondary Decision Maker: Santiago Zhaneyannaalexis - [phone]    Transition of Care Plan:          Patient is a . Informed daughter of right to request to transfer to the [facility].  Daughter desires for transfer to [facility] and gave verbal consent. Assigned RN [employer] witnessed daughter Berny Dawkins) give verbal consent for [facility]. Request for transfer forms w/clinicals to be faxed to [facility].      Patient lives in an independent living apartment at the [facility]. Has a caregiver 24/7. Patient requires some assistance from caregiver. Last seen at the [facility] approx x 2 mos ago. Utilizes the [address] for all medications. Home O2 2L PRN. DME consists of walker, wheelchair, cane, and scooter. Caregiver provides all transportation to/from medical appointment's. Care Management Interventions  PCP Verified by CM: Yes (2 MOS ago)  Mode of Transport at Discharge:  Other (see comment)  Transition of Care Consult (CM Consult): Discharge Planning  Discharge Durable Medical Equipment: No  Support Systems: Child(rio)  Confirm Follow Up Transport: Family  Discharge Location  Patient Expects to be Discharged to[de-identified] Assisted [facility] [address], [address] [place] [address] Loop

## 2022-03-07 NOTE — ED PROVIDER NOTES
EMERGENCY DEPARTMENT HISTORY AND PHYSICAL EXAM      Date: 3/6/2022  Patient Name: Fer Mcfarland    History of Presenting Illness     Chief Complaint   Patient presents with    Shortness of Breath       History Provided By: Patient and Caregiver    HPI: Fer Mcfarland, 80 y.o. male with a past medical history significant Multiple comorbidities and on oxygen presents to the ED with cc of increasing shortness of breath over the past 48 to 72 hours. The caregiver at the bedside said that he had a chest x-ray a few days ago. They deny any fever, chills, chest pain, rash, diarrhea, headache, night sweats. But no other treatments prior to arrival.    There are no other complaints, changes, or physical findings at this time. PCP: Unknown (Inactive)    No current facility-administered medications on file prior to encounter. Current Outpatient Medications on File Prior to Encounter   Medication Sig Dispense Refill    aspirin 81 mg chewable tablet Take 1 Tablet by mouth daily. 30 Tablet 0    QUEtiapine (SEROquel) 25 mg tablet Take 0.5 Tablets by mouth nightly. 30 Tablet 0    famotidine (PEPCID) 10 mg tablet Take 1 Tablet by mouth nightly as needed for Heartburn. 15 Tablet 0       Past History     Past Medical History:  Past Medical History:   Diagnosis Date    CHF (congestive heart failure) ([facility] [address]     COPD (chronic obstructive pulmonary disease) (HCC)     HTN (hypertension)     Prostate CA (HCC)        Past Surgical History:  No past surgical history on file. Family History:  Family History   Family history unknown: Yes       Social History:  Social History     Tobacco Use    Smoking status: Former Smoker    Smokeless tobacco: Never Used   Substance Use Topics    Alcohol use: Never    Drug use: Never       Allergies:  No Known Allergies      Review of Systems     Review of Systems   Constitutional: Negative. Negative for appetite change, chills, fatigue and fever. HENT: Negative.   Negative for congestion and sinus pain. Eyes: Negative. Negative for pain and visual disturbance. Respiratory: Positive for shortness of breath. Negative for chest tightness. Cardiovascular: Negative. Negative for chest pain. Gastrointestinal: Negative. Negative for abdominal pain, diarrhea, nausea and vomiting. Genitourinary: Negative. Negative for difficulty urinating. No discharge   Musculoskeletal: Negative. Negative for arthralgias. Skin: Negative. Negative for rash. Neurological: Negative. Negative for weakness and headaches. Hematological: Negative. Psychiatric/Behavioral: Negative. Negative for agitation. The patient is not nervous/anxious. All other systems reviewed and are negative. Physical Exam     Physical Exam  Vitals and nursing note reviewed. Constitutional:       General: He is not in acute distress. Appearance: He is well-developed. HENT:      Head: Normocephalic and atraumatic. Nose: Nose normal.      Mouth/Throat:      Mouth: Mucous membranes are moist.      Pharynx: Oropharynx is clear. No oropharyngeal exudate. Eyes:      General:         Right eye: No discharge. Left eye: No discharge. Conjunctiva/sclera: Conjunctivae normal.      Pupils: Pupils are equal, round, and reactive to light. Cardiovascular:      Rate and Rhythm: Normal rate and regular rhythm. Chest Wall: PMI is not displaced. No thrill. Heart sounds: Normal heart sounds. No murmur heard. No friction rub. No gallop. Pulmonary:      Effort: Pulmonary effort is normal. Tachypnea present. No respiratory distress. Breath sounds: Examination of the right-upper field reveals wheezing. Examination of the left-upper field reveals wheezing. Examination of the right-middle field reveals wheezing. Examination of the left-middle field reveals wheezing. Examination of the right-lower field reveals wheezing. Examination of the left-lower field reveals wheezing. Wheezing present. No rales. Chest:      Chest wall: No tenderness. Abdominal:      General: Bowel sounds are normal. There is no distension. Palpations: Abdomen is soft. There is no mass. Tenderness: There is no abdominal tenderness. There is no guarding or rebound. Musculoskeletal:         General: Normal range of motion. Cervical back: Normal range of motion and neck supple. Lymphadenopathy:      Cervical: No cervical adenopathy. Skin:     General: Skin is warm and dry. Capillary Refill: Capillary refill takes less than 2 seconds. Findings: No erythema or rash. Neurological:      Mental Status: He is alert and oriented to person, place, and time. Cranial Nerves: No cranial nerve deficit. Coordination: Coordination normal.   Psychiatric:         Mood and Affect: Mood normal.         Behavior: Behavior normal.         Lab and Diagnostic Study Results     Labs -     Recent Results (from the past 12 hour(s))   CBC WITH AUTOMATED DIFF    Collection Time: 03/06/22  7:45 PM   Result Value Ref Range    WBC 7.5 4.1 - 11.1 K/uL    RBC 3.79 (L) 4.10 - 5.70 M/uL    HGB 12.7 12.1 - 17.0 g/dL    HCT 39.8 36.6 - 50.3 %    .0 (H) 80.0 - 99.0 FL    MCH 33.5 26.0 - 34.0 PG    MCHC 31.9 30.0 - 36.5 g/dL    RDW 14.0 11.5 - 14.5 %    PLATELET 872 940 - 125 K/uL    MPV 9.4 8.9 - 12.9 FL    NRBC 0.0 0.0  WBC    ABSOLUTE NRBC 0.00 0.00 - 0.01 K/uL    NEUTROPHILS 68 32 - 75 %    LYMPHOCYTES 17 12 - 49 %    MONOCYTES 12 5 - 13 %    EOSINOPHILS 2 0 - 7 %    BASOPHILS 0 0 - 1 %    BLASTS 1 (H) 0 %    IMMATURE GRANULOCYTES 0 %    ABS. NEUTROPHILS 5.1 1.8 - 8.0 K/UL    ABS. LYMPHOCYTES 1.3 0.8 - 3.5 K/UL    ABS. MONOCYTES 0.9 0.0 - 1.0 K/UL    ABS. EOSINOPHILS 0.2 0.0 - 0.4 K/UL    ABS. BASOPHILS 0.0 0.0 - 0.1 K/UL    ABS. IMM.  GRANS. 0.0 K/UL    DF Manual      RBC COMMENTS Microcytosis  1+        RBC COMMENTS Anisocytosis  1+       METABOLIC PANEL, COMPREHENSIVE    Collection Time: 03/06/22  7:45 PM   Result Value Ref Range    Sodium 142 136 - 145 mmol/L    Potassium 4.4 3.5 - 5.1 mmol/L    Chloride 109 (H) 97 - 108 mmol/L    CO2 29 21 - 32 mmol/L    Anion gap 4 (L) 5 - 15 mmol/L    Glucose 96 65 - 100 mg/dL    BUN 22 (H) 6 - 20 mg/dL    Creatinine 0.93 0.70 - 1.30 mg/dL    BUN/Creatinine ratio 24 (H) 12 - 20      GFR est AA >60 >60 ml/min/1.73m2    GFR est non-AA >60 >60 ml/min/1.73m2    Calcium 8.6 8.5 - 10.1 mg/dL    Bilirubin, total 0.5 0.2 - 1.0 mg/dL    AST (SGOT) 18 15 - 37 U/L    ALT (SGPT) 14 12 - 78 U/L    Alk. phosphatase 49 45 - 117 U/L    Protein, total 6.0 (L) 6.4 - 8.2 g/dL    Albumin 3.3 (L) 3.5 - 5.0 g/dL    Globulin 2.7 2.0 - 4.0 g/dL    A-G Ratio 1.2 1.1 - 2.2     MAGNESIUM    Collection Time: 03/06/22  7:45 PM   Result Value Ref Range    Magnesium 2.2 1.6 - 2.4 mg/dL   PROTHROMBIN TIME + INR    Collection Time: 03/06/22  7:45 PM   Result Value Ref Range    Prothrombin time 12.9 11.9 - 14.6 sec    INR 1.0 0.9 - 1.1     TROPONIN-HIGH SENSITIVITY    Collection Time: 03/06/22  7:45 PM   Result Value Ref Range    Troponin-High Sensitivity 66 0 - 76 ng/L       Radiologic Studies -   @lastxrresult@  CT Results  (Last 48 hours)    None        CXR Results  (Last 48 hours)               03/06/22 1912  XR CHEST PORT Final result    Impression:      No consolidation in the lungs. Follow-up as clinically indicated. Narrative:  Chest one view       INDICATION: Chest pain       COMPARISON: 2/20/2022       FINDINGS:       Cardiac silhouette is stable. Elevated left hemidiaphragm again seen. Slightly   prominent interstitial markings/fibrosis without madonna edema. No consolidation   in the lungs. Minimal atelectasis lung bases. No pleural effusions. No   pneumothorax. No displaced fracture in the visualized bony structures. Right   shoulder prosthesis. Bony demineralization. Medical Decision Making   - I am the first provider for this patient.     - I reviewed the vital signs, available nursing notes, past medical history, past surgical history, family history and social history. - Initial assessment performed. The patients presenting problems have been discussed, and they are in agreement with the care plan formulated and outlined with them. I have encouraged them to ask questions as they arise throughout their visit. Vital Signs-Reviewed the patient's vital signs. Patient Vitals for the past 12 hrs:   Temp Pulse Resp BP SpO2   03/06/22 2130 -- -- -- -- 94 %   03/06/22 1754 97.8 °F (36.6 °C) 73 28 117/63 100 %       Records Reviewed: Nursing Notes and Old Medical Records    The patient presents with shortness of breath with a differential diagnosis of asthma, pneumonia, COPD exacerbation. Will assess with basic labs EKG chest x-ray      ED Course:     ED Course as of 03/06/22 2151   Omid Martinez Mar 06, 2022   2145 Increasing oxygen requirement continues to wheeze. Will treat as an inpatient for COPD exacerbation [CS]      ED Course User Index  [CS] Thomas Holbrook MD       Provider Notes (Medical Decision Making):   3year-old male with acute COPD exacerbation. Cardiac enzymes are negative this time chest x-ray is clear we will be admitting for further albuterol Atrovent treatments as well as risk factor evaluation and management  MDM       Procedures   Medical Decision Makingedical Decision Making  Performed by: Kraig Dixon MD  PROCEDURES:  Procedures       Disposition   Disposition: Condition stable    Admitted    Diagnosis     Clinical Impression:   1. Acute exacerbation of chronic obstructive pulmonary disease (COPD) ([facility] [address]        Attestations:    Kraig Dixon MD    Please note that this dictation was completed with [employer], the [employer] voice recognition software. Quite often unanticipated grammatical, syntax, homophones, and other interpretive errors are inadvertently transcribed by the computer software. Please disregard these errors.   Please excuse any errors that have escaped final proofreading. Thank you.

## 2022-03-07 NOTE — PROGRESS NOTES
Hospitalist Progress Note               Daily Progress Note: 3/7/2022  80 y.o. male history of heart failure, COPD, hypertension and mild dementia presents to the emergency room as he is found with significant respiratory distress by the caretaker. She already gave him nebulizer treatments, but not much improvement so concerned about pneumonia on the called emergency crew. They found him with significant respiratory distress with tachypnea but he was able to communicate and able to walk to the ambulance. He found with significant wheezing and short of breath, he is coughing a lot. Given nebulizer treatments in the emergency room with some improvement, admitted for further management. He has no fever or chills.       Subjective: The patient is seen for follow  up.      Problem List:  Problem List as of 3/7/2022 Date Reviewed: 3/6/2022          Codes Class Noted - Resolved    COPD with acute exacerbation [facility]) ICD-10-CM: J44.1  ICD-9-CM: 491.21  3/6/2022 - Present        Elevated troponin ICD-10-CM: R77.8  ICD-9-CM: 790.6  2/20/2022 - Present        Respiratory failure ([facility] [address] ICD-10-CM: J96.90  ICD-9-CM: 518.81  1/2/2022 - Present              Medications reviewed  Current Facility-Administered Medications   Medication Dose Route Frequency    albuterol-ipratropium (DUO-NEB) 2.5 MG-0.5 MG/3 ML  3 mL Nebulization Q6H RT    budesonide (PULMICORT) 500 mcg/2 ml nebulizer suspension  500 mcg Nebulization BID RT    sodium chloride (NS) flush 5-40 mL  5-40 mL IntraVENous Q8H    sodium chloride (NS) flush 5-40 mL  5-40 mL IntraVENous PRN    aspirin chewable tablet 81 mg  81 mg Oral DAILY    QUEtiapine (SEROquel) tablet 12.5 mg  12.5 mg Oral QHS    famotidine (PEPCID) tablet 10 mg  10 mg Oral ACB    sodium chloride (NS) flush 5-40 mL  5-40 mL IntraVENous Q8H    sodium chloride (NS) flush 5-40 mL  5-40 mL IntraVENous PRN    albuterol (PROVENTIL VENTOLIN) nebulizer solution 2.5 mg  2.5 mg Nebulization Q4H PRN    methylPREDNISolone (PF) (Solu-MEDROL) injection 37.5 mg  37.5 mg IntraVENous Q8H    acetaminophen (TYLENOL) tablet 650 mg  650 mg Oral Q4H PRN    ondansetron (ZOFRAN ODT) tablet 4 mg  4 mg Oral Q6H PRN    docusate sodium (COLACE) capsule 100 mg  100 mg Oral BID    cefTRIAXone (ROCEPHIN) 1 g in sterile water (preservative free) 10 mL IV syringe  1 g IntraVENous Q12H       Review of Systems:   A comprehensive review of systems was negative except for that written in the HPI. Objective:   Physical Exam:     Visit Vitals  /71 (BP 1 Location: Right upper arm, BP Patient Position: Semi fowlers)   Pulse 86   Temp 97.6 °F (36.4 °C)   Resp 20   Ht 5' 5\" (1.651 m)   Wt 68 kg (150 lb)   SpO2 95%   BMI 24.96 kg/m²    O2 Flow Rate (L/min): 2 l/min (found at 3 lpm nc, decreased to 3 lpm nc SPO2 99) O2 Device: Nasal cannula    Temp (24hrs), Av °F (36.7 °C), Min:97.6 °F (36.4 °C), Max:98.7 °F (37.1 °C)     07 -  1900  In: -   Out: 100 [Urine:100]   No intake/output data recorded. General:  Alert, cooperative, no distress, appears stated age. Head:  Normocephalic, without obvious abnormality, atraumatic. Eyes:  Conjunctivae/corneas clear. PERRL, EOMs intact. Throat: Moist oral mucosa   Neck: Supple, symmetrical, trachea midline, no adenopathy, no JVD. Lungs:   Clear to auscultation bilaterally. diminished bases lr?l   Chest wall:  No tenderness or deformity. Heart:  Regular rate and rhythm, S1, S2 normal, no murmur, click, rub or gallop. Abdomen:   Soft, non-tender, not distended. Bowel sounds present, No rebound or guarding. Extremities: Extremities normal, atraumatic, no cyanosis. No edema   Pulses: 2+ and symmetric all extremities. Skin: Warm,dry   Neurologic: CNII-XII intact. No motor or sensory deficits.       Data Review:       Recent Days:  Recent Labs     2251 22  194   WBC 5.3 7.5   HGB 12.5 12.7   HCT 39.4 39.8    216     Recent Labs     2251 03/06/22 1945    142   K 4.4 4.4   * 109*   CO2 32 29   * 96   BUN 21* 22*   CREA 0.77 0.93   CA 8.3* 8.6   MG  --  2.2   PHOS 2.4*  --    ALB 2.8* 3.3*   TBILI  --  0.5   ALT  --  14   INR  --  1.0     No results for input(s): PH, PCO2, PO2, HCO3, FIO2 in the last 72 hours. 24 Hour Results:  Recent Results (from the past 24 hour(s))   EKG, 12 LEAD, INITIAL    Collection Time: 03/06/22  6:10 PM   Result Value Ref Range    Ventricular Rate 74 BPM    Atrial Rate 74 BPM    P-R Interval 186 ms    QRS Duration 120 ms    Q-T Interval 410 ms    QTC Calculation (Bezet) 455 ms    Calculated P Axis 66 degrees    Calculated R Axis -77 degrees    Calculated T Axis 53 degrees    Diagnosis       Sinus rhythm with occasional Premature ventricular complexes  Pulmonary disease pattern  Right bundle branch block  Left anterior fascicular block  * Bifascicular block *  Septal infarct , age undetermined  Abnormal ECG  When compared with ECG of 20-FEB-2022 19:31,  Premature ventricular complexes are now Present  Septal infarct is now Present  Borderline criteria for Lateral infarct are no longer Present  Non-specific change in ST segment in Lateral leads  Confirmed by Damian Lipscomb ([id number]) on 3/7/2022 9:29:15 AM     CBC WITH AUTOMATED DIFF    Collection Time: 03/06/22  7:45 PM   Result Value Ref Range    WBC 7.5 4.1 - 11.1 K/uL    RBC 3.79 (L) 4.10 - 5.70 M/uL    HGB 12.7 12.1 - 17.0 g/dL    HCT 39.8 36.6 - 50.3 %    .0 (H) 80.0 - 99.0 FL    MCH 33.5 26.0 - 34.0 PG    MCHC 31.9 30.0 - 36.5 g/dL    RDW 14.0 11.5 - 14.5 %    PLATELET 582 899 - 800 K/uL    MPV 9.4 8.9 - 12.9 FL    NRBC 0.0 0.0  WBC    ABSOLUTE NRBC 0.00 0.00 - 0.01 K/uL    NEUTROPHILS 68 32 - 75 %    LYMPHOCYTES 17 12 - 49 %    MONOCYTES 12 5 - 13 %    EOSINOPHILS 2 0 - 7 %    BASOPHILS 0 0 - 1 %    BLASTS 1 (H) 0 %    IMMATURE GRANULOCYTES 0 %    ABS. NEUTROPHILS 5.1 1.8 - 8.0 K/UL    ABS. LYMPHOCYTES 1.3 0.8 - 3.5 K/UL    ABS. MONOCYTES 0.9 0.0 - 1.0 K/UL    ABS. EOSINOPHILS 0.2 0.0 - 0.4 K/UL    ABS. BASOPHILS 0.0 0.0 - 0.1 K/UL    ABS. IMM. GRANS. 0.0 K/UL    DF Manual      RBC COMMENTS Microcytosis  1+        RBC COMMENTS Anisocytosis  1+       METABOLIC PANEL, COMPREHENSIVE    Collection Time: 03/06/22  7:45 PM   Result Value Ref Range    Sodium 142 136 - 145 mmol/L    Potassium 4.4 3.5 - 5.1 mmol/L    Chloride 109 (H) 97 - 108 mmol/L    CO2 29 21 - 32 mmol/L    Anion gap 4 (L) 5 - 15 mmol/L    Glucose 96 65 - 100 mg/dL    BUN 22 (H) 6 - 20 mg/dL    Creatinine 0.93 0.70 - 1.30 mg/dL    BUN/Creatinine ratio 24 (H) 12 - 20      GFR est AA >60 >60 ml/min/1.73m2    GFR est non-AA >60 >60 ml/min/1.73m2    Calcium 8.6 8.5 - 10.1 mg/dL    Bilirubin, total 0.5 0.2 - 1.0 mg/dL    AST (SGOT) 18 15 - 37 U/L    ALT (SGPT) 14 12 - 78 U/L    Alk.  phosphatase 49 45 - 117 U/L    Protein, total 6.0 (L) 6.4 - 8.2 g/dL    Albumin 3.3 (L) 3.5 - 5.0 g/dL    Globulin 2.7 2.0 - 4.0 g/dL    A-G Ratio 1.2 1.1 - 2.2     MAGNESIUM    Collection Time: 03/06/22  7:45 PM   Result Value Ref Range    Magnesium 2.2 1.6 - 2.4 mg/dL   PROTHROMBIN TIME + INR    Collection Time: 03/06/22  7:45 PM   Result Value Ref Range    Prothrombin time 12.9 11.9 - 14.6 sec    INR 1.0 0.9 - 1.1     TROPONIN-HIGH SENSITIVITY    Collection Time: 03/06/22  7:45 PM   Result Value Ref Range    Troponin-High Sensitivity 66 0 - 76 ng/L   CBC W/O DIFF    Collection Time: 03/07/22  9:51 AM   Result Value Ref Range    WBC 5.3 4.1 - 11.1 K/uL    RBC 3.78 (L) 4.10 - 5.70 M/uL    HGB 12.5 12.1 - 17.0 g/dL    HCT 39.4 36.6 - 50.3 %    .2 (H) 80.0 - 99.0 FL    MCH 33.1 26.0 - 34.0 PG    MCHC 31.7 30.0 - 36.5 g/dL    RDW 13.7 11.5 - 14.5 %    PLATELET 854 353 - 097 K/uL    MPV 9.5 8.9 - 12.9 FL    NRBC 0.0 0.0  WBC    ABSOLUTE NRBC 0.00 0.00 - 0.01 K/uL   RENAL FUNCTION PANEL    Collection Time: 03/07/22  9:51 AM   Result Value Ref Range    Sodium 141 136 - 145 mmol/L    Potassium 4.4 3.5 - 5.1 mmol/L    Chloride 109 (H) 97 - 108 mmol/L    CO2 32 21 - 32 mmol/L    Anion gap 0 (L) 5 - 15 mmol/L    Glucose 143 (H) 65 - 100 mg/dL    BUN 21 (H) 6 - 20 mg/dL    Creatinine 0.77 0.70 - 1.30 mg/dL    BUN/Creatinine ratio 27 (H) 12 - 20      GFR est AA >60 >60 ml/min/1.73m2    GFR est non-AA >60 >60 ml/min/1.73m2    Calcium 8.3 (L) 8.5 - 10.1 mg/dL    Phosphorus 2.4 (L) 2.6 - 4.7 mg/dL    Albumin 2.8 (L) 3.5 - 5.0 g/dL   TSH 3RD GENERATION    Collection Time: 03/07/22  9:51 AM   Result Value Ref Range    TSH 0.64 0.36 - 3.74 uIU/mL       chest X-ray    Assessment/     Patient Active Problem List   Diagnosis Code    Respiratory failure ([facility]) J96.90    Elevated troponin R77.8    COPD with acute exacerbation ([facility]) J44.1         -copd exacerbation  Acute bronchitis  Mild senile dementia  Plan:  Cont nebs  Cont solumedrol  Cont budesonide  Cont ceftriaxone          Total time spent with patient: 30 minutes. This dictation was done by dragon, computer voice recognition software. Often unanticipated grammatical, syntax, phones and other interpretive errors are inadvertently transcribed. Please excuse errors that have escaped final proofreading.     Royal Gillian MD

## 2022-03-07 NOTE — H&P
History and Physical              Subjective :   Chief Complaint : Shortness of breath with wheezing and difficulty breathing with hypoxia    Source of information : Patient, ED provider from the records. History of present illness:   80 y.o. male history of heart failure, COPD, hypertension and mild dementia presents to the emergency room as he is found with significant respiratory distress by the caretaker. She already gave him nebulizer treatments, but not much improvement so concerned about pneumonia on the called emergency crew. They found him with significant respiratory distress with tachypnea but he was able to communicate and able to walk to the ambulance. He found with significant wheezing and short of breath, he is coughing a lot. Given nebulizer treatments in the emergency room with some improvement, admitted for further management. He has no fever or chills. 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 Family History   Family history unknown: Yes      Social History     Tobacco Use    Smoking status: Former Smoker    Smokeless tobacco: Never Used   Substance Use Topics    Alcohol use: Never       Prior to Admission medications    Medication Sig Start Date End Date Taking? Authorizing Provider   aspirin 81 mg chewable tablet Take 1 Tablet by mouth daily. 2/22/22   Park Berrios MD   QUEtiapine (SEROquel) 25 mg tablet Take 0.5 Tablets by mouth nightly. 2/22/22   Park Berrios MD   famotidine (PEPCID) 10 mg tablet Take 1 Tablet by mouth nightly as needed for Heartburn. 2/22/22   Park Berrios MD     No Known Allergies          Review of Systems: Even the patient was able to give information he was so short of breath unable to speak well to get the information.       Vitals: Patient Vitals for the past 12 hrs:   Temp Pulse Resp BP SpO2   03/06/22 2130 -- -- -- -- 94 %   03/06/22 1754 97.8 °F (36.6 °C) 73 28 117/63 100 %       Physical Exam:   General : Looks uncomfortable with respiratory distress, coughing when speaking is congested. Looks tired. HEENT : Dry oral mucosa, atraumatic normocephalic, Normal ear and nose. Neck : Supple, no JVD, no masses noted, no carotid bruit. Lungs : Breath sounds with decreased air entry, decreased air movement, coarse breath sounds, rhonchi present. Expiratory wheezes present throughout the chest.    CVS : Rhythm rate regular, difficult to hear heart sounds due to rhonchi. No murmur or gallop. Abdomen : Soft, nontender, bowel sounds active. Extremities : 2+ edema noted,  pedal pulses palpable. Musculoskeletal : Fair range of motion, no joint swelling or effusion, muscle tone and power appears normal.   Skin : Moist, warm,  no pathological rash. Lymphatic : No cervical lymphadenopathy. Neurological : Awake, alert, oriented to time place person. Psychiatric : Mood and affect appears appropriate to the situation. Data Review:   Recent Results (from the past 24 hour(s))   CBC WITH AUTOMATED DIFF    Collection Time: 03/06/22  7:45 PM   Result Value Ref Range    WBC 7.5 4.1 - 11.1 K/uL    RBC 3.79 (L) 4.10 - 5.70 M/uL    HGB 12.7 12.1 - 17.0 g/dL    HCT 39.8 36.6 - 50.3 %    .0 (H) 80.0 - 99.0 FL    MCH 33.5 26.0 - 34.0 PG    MCHC 31.9 30.0 - 36.5 g/dL    RDW 14.0 11.5 - 14.5 %    PLATELET 008 732 - 351 K/uL    MPV 9.4 8.9 - 12.9 FL    NRBC 0.0 0.0  WBC    ABSOLUTE NRBC 0.00 0.00 - 0.01 K/uL    NEUTROPHILS 68 32 - 75 %    LYMPHOCYTES 17 12 - 49 %    MONOCYTES 12 5 - 13 %    EOSINOPHILS 2 0 - 7 %    BASOPHILS 0 0 - 1 %    BLASTS 1 (H) 0 %    IMMATURE GRANULOCYTES 0 %    ABS. NEUTROPHILS 5.1 1.8 - 8.0 K/UL    ABS. LYMPHOCYTES 1.3 0.8 - 3.5 K/UL    ABS. MONOCYTES 0.9 0.0 - 1.0 K/UL    ABS.  EOSINOPHILS 0.2 0.0 - 0.4 K/UL ABS. BASOPHILS 0.0 0.0 - 0.1 K/UL    ABS. IMM. GRANS. 0.0 K/UL    DF Manual      RBC COMMENTS Microcytosis  1+        RBC COMMENTS Anisocytosis  1+       METABOLIC PANEL, COMPREHENSIVE    Collection Time: 03/06/22  7:45 PM   Result Value Ref Range    Sodium 142 136 - 145 mmol/L    Potassium 4.4 3.5 - 5.1 mmol/L    Chloride 109 (H) 97 - 108 mmol/L    CO2 29 21 - 32 mmol/L    Anion gap 4 (L) 5 - 15 mmol/L    Glucose 96 65 - 100 mg/dL    BUN 22 (H) 6 - 20 mg/dL    Creatinine 0.93 0.70 - 1.30 mg/dL    BUN/Creatinine ratio 24 (H) 12 - 20      GFR est AA >60 >60 ml/min/1.73m2    GFR est non-AA >60 >60 ml/min/1.73m2    Calcium 8.6 8.5 - 10.1 mg/dL    Bilirubin, total 0.5 0.2 - 1.0 mg/dL    AST (SGOT) 18 15 - 37 U/L    ALT (SGPT) 14 12 - 78 U/L    Alk. phosphatase 49 45 - 117 U/L    Protein, total 6.0 (L) 6.4 - 8.2 g/dL    Albumin 3.3 (L) 3.5 - 5.0 g/dL    Globulin 2.7 2.0 - 4.0 g/dL    A-G Ratio 1.2 1.1 - 2.2     MAGNESIUM    Collection Time: 03/06/22  7:45 PM   Result Value Ref Range    Magnesium 2.2 1.6 - 2.4 mg/dL   PROTHROMBIN TIME + INR    Collection Time: 03/06/22  7:45 PM   Result Value Ref Range    Prothrombin time 12.9 11.9 - 14.6 sec    INR 1.0 0.9 - 1.1     TROPONIN-HIGH SENSITIVITY    Collection Time: 03/06/22  7:45 PM   Result Value Ref Range    Troponin-High Sensitivity 66 0 - 76 ng/L       Radiologic Studies :     CXR Results  (Last 48 hours)               03/06/22 1912  XR CHEST PORT Final result    Impression:      No consolidation in the lungs. Follow-up as clinically indicated. Narrative:  Chest one view       INDICATION: Chest pain       COMPARISON: 2/20/2022       FINDINGS:       Cardiac silhouette is stable. Elevated left hemidiaphragm again seen. Slightly   prominent interstitial markings/fibrosis without madonna edema. No consolidation   in the lungs. Minimal atelectasis lung bases. No pleural effusions. No   pneumothorax. No displaced fracture in the visualized bony structures.  Right shoulder prosthesis. Bony demineralization. Assessment and Plan :     COPD with exacerbation: We will keep him on nebulizer treatments and steroids. Acute bronchitis: Started on antibiotic. Avoid fluoroquinolones due to his age and he is on Seroquel. Started on ceftriaxone    Mild dementia senile, stable    Patient is very hard of hearing and also blind that he can see only shades. It affects his activity also communications. Admitted to medical floor, full CODE STATUS, home medications reviewed and verified. Daughter is the power of  to make decisions and also has a friend who helps. CC : Unknown (Inactive)  Signed By: Ascencion Caldera MD     March 6, 2022      This dictation was done by dragon, computer voice recognition software. Often unanticipated grammatical, syntax, [place] phones and other interpretive errors are inadvertently transcribed. Please excuse errors that have escaped final proofreading.

## 2022-03-07 NOTE — ED NOTES
.. TRANSFER - OUT REPORT:    Verbal report given to [facility] RN on [employer]  being transferred to [facility] for routine progression of care       Report consisted of patients Situation, Background, Assessment and   Recommendations(SBAR). Information from the following report(s) SBAR was reviewed with the receiving nurse. Lines:   Peripheral IV 03/06/22 Right Antecubital (Active)        Opportunity for questions and clarification was provided.       Patient transported with:   [employer]

## 2022-03-07 NOTE — PROGRESS NOTES
SW met w/patient and caregiver at bedside. Daughter Alina Ramirez, on FaceTime. Patient informed writer he doesn't want to transfer to the [facility].  Patient signed Refusal to Transfer.   Refusal and clinicals faxed to [facility], MSW

## 2022-03-08 LAB
ALBUMIN SERPL-MCNC: 2.9 G/DL (ref 3.5–5)
ALBUMIN/GLOB SERPL: 1 {RATIO} (ref 1.1–2.2)
ALP SERPL-CCNC: 47 U/L (ref 45–117)
ALT SERPL-CCNC: 14 U/L (ref 12–78)
ANION GAP SERPL CALC-SCNC: 5 MMOL/L (ref 5–15)
AST SERPL W P-5'-P-CCNC: 17 U/L (ref 15–37)
BASOPHILS # BLD: 0 K/UL (ref 0–0.1)
BASOPHILS NFR BLD: 0 % (ref 0–1)
BILIRUB SERPL-MCNC: 0.4 MG/DL (ref 0.2–1)
BUN SERPL-MCNC: 28 MG/DL (ref 6–20)
BUN/CREAT SERPL: 26 (ref 12–20)
CA-I BLD-MCNC: 8.1 MG/DL (ref 8.5–10.1)
CHLORIDE SERPL-SCNC: 107 MMOL/L (ref 97–108)
CO2 SERPL-SCNC: 28 MMOL/L (ref 21–32)
CREAT SERPL-MCNC: 1.09 MG/DL (ref 0.7–1.3)
DIFFERENTIAL METHOD BLD: ABNORMAL
EOSINOPHIL # BLD: 0 K/UL (ref 0–0.4)
EOSINOPHIL NFR BLD: 0 % (ref 0–7)
ERYTHROCYTE [DISTWIDTH] IN BLOOD BY AUTOMATED COUNT: 13.8 % (ref 11.5–14.5)
GLOBULIN SER CALC-MCNC: 2.9 G/DL (ref 2–4)
GLUCOSE SERPL-MCNC: 107 MG/DL (ref 65–100)
HCT VFR BLD AUTO: 37.5 % (ref 36.6–50.3)
HGB BLD-MCNC: 12.2 G/DL (ref 12.1–17)
IMM GRANULOCYTES # BLD AUTO: 0 K/UL
IMM GRANULOCYTES NFR BLD AUTO: 0 %
LYMPHOCYTES # BLD: 2 K/UL (ref 0.8–3.5)
LYMPHOCYTES NFR BLD: 16 % (ref 12–49)
MCH RBC QN AUTO: 33.5 PG (ref 26–34)
MCHC RBC AUTO-ENTMCNC: 32.5 G/DL (ref 30–36.5)
MCV RBC AUTO: 103 FL (ref 80–99)
MONOCYTES # BLD: 0.5 K/UL (ref 0–1)
MONOCYTES NFR BLD: 4 % (ref 5–13)
NEUTS SEG # BLD: 9.8 K/UL (ref 1.8–8)
NEUTS SEG NFR BLD: 80 % (ref 32–75)
NRBC # BLD: 0 K/UL (ref 0–0.01)
NRBC BLD-RTO: 0 PER 100 WBC
PLATELET # BLD AUTO: 241 K/UL (ref 150–400)
PMV BLD AUTO: 9.6 FL (ref 8.9–12.9)
POTASSIUM SERPL-SCNC: 4.2 MMOL/L (ref 3.5–5.1)
PROT SERPL-MCNC: 5.8 G/DL (ref 6.4–8.2)
RBC # BLD AUTO: 3.64 M/UL (ref 4.1–5.7)
RBC MORPH BLD: ABNORMAL
SODIUM SERPL-SCNC: 140 MMOL/L (ref 136–145)
WBC # BLD AUTO: 12.3 K/UL (ref 4.1–11.1)

## 2022-03-08 PROCEDURE — 74011250637 HC RX REV CODE- 250/637: Performed by: HOSPITALIST

## 2022-03-08 PROCEDURE — 94760 N-INVAS EAR/PLS OXIMETRY 1: CPT

## 2022-03-08 PROCEDURE — 80053 COMPREHEN METABOLIC PANEL: CPT

## 2022-03-08 PROCEDURE — 94640 AIRWAY INHALATION TREATMENT: CPT

## 2022-03-08 PROCEDURE — 74011000250 HC RX REV CODE- 250: Performed by: HOSPITALIST

## 2022-03-08 PROCEDURE — 74011000250 HC RX REV CODE- 250: Performed by: EMERGENCY MEDICINE

## 2022-03-08 PROCEDURE — 74011250636 HC RX REV CODE- 250/636: Performed by: HOSPITALIST

## 2022-03-08 PROCEDURE — 65270000029 HC RM PRIVATE

## 2022-03-08 PROCEDURE — 85025 COMPLETE CBC W/AUTO DIFF WBC: CPT

## 2022-03-08 PROCEDURE — 36415 COLL VENOUS BLD VENIPUNCTURE: CPT

## 2022-03-08 PROCEDURE — 77010033678 HC OXYGEN DAILY

## 2022-03-08 RX ORDER — AZITHROMYCIN 500 MG/1
500 TABLET, FILM COATED ORAL DAILY
Status: DISCONTINUED | OUTPATIENT
Start: 2022-03-09 | End: 2022-03-08

## 2022-03-08 RX ORDER — MELATONIN
2000 DAILY
Status: DISCONTINUED | OUTPATIENT
Start: 2022-03-09 | End: 2022-03-09 | Stop reason: HOSPADM

## 2022-03-08 RX ADMIN — DOCUSATE SODIUM 100 MG: 100 CAPSULE, LIQUID FILLED ORAL at 09:20

## 2022-03-08 RX ADMIN — SODIUM CHLORIDE, PRESERVATIVE FREE 10 ML: 5 INJECTION INTRAVENOUS at 00:58

## 2022-03-08 RX ADMIN — METHYLPREDNISOLONE SODIUM SUCCINATE 37.5 MG: 125 INJECTION, POWDER, FOR SOLUTION INTRAMUSCULAR; INTRAVENOUS at 00:28

## 2022-03-08 RX ADMIN — SODIUM CHLORIDE, PRESERVATIVE FREE 10 ML: 5 INJECTION INTRAVENOUS at 16:13

## 2022-03-08 RX ADMIN — DOCUSATE SODIUM 100 MG: 100 CAPSULE, LIQUID FILLED ORAL at 00:27

## 2022-03-08 RX ADMIN — FAMOTIDINE 10 MG: 20 TABLET, FILM COATED ORAL at 09:20

## 2022-03-08 RX ADMIN — CEFTRIAXONE SODIUM 1 G: 1 INJECTION, POWDER, FOR SOLUTION INTRAMUSCULAR; INTRAVENOUS at 22:07

## 2022-03-08 RX ADMIN — IPRATROPIUM BROMIDE AND ALBUTEROL SULFATE 3 ML: .5; 3 SOLUTION RESPIRATORY (INHALATION) at 16:49

## 2022-03-08 RX ADMIN — METHYLPREDNISOLONE SODIUM SUCCINATE 37.5 MG: 125 INJECTION, POWDER, FOR SOLUTION INTRAMUSCULAR; INTRAVENOUS at 16:13

## 2022-03-08 RX ADMIN — DOCUSATE SODIUM 100 MG: 100 CAPSULE, LIQUID FILLED ORAL at 22:08

## 2022-03-08 RX ADMIN — ASPIRIN 81 MG CHEWABLE TABLET 81 MG: 81 TABLET CHEWABLE at 09:20

## 2022-03-08 RX ADMIN — CEFTRIAXONE SODIUM 1 G: 1 INJECTION, POWDER, FOR SOLUTION INTRAMUSCULAR; INTRAVENOUS at 00:27

## 2022-03-08 RX ADMIN — METHYLPREDNISOLONE SODIUM SUCCINATE 37.5 MG: 125 INJECTION, POWDER, FOR SOLUTION INTRAMUSCULAR; INTRAVENOUS at 07:15

## 2022-03-08 RX ADMIN — IPRATROPIUM BROMIDE AND ALBUTEROL SULFATE 3 ML: .5; 3 SOLUTION RESPIRATORY (INHALATION) at 19:36

## 2022-03-08 RX ADMIN — BUDESONIDE 500 MCG: 0.5 SUSPENSION RESPIRATORY (INHALATION) at 19:36

## 2022-03-08 RX ADMIN — SODIUM CHLORIDE, PRESERVATIVE FREE 10 ML: 5 INJECTION INTRAVENOUS at 22:08

## 2022-03-08 RX ADMIN — QUETIAPINE FUMARATE 12.5 MG: 25 TABLET ORAL at 22:07

## 2022-03-08 RX ADMIN — QUETIAPINE FUMARATE 12.5 MG: 25 TABLET ORAL at 00:28

## 2022-03-08 RX ADMIN — CEFTRIAXONE SODIUM 1 G: 1 INJECTION, POWDER, FOR SOLUTION INTRAMUSCULAR; INTRAVENOUS at 09:21

## 2022-03-08 NOTE — PROGRESS NOTES
Progress note sent to [facility] via [facility].            Steven Liang, KATT

## 2022-03-09 ENCOUNTER — APPOINTMENT (OUTPATIENT)
Dept: GENERAL RADIOLOGY | Age: 87
DRG: 192 | End: 2022-03-09
Attending: STUDENT IN AN ORGANIZED HEALTH CARE EDUCATION/TRAINING PROGRAM
Payer: MEDICARE

## 2022-03-09 VITALS
RESPIRATION RATE: 22 BRPM | BODY MASS INDEX: 24.99 KG/M2 | HEART RATE: 73 BPM | DIASTOLIC BLOOD PRESSURE: 77 MMHG | SYSTOLIC BLOOD PRESSURE: 137 MMHG | OXYGEN SATURATION: 90 % | HEIGHT: 65 IN | TEMPERATURE: 97.7 F | WEIGHT: 150 LBS

## 2022-03-09 PROBLEM — J20.9 ACUTE BRONCHITIS: Status: ACTIVE | Noted: 2022-03-09

## 2022-03-09 PROCEDURE — 74011000250 HC RX REV CODE- 250: Performed by: HOSPITALIST

## 2022-03-09 PROCEDURE — 94760 N-INVAS EAR/PLS OXIMETRY 1: CPT

## 2022-03-09 PROCEDURE — 94640 AIRWAY INHALATION TREATMENT: CPT

## 2022-03-09 PROCEDURE — 71045 X-RAY EXAM CHEST 1 VIEW: CPT

## 2022-03-09 PROCEDURE — 77010033678 HC OXYGEN DAILY

## 2022-03-09 PROCEDURE — 74011250637 HC RX REV CODE- 250/637: Performed by: STUDENT IN AN ORGANIZED HEALTH CARE EDUCATION/TRAINING PROGRAM

## 2022-03-09 PROCEDURE — 74011250636 HC RX REV CODE- 250/636: Performed by: HOSPITALIST

## 2022-03-09 PROCEDURE — 74011636637 HC RX REV CODE- 636/637: Performed by: STUDENT IN AN ORGANIZED HEALTH CARE EDUCATION/TRAINING PROGRAM

## 2022-03-09 PROCEDURE — 74011250637 HC RX REV CODE- 250/637: Performed by: HOSPITALIST

## 2022-03-09 RX ORDER — PREDNISONE 20 MG/1
20 TABLET ORAL 2 TIMES DAILY WITH MEALS
Status: DISCONTINUED | OUTPATIENT
Start: 2022-03-09 | End: 2022-03-09

## 2022-03-09 RX ORDER — MAG HYDROX/ALUMINUM HYD/SIMETH 200-200-20
30 SUSPENSION, ORAL (FINAL DOSE FORM) ORAL
Status: DISCONTINUED | OUTPATIENT
Start: 2022-03-09 | End: 2022-03-09 | Stop reason: HOSPADM

## 2022-03-09 RX ORDER — ALBUTEROL SULFATE 90 UG/1
2 AEROSOL, METERED RESPIRATORY (INHALATION)
Qty: 18 G | Refills: 1 | Status: SHIPPED | OUTPATIENT
Start: 2022-03-09

## 2022-03-09 RX ORDER — PREDNISONE 20 MG/1
TABLET ORAL
Qty: 9 TABLET | Refills: 0 | Status: SHIPPED | OUTPATIENT
Start: 2022-03-09 | End: 2022-03-15

## 2022-03-09 RX ORDER — MAG HYDROX/ALUMINUM HYD/SIMETH 200-200-20
30 SUSPENSION, ORAL (FINAL DOSE FORM) ORAL ONCE
Status: DISCONTINUED | OUTPATIENT
Start: 2022-03-09 | End: 2022-03-09

## 2022-03-09 RX ORDER — AMOXICILLIN AND CLAVULANATE POTASSIUM 875; 125 MG/1; MG/1
1 TABLET, FILM COATED ORAL EVERY 12 HOURS
Qty: 10 TABLET | Refills: 0 | Status: SHIPPED | OUTPATIENT
Start: 2022-03-09 | End: 2022-03-14

## 2022-03-09 RX ORDER — MELATONIN
2000 DAILY
Qty: 30 TABLET | Refills: 1 | Status: SHIPPED | OUTPATIENT
Start: 2022-03-09

## 2022-03-09 RX ORDER — BUDESONIDE AND FORMOTEROL FUMARATE DIHYDRATE 160; 4.5 UG/1; UG/1
2 AEROSOL RESPIRATORY (INHALATION) 2 TIMES DAILY
Qty: 10.2 G | Refills: 1 | Status: SHIPPED | OUTPATIENT
Start: 2022-03-09

## 2022-03-09 RX ORDER — PREDNISONE 20 MG/1
40 TABLET ORAL
Status: DISCONTINUED | OUTPATIENT
Start: 2022-03-09 | End: 2022-03-09 | Stop reason: HOSPADM

## 2022-03-09 RX ADMIN — IPRATROPIUM BROMIDE AND ALBUTEROL SULFATE 3 ML: .5; 3 SOLUTION RESPIRATORY (INHALATION) at 01:41

## 2022-03-09 RX ADMIN — SODIUM CHLORIDE, PRESERVATIVE FREE 10 ML: 5 INJECTION INTRAVENOUS at 05:56

## 2022-03-09 RX ADMIN — ASPIRIN 81 MG CHEWABLE TABLET 81 MG: 81 TABLET CHEWABLE at 08:42

## 2022-03-09 RX ADMIN — CEFTRIAXONE SODIUM 1 G: 1 INJECTION, POWDER, FOR SOLUTION INTRAMUSCULAR; INTRAVENOUS at 10:39

## 2022-03-09 RX ADMIN — DOCUSATE SODIUM 100 MG: 100 CAPSULE, LIQUID FILLED ORAL at 08:42

## 2022-03-09 RX ADMIN — PREDNISONE 40 MG: 20 TABLET ORAL at 10:39

## 2022-03-09 RX ADMIN — FAMOTIDINE 10 MG: 20 TABLET, FILM COATED ORAL at 08:42

## 2022-03-09 RX ADMIN — Medication 2000 UNITS: at 08:42

## 2022-03-09 NOTE — PROGRESS NOTES
Problem: Pressure Injury - Risk of  Goal: *Prevention of pressure injury  Description: Document Demarcus Scale and appropriate interventions in the flowsheet. Outcome: Resolved/Met     Problem: Patient Education: Go to Patient Education Activity  Goal: Patient/Family Education  Outcome: Resolved/Met     Problem: Falls - Risk of  Goal: *Absence of Falls  Description: Document Deborah Fall Risk and appropriate interventions in the flowsheet.   Outcome: Resolved/Met     Problem: Patient Education: Go to Patient Education Activity  Goal: Patient/Family Education  Outcome: Resolved/Met

## 2022-03-09 NOTE — ANTIMICROBIAL STEWARDSHIP
The Antimicrobial Stewardship Team has reviewed current therapy. Patient is on Rocephin for COPD exacerbation. There are no infiltrates on chest CXR. Consider d/c Rocephin unless there is a strong suspicion of infectious source. Rocephin puts the patient at increased risk for developing resistance, along with C.difficile.

## 2022-03-09 NOTE — PROGRESS NOTES
Patient reported having some heartburn this morning. Dr. Delano Arreaga paged and notified, and telephone verbal order received for Lila.

## 2022-03-09 NOTE — PROGRESS NOTES
PT eval order received and acknowledged. Pt screened and is currently presenting at baseline I for all functional mobility/transfers. Pt noted to be amb in hallway without assistance. PT evaluation order will be discontinued at this time as pt has no acute PT needs. Please reorder PT if pt functional status changes. Thank you.

## 2022-03-09 NOTE — PROGRESS NOTES
CM noted discharge order. CM spoke with patient's daughter and she gave CM number of patient's niece, Nic Hansen. CM spoke with Nic Hansen. She will be by to  patient around 2:30. Primary RN made aware. Patient's daughter is contacting patient's personal care aide to make them aware of what time to meet the patient at home. Discharge plan of care/case management plan validated with provider discharge order. Medicare pt has received, reviewed, and signed 2nd IM letter informing them of their right to appeal the discharge. Signed copied has been placed on pt bedside chart.     Patient's copy of IMM sent with patient's discharge paperwork home per daughter's request.

## 2022-03-09 NOTE — DISCHARGE SUMMARY
Hospitalist Discharge Summary     Patient ID:    Rhona Farley  [id number]  89 y.o.  6/3/1929    Admit date: 3/6/2022    Discharge date : 3/9/2022    Chronic Diagnoses:    Problem List as of 3/9/2022 Date Reviewed: 3/6/2022          Codes Class Noted - Resolved    Acute bronchitis ICD-10-CM: J20.9  ICD-9-CM: 466.0  3/9/2022 - Present        * (Principal) COPD with acute exacerbation ([facility] ICD-10-CM: J44.1  ICD-9-CM: 491.21  3/6/2022 - Present        Elevated troponin ICD-10-CM: R77.8  ICD-9-CM: 790.6  2/20/2022 - Present        Respiratory failure ([facility] ICD-10-CM: J96.90  ICD-9-CM: 518.81  1/2/2022 - Present          22    Final Diagnoses:   Principal Problem:    COPD with acute exacerbation ([facility].) (3/6/2022)    Active Problems:    Acute bronchitis (3/9/2022)      Hospital Course:   Robby Mckeon is a 80year-old male with a PMHx of heart failure, COPD, chronic hypoxia on 2 L at home, hypertension and mild dementia who presents to the emergency room after cartaker found him with significant respiratory distress. EMS found pt with significant respiratory distress with tachypnea but he was able to communicate and able to walk to the ambulance. Joon Flock found with significant wheezing and coughing. In the ED, initially placed on 4 L nasal cannula. He was given nebulizer treatments with some improvement. Chest x-ray negative for consolidation or acute infiltrates. CBC and CMP unremarkable. Started on scheduled duonebs, IV solu-medrole, and IV ceftriaxone empirically. Scheduled Pulmicort inhaler. Weaned to baseline 2 L nasal cannula. Significant improvement in wheezing. Patient has 24/7 caregiver in an independent living apartment at [facility] and will return at discharge. Close outpatient follow-up with PCP and Pulmonary.     Discharge Medications:   Current Discharge Medication List      START taking these medications    Details   cholecalciferol (VITAMIN D3) (1000 Units /25 mcg) tablet Take 2 Tablets by mouth daily. Qty: 30 Tablet, Refills: 1  Start date: 3/9/2022      predniSONE (DELTASONE) 20 mg tablet Take 20 mg by mouth two (2) times a day for 3 days, THEN 20 mg daily for 3 days. Qty: 9 Tablet, Refills: 0  Start date: 3/9/2022, End date: 3/15/2022      budesonide-formoteroL (Symbicort) 160-4.5 mcg/actuation HFAA Take 2 Puffs by inhalation two (2) times a day. Qty: 10.2 g, Refills: 1  Start date: 3/9/2022      amoxicillin-clavulanate (Augmentin) 875-125 mg per tablet Take 1 Tablet by mouth every twelve (12) hours for 5 days. Qty: 10 Tablet, Refills: 0  Start date: 3/9/2022, End date: 3/14/2022      albuterol (ProAir HFA) 90 mcg/actuation inhaler Take 2 Puffs by inhalation every six (6) hours as needed for Wheezing or Shortness of Breath. Qty: 18 g, Refills: 1  Start date: 3/9/2022         CONTINUE these medications which have NOT CHANGED    Details   aspirin 81 mg chewable tablet Take 1 Tablet by mouth daily. Qty: 30 Tablet, Refills: 0      QUEtiapine (SEROquel) 25 mg tablet Take 0.5 Tablets by mouth nightly. Qty: 30 Tablet, Refills: 0      famotidine (PEPCID) 10 mg tablet Take 1 Tablet by mouth nightly as needed for Heartburn. Qty: 15 Tablet, Refills: 0               Follow up Care:    1. Unknown (Inactive) in 1-2 weeks. Follow-up Information     Follow up With Specialties Details Why Contact Info    Your PCP  Schedule an appointment as soon as possible for a visit in 1 week Hospital follow-up     Malinda Faria DO Pulmonary Critical Care Schedule an appointment as soon as possible for a visit in 2 weeks Hospital follow-up acute bronchitis/COPD exacerbation [address] [address]  [phone]      [address] [facility]  As needed, If symptoms worsen [address] [facility]  [phone]            Patient Follow Up Instructions:    Activity: Activity as tolerated  Diet:  Cardiac Diet  Wound care: None    Condition at Discharge: Stable  __________________________________________________________________    Disposition  Home or Self Care  ____________________________________________________________________    Code Status:  Full Code  ___________________________________________________________________    Discharge Exam:  Patient seen and examined by me on discharge day. Pertinent Findings:    Gen:    Not in distress   Skin: No rashes or lesions. Warm, dry, intact. Chest: Symmetric chest wall expansion. Decreased air entry bases. No wheezing, rhonchi, or rales. On baseline 2L. CVS:   Regular rate and rhythm. +S1/S2. No murmur or gallop. No edema  Abd:  Soft, not distended, not tender. Active bowel sounds. Neuro:  Alert and oriented. Baseline senile dementia. CN II-XII grossly intact. Psych: Mood appropriate    CONSULTATIONS: None    Significant Diagnostic Studies:   Recent Results (from the past 24 hour(s))   CBC WITH AUTOMATED DIFF    Collection Time: 03/08/22 11:26 AM   Result Value Ref Range    WBC 12.3 (H) 4.1 - 11.1 K/uL    RBC 3.64 (L) 4.10 - 5.70 M/uL    HGB 12.2 12.1 - 17.0 g/dL    HCT 37.5 36.6 - 50.3 %    .0 (H) 80.0 - 99.0 FL    MCH 33.5 26.0 - 34.0 PG    MCHC 32.5 30.0 - 36.5 g/dL    RDW 13.8 11.5 - 14.5 %    PLATELET 138 031 - 926 K/uL    MPV 9.6 8.9 - 12.9 FL    NRBC 0.0 0.0  WBC    ABSOLUTE NRBC 0.00 0.00 - 0.01 K/uL    NEUTROPHILS 80 (H) 32 - 75 %    LYMPHOCYTES 16 12 - 49 %    MONOCYTES 4 (L) 5 - 13 %    EOSINOPHILS 0 0 - 7 %    BASOPHILS 0 0 - 1 %    IMMATURE GRANULOCYTES 0 %    ABS. NEUTROPHILS 9.8 (H) 1.8 - 8.0 K/UL    ABS. LYMPHOCYTES 2.0 0.8 - 3.5 K/UL    ABS. MONOCYTES 0.5 0.0 - 1.0 K/UL    ABS. EOSINOPHILS 0.0 0.0 - 0.4 K/UL    ABS. BASOPHILS 0.0 0.0 - 0.1 K/UL    ABS. IMM.  GRANS. 0.0 K/UL    DF Manual      RBC COMMENTS Macrocytosis  1+       METABOLIC PANEL, COMPREHENSIVE    Collection Time: 03/08/22 11:26 AM   Result Value Ref Range    Sodium 140 136 - 145 mmol/L    Potassium 4.2 3.5 - 5.1 mmol/L Chloride 107 97 - 108 mmol/L    CO2 28 21 - 32 mmol/L    Anion gap 5 5 - 15 mmol/L    Glucose 107 (H) 65 - 100 mg/dL    BUN 28 (H) 6 - 20 mg/dL    Creatinine 1.09 0.70 - 1.30 mg/dL    BUN/Creatinine ratio 26 (H) 12 - 20      GFR est AA >60 >60 ml/min/1.73m2    GFR est non-AA >60 >60 ml/min/1.73m2    Calcium 8.1 (L) 8.5 - 10.1 mg/dL    Bilirubin, total 0.4 0.2 - 1.0 mg/dL    AST (SGOT) 17 15 - 37 U/L    ALT (SGPT) 14 12 - 78 U/L    Alk. phosphatase 47 45 - 117 U/L    Protein, total 5.8 (L) 6.4 - 8.2 g/dL    Albumin 2.9 (L) 3.5 - 5.0 g/dL    Globulin 2.9 2.0 - 4.0 g/dL    A-G Ratio 1.0 (L) 1.1 - 2.2       XR CHEST PORT   Final Result      No consolidation in the lungs. Follow-up as clinically indicated.               Signed:  Myrna Miles PA-C  3/9/2022  8:31 AM

## 2022-03-09 NOTE — PROGRESS NOTES
If you experience chest pain call 911. Do not drive yourself. I have reviewed discharge instructions with the patient and caregiver. The patient and caregiver verbalized understanding. Home or Self Care       Patient and Caretaker educated on new meds and reasons for interventions. Discussed new needs and that he will have 24/7 care at home. Left by wheelchair with caretaker to home.

## 2022-03-28 ENCOUNTER — HOSPITAL ENCOUNTER (EMERGENCY)
Age: 87
Discharge: HOME | End: 2022-03-29
Attending: STUDENT IN AN ORGANIZED HEALTH CARE EDUCATION/TRAINING PROGRAM
Payer: MEDICARE

## 2022-03-28 DIAGNOSIS — K64.9 HEMORRHOIDS, UNSPECIFIED HEMORRHOID TYPE: Primary | ICD-10-CM

## 2022-03-28 DIAGNOSIS — K62.5 RECTAL BLEEDING: ICD-10-CM

## 2022-03-28 PROCEDURE — 99284 EMERGENCY DEPT VISIT MOD MDM: CPT

## 2022-03-29 VITALS
HEART RATE: 88 BPM | RESPIRATION RATE: 20 BRPM | OXYGEN SATURATION: 91 % | TEMPERATURE: 98.3 F | BODY MASS INDEX: 26.03 KG/M2 | HEIGHT: 66 IN | SYSTOLIC BLOOD PRESSURE: 148 MMHG | DIASTOLIC BLOOD PRESSURE: 92 MMHG | WEIGHT: 162 LBS

## 2022-03-29 LAB
BASOPHILS # BLD: 0 K/UL (ref 0–0.1)
BASOPHILS NFR BLD: 1 % (ref 0–1)
DIFFERENTIAL METHOD BLD: ABNORMAL
EOSINOPHIL # BLD: 0.1 K/UL (ref 0–0.4)
EOSINOPHIL NFR BLD: 1 % (ref 0–7)
ERYTHROCYTE [DISTWIDTH] IN BLOOD BY AUTOMATED COUNT: 13.8 % (ref 11.5–14.5)
HCT VFR BLD AUTO: 41.1 % (ref 36.6–50.3)
HGB BLD-MCNC: 12.9 G/DL (ref 12.1–17)
IMM GRANULOCYTES # BLD AUTO: 0 K/UL (ref 0–0.04)
IMM GRANULOCYTES NFR BLD AUTO: 0 % (ref 0–0.5)
LYMPHOCYTES # BLD: 1 K/UL (ref 0.8–3.5)
LYMPHOCYTES NFR BLD: 15 % (ref 12–49)
MCH RBC QN AUTO: 33 PG (ref 26–34)
MCHC RBC AUTO-ENTMCNC: 31.4 G/DL (ref 30–36.5)
MCV RBC AUTO: 105.1 FL (ref 80–99)
MONOCYTES # BLD: 0.8 K/UL (ref 0–1)
MONOCYTES NFR BLD: 12 % (ref 5–13)
NEUTS SEG # BLD: 4.4 K/UL (ref 1.8–8)
NEUTS SEG NFR BLD: 71 % (ref 32–75)
NRBC # BLD: 0 K/UL (ref 0–0.01)
NRBC BLD-RTO: 0 PER 100 WBC
PLATELET # BLD AUTO: 203 K/UL (ref 150–400)
PMV BLD AUTO: 9.7 FL (ref 8.9–12.9)
RBC # BLD AUTO: 3.91 M/UL (ref 4.1–5.7)
WBC # BLD AUTO: 6.2 K/UL (ref 4.1–11.1)

## 2022-03-29 PROCEDURE — 36415 COLL VENOUS BLD VENIPUNCTURE: CPT

## 2022-03-29 PROCEDURE — 85025 COMPLETE CBC W/AUTO DIFF WBC: CPT

## 2022-03-29 NOTE — ED PROVIDER NOTES
EMERGENCY DEPARTMENT HISTORY AND PHYSICAL EXAM      Date: 3/28/2022  Patient Name: Kumar Nevarez      History of Presenting Illness     Chief Complaint   Patient presents with    Rectal Bleeding       History Provided By: Patient    HPI: Kumar Nevarez, 80 y.o. male with PMH of HTN, COPD, CHF, and prostate cancer presents to the ED with rectal eating. Nursing aide taking care of the patient noted that he has some bleeding when they were changing him and they took [facility].  Laboratory work-up done that stable hemoglobin without leukocytosis and much unremarkable work-up. No known past medical history of hemorrhoids or GI bleed. Currently, patient does not have any other symptoms. There are no other complaints, changes, or physical findings at this time. PCP: Unknown, Provider, DPM    Current Outpatient Medications   Medication Sig Dispense Refill    cholecalciferol (VITAMIN D3) (1000 Units /25 mcg) tablet Take 2 Tablets by mouth daily. 30 Tablet 1    budesonide-formoteroL (Symbicort) 160-4.5 mcg/actuation HFAA Take 2 Puffs by inhalation two (2) times a day. 10.2 g 1    albuterol (ProAir HFA) 90 mcg/actuation inhaler Take 2 Puffs by inhalation every six (6) hours as needed for Wheezing or Shortness of Breath. 18 g 1    aspirin 81 mg chewable tablet Take 1 Tablet by mouth daily. 30 Tablet 0    QUEtiapine (SEROquel) 25 mg tablet Take 0.5 Tablets by mouth nightly. 30 Tablet 0    famotidine (PEPCID) 10 mg tablet Take 1 Tablet by mouth nightly as needed for Heartburn. 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     Family History:  Family History   Family history unknown: Yes       Social History:  Social History Tobacco Use    Smoking status: Former Smoker    Smokeless tobacco: Never Used   Substance Use Topics    Alcohol use: Never    Drug use: Never       Allergies:  No Known Allergies      Review of Systems     Review of Systems    A 10 point review of system was performed and was negative except as noted above HPI    Physical Exam     Physical Exam  Vitals and nursing note reviewed. Exam conducted with a chaperone present. Constitutional:       General: He is not in acute distress. Appearance: He is not ill-appearing, toxic-appearing or diaphoretic. HENT:      Head: Normocephalic and atraumatic. Mouth/Throat:      Mouth: Mucous membranes are moist.      Pharynx: Oropharynx is clear. Eyes:      Extraocular Movements: Extraocular movements intact. Pupils: Pupils are equal, round, and reactive to light. Cardiovascular:      Rate and Rhythm: Normal rate and regular rhythm. Pulmonary:      Effort: Pulmonary effort is normal.      Breath sounds: Normal breath sounds. Abdominal:      Palpations: Abdomen is soft. Tenderness: There is no abdominal tenderness. Genitourinary:     Rectum: External hemorrhoid and internal hemorrhoid present. No tenderness or anal fissure. Normal anal tone. Skin:     General: Skin is warm and dry. Neurological:      Mental Status: He is alert and oriented to person, place, and time.          Lab and Diagnostic Study Results     Labs -     Recent Results (from the past 12 hour(s))   CBC WITH AUTOMATED DIFF    Collection Time: 03/29/22 12:30 AM   Result Value Ref Range    WBC 6.2 4.1 - 11.1 K/uL    RBC 3.91 (L) 4.10 - 5.70 M/uL    HGB 12.9 12.1 - 17.0 g/dL    HCT 41.1 36.6 - 50.3 %    .1 (H) 80.0 - 99.0 FL    MCH 33.0 26.0 - 34.0 PG    MCHC 31.4 30.0 - 36.5 g/dL    RDW 13.8 11.5 - 14.5 %    PLATELET 095 077 - 341 K/uL    MPV 9.7 8.9 - 12.9 FL    NRBC 0.0 0.0  WBC    ABSOLUTE NRBC 0.00 0.00 - 0.01 K/uL    NEUTROPHILS 71 32 - 75 %    LYMPHOCYTES 15 12 - 49 %    MONOCYTES 12 5 - 13 %    EOSINOPHILS 1 0 - 7 %    BASOPHILS 1 0 - 1 %    IMMATURE GRANULOCYTES 0 0 - 0.5 %    ABS. NEUTROPHILS 4.4 1.8 - 8.0 K/UL    ABS. LYMPHOCYTES 1.0 0.8 - 3.5 K/UL    ABS. MONOCYTES 0.8 0.0 - 1.0 K/UL    ABS. EOSINOPHILS 0.1 0.0 - 0.4 K/UL    ABS. BASOPHILS 0.0 0.0 - 0.1 K/UL    ABS. IMM. GRANS. 0.0 0.00 - 0.04 K/UL    DF AUTOMATED         Radiologic Studies -   [unfilled]  CT Results  (Last 48 hours)    None        CXR Results  (Last 48 hours)    None          Medical Decision Making and ED Course   - I am the first and primary provider for this patient AND AM THE PRIMARY PROVIDER OF RECORD. - I reviewed the vital signs, available nursing notes, past medical history, past surgical history, family history and social history. - Initial assessment performed. The patients presenting problems have been discussed, and the staff are in agreement with the care plan formulated and outlined with them. I have encouraged them to ask questions as they arise throughout their visit. Vital Signs-Reviewed the patient's vital signs. Patient Vitals for the past 24 hrs:   Temp Pulse Resp BP SpO2   03/29/22 0140 98.3 °F (36.8 °C) 88 20 (!) 148/92 91 %   03/28/22 2247 98.4 °F (36.9 °C) 91 18 (!) 142/86 92 %       Records Reviewed: Nursing Notes    Provider Notes (Medical Decision Making):     [facility] with concerns for rectal bleed. Examination revealed internal and external hemorrhoids. He did have a normal hemoglobin on patient for a CBC. Reported per nursing aide that recommended patient is that he had small amount of blood noted in his diaper. I did repeat his CBC and appears to be stable. Thus, the probability of large GI bleed is very unlikely. Did not have any episodes of bleeding in the ED. He appears hemodynamically stable. Thus, discharged home to follow-up with his PMD for evaluation.   Anticipatory guidance and return precautions cussed with the patient and nursing aide taking care of him. Disposition     Disposition: Condition stable  DC- Adult Discharges: All of the diagnostic tests were reviewed and questions answered. Diagnosis, care plan and treatment options were discussed. The patient understands the instructions and will follow up as directed. The patients results have been reviewed with them. They have been counseled regarding their diagnosis. The patient and caregiver verbally convey understanding and agreement of the signs, symptoms, diagnosis, treatment and prognosis and additionally agrees to follow up as recommended with their PCP in 24 - 48 hours. They also agree with the care-plan and convey that all of their questions have been answered. I have also put together some discharge instructions for them that include: 1) educational information regarding their diagnosis, 2) how to care for their diagnosis at home, as well a 3) list of reasons why they would want to return to the ED prior to their follow-up appointment, should their condition change. Discharged      DISCHARGE PLAN:  1. Current Discharge Medication List      CONTINUE these medications which have NOT CHANGED    Details   cholecalciferol (VITAMIN D3) (1000 Units /25 mcg) tablet Take 2 Tablets by mouth daily. Qty: 30 Tablet, Refills: 1      budesonide-formoteroL (Symbicort) 160-4.5 mcg/actuation HFAA Take 2 Puffs by inhalation two (2) times a day. Qty: 10.2 g, Refills: 1      albuterol (ProAir HFA) 90 mcg/actuation inhaler Take 2 Puffs by inhalation every six (6) hours as needed for Wheezing or Shortness of Breath. Qty: 18 g, Refills: 1      aspirin 81 mg chewable tablet Take 1 Tablet by mouth daily. Qty: 30 Tablet, Refills: 0      QUEtiapine (SEROquel) 25 mg tablet Take 0.5 Tablets by mouth nightly. Qty: 30 Tablet, Refills: 0      famotidine (PEPCID) 10 mg tablet Take 1 Tablet by mouth nightly as needed for Heartburn.   Qty: 15 Tablet, Refills: 0 2.   Follow-up Information     Follow up With Specialties Details Why [address] [facility] EMERGENCY DEPT Emergency Medicine Go to  As needed, If symptoms worsen [address] [address]  [phone]    Primary Care Doctor  Schedule an appointment as soon as possible for a visit  For reevaluation, Discuss your visit to the ER         3. Return to ED if worse   4. Current Discharge Medication List          Diagnosis     Clinical Impression:   1. Hemorrhoids, unspecified hemorrhoid type    2. Rectal bleeding        Attestations: Brent Pabon MD    Please note that this dictation was completed with WAY[employer], the computer voice recognition software. Quite often unanticipated grammatical, syntax, homophones, and other interpretive errors are inadvertently transcribed by the computer software. Please disregard these errors. Please excuse any errors that have escaped final proofreading. Thank you.

## 2022-03-29 NOTE — ED NOTES
Pt seen in ED for    Chief Complaint   Patient presents with    Rectal Bleeding         1. Hemorrhoids, unspecified hemorrhoid type    2. Rectal bleeding           Reviewed discharge instructions with pt to include medications, cautions/side effects, follow-up with PCP or specialist as advised, and access to MyChart. Pt alert, oriented to baseline and able to make decisions and be discharged via their choice of transportation. Pt verbalized understanding of discharge instructions. Pt transferred to w/c by caregiver; accompanied by caregiver ; discharged with (0) e-scripts/prescriptions and a copy of discharge instructions.

## 2022-03-29 NOTE — ED TRIAGE NOTES
Pt resides at home with caregivers , pt reports bright red rectal bleeding today, pt takes asa daily, hard stool noted earlier, no vomiting, denies abdominal pain, hx copd, pt on o2 at 2 liters nasal cannula

## 2022-03-29 NOTE — DISCHARGE INSTRUCTIONS
Thank you! Thank you for allowing me to care for you in the emergency department. I sincerely hope that you are satisfied with your visit today. It is my goal to provide you with excellent care. Below you will find a list of your labs and imaging from your visit today. Should you have any questions regarding these results please do not hesitate to call the emergency department. Labs -     Recent Results (from the past 12 hour(s))   CBC WITH AUTOMATED DIFF    Collection Time: 03/29/22 12:30 AM   Result Value Ref Range    WBC 6.2 4.1 - 11.1 K/uL    RBC 3.91 (L) 4.10 - 5.70 M/uL    HGB 12.9 12.1 - 17.0 g/dL    HCT 41.1 36.6 - 50.3 %    .1 (H) 80.0 - 99.0 FL    MCH 33.0 26.0 - 34.0 PG    MCHC 31.4 30.0 - 36.5 g/dL    RDW 13.8 11.5 - 14.5 %    PLATELET 335 605 - 122 K/uL    MPV 9.7 8.9 - 12.9 FL    NRBC 0.0 0.0  WBC    ABSOLUTE NRBC 0.00 0.00 - 0.01 K/uL    NEUTROPHILS 71 32 - 75 %    LYMPHOCYTES 15 12 - 49 %    MONOCYTES 12 5 - 13 %    EOSINOPHILS 1 0 - 7 %    BASOPHILS 1 0 - 1 %    IMMATURE GRANULOCYTES 0 0 - 0.5 %    ABS. NEUTROPHILS 4.4 1.8 - 8.0 K/UL    ABS. LYMPHOCYTES 1.0 0.8 - 3.5 K/UL    ABS. MONOCYTES 0.8 0.0 - 1.0 K/UL    ABS. EOSINOPHILS 0.1 0.0 - 0.4 K/UL    ABS. BASOPHILS 0.0 0.0 - 0.1 K/UL    ABS. IMM. GRANS. 0.0 0.00 - 0.04 K/UL    DF AUTOMATED         Radiologic Studies -   No orders to display     CT Results  (Last 48 hours)      None          CXR Results  (Last 48 hours)      None               If you feel that you have not received excellent quality care or timely care, please ask to speak to the nurse manager. Please choose us in the future for your continued health care needs. ------------------------------------------------------------------------------------------------------------  The exam and treatment you received in the Emergency Department were for an urgent problem and are not intended as complete care.  It is important that you follow-up with a doctor, nurse practitioner, or physician assistant to:  (1) confirm your diagnosis,  (2) re-evaluation of changes in your illness and treatment, and  (3) for ongoing care. If your symptoms become worse or you do not improve as expected and you are unable to reach your usual health care provider, you should return to the Emergency Department. We are available 24 hours a day. Please take your discharge instructions with you when you go to your follow-up appointment. If you have any problem arranging a follow-up appointment, contact the Emergency Department immediately. If a prescription has been provided, please have it filled as soon as possible to prevent a delay in treatment. Read the entire medication instruction sheet provided to you by the pharmacy. If you have any questions or reservations about taking the medication due to side effects or interactions with other medications, please call your primary care physician or contact the ER to speak with the charge nurse. Make an appointment with your family doctor or the physician you were referred to for follow-up of this visit as instructed on your discharge paperwork, as this is a mandatory follow-up. Return to the ER if you are unable to be seen or if you are unable to be seen in a timely manner. If you have any problem arranging the follow-up visit, contact the Emergency Department immediately.

## 2022-06-05 ENCOUNTER — HOSPITAL ENCOUNTER (EMERGENCY)
Age: 87
Discharge: HOME | End: 2022-06-05
Attending: STUDENT IN AN ORGANIZED HEALTH CARE EDUCATION/TRAINING PROGRAM
Payer: MEDICARE

## 2022-06-05 ENCOUNTER — APPOINTMENT (OUTPATIENT)
Dept: NON INVASIVE DIAGNOSTICS | Age: 87
End: 2022-06-05
Attending: NURSE PRACTITIONER
Payer: MEDICARE

## 2022-06-05 ENCOUNTER — APPOINTMENT (OUTPATIENT)
Dept: CT IMAGING | Age: 87
End: 2022-06-05
Attending: NURSE PRACTITIONER
Payer: MEDICARE

## 2022-06-05 ENCOUNTER — APPOINTMENT (OUTPATIENT)
Dept: GENERAL RADIOLOGY | Age: 87
End: 2022-06-05
Attending: NURSE PRACTITIONER
Payer: MEDICARE

## 2022-06-05 VITALS
SYSTOLIC BLOOD PRESSURE: 129 MMHG | HEIGHT: 64 IN | BODY MASS INDEX: 29.88 KG/M2 | DIASTOLIC BLOOD PRESSURE: 83 MMHG | HEART RATE: 64 BPM | OXYGEN SATURATION: 97 % | WEIGHT: 175 LBS | RESPIRATION RATE: 30 BRPM | TEMPERATURE: 98.2 F

## 2022-06-05 DIAGNOSIS — R79.89 ELEVATED D-DIMER: ICD-10-CM

## 2022-06-05 DIAGNOSIS — R60.0 PEDAL EDEMA: Primary | ICD-10-CM

## 2022-06-05 PROBLEM — Z99.81 HISTORY OF HOME OXYGEN THERAPY: Status: ACTIVE | Noted: 2022-06-05

## 2022-06-05 PROBLEM — I50.9 CHF (CONGESTIVE HEART FAILURE) (HCC): Status: ACTIVE | Noted: 2022-06-05

## 2022-06-05 LAB
ALBUMIN SERPL-MCNC: 3.4 G/DL (ref 3.5–5)
ALBUMIN/GLOB SERPL: 1.1 {RATIO} (ref 1.1–2.2)
ALP SERPL-CCNC: 68 U/L (ref 45–117)
ALT SERPL-CCNC: 17 U/L (ref 12–78)
ANION GAP SERPL CALC-SCNC: 4 MMOL/L (ref 5–15)
AST SERPL W P-5'-P-CCNC: 23 U/L (ref 15–37)
ATRIAL RATE: 66 BPM
BASOPHILS # BLD: 0 K/UL (ref 0–0.1)
BASOPHILS NFR BLD: 1 % (ref 0–1)
BILIRUB SERPL-MCNC: 0.7 MG/DL (ref 0.2–1)
BNP SERPL-MCNC: 284 PG/ML
BUN SERPL-MCNC: 34 MG/DL (ref 6–20)
BUN/CREAT SERPL: 26 (ref 12–20)
CA-I BLD-MCNC: 8 MG/DL (ref 8.5–10.1)
CALCULATED P AXIS, ECG09: 47 DEGREES
CALCULATED R AXIS, ECG10: -82 DEGREES
CALCULATED T AXIS, ECG11: 38 DEGREES
CHLORIDE SERPL-SCNC: 103 MMOL/L (ref 97–108)
CO2 SERPL-SCNC: 34 MMOL/L (ref 21–32)
CREAT SERPL-MCNC: 1.33 MG/DL (ref 0.7–1.3)
D DIMER PPP FEU-MCNC: 1.29 UG/ML(FEU)
DIAGNOSIS, 93000: NORMAL
DIFFERENTIAL METHOD BLD: ABNORMAL
EOSINOPHIL # BLD: 0.1 K/UL (ref 0–0.4)
EOSINOPHIL NFR BLD: 2 % (ref 0–7)
ERYTHROCYTE [DISTWIDTH] IN BLOOD BY AUTOMATED COUNT: 13.2 % (ref 11.5–14.5)
GLOBULIN SER CALC-MCNC: 3.2 G/DL (ref 2–4)
GLUCOSE SERPL-MCNC: 74 MG/DL (ref 65–100)
HCT VFR BLD AUTO: 41.1 % (ref 36.6–50.3)
HGB BLD-MCNC: 13 G/DL (ref 12.1–17)
IMM GRANULOCYTES # BLD AUTO: 0 K/UL (ref 0–0.04)
IMM GRANULOCYTES NFR BLD AUTO: 0 % (ref 0–0.5)
LYMPHOCYTES # BLD: 1 K/UL (ref 0.8–3.5)
LYMPHOCYTES NFR BLD: 16 % (ref 12–49)
MCH RBC QN AUTO: 32.9 PG (ref 26–34)
MCHC RBC AUTO-ENTMCNC: 31.6 G/DL (ref 30–36.5)
MCV RBC AUTO: 104.1 FL (ref 80–99)
MONOCYTES # BLD: 0.6 K/UL (ref 0–1)
MONOCYTES NFR BLD: 11 % (ref 5–13)
NEUTS SEG # BLD: 4.2 K/UL (ref 1.8–8)
NEUTS SEG NFR BLD: 70 % (ref 32–75)
NRBC # BLD: 0 K/UL (ref 0–0.01)
NRBC BLD-RTO: 0 PER 100 WBC
P-R INTERVAL, ECG05: 194 MS
PLATELET # BLD AUTO: 203 K/UL (ref 150–400)
PMV BLD AUTO: 9.3 FL (ref 8.9–12.9)
POTASSIUM SERPL-SCNC: 3.8 MMOL/L (ref 3.5–5.1)
PROT SERPL-MCNC: 6.6 G/DL (ref 6.4–8.2)
Q-T INTERVAL, ECG07: 438 MS
QRS DURATION, ECG06: 118 MS
QTC CALCULATION (BEZET), ECG08: 459 MS
RBC # BLD AUTO: 3.95 M/UL (ref 4.1–5.7)
RIGHT POP RFX: 1 S
SODIUM SERPL-SCNC: 141 MMOL/L (ref 136–145)
TROPONIN-HIGH SENSITIVITY: 16 NG/L (ref 0–76)
VENTRICULAR RATE, ECG03: 66 BPM
WBC # BLD AUTO: 5.9 K/UL (ref 4.1–11.1)

## 2022-06-05 PROCEDURE — 85025 COMPLETE CBC W/AUTO DIFF WBC: CPT

## 2022-06-05 PROCEDURE — 74011000636 HC RX REV CODE- 636: Performed by: STUDENT IN AN ORGANIZED HEALTH CARE EDUCATION/TRAINING PROGRAM

## 2022-06-05 PROCEDURE — 71275 CT ANGIOGRAPHY CHEST: CPT

## 2022-06-05 PROCEDURE — 84484 ASSAY OF TROPONIN QUANT: CPT

## 2022-06-05 PROCEDURE — 93970 EXTREMITY STUDY: CPT

## 2022-06-05 PROCEDURE — 93005 ELECTROCARDIOGRAM TRACING: CPT

## 2022-06-05 PROCEDURE — 36415 COLL VENOUS BLD VENIPUNCTURE: CPT

## 2022-06-05 PROCEDURE — 96374 THER/PROPH/DIAG INJ IV PUSH: CPT

## 2022-06-05 PROCEDURE — 74011250636 HC RX REV CODE- 250/636: Performed by: NURSE PRACTITIONER

## 2022-06-05 PROCEDURE — 71045 X-RAY EXAM CHEST 1 VIEW: CPT

## 2022-06-05 PROCEDURE — 80053 COMPREHEN METABOLIC PANEL: CPT

## 2022-06-05 PROCEDURE — 93970 EXTREMITY STUDY: CPT | Performed by: SURGERY

## 2022-06-05 PROCEDURE — 85379 FIBRIN DEGRADATION QUANT: CPT

## 2022-06-05 PROCEDURE — 99285 EMERGENCY DEPT VISIT HI MDM: CPT

## 2022-06-05 PROCEDURE — 83880 ASSAY OF NATRIURETIC PEPTIDE: CPT

## 2022-06-05 RX ORDER — BUMETANIDE 1 MG/1
1 TABLET ORAL DAILY
Qty: 7 TABLET | Refills: 0 | Status: SHIPPED | OUTPATIENT
Start: 2022-06-05 | End: 2022-06-12

## 2022-06-05 RX ORDER — BUMETANIDE 1 MG/1
1 TABLET ORAL DAILY
Qty: 7 TABLET | Refills: 0 | Status: SHIPPED | OUTPATIENT
Start: 2022-06-05 | End: 2022-06-05 | Stop reason: SDUPTHER

## 2022-06-05 RX ORDER — FUROSEMIDE 10 MG/ML
40 INJECTION INTRAMUSCULAR; INTRAVENOUS
Status: COMPLETED | OUTPATIENT
Start: 2022-06-05 | End: 2022-06-05

## 2022-06-05 RX ADMIN — IOPAMIDOL 100 ML: 755 INJECTION, SOLUTION INTRAVENOUS at 16:22

## 2022-06-05 RX ADMIN — FUROSEMIDE 40 MG: 10 INJECTION, SOLUTION INTRAMUSCULAR; INTRAVENOUS at 10:14

## 2022-06-05 NOTE — ED TRIAGE NOTES
Pt arrives with increased swelling in BLLE that started a few weeks ago. Pt has a hx of CHF on 3L NC  at home.  Denies CP/SOB

## 2022-06-05 NOTE — ED PROVIDER NOTES
EMERGENCY DEPARTMENT HISTORY AND PHYSICAL EXAM      Date: 6/5/2022  Patient Name: Eve Peter      History of Presenting Illness     Chief Complaint   Patient presents with    Leg Swelling       History Provided By: Patient    HPI: Eve Peter, 80 y.o. male with a past medical history significant hypertension and chf, copd,  presents to the ED with cc of bilateral lower leg swelling, Denies chest pain, denies short of breath. Pt is alert and oriented at this time. Pt denies pain except in legs as they feel aching. There are no other complaints, changes, or physical findings at this time. PCP: Unknown, Provider, MD    Current Outpatient Medications   Medication Sig Dispense Refill    bumetanide (BUMEX) 1 mg tablet Take 1 Tablet by mouth daily for 7 days. 7 Tablet 0    cholecalciferol (VITAMIN D3) (1000 Units /25 mcg) tablet Take 2 Tablets by mouth daily. 30 Tablet 1    budesonide-formoteroL (Symbicort) 160-4.5 mcg/actuation HFAA Take 2 Puffs by inhalation two (2) times a day. 10.2 g 1    albuterol (ProAir HFA) 90 mcg/actuation inhaler Take 2 Puffs by inhalation every six (6) hours as needed for Wheezing or Shortness of Breath. 18 g 1    aspirin 81 mg chewable tablet Take 1 Tablet by mouth daily. 30 Tablet 0    QUEtiapine (SEROquel) 25 mg tablet Take 0.5 Tablets by mouth nightly. 30 Tablet 0    famotidine (PEPCID) 10 mg tablet Take 1 Tablet by mouth nightly as needed for Heartburn. 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 No pertinent surgical history.     Family History:  Family History   Family history unknown: Yes       Social History:  Social History     Tobacco Use    Smoking status: Former Smoker    Smokeless tobacco: Never Used   Substance Use Topics    Alcohol use: Never    Drug use: Never       Allergies:  No Known Allergies      Review of Systems     Review of Systems   Constitutional: Negative. Negative for chills, diaphoresis and fever. HENT: Negative. Respiratory: Negative. Negative for chest tightness and shortness of breath. Cardiovascular: Positive for leg swelling. Negative for chest pain and palpitations. Gastrointestinal: Negative. Genitourinary: Negative. Musculoskeletal: Positive for arthralgias and myalgias. Skin: Negative. Neurological: Negative. Psychiatric/Behavioral: Negative. All other systems reviewed and are negative. Physical Exam     Physical Exam  Vitals and nursing note reviewed. Constitutional:       Appearance: Normal appearance. He is normal weight. HENT:      Head: Normocephalic and atraumatic. Eyes:      Extraocular Movements: Extraocular movements intact. Pupils: Pupils are equal, round, and reactive to light. Cardiovascular:      Rate and Rhythm: Normal rate and regular rhythm. Pulses: Normal pulses. Heart sounds: Normal heart sounds. Pulmonary:      Effort: Pulmonary effort is normal. No respiratory distress. Breath sounds: Normal breath sounds. No wheezing, rhonchi or rales. Chest:      Chest wall: No tenderness. Abdominal:      General: Abdomen is flat. Musculoskeletal:         General: Swelling present. No tenderness. Normal range of motion. Right lower leg: Edema present. Left lower leg: Edema present. Skin:     General: Skin is warm and dry. Capillary Refill: Capillary refill takes less than 2 seconds. Findings: No erythema. Neurological:      General: No focal deficit present. Mental Status: He is alert and oriented to person, place, and time. Motor: No weakness.       Gait: Gait normal.   Psychiatric:         Mood and Affect: Mood normal.         Behavior: Behavior normal.         Lab and Diagnostic Study Results     Labs -     No results found for this or any previous visit (from the past 12 hour(s)). Radiologic Studies -   [unfilled]  CT Results  (Last 48 hours)    None        CXR Results  (Last 48 hours)    None          Medical Decision Making and ED Course   - I am the first and primary provider for this patient AND AM THE PRIMARY PROVIDER OF RECORD. - I reviewed the vital signs, available nursing notes, past medical history, past surgical history, family history and social history. - Initial assessment performed. The patients presenting problems have been discussed, and the staff are in agreement with the care plan formulated and outlined with them. I have encouraged them to ask questions as they arise throughout their visit. Vital Signs-Reviewed the patient's vital signs. No data found. EKG interpretation:Normal sinus rhythm   Right bundle branch block   Left anterior fascicular block    Bifascicular block    Abnormal ECG       Confirmed by Kenyetta Larios MD, [facility] ([id number]) on 6/5/2022 10:50:21 PM    Records Reviewed: Nursing Notes and Old Medical Records    The patient presents with leg swelling with a differential diagnosis of pedal edema, congestive heart failure, dvt. Dependant swelling. ED Course:              Provider Notes (Medical Decision Making):     MDM  Number of Diagnoses or Management Options  Elevated d-dimer: new, needed workup  Pedal edema: new, needed workup     Amount and/or Complexity of Data Reviewed  Clinical lab tests: ordered and reviewed  Tests in the radiology section of CPT®: ordered and reviewed    Risk of Complications, Morbidity, and/or Mortality  Presenting problems: minimal  Diagnostic procedures: minimal  Management options: minimal  General comments: +dimer done. Negative venous dopppler. Negative CT scan. Education to elvated legs, compression stockings. Avoid dependant positions of legs that can increase swelling.        Patient Progress  Patient progress: stable             Consultations:       Consultations: - NONE    Case was discussed with Dr Roxi Liu and will include CT for elevated ddimer. Negtaive chest pain negative sob. Procedures and Critical Care       Performed by: Tegan Packer NP  PROCEDURES:none  Procedures         Tegan Packer NP    Decrease salt intake. Compression stockings  Elevated legs frequently. Disposition     Disposition: Condition stable  DC- Adult Discharges: All of the diagnostic tests were reviewed and questions answered. Diagnosis, care plan and treatment options were discussed. The patient understands the instructions and will follow up as directed. The patients results have been reviewed with them. They have been counseled regarding their diagnosis. The patient verbally convey understanding and agreement of the signs, symptoms, diagnosis, treatment and prognosis and additionally agrees to follow up as recommended with their PCP in 24 - 48 hours. They also agree with the care-plan and convey that all of their questions have been answered. I have also put together some discharge instructions for them that include: 1) educational information regarding their diagnosis, 2) how to care for their diagnosis at home, as well a 3) list of reasons why they would want to return to the ED prior to their follow-up appointment, should their condition change. DC-The patient was given verbal follow-up instructions    Discharged    Remove if not discharged  DISCHARGE PLAN:  1. Current Discharge Medication List      CONTINUE these medications which have NOT CHANGED    Details   cholecalciferol (VITAMIN D3) (1000 Units /25 mcg) tablet Take 2 Tablets by mouth daily. Qty: 30 Tablet, Refills: 1      budesonide-formoteroL (Symbicort) 160-4.5 mcg/actuation HFAA Take 2 Puffs by inhalation two (2) times a day.   Qty: 10.2 g, Refills: 1      albuterol (ProAir HFA) 90 mcg/actuation inhaler Take 2 Puffs by inhalation every six (6) hours as needed for Wheezing or Shortness of Breath. Qty: 18 g, Refills: 1      aspirin 81 mg chewable tablet Take 1 Tablet by mouth daily. Qty: 30 Tablet, Refills: 0      QUEtiapine (SEROquel) 25 mg tablet Take 0.5 Tablets by mouth nightly. Qty: 30 Tablet, Refills: 0      famotidine (PEPCID) 10 mg tablet Take 1 Tablet by mouth nightly as needed for Heartburn. Qty: 15 Tablet, Refills: 0           2. Follow-up Information     Follow up With Specialties Details Why Contact Info    Angel Cortes MD Internal Medicine Physician In 2 days  [address] [place] [address] [phone]          3. Return to ED if worse   4. Discharge Medication List as of 6/5/2022  8:11 PM          Diagnosis     Clinical Impression:   1. Pedal edema    2. Elevated d-dimer        Attestations:    Jean-Paul Kidd NP    Please note that this dictation was completed with [employer], the computer voice recognition software. Quite often unanticipated grammatical, syntax, homophones, and other interpretive errors are inadvertently transcribed by the computer software. Please disregard these errors. Please excuse any errors that have escaped final proofreading. Thank you.

## 2022-07-28 ENCOUNTER — APPOINTMENT (OUTPATIENT)
Dept: GENERAL RADIOLOGY | Age: 87
End: 2022-07-28
Attending: FAMILY MEDICINE
Payer: MEDICARE

## 2022-07-28 ENCOUNTER — HOSPITAL ENCOUNTER (OUTPATIENT)
Dept: NON INVASIVE DIAGNOSTICS | Age: 87
Discharge: HOME | End: 2022-07-28
Attending: FAMILY MEDICINE
Payer: MEDICARE

## 2022-07-28 ENCOUNTER — HOSPITAL ENCOUNTER (EMERGENCY)
Age: 87
Discharge: HOME | End: 2022-07-28
Attending: FAMILY MEDICINE
Payer: MEDICARE

## 2022-07-28 VITALS
RESPIRATION RATE: 18 BRPM | HEIGHT: 63 IN | TEMPERATURE: 98.1 F | SYSTOLIC BLOOD PRESSURE: 151 MMHG | OXYGEN SATURATION: 99 % | HEART RATE: 84 BPM | WEIGHT: 180 LBS | BODY MASS INDEX: 31.89 KG/M2 | DIASTOLIC BLOOD PRESSURE: 80 MMHG

## 2022-07-28 DIAGNOSIS — L03.116 CELLULITIS OF LEFT LOWER EXTREMITY: Primary | ICD-10-CM

## 2022-07-28 DIAGNOSIS — R60.9 EDEMA: ICD-10-CM

## 2022-07-28 PROCEDURE — 99284 EMERGENCY DEPT VISIT MOD MDM: CPT

## 2022-07-28 PROCEDURE — 74011250637 HC RX REV CODE- 250/637: Performed by: FAMILY MEDICINE

## 2022-07-28 PROCEDURE — 73590 X-RAY EXAM OF LOWER LEG: CPT

## 2022-07-28 PROCEDURE — 93971 EXTREMITY STUDY: CPT

## 2022-07-28 RX ORDER — SENNOSIDES 8.6 MG/1
CAPSULE, GELATIN COATED ORAL
COMMUNITY

## 2022-07-28 RX ORDER — CEPHALEXIN 500 MG/1
500 CAPSULE ORAL 4 TIMES DAILY
Qty: 28 CAPSULE | Refills: 0 | Status: SHIPPED | OUTPATIENT
Start: 2022-07-28 | End: 2022-08-04

## 2022-07-28 RX ORDER — GUAIFENESIN 200 MG/1
200 TABLET ORAL
COMMUNITY

## 2022-07-28 RX ORDER — CEPHALEXIN 500 MG/1
500 CAPSULE ORAL ONCE
Status: COMPLETED | OUTPATIENT
Start: 2022-07-28 | End: 2022-07-28

## 2022-07-28 RX ORDER — FUROSEMIDE 80 MG/1
TABLET ORAL DAILY
COMMUNITY

## 2022-07-28 RX ORDER — FINASTERIDE 5 MG/1
5 TABLET, FILM COATED ORAL DAILY
COMMUNITY

## 2022-07-28 RX ADMIN — CEPHALEXIN 500 MG: 500 CAPSULE ORAL at 10:34

## 2022-07-28 NOTE — ED TRIAGE NOTES
Pt hit leg on wheelchair about a week ago. Left leg is swollen. Pt states has been able to walk since injurty. Dr. Gregg Marquez pt to ed.

## 2022-07-28 NOTE — ED PROVIDER NOTES
EMERGENCY DEPARTMENT HISTORY AND PHYSICAL EXAM      Date: 7/28/2022  Patient Name: Tiffany Max    History of Presenting Illness     Chief Complaint   Patient presents with    Leg Swelling       History Provided By:     HPI: Tiffany Max, is a very pleasant 80 y.o. male presenting to the ED with a chief complaint of leg swelling. Patient states he struck his left leg in a wheelchair approximately week ago. Since this time the leg has been swollen and red. Mildly painful. He is able to walk. No fevers chills rigors. No posterior calf tenderness. The patient denies any other symptoms at this time. PCP: None    No current facility-administered medications on file prior to encounter. Current Outpatient Medications on File Prior to Encounter   Medication Sig Dispense Refill    finasteride (PROSCAR) 5 mg tablet Take 5 mg by mouth in the morning. furosemide (LASIX) 80 mg tablet Take  by mouth daily. sennosides (Senna) 8.6 mg cap Take  by mouth.      guaiFENesin (ORGANIDIN) 200 mg tablet Take 200 mg by mouth every four (4) hours as needed for Congestion. cholecalciferol (VITAMIN D3) (1000 Units /25 mcg) tablet Take 2 Tablets by mouth daily. 30 Tablet 1    budesonide-formoteroL (Symbicort) 160-4.5 mcg/actuation HFAA Take 2 Puffs by inhalation two (2) times a day. 10.2 g 1    albuterol (ProAir HFA) 90 mcg/actuation inhaler Take 2 Puffs by inhalation every six (6) hours as needed for Wheezing or Shortness of Breath. 18 g 1    aspirin 81 mg chewable tablet Take 1 Tablet by mouth daily. 30 Tablet 0    QUEtiapine (SEROquel) 25 mg tablet Take 0.5 Tablets by mouth nightly. 30 Tablet 0    famotidine (PEPCID) 10 mg tablet Take 1 Tablet by mouth nightly as needed for Heartburn.  (Patient not taking: Reported on 7/28/2022) 15 Tablet 0       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    CHF (congestive heart failure) ([facility])     COPD (chronic obstructive pulmonary disease) (HCC)     HTN (hypertension)     Prostate CA (HCC)        Past Surgical History:  Past Surgical History:   Procedure Laterality Date    HX ORTHOPAEDIC      neck       Family History:  Family History   Family history unknown: Yes       Social History:  Social History     Tobacco Use    Smoking status: Former    Smokeless tobacco: Never   Vaping Use    Vaping Use: Never used   Substance Use Topics    Alcohol use: Never    Drug use: Never       Allergies:  No Known Allergies      Review of Systems     Review of Systems   Constitutional:  Negative for activity change, appetite change, chills, fatigue and fever. HENT:  Negative for congestion and sore throat. Eyes:  Negative for photophobia and visual disturbance. Respiratory:  Negative for cough, shortness of breath and wheezing. Cardiovascular:  Negative for chest pain, palpitations and leg swelling. Gastrointestinal:  Negative for abdominal pain, diarrhea, nausea and vomiting. Endocrine: Negative for cold intolerance and heat intolerance. Musculoskeletal:  Negative for gait problem and joint swelling. Skin:  Positive for color change. Negative for rash. Neurological:  Negative for dizziness and headaches. Physical Exam     Physical Exam  Constitutional:       General: He is not in acute distress. Appearance: Normal appearance. He is not toxic-appearing. HENT:      Head: Normocephalic and atraumatic. Right Ear: External ear normal.      Left Ear: External ear normal.      Mouth/Throat:      Mouth: Mucous membranes are moist.      Pharynx: Oropharynx is clear. Eyes:      Extraocular Movements: Extraocular movements intact. Conjunctiva/sclera: Conjunctivae normal.   Cardiovascular:      Rate and Rhythm: Normal rate and regular rhythm. Pulses: Normal pulses. Heart sounds: Normal heart sounds. Pulmonary:      Effort: Pulmonary effort is normal. No respiratory distress.       Breath sounds: Normal breath sounds. No wheezing, rhonchi or rales. Abdominal:      General: There is no distension. Palpations: Abdomen is soft. Tenderness: There is no abdominal tenderness. There is no guarding or rebound. Musculoskeletal:         General: No deformity. Cervical back: Normal range of motion and neck supple. Right lower leg: No edema. Left lower leg: No edema. Skin:     Capillary Refill: Capillary refill takes less than 2 seconds. Findings: No erythema or rash. Comments: Erythema lateral aspect of the left leg. Bilateral edema, worse left lower extremity. No posterior calf tenderness with palpation. Posterior tibial and dorsalis pedis pulses 2+ bilaterally. Neurological:      General: No focal deficit present. Mental Status: He is alert and oriented to person, place, and time. Gait: Gait normal.   Psychiatric:         Mood and Affect: Mood normal.         Behavior: Behavior normal.       Lab and Diagnostic Study Results     Labs -   No results found for this or any previous visit (from the past 12 hour(s)). Radiologic Studies -   @lastxrresult@  CT Results  (Last 48 hours)      None          CXR Results  (Last 48 hours)      None              Medical Decision Making   - I am the first provider for this patient. - I reviewed the vital signs, available nursing notes, past medical history, past surgical history, family history and social history. - Initial assessment performed. The patients presenting problems have been discussed, and they are in agreement with the care plan formulated and outlined with them. I have encouraged them to ask questions as they arise throughout their visit. Vital Signs-Reviewed the patient's vital signs.   Patient Vitals for the past 12 hrs:   Temp Pulse Resp BP SpO2   07/28/22 1015 98.1 °F (36.7 °C) 84 18 (!) 151/80 99 %         ED Course/ Provider Notes (Medical Decision Making):     Patient presented to the emergency department with the aforementioned chief complaint. On examination the patient is nontoxic. Vitals were reviewed per above. Patient afebrile. Findings consistent with likely cellulitis. Given unilateral lower extremity swelling, Doppler study prescription also provided to patient. No Doppler capabilities at this facility. Patient to go to [facility] now to have the study performed. Keflex prescribed for cellulitis. Aurelia Morales was given a thorough list of signs and symptoms that would warrant an immediate return to the emergency department. Otherwise Aurelia Morales will follow up with PCP. Procedures   Medical Decision Makingedical Decision Making  Performed by: Stella Webster,   Procedures  None       Disposition   Disposition:     Home     All of the diagnostic tests were reviewed and questions answered. Diagnosis, care plan and treatment options were discussed. The patient understands the instructions and will follow up as directed. The patients results have been reviewed with them. They have been counseled regarding their diagnosis. The patient verbally convey understanding and agreement of the signs, symptoms, diagnosis, treatment and prognosis and additionally agrees to follow up as recommended with their PCP in 24 - 48 hours. They also agree with the care-plan and convey that all of their questions have been answered. I have also put together some discharge instructions for them that include: 1) educational information regarding their diagnosis, 2) how to care for their diagnosis at home, as well a 3) list of reasons why they would want to return to the ED prior to their follow-up appointment, should their condition change. DISCHARGE PLAN:    1. Cannot display discharge medications since this patient is not currently admitted.         2.   Follow-up Information       Follow up With Specialties Details Why Contact Info    Your primary care doctor Schedule an appointment as soon as possible for a visit in 2 days      Please go to [facility] for your Doppler study  Call                 3.  Return to ED if worse       4. Discharge Medication List as of 7/28/2022 12:01 PM        START taking these medications    Details   cephALEXin (Keflex) 500 mg capsule Take 1 Capsule by mouth four (4) times daily for 7 days. , Normal, Disp-28 Capsule, R-0           CONTINUE these medications which have NOT CHANGED    Details   finasteride (PROSCAR) 5 mg tablet Take 5 mg by mouth in the morning., Historical Med      furosemide (LASIX) 80 mg tablet Take  by mouth daily. , Historical Med      sennosides (Senna) 8.6 mg cap Take  by mouth., Historical Med      guaiFENesin (ORGANIDIN) 200 mg tablet Take 200 mg by mouth every four (4) hours as needed for Congestion. , Historical Med      cholecalciferol (VITAMIN D3) (1000 Units /25 mcg) tablet Take 2 Tablets by mouth daily. , Normal, Disp-30 Tablet, R-1      budesonide-formoteroL (Symbicort) 160-4.5 mcg/actuation HFAA Take 2 Puffs by inhalation two (2) times a day., Normal, Disp-10.2 g, R-1      albuterol (ProAir HFA) 90 mcg/actuation inhaler Take 2 Puffs by inhalation every six (6) hours as needed for Wheezing or Shortness of Breath., Normal, Disp-18 g, R-1      aspirin 81 mg chewable tablet Take 1 Tablet by mouth daily. , Normal, Disp-30 Tablet, R-0      QUEtiapine (SEROquel) 25 mg tablet Take 0.5 Tablets by mouth nightly., Normal, Disp-30 Tablet, R-0      famotidine (PEPCID) 10 mg tablet Take 1 Tablet by mouth nightly as needed for Heartburn., Normal, Disp-15 Tablet, R-0               Diagnosis     Clinical Impression:    1. Cellulitis of left lower extremity    2. Edema        Attestations:    Julisa Washington, DO    Please note that this dictation was completed with [employer], the [employer] voice recognition software.   Quite often unanticipated grammatical, syntax, homophones, and other interpretive errors are inadvertently transcribed by the computer software. Please disregard these errors. Please excuse any errors that have escaped final proofreading. Thank you.

## 2022-07-28 NOTE — DISCHARGE INSTRUCTIONS
Thank you! Thank you for allowing me to care for you in the emergency department. It is my goal to provide you with excellent care. If you have not received excellent quality care, please ask to speak to the nurse manager. Please fill out the survey that will come to you by mail or email since we listen to your feedback! Below you will find a list of your tests from today's visit. Should you have any questions, please do not hesitate to call the emergency department. Labs  No results found for this or any previous visit (from the past 12 hour(s)). Radiologic Studies  XR TIB/FIB LT   Final Result   Diffuse soft tissue edema. No acute osseous abnormality. DUPLEX LOWER EXT VENOUS LEFT    (Results Pending)     CT Results  (Last 48 hours)      None          CXR Results  (Last 48 hours)      None          ------------------------------------------------------------------------------------------------------------  The exam and treatment you received in the Emergency Department were for an urgent problem and are not intended as complete care. It is important that you follow-up with a doctor, nurse practitioner, or physician assistant to:  (1) confirm your diagnosis,  (2) re-evaluation of changes in your illness and treatment, and  (3) for ongoing care. Please take your discharge instructions with you when you go to your follow-up appointment. If you have any problem arranging a follow-up appointment, contact the Emergency Department. If your symptoms become worse or you do not improve as expected and you are unable to reach your health care provider, please return to the Emergency Department. We are available 24 hours a day. If a prescription has been provided, please have it filled as soon as possible to prevent a delay in treatment.  If you have any questions or reservations about taking the medication due to side effects or interactions with other medications, please call your primary care provider or contact the ER.

## 2022-11-23 ENCOUNTER — APPOINTMENT (OUTPATIENT)
Dept: CT IMAGING | Age: 87
End: 2022-11-23
Attending: EMERGENCY MEDICINE
Payer: MEDICARE

## 2022-11-23 ENCOUNTER — HOSPITAL ENCOUNTER (INPATIENT)
Age: 87
LOS: 8 days | Discharge: HOME HEALTH | End: 2022-12-02
Attending: EMERGENCY MEDICINE | Admitting: HOSPITALIST
Payer: MEDICARE

## 2022-11-23 DIAGNOSIS — S00.93XA CONTUSION OF HEAD, UNSPECIFIED PART OF HEAD, INITIAL ENCOUNTER: Primary | ICD-10-CM

## 2022-11-23 LAB
BASOPHILS # BLD: 0 K/UL (ref 0–0.1)
BASOPHILS NFR BLD: 0 % (ref 0–1)
DIFFERENTIAL METHOD BLD: ABNORMAL
EOSINOPHIL # BLD: 0 K/UL (ref 0–0.4)
EOSINOPHIL NFR BLD: 0 % (ref 0–7)
ERYTHROCYTE [DISTWIDTH] IN BLOOD BY AUTOMATED COUNT: 13.7 % (ref 11.5–14.5)
HCT VFR BLD AUTO: 42.7 % (ref 36.6–50.3)
HGB BLD-MCNC: 13.9 G/DL (ref 12.1–17)
IMM GRANULOCYTES # BLD AUTO: 0.1 K/UL (ref 0–0.04)
IMM GRANULOCYTES NFR BLD AUTO: 1 % (ref 0–0.5)
LYMPHOCYTES # BLD: 0.8 K/UL (ref 0.8–3.5)
LYMPHOCYTES NFR BLD: 10 % (ref 12–49)
MCH RBC QN AUTO: 32.9 PG (ref 26–34)
MCHC RBC AUTO-ENTMCNC: 32.6 G/DL (ref 30–36.5)
MCV RBC AUTO: 101.2 FL (ref 80–99)
MONOCYTES # BLD: 0.8 K/UL (ref 0–1)
MONOCYTES NFR BLD: 9 % (ref 5–13)
NEUTS SEG # BLD: 6.7 K/UL (ref 1.8–8)
NEUTS SEG NFR BLD: 80 % (ref 32–75)
NRBC # BLD: 0 K/UL (ref 0–0.01)
NRBC BLD-RTO: 0 PER 100 WBC
PLATELET # BLD AUTO: 187 K/UL (ref 150–400)
PMV BLD AUTO: 10.8 FL (ref 8.9–12.9)
RBC # BLD AUTO: 4.22 M/UL (ref 4.1–5.7)
WBC # BLD AUTO: 8.4 K/UL (ref 4.1–11.1)

## 2022-11-23 PROCEDURE — 96376 TX/PRO/DX INJ SAME DRUG ADON: CPT

## 2022-11-23 PROCEDURE — 99285 EMERGENCY DEPT VISIT HI MDM: CPT

## 2022-11-23 PROCEDURE — 96366 THER/PROPH/DIAG IV INF ADDON: CPT

## 2022-11-23 PROCEDURE — 74011000250 HC RX REV CODE- 250: Performed by: EMERGENCY MEDICINE

## 2022-11-23 PROCEDURE — 80053 COMPREHEN METABOLIC PANEL: CPT

## 2022-11-23 PROCEDURE — 93005 ELECTROCARDIOGRAM TRACING: CPT

## 2022-11-23 PROCEDURE — 83605 ASSAY OF LACTIC ACID: CPT

## 2022-11-23 PROCEDURE — 85025 COMPLETE CBC W/AUTO DIFF WBC: CPT

## 2022-11-23 PROCEDURE — 70450 CT HEAD/BRAIN W/O DYE: CPT

## 2022-11-23 PROCEDURE — 81001 URINALYSIS AUTO W/SCOPE: CPT

## 2022-11-23 PROCEDURE — 36415 COLL VENOUS BLD VENIPUNCTURE: CPT

## 2022-11-23 PROCEDURE — 74011000258 HC RX REV CODE- 258: Performed by: EMERGENCY MEDICINE

## 2022-11-23 PROCEDURE — 96365 THER/PROPH/DIAG IV INF INIT: CPT

## 2022-11-23 RX ORDER — SODIUM CHLORIDE 0.9 % (FLUSH) 0.9 %
5-40 SYRINGE (ML) INJECTION EVERY 8 HOURS
Status: DISCONTINUED | OUTPATIENT
Start: 2022-11-23 | End: 2022-11-24

## 2022-11-23 RX ORDER — DILTIAZEM HYDROCHLORIDE 5 MG/ML
10 INJECTION INTRAVENOUS ONCE
Status: COMPLETED | OUTPATIENT
Start: 2022-11-23 | End: 2022-11-23

## 2022-11-23 RX ORDER — ENOXAPARIN SODIUM 100 MG/ML
1 INJECTION SUBCUTANEOUS
Status: COMPLETED | OUTPATIENT
Start: 2022-11-23 | End: 2022-11-24

## 2022-11-23 RX ORDER — SODIUM CHLORIDE 0.9 % (FLUSH) 0.9 %
5-40 SYRINGE (ML) INJECTION AS NEEDED
Status: DISCONTINUED | OUTPATIENT
Start: 2022-11-23 | End: 2022-12-02 | Stop reason: HOSPADM

## 2022-11-23 RX ADMIN — DILTIAZEM HYDROCHLORIDE 2.5 MG/HR: 5 INJECTION, SOLUTION INTRAVENOUS at 23:31

## 2022-11-23 RX ADMIN — DILTIAZEM HYDROCHLORIDE 10 MG: 5 INJECTION INTRAVENOUS at 23:26

## 2022-11-23 NOTE — Clinical Note
Status[de-identified] INPATIENT [101]   Type of Bed: Intensive Care [6]   Inpatient Hospitalization Certified Necessary for the Following Reasons: 3.  Patient receiving treatment that can only be provided in an inpatient setting (further clarification in H&P documentation)   Admitting Diagnosis: Paroxysmal atrial fibrillation with rapid ventricular response [facility]) [[id number]]   Admitting Physician: Hansa Bravo [[id number]]   Attending Physician: Hansa Bravo [[id number]]   Estimated Length of Stay: 2 Midnights   Discharge Plan[de-identified] [facility]

## 2022-11-24 PROBLEM — I48.0 PAROXYSMAL ATRIAL FIBRILLATION WITH RAPID VENTRICULAR RESPONSE (HCC): Status: ACTIVE | Noted: 2022-11-24

## 2022-11-24 PROBLEM — I48.91 ATRIAL FIBRILLATION WITH RVR (HCC): Status: ACTIVE | Noted: 2022-11-24

## 2022-11-24 LAB
ALBUMIN SERPL-MCNC: 2.9 G/DL (ref 3.5–5)
ALBUMIN/GLOB SERPL: 0.7 {RATIO} (ref 1.1–2.2)
ALP SERPL-CCNC: 74 U/L (ref 45–117)
ALT SERPL-CCNC: ABNORMAL U/L (ref 12–78)
ANION GAP SERPL CALC-SCNC: 2 MMOL/L (ref 5–15)
APPEARANCE UR: ABNORMAL
AST SERPL W P-5'-P-CCNC: ABNORMAL U/L (ref 15–37)
BACTERIA URNS QL MICRO: NEGATIVE /HPF
BILIRUB SERPL-MCNC: 1.1 MG/DL (ref 0.2–1)
BILIRUB UR QL: NEGATIVE
BUN SERPL-MCNC: 38 MG/DL (ref 6–20)
BUN/CREAT SERPL: 22 (ref 12–20)
CA-I BLD-MCNC: 7.6 MG/DL (ref 8.5–10.1)
CHLORIDE SERPL-SCNC: 95 MMOL/L (ref 97–108)
CK SERPL-CCNC: 422 U/L (ref 39–308)
CO2 SERPL-SCNC: 33 MMOL/L (ref 21–32)
COLOR UR: ABNORMAL
CREAT SERPL-MCNC: 1.7 MG/DL (ref 0.7–1.3)
FLUAV RNA SPEC QL NAA+PROBE: NOT DETECTED
FLUBV RNA SPEC QL NAA+PROBE: NOT DETECTED
GLOBULIN SER CALC-MCNC: 4.2 G/DL (ref 2–4)
GLUCOSE SERPL-MCNC: 92 MG/DL (ref 65–100)
GLUCOSE UR STRIP.AUTO-MCNC: NEGATIVE MG/DL
HGB UR QL STRIP: NEGATIVE
HYALINE CASTS URNS QL MICRO: ABNORMAL /LPF (ref 0–5)
KETONES UR QL STRIP.AUTO: NEGATIVE MG/DL
LACTATE SERPL-SCNC: 2.6 MMOL/L (ref 0.4–2)
LEUKOCYTE ESTERASE UR QL STRIP.AUTO: ABNORMAL
MAGNESIUM SERPL-MCNC: 2.2 MG/DL (ref 1.6–2.4)
MUCOUS THREADS URNS QL MICRO: NEGATIVE /LPF
NITRITE UR QL STRIP.AUTO: NEGATIVE
PH UR STRIP: 5 [PH] (ref 5–8)
POTASSIUM SERPL-SCNC: 4.1 MMOL/L (ref 3.5–5.1)
POTASSIUM SERPL-SCNC: ABNORMAL MMOL/L (ref 3.5–5.1)
PROT SERPL-MCNC: 7.1 G/DL (ref 6.4–8.2)
PROT UR STRIP-MCNC: NEGATIVE MG/DL
RBC #/AREA URNS HPF: ABNORMAL /HPF (ref 0–5)
SARS-COV-2, COV2: NOT DETECTED
SODIUM SERPL-SCNC: 130 MMOL/L (ref 136–145)
SP GR UR REFRACTOMETRY: 1.01 (ref 1–1.03)
TSH SERPL DL<=0.05 MIU/L-ACNC: 3.85 UIU/ML (ref 0.36–3.74)
UROBILINOGEN UR QL STRIP.AUTO: 0.1 EU/DL (ref 0.1–1)
WBC URNS QL MICRO: >100 /HPF (ref 0–4)

## 2022-11-24 PROCEDURE — 74011250636 HC RX REV CODE- 250/636: Performed by: INTERNAL MEDICINE

## 2022-11-24 PROCEDURE — 87186 SC STD MICRODIL/AGAR DIL: CPT

## 2022-11-24 PROCEDURE — 74011000258 HC RX REV CODE- 258: Performed by: EMERGENCY MEDICINE

## 2022-11-24 PROCEDURE — 74011000258 HC RX REV CODE- 258: Performed by: HOSPITALIST

## 2022-11-24 PROCEDURE — 83735 ASSAY OF MAGNESIUM: CPT

## 2022-11-24 PROCEDURE — 94640 AIRWAY INHALATION TREATMENT: CPT

## 2022-11-24 PROCEDURE — 74011000258 HC RX REV CODE- 258: Performed by: INTERNAL MEDICINE

## 2022-11-24 PROCEDURE — 96367 TX/PROPH/DG ADDL SEQ IV INF: CPT

## 2022-11-24 PROCEDURE — 36415 COLL VENOUS BLD VENIPUNCTURE: CPT

## 2022-11-24 PROCEDURE — 87636 SARSCOV2 & INF A&B AMP PRB: CPT

## 2022-11-24 PROCEDURE — 74011000250 HC RX REV CODE- 250: Performed by: EMERGENCY MEDICINE

## 2022-11-24 PROCEDURE — 74011250636 HC RX REV CODE- 250/636: Performed by: EMERGENCY MEDICINE

## 2022-11-24 PROCEDURE — 87077 CULTURE AEROBIC IDENTIFY: CPT

## 2022-11-24 PROCEDURE — 74011000250 HC RX REV CODE- 250: Performed by: HOSPITALIST

## 2022-11-24 PROCEDURE — 84443 ASSAY THYROID STIM HORMONE: CPT

## 2022-11-24 PROCEDURE — 94761 N-INVAS EAR/PLS OXIMETRY MLT: CPT

## 2022-11-24 PROCEDURE — 96375 TX/PRO/DX INJ NEW DRUG ADDON: CPT

## 2022-11-24 PROCEDURE — 74011250637 HC RX REV CODE- 250/637: Performed by: HOSPITALIST

## 2022-11-24 PROCEDURE — 82550 ASSAY OF CK (CPK): CPT

## 2022-11-24 PROCEDURE — 87150 DNA/RNA AMPLIFIED PROBE: CPT

## 2022-11-24 PROCEDURE — 87040 BLOOD CULTURE FOR BACTERIA: CPT

## 2022-11-24 PROCEDURE — 96372 THER/PROPH/DIAG INJ SC/IM: CPT

## 2022-11-24 PROCEDURE — 65270000029 HC RM PRIVATE

## 2022-11-24 PROCEDURE — 84132 ASSAY OF SERUM POTASSIUM: CPT

## 2022-11-24 PROCEDURE — 74011250636 HC RX REV CODE- 250/636: Performed by: HOSPITALIST

## 2022-11-24 RX ORDER — QUETIAPINE FUMARATE 25 MG/1
12.5 TABLET, FILM COATED ORAL
Status: DISCONTINUED | OUTPATIENT
Start: 2022-11-24 | End: 2022-11-26

## 2022-11-24 RX ORDER — SODIUM CHLORIDE 0.9 % (FLUSH) 0.9 %
5-40 SYRINGE (ML) INJECTION EVERY 8 HOURS
Status: DISCONTINUED | OUTPATIENT
Start: 2022-11-24 | End: 2022-11-24

## 2022-11-24 RX ORDER — FINASTERIDE 5 MG/1
5 TABLET, FILM COATED ORAL DAILY
Status: DISCONTINUED | OUTPATIENT
Start: 2022-11-24 | End: 2022-12-02 | Stop reason: HOSPADM

## 2022-11-24 RX ORDER — SENNOSIDES 8.6 MG/1
1 TABLET ORAL DAILY
Status: DISCONTINUED | OUTPATIENT
Start: 2022-11-24 | End: 2022-12-02 | Stop reason: HOSPADM

## 2022-11-24 RX ORDER — MELATONIN
2000 DAILY
Status: DISCONTINUED | OUTPATIENT
Start: 2022-11-24 | End: 2022-12-02 | Stop reason: HOSPADM

## 2022-11-24 RX ORDER — BUDESONIDE AND FORMOTEROL FUMARATE DIHYDRATE 160; 4.5 UG/1; UG/1
2 AEROSOL RESPIRATORY (INHALATION) 2 TIMES DAILY
Status: DISCONTINUED | OUTPATIENT
Start: 2022-11-24 | End: 2022-12-02 | Stop reason: HOSPADM

## 2022-11-24 RX ORDER — ALBUTEROL SULFATE 90 UG/1
2 AEROSOL, METERED RESPIRATORY (INHALATION)
Status: DISCONTINUED | OUTPATIENT
Start: 2022-11-24 | End: 2022-12-02 | Stop reason: HOSPADM

## 2022-11-24 RX ORDER — SODIUM CHLORIDE 0.9 % (FLUSH) 0.9 %
5-40 SYRINGE (ML) INJECTION AS NEEDED
Status: DISCONTINUED | OUTPATIENT
Start: 2022-11-24 | End: 2022-12-02 | Stop reason: HOSPADM

## 2022-11-24 RX ORDER — GUAIFENESIN 100 MG/5ML
81 LIQUID (ML) ORAL DAILY
Status: DISCONTINUED | OUTPATIENT
Start: 2022-11-24 | End: 2022-12-02 | Stop reason: HOSPADM

## 2022-11-24 RX ADMIN — AMIODARONE HYDROCHLORIDE 150 MG: 1.5 INJECTION, SOLUTION INTRAVENOUS at 05:18

## 2022-11-24 RX ADMIN — SODIUM CHLORIDE, PRESERVATIVE FREE 10 ML: 5 INJECTION INTRAVENOUS at 02:59

## 2022-11-24 RX ADMIN — AMIODARONE HYDROCHLORIDE 1 MG/MIN: 50 INJECTION, SOLUTION INTRAVENOUS at 05:47

## 2022-11-24 RX ADMIN — DOXYCYCLINE 100 MG: 100 INJECTION, POWDER, LYOPHILIZED, FOR SOLUTION INTRAVENOUS at 22:27

## 2022-11-24 RX ADMIN — DILTIAZEM HYDROCHLORIDE 7.5 MG/HR: 5 INJECTION, SOLUTION INTRAVENOUS at 01:02

## 2022-11-24 RX ADMIN — METHYLPREDNISOLONE SODIUM SUCCINATE 40 MG: 40 INJECTION, POWDER, FOR SOLUTION INTRAMUSCULAR; INTRAVENOUS at 22:18

## 2022-11-24 RX ADMIN — METHYLPREDNISOLONE SODIUM SUCCINATE 40 MG: 40 INJECTION, POWDER, FOR SOLUTION INTRAMUSCULAR; INTRAVENOUS at 15:12

## 2022-11-24 RX ADMIN — METHYLPREDNISOLONE SODIUM SUCCINATE 40 MG: 40 INJECTION, POWDER, FOR SOLUTION INTRAMUSCULAR; INTRAVENOUS at 11:31

## 2022-11-24 RX ADMIN — DOXYCYCLINE 100 MG: 100 INJECTION, POWDER, LYOPHILIZED, FOR SOLUTION INTRAVENOUS at 11:40

## 2022-11-24 RX ADMIN — ENOXAPARIN SODIUM 70 MG: 100 INJECTION SUBCUTANEOUS at 00:34

## 2022-11-24 RX ADMIN — BUDESONIDE AND FORMOTEROL FUMARATE DIHYDRATE 2 PUFF: 160; 4.5 AEROSOL RESPIRATORY (INHALATION) at 20:12

## 2022-11-24 RX ADMIN — SODIUM CHLORIDE, PRESERVATIVE FREE 10 ML: 5 INJECTION INTRAVENOUS at 01:17

## 2022-11-24 RX ADMIN — HYALURONIDASE 150 UNITS: 150 INJECTION SUBCUTANEOUS at 18:00

## 2022-11-24 RX ADMIN — QUETIAPINE FUMARATE 12.5 MG: 25 TABLET ORAL at 21:16

## 2022-11-24 RX ADMIN — AMIODARONE HYDROCHLORIDE 0.5 MG/MIN: 50 INJECTION, SOLUTION INTRAVENOUS at 15:04

## 2022-11-24 RX ADMIN — DILTIAZEM HYDROCHLORIDE 5 MG/HR: 5 INJECTION, SOLUTION INTRAVENOUS at 23:29

## 2022-11-24 RX ADMIN — ASPIRIN 81 MG 81 MG: 81 TABLET ORAL at 11:30

## 2022-11-24 RX ADMIN — ALBUTEROL SULFATE 2 PUFF: 108 AEROSOL, METERED RESPIRATORY (INHALATION) at 20:13

## 2022-11-24 RX ADMIN — BUDESONIDE AND FORMOTEROL FUMARATE DIHYDRATE 2 PUFF: 160; 4.5 AEROSOL RESPIRATORY (INHALATION) at 08:23

## 2022-11-24 RX ADMIN — SODIUM CHLORIDE 1000 ML: 9 INJECTION, SOLUTION INTRAVENOUS at 01:02

## 2022-11-24 NOTE — CONSULTS
Pulmonary/ CC Consult    Subjective:   Date of Consultation:  November 24, 2022  Referring Physician: Alyssa Green MD    Mr. Everlyn Cabot is a 80years old  male seen in assisted living facility with supervised care. He has known history of CHF, advanced COPD with central lobular emphysema, hypertension was brought to the emergency department after a fall from wheelchair. It resulted in injury on the left side of the his head. CT scan of head did not show any new intracranial pathology. It was not clear if he lost his consciousness. Patient has history of dementia. Apparently he is on oxygen in the facility. ER he was found to be in atrial fibrillation with rapid ventricular response. He was also noted to have upper respiratory tract congestion. Patient is evaluated in the ER where he is awaiting for a bed to open up in ICU. His heart rate is around 130s. He is currently getting IV amiodarone infusion. He was given Symbicort inhaler by respiratory therapist.  Patient is not in any respiratory distress at the time of evaluation.   Patient has poor vision    Patient Active Problem List   Diagnosis Code    Respiratory failure ([facility]) J96.90    Elevated troponin R77.8    COPD with acute exacerbation ([facility]) J44.1    Acute bronchitis J20.9    History of home oxygen therapy Z99.81    CHF (congestive heart failure) ([facility]) I50.9    Atrial fibrillation with RVR ([facility]) I48.91    Paroxysmal atrial fibrillation with rapid ventricular response ([facility]) I48.0     Past Medical History:   Diagnosis Date    Asthma     Blind 03/06/2022    CHF (congestive heart failure) ([facility])     COPD (chronic obstructive pulmonary disease) ([facility])     HTN (hypertension)     Prostate CA ([facility] [address]       Family History   Family history unknown: Yes      Social History     Tobacco Use    Smoking status: Former    Smokeless tobacco: Never   Substance Use Topics    Alcohol use: Never     Past Surgical History:   Procedure Laterality Date    HX ORTHOPAEDIC      neck      Prior to Admission medications    Medication Sig Start Date End Date Taking? Authorizing Provider   finasteride (PROSCAR) 5 mg tablet Take 5 mg by mouth in the morning. Yeny Sharif MD   furosemide (LASIX) 80 mg tablet Take  by mouth daily. Yeny Sharif MD   sennosides (Senna) 8.6 mg cap Take  by mouth. Yeny Sharif MD   guaiFENesin (ORGANIDIN) 200 mg tablet Take 200 mg by mouth every four (4) hours as needed for Congestion. Yeny Sharif MD   cholecalciferol (VITAMIN D3) (1000 Units /25 mcg) tablet Take 2 Tablets by mouth daily. 3/9/22   Deejay Jaramillo PA-C   budesonide-formoteroL (Symbicort) 160-4.5 mcg/actuation HFAA Take 2 Puffs by inhalation two (2) times a day. 3/9/22   Deejay Jaramillo PA-C   albuterol (ProAir HFA) 90 mcg/actuation inhaler Take 2 Puffs by inhalation every six (6) hours as needed for Wheezing or Shortness of Breath. 3/9/22   Deejay Jaramillo PA-C   aspirin 81 mg chewable tablet Take 1 Tablet by mouth daily. 22   Megan Quispe MD   QUEtiapine (SEROquel) 25 mg tablet Take 0.5 Tablets by mouth nightly. 22   Megan Quispe MD     Allergies   Allergen Reactions    Gabapentin Unknown (comments)        Review of Systems:  Review of systems not obtained due to patient factors. Objective:   Blood pressure 113/78, pulse (!) 136, temperature 98.2 °F (36.8 °C), resp. rate 20, height 5' 6\" (1.676 m), weight 74.8 kg (165 lb), SpO2 100 %. Temp (24hrs), Av.1 °F (36.7 °C), Min:98 °F (36.7 °C), Max:98.2 °F (36.8 °C)    CT HEAD WO CONT   Final Result   1. No evidence of acute intracranial abnormality by this modality.                 Data Review: CBC:   Recent Labs     22  2315   WBC 8.4   RBC 4.22   HGB 13.9   HCT 42.7      GRANS 80*   LYMPH 10*   EOS 0     CMP:   Recent Labs     22  0051 22  2113   GLU  --  92   NA  --  130*   K 4.1 Hemolyzed, recollect requested   CL  --  95*   CO2  --  33*   BUN  --  38* CREA  --  1.70*   CA  --  7.6*   AGAP  --  2*   BUCR  --  22*   AP  --  74   TP  --  7.1   ALB  --  2.9*   GLOB  --  4.2*   AGRAT  --  0.7*     Liver Enzymes:   Recent Labs     11/23/22  2315   TP 7.1   ALB 2.9*   AP 74     ABGs: No results for input(s): PH, PCO2, PO2, HCO3 in the last 72 hours. Current Facility-Administered Medications   Medication Dose Route Frequency    aspirin chewable tablet 81 mg  81 mg Oral DAILY    QUEtiapine (SEROquel) tablet 12.5 mg  12.5 mg Oral QHS    cholecalciferol (VITAMIN D3) (1000 Units /25 mcg) tablet 2,000 Units  2,000 Units Oral DAILY    budesonide-formoteroL (SYMBICORT) 160-4.5 mcg/actuation HFA inhaler 2 Puff  2 Puff Inhalation BID    albuterol (PROVENTIL HFA, VENTOLIN HFA, PROAIR HFA) inhaler 2 Puff  2 Puff Inhalation Q6H PRN    finasteride (PROSCAR) tablet 5 mg  5 mg Oral DAILY    senna (SENOKOT) tablet 8.6 mg  1 Tablet Oral DAILY    sodium chloride (NS) flush 5-40 mL  5-40 mL IntraVENous Q8H    sodium chloride (NS) flush 5-40 mL  5-40 mL IntraVENous PRN    acetaminophen (TYLENOL) solution 650 mg  650 mg Oral Q4H PRN    amiodarone (CORDARONE) 375 mg in dextrose 5% 250 mL infusion  0.5-1 mg/min IntraVENous TITRATE    sodium chloride (NS) flush 5-40 mL  5-40 mL IntraVENous Q8H    sodium chloride (NS) flush 5-40 mL  5-40 mL IntraVENous PRN    dilTIAZem (CARDIZEM) 125 mg in 0.9% sodium chloride 125 mL ([id number])  5-15 mg/hr IntraVENous TITRATE     Current Outpatient Medications   Medication Sig    finasteride (PROSCAR) 5 mg tablet Take 5 mg by mouth in the morning. furosemide (LASIX) 80 mg tablet Take  by mouth daily. sennosides (Senna) 8.6 mg cap Take  by mouth.    guaiFENesin (ORGANIDIN) 200 mg tablet Take 200 mg by mouth every four (4) hours as needed for Congestion. cholecalciferol (VITAMIN D3) (1000 Units /25 mcg) tablet Take 2 Tablets by mouth daily. budesonide-formoteroL (Symbicort) 160-4.5 mcg/actuation HFAA Take 2 Puffs by inhalation two (2) times a day. albuterol (ProAir HFA) 90 mcg/actuation inhaler Take 2 Puffs by inhalation every six (6) hours as needed for Wheezing or Shortness of Breath. aspirin 81 mg chewable tablet Take 1 Tablet by mouth daily. QUEtiapine (SEROquel) 25 mg tablet Take 0.5 Tablets by mouth nightly. Exam:    This is an elderly male who has poor vision. He is alert and oriented otherwise. He is known to have dementia. He is on nasal cannula oxygen  Has rhonchi audible on auscultation of chest.  JVD is absent. No cervical lymphadenopathy. Thyroid not enlarged  Heart: S1-S2 irregular. Abdomen: Soft, nontender, no visceromegaly  Extremities: No edema, sinus or clubbing  Neuro: No focal motor deficit. Impression:   80years old  male seen in assisted living facility with supervised care. He has known history of CHF, advanced COPD with central lobular emphysema, hypertension was brought to the emergency department after a fall from wheelchair. It resulted in injury on the left side of the his head. CT scan of head did not show any new intracranial pathology. It was not clear if he lost his consciousness. Patient has history of dementia. Apparently he is on oxygen in the facility. ER he was found to be in atrial fibrillation with rapid ventricular response. He was also noted to have upper respiratory tract congestion. Plan:   COPD with exacerbation  Patient has history of advanced COPD with emphysema. He is on nasal cannula oxygen at home. Currently he has upper respiratory tract congestion with some wheezing. We will treat him with nebulized albuterol and Atrovent, low-dose systemic steroids and IV antibiotics. Doxycycline 100 mg twice daily  #2 atrial fibrillation with rapid ventricular response:  Patient is on IV amiodarone therapy. 3.  Patient's chest x-ray has shown chronic changes without any acute infiltrates  4. Status post fall from wheelchair. CT scan of head is negative  5.   Dementia: Supportive care  Transferred to ICU once bed is available  Thank you for involving me in the management of your patient  More than 55 minutes were spent in patient's evaluation, review of lab data, imaging studies and decision making    Willy Faye MD  [facility]

## 2022-11-24 NOTE — CONSULTS
Consult    Patient: Karyle Crook MRN: [id number]  SSN: [id number]    YOB: 1929  Age: 80 y.o. Sex: male      Subjective:      Karyle Crook is a 80 y.o. male who is being seen for atrial fibrillation. Patient is a poor historian. He has a history of prostate cancer, hypertension, COPD, heart failure, bronchial asthma. Patient is blind. History is taken from chart review and further discussion with other healthcare personnel. But his daughter he was recently admitted to the [place] and at that time he was noticed to be in atrial fibrillation. Family opted against anticoagulation due to concerns of bleeding  And falls. Patient with history of dementia. He fell from the wheelchair. Injured the left side of his head. He is confused. Hard of hearing. He was noticed to be in atrial fibrillation with RVR. He was noticed to be hypotensive.     Past Medical History:   Diagnosis Date    Asthma     Blind 03/06/2022    CHF (congestive heart failure) (HCC)     COPD (chronic obstructive pulmonary disease) (HCC)     HTN (hypertension)     Prostate CA ([facility] [address]      Past Surgical History:   Procedure Laterality Date    HX ORTHOPAEDIC      neck      Family History   Family history unknown: Yes     Social History     Tobacco Use    Smoking status: Former    Smokeless tobacco: Never   Substance Use Topics    Alcohol use: Never      Current Facility-Administered Medications   Medication Dose Route Frequency Provider Last Rate Last Admin    aspirin chewable tablet 81 mg  81 mg Oral DAILY Daniela Gale MD   81 mg at 11/24/22 1130    QUEtiapine (SEROquel) tablet 12.5 mg  12.5 mg Oral QHS Daniela Gale MD        cholecalciferol (VITAMIN D3) (1000 Units /25 mcg) tablet 2,000 Units  2,000 Units Oral DAILY Daniela Gale MD        budesonide-formoteroL (SYMBICORT) 160-4.5 mcg/actuation HFA inhaler 2 Puff  2 Puff Inhalation BID Daniela Gale MD   2 Puff at 11/24/22 7342 albuterol (PROVENTIL HFA, VENTOLIN HFA, PROAIR HFA) inhaler 2 Puff  2 Puff Inhalation Q6H PRN Nichol Gonzalez MD        finasteride (PROSCAR) tablet 5 mg  5 mg Oral DAILY Nichol Gonzalez MD        senna (SENOKOT) tablet 8.6 mg  1 Tablet Oral DAILY Nichol Gonzalez MD        sodium chloride (NS) flush 5-40 mL  5-40 mL IntraVENous Q8H Nichol Gonzalez MD   10 mL at 11/24/22 0259    sodium chloride (NS) flush 5-40 mL  5-40 mL IntraVENous PRN Nichol Gonzalez MD        acetaminophen (TYLENOL) solution 650 mg  650 mg Oral Q4H PRN Nichol Gonzalez MD        amiodarone (CORDARONE) 375 mg in dextrose 5% 250 mL infusion  0.5-1 mg/min IntraVENous TITRATE Nichol Gonzalez MD 40 mL/hr at 11/24/22 0547 1 mg/min at 11/24/22 0547    methylPREDNISolone (PF) (SOLU-MEDROL) injection 40 mg  40 mg IntraVENous Q8H No Graf MD   40 mg at 11/24/22 1131    doxycycline (VIBRAMYCIN) 100 mg in 0.9% sodium chloride (MBP/ADV) 100 mL MBP  100 mg IntraVENous Q12H No Graf  mL/hr at 11/24/22 1140 100 mg at 11/24/22 1140    sodium chloride (NS) flush 5-40 mL  5-40 mL IntraVENous Q8H Ethelene Mayco LYN MD   10 mL at 11/24/22 0117    sodium chloride (NS) flush 5-40 mL  5-40 mL IntraVENous PRN Sharon Peterson MD        dilTIAZem (CARDIZEM) 125 mg in 0.9% sodium chloride 125 mL ([id number])  5-15 mg/hr IntraVENous TITRATE Sharon Peterson MD   Stopped at 11/24/22 0300     Current Outpatient Medications   Medication Sig Dispense Refill    finasteride (PROSCAR) 5 mg tablet Take 5 mg by mouth in the morning. furosemide (LASIX) 80 mg tablet Take  by mouth daily. sennosides (Senna) 8.6 mg cap Take  by mouth.      guaiFENesin (ORGANIDIN) 200 mg tablet Take 200 mg by mouth every four (4) hours as needed for Congestion. cholecalciferol (VITAMIN D3) (1000 Units /25 mcg) tablet Take 2 Tablets by mouth daily.  30 Tablet 1    budesonide-formoteroL (Symbicort) 160-4.5 mcg/actuation HFAA Take 2 Puffs by inhalation two (2) times a day. 10.2 g 1    albuterol (ProAir HFA) 90 mcg/actuation inhaler Take 2 Puffs by inhalation every six (6) hours as needed for Wheezing or Shortness of Breath. 18 g 1    aspirin 81 mg chewable tablet Take 1 Tablet by mouth daily. 30 Tablet 0    QUEtiapine (SEROquel) 25 mg tablet Take 0.5 Tablets by mouth nightly. 30 Tablet 0        Allergies   Allergen Reactions    Gabapentin Unknown (comments)       Review of Systems:  Not reliable    Objective:     Vitals:    11/24/22 0747 11/24/22 0823 11/24/22 0830 11/24/22 1012   BP: 109/81  113/78 (!) 111/92   Pulse: (!) 132  (!) 136 (!) 124   Resp: (!) 39  20 22   Temp: 98.2 °F (36.8 °C)   98.3 °F (36.8 °C)   SpO2: 94% 95% 100% 98%   Weight:       Height:            Physical Exam:  General:  Confused. Eyes:  Conjunctivae/corneas clear. PERRL, EOMs intact. Fundi benign   Ears:  Normal TMs and external ear canals both ears. Nose: Nares normal. Septum midline. Mucosa normal. No drainage or sinus tenderness. Mouth/Throat: Lips, mucosa, and tongue normal. Teeth and gums normal.   Neck: Supple, symmetrical, trachea midline, no adenopathy, thyroid: no enlargment/tenderness/nodules, no carotid bruit and no JVD. Back:   Symmetric, no curvature. ROM normal. No CVA tenderness. Lungs:   Clear to auscultation bilaterally. Heart:  Tachycardic. Abdomen:   Soft, non-tender. Bowel sounds normal. No masses,  No organomegaly. Extremities: Extremities normal, atraumatic, no cyanosis or edema. Pulses: 2+ and symmetric all extremities. Skin: Skin color, texture, turgor normal. No rashes or lesions   Lymph nodes: Cervical, supraclavicular, and axillary nodes normal.   Neurologic: CNII-XII intact. Normal strength, sensation and reflexes throughout.          Assessment:     Hospital Problems  Date Reviewed: 11/24/2022            Codes Class Noted POA    Atrial fibrillation with RVR ([facility] [address] ICD-10-CM: I48.91  ICD-9-CM: 427.31  11/24/2022 Yes        Paroxysmal atrial fibrillation with rapid ventricular response [facility]) ICD-10-CM: I48.0  ICD-9-CM: 427.31  11/24/2022 Unknown         Patient is a 75-year-old white male with:  1. Chronic atrial fibrillation  2. Hypotension  3. History of hypertension  4. Bronchial asthma  5. Prostate cancer   6. COPD  7. Dementia  8. Hard of hearing  9. Blind  910.  10.  UTI  11. Hyponatremia  12. Acute kidney injury  13. Mild rhabdomyolysis  14. Thoracolumbar scoliosis  15. Moderate tricuspid regurgitation      Plan:     White count is normal, hemoglobin 13.9, platelet 062. Urine analysis showed increased leukocyte esterase  Creatinine 1.7, BUN 38, sodium 130, potassium 4.1. Bicarbonate 33. CPK mildly elevated at 422  Influenza is negative. CT head was nonacute. Echo from January showed EF of 60 to 65%, moderate TR, left atrial enlargement    Currently on aspirin, diltiazem infusion. Amiodarone has been ordered. Family has opted for no anticoagulation. We will continue aspirin for now. Appreciate pulmonary input    No further cardiac testing planned at this time as it would not change my management    Thank you  For involving me in this nice patient's care.   I will follow        Signed By: Elizabeth Morrow MD     November 24, 2022

## 2022-11-24 NOTE — PROGRESS NOTES
[facility] Hospitalist Progress Note  Ceferino Urbina MD    Date:2022       [facility]  Patient Erick Nettles     YOB: 1929     Age:89 y.o.    22 admission course  80 y.o. male lives in an assisted living facility with supervised care with history of heart failure, COPD advanced with centrilobular emphysema, hypertension is brought to the emergency room due to a fall from wheelchair. There is an injury on the left side of his head, unknown unable to find information if he lost any consciousness. At baseline he has dementia but pleasantly confused and tried to talk but cannot provide information. He is on home oxygen for COPD. Evaluation in the emergency room found with atrial fibrillation with rapid ventricular rate and also he is developing hypotension. 22 no new complaints, tolerating medications well  Presently on amiodarone drip for rapid atrial fibrillation    Subjective    Subjective:  Symptoms:  Stable. Review of Systems   All other systems reviewed and are negative. Objective         Vitals Last 24 Hours:  TEMPERATURE:  Temp  Av °F (36.7 °C)  Min: 98 °F (36.7 °C)  Max: 98 °F (36.7 °C)  RESPIRATIONS RANGE: Resp  Av.9  Min: 17  Max: 40  PULSE OXIMETRY RANGE: SpO2  Av.4 %  Min: 96 %  Max: 100 %  PULSE RANGE: Pulse  Av.7  Min: 79  Max: 153  BLOOD PRESSURE RANGE: Systolic (04PQZ), YRI:135 , Min:92 , VOJ:778   ; Diastolic (38OXY), WGX:70, Min:66, Max:126    I/O (24Hr): No intake or output data in the 24 hours ending 22 0751  Objective:  General Appearance:  Comfortable. Vital signs: (most recent): Blood pressure 103/77, pulse (!) 136, temperature 98 °F (36.7 °C), resp. rate 18, height 5' 6\" (1.676 m), weight 74.8 kg (165 lb), SpO2 96 %. General : Pleasant, appears mildly respiratory distress. HEENT : PERRLA, normal oral mucosa, atraumatic normocephalic, Normal ear and nose.   Neck : Supple, no JVD, no masses noted, no carotid bruit.  Lungs : Coarse breath sounds with rhonchi and expiratory wheezing. Prolonged expirations. On nasal cannula  CVS : Rhythm rate irregularly irregular. Tachycardia noted. Difficult to hear heart sounds. Abdomen : Soft, nontender,  bowel sounds active. Extremities : Severe edema bilateral lower extremities from knee down to the feet. Musculoskeletal : Decreased range of motion. Muscle power and tone decreased. Skin : Dry, warm, pale. Lymphatic : No cervical lymphadenopathy. Neurological : Awake, alert, oriented to only place and person. He knows not at his regular place. Wants to get out of here. He is not understanding things. Psychiatric : Pleasantly confused with dementia      Labs/Imaging/Diagnostics    Labs:  CBC:  Recent Labs     11/23/22 2315   WBC 8.4   RBC 4.22   HGB 13.9   HCT 42.7   .2*   RDW 13.7        CHEMISTRIES:  Recent Labs     11/24/22  0250 11/24/22  0051 11/23/22  2315   NA  --   --  130*   K  --  4.1 Hemolyzed, recollect requested   CL  --   --  95*   CO2  --   --  33*   BUN  --   --  38*   CA  --   --  7.6*   MG 2.2  --   --    PT/INR:No results for input(s): INR, INREXT in the last 72 hours. No lab exists for component: PROTIME  APTT:No results for input(s): APTT in the last 72 hours. LIVER PROFILE:  Recent Labs     11/23/22 2315   AST Hemolyzed, recollect requested   ALT Hemolyzed, recollect requested     Lab Results   Component Value Date/Time    ALT (SGPT) Hemolyzed, recollect requested 11/23/2022 11:15 PM    AST (SGOT) Hemolyzed, recollect requested 11/23/2022 11:15 PM    Alk. phosphatase 74 11/23/2022 11:15 PM    Bilirubin, total 1.1 (H) 11/23/2022 11:15 PM       Imaging Last 24 Hours:  CT HEAD WO CONT    Result Date: 11/23/2022  EXAM:  CT HEAD WO CONT INDICATION:   None provided Additional history: Patient fell out of wheelchair COMPARISON: CT of the head, 1/2/2022. . TECHNIQUE: Unenhanced CT of the head was performed using 5 mm images.  Coronal and sagittal reformats were produced. Brain and bone windows were generated. CT dose reduction was achieved through use of a standardized protocol tailored for this examination and automatic exposure control for dose modulation. David Whitaker FINDINGS: Global atrophy. Periventricular and subcortical white matter hypoattenuation. There is no intracranial hemorrhage, extra-axial collection, mass, mass effect or midline shift. The basilar cisterns are open. No acute infarct is identified. Intracranial atherosclerosis The bone windows demonstrate no abnormalities. The visualized portions of the paranasal sinuses and mastoid air cells are clear. .    1. No evidence of acute intracranial abnormality by this modality. Assessment//Plan   Active Problems:    Atrial fibrillation with RVR (HCC) (11/24/2022)      Paroxysmal atrial fibrillation with rapid ventricular response ([address] (11/24/2022)    CT Results  (Last 48 hours)                 11/23/22 2111  CT HEAD WO CONT Final result    Impression:  1. No evidence of acute intracranial abnormality by this modality. Narrative:  EXAM:  CT HEAD WO CONT   INDICATION:   None provided   Additional history: Patient fell out of wheelchair   COMPARISON: CT of the head, 1/2/2022. .   TECHNIQUE:    Unenhanced CT of the head was performed using 5 mm images. Coronal and sagittal   reformats were produced. Brain and bone windows were generated. CT dose reduction was achieved through use of a standardized protocol tailored   for this examination and automatic exposure control for dose modulation. David Whitaker FINDINGS:   Global atrophy. Periventricular and subcortical white matter hypoattenuation. There is no intracranial hemorrhage, extra-axial collection, mass, mass effect   or midline shift. The basilar cisterns are open. No acute infarct is   identified. Intracranial atherosclerosis   The bone windows demonstrate no abnormalities.  The visualized portions of the   paranasal sinuses and mastoid air cells are clear. .               Assessment & Plan  11/23/22 admission course  80 y.o. male lives in an assisted living facility with supervised care with history of heart failure, COPD advanced with centrilobular emphysema, hypertension is brought to the emergency room due to a fall from wheelchair. There is an injury on the left side of his head, unknown unable to find information if he lost any consciousness. At baseline he has dementia but pleasantly confused and tried to talk but cannot provide information. He is on home oxygen for COPD. Evaluation in the emergency room found with atrial fibrillation with rapid ventricular rate and also he is developing hypotension. 11/24/22 no new complaints, tolerating medications well  Presently on amiodarone drip for rapid atrial fibrillation    MICROBIOLOGY    11/24/22 Blood  Pending  11/24/22 Covid 19 Pending  11/24/222 Influenza AB Pending    ASSESSMENT AND PLAN    Atrial fibrillation with rapid ventricular rate: Started on IV diltiazem in the emergency room we adjusted rate as per the condition. Later in the day he developed hypotension, diltiazem is on hold, started on amiodarone as per protocol bolus and infusion. Chronic obstructive pulmonary disease with centrilobular emphysema advanced, on inhalers that he is using. History of dementia with behavioral disturbance on Seroquel    Admitted to ICU  Code status DO NOT RESUSCITATE  No anticoagulation due to risk of bleeding as patient is at risk of falls.         Electronically signed by Ward Arambula MD on 11/24/2022 at 7:51 AM

## 2022-11-24 NOTE — ED TRIAGE NOTES
Pt arrived by EMS, pt fell out of wheelchair. pt is on home O2 at 3L [place]. Pt lives [facility] in [facility].

## 2022-11-24 NOTE — ED NOTES
Assumed care of patient from Uriel Griggs RN. Pt.currently resting with no complaints and in no acute distress.

## 2022-11-24 NOTE — ED NOTES
Called Dr. Arturo Patten to inform about pt BP low, and held Diltiazem do to Low BP. Per Dr. Arturo Patten keep Diltiazem on hold for now due to low BP.

## 2022-11-24 NOTE — ED NOTES
TRANSFER - OUT REPORT:    Tubed report given to [facility] on Pete Abdi  being transferred to [facility] for routine progression of care       Report consisted of patients Situation, Background, Assessment and   Recommendations(SBAR). Information from the following report(s) SBAR, ED Summary, MAR, and Recent Results was reviewed with the receiving nurse. Lines:   Peripheral IV 11/23/22 Right Antecubital (Active)       Peripheral IV 11/24/22 Anterior;Left;Proximal Forearm (Active)        Opportunity for questions and clarification was provided.       Patient transported with:   Registered Nurse  Tech

## 2022-11-24 NOTE — H&P
Hospitalist History & Physical Notes. [facility]. Name : Zohreh Rothman      MRN number : [id number]     YOB: 1929     Subjective :   Chief Complaint : With fall from wheelchair, found with atrial fibrillation with rapid ventricular rate    Source of information : ED provider, previous records. Patient with dementia cannot give information. History of present illness:   80 y.o. male lives in an assisted living facility with supervised care with history of heart failure, COPD advanced with centrilobular emphysema, hypertension is brought to the emergency room due to a fall from wheelchair. There is an injury on the left side of his head, unknown unable to find information if he lost any consciousness. At baseline he has dementia but pleasantly confused and tried to talk but cannot provide information. He is on home oxygen for COPD. Evaluation in the emergency room found with atrial fibrillation with rapid ventricular rate and also he is developing hypotension. 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 Authorizing Provider   finasteride (PROSCAR) 5 mg tablet Take 5 mg by mouth in the morning. Yeny Sharif MD   furosemide (LASIX) 80 mg tablet Take  by mouth daily. Yeny Sharif MD   sennosides (Senna) 8.6 mg cap Take  by mouth.     Yeny Sharif MD   guaiFENesin (ORGANIDIN) 200 mg tablet Take 200 mg by mouth every four (4) hours as needed for Congestion. Yeny Sharif MD   cholecalciferol (VITAMIN D3) (1000 Units /25 mcg) tablet Take 2 Tablets by mouth daily. 3/9/22   Deejay Jaramillo PA-C   budesonide-formoteroL (Symbicort) 160-4.5 mcg/actuation HFAA Take 2 Puffs by inhalation two (2) times a day. 3/9/22   Deejay Jaramillo PA-C   albuterol (ProAir HFA) 90 mcg/actuation inhaler Take 2 Puffs by inhalation every six (6) hours as needed for Wheezing or Shortness of Breath. 3/9/22   Deejay Jaramillo PA-C   aspirin 81 mg chewable tablet Take 1 Tablet by mouth daily. 2/22/22   Megan Quispe MD   QUEtiapine (SEROquel) 25 mg tablet Take 0.5 Tablets by mouth nightly. 2/22/22   Megan Quispe MD     Allergies   Allergen Reactions    Gabapentin Unknown (comments)             Review of Systems: Unable to get any information from the patient but he is pleasantly communicating. He sounds very dyspneic when speaking. Vitals:   Patient Vitals for the past 12 hrs:   Temp Pulse Resp BP SpO2   11/24/22 0007 -- (!) 137 26 105/84 99 %   11/23/22 2352 -- (!) 128 (!) 37 94/83 --   11/23/22 2337 -- (!) 119 17 103/77 99 %   11/23/22 2326 -- (!) 142 -- (!) 152/126 --   11/23/22 2322 -- (!) 137 24 (!) 152/126 --   11/23/22 2307 -- (!) 146 27 (!) 122/111 98 %   11/23/22 2300 -- (!) 149 22 111/88 96 %   11/23/22 2252 -- (!) 152 22 96/67 97 %   11/23/22 2101 98 °F (36.7 °C) 67 22 115/66 98 %       Physical Exam:   General : Pleasant, appears mildly respiratory distress. HEENT : PERRLA, normal oral mucosa, atraumatic normocephalic, Normal ear and nose. Neck : Supple, no JVD, no masses noted, no carotid bruit. Lungs : Coarse breath sounds with rhonchi and expiratory wheezing. Prolonged expirations. On nasal cannula  CVS : Rhythm rate irregularly irregular. Tachycardia noted. Difficult to hear heart sounds. Abdomen : Soft, nontender,  bowel sounds active. Extremities : Severe edema bilateral lower extremities from knee down to the feet. Musculoskeletal : Decreased range of motion. Muscle power and tone decreased. Skin : Dry, warm, pale. Lymphatic : No cervical lymphadenopathy. Neurological : Awake, alert, oriented to only place and person. He knows not at his regular place. Wants to get out of here. He is not understanding things. Psychiatric : Pleasantly confused with dementia      Data Review:   Recent Results (from the past 24 hour(s))   CBC WITH AUTOMATED DIFF    Collection Time: 11/23/22 11:15 PM   Result Value Ref Range    WBC 8.4 4.1 - 11.1 K/uL    RBC 4.22 4.10 - 5.70 M/uL    HGB 13.9 12.1 - 17.0 g/dL    HCT 42.7 36.6 - 50.3 %    .2 (H) 80.0 - 99.0 FL    MCH 32.9 26.0 - 34.0 PG    MCHC 32.6 30.0 - 36.5 g/dL    RDW 13.7 11.5 - 14.5 %    PLATELET 099 989 - 278 K/uL    MPV 10.8 8.9 - 12.9 FL    NRBC 0.0 0.0  WBC    ABSOLUTE NRBC 0.00 0.00 - 0.01 K/uL    NEUTROPHILS 80 (H) 32 - 75 %    LYMPHOCYTES 10 (L) 12 - 49 %    MONOCYTES 9 5 - 13 %    EOSINOPHILS 0 0 - 7 %    BASOPHILS 0 0 - 1 %    IMMATURE GRANULOCYTES 1 (H) 0 - 0.5 %    ABS. NEUTROPHILS 6.7 1.8 - 8.0 K/UL    ABS. LYMPHOCYTES 0.8 0.8 - 3.5 K/UL    ABS. MONOCYTES 0.8 0.0 - 1.0 K/UL    ABS. EOSINOPHILS 0.0 0.0 - 0.4 K/UL    ABS. BASOPHILS 0.0 0.0 - 0.1 K/UL    ABS. IMM. GRANS. 0.1 (H) 0.00 - 0.04 K/UL    DF AUTOMATED     METABOLIC PANEL, COMPREHENSIVE    Collection Time: 11/23/22 11:15 PM   Result Value Ref Range    Sodium 130 (L) 136 - 145 mmol/L    Potassium Hemolyzed, recollect requested 3.5 - 5.1 mmol/L    Chloride 95 (L) 97 - 108 mmol/L    CO2 33 (H) 21 - 32 mmol/L    Anion gap 2 (L) 5 - 15 mmol/L    Glucose 92 65 - 100 mg/dL    BUN 38 (H) 6 - 20 mg/dL    Creatinine 1.70 (H) 0.70 - 1.30 mg/dL    BUN/Creatinine ratio 22 (H) 12 - 20      eGFR 37 (L) >60 ml/min/1.73m2    Calcium 7.6 (L) 8.5 - 10.1 mg/dL    Bilirubin, total 1.1 (H) 0.2 - 1.0 mg/dL    AST (SGOT) Hemolyzed, recollect requested 15 - 37 U/L    ALT (SGPT) Hemolyzed, recollect requested 12 - 78 U/L    Alk. phosphatase 74 45 - 117 U/L    Protein, total 7.1 6.4 - 8.2 g/dL    Albumin 2.9 (L) 3.5 - 5.0 g/dL    Globulin 4.2 (H) 2.0 - 4.0 g/dL    A-G Ratio 0.7 (L) 1.1 - 2.2     LACTIC ACID    Collection Time: 11/23/22 11:15 PM   Result Value Ref Range    Lactic acid 2.6 (HH) 0.4 - 2.0 mmol/L   URINALYSIS W/MICROSCOPIC    Collection Time: 11/23/22 11:20 PM   Result Value Ref Range    Color Yellow/Straw      Appearance Turbid (A) Clear      Specific gravity 1.012 1.003 - 1.030      pH (UA) 5.0 5.0 - 8.0      Protein Negative Negative mg/dL    Glucose Negative Negative mg/dL    Ketone Negative Negative mg/dL    Bilirubin Negative Negative      Blood Negative Negative      Urobilinogen 0.1 0.1 - 1.0 EU/dL    Nitrites Negative Negative      Leukocyte Esterase Large (A) Negative      WBC >100 (H) 0 - 4 /hpf    RBC 0-5 0 - 5 /hpf    Bacteria Negative Negative /hpf    Mucus Negative Negative /lpf    Hyaline cast 0-2 0 - 5 /lpf       Radiologic Studies :   CT Results  (Last 48 hours)                 11/23/22 2111  CT HEAD WO CONT Final result    Impression:  1. No evidence of acute intracranial abnormality by this modality. Narrative:  EXAM:  CT HEAD WO CONT   INDICATION:   None provided   Additional history: Patient fell out of wheelchair   COMPARISON: CT of the head, 1/2/2022. .   TECHNIQUE:    Unenhanced CT of the head was performed using 5 mm images. Coronal and sagittal   reformats were produced. Brain and bone windows were generated. CT dose reduction was achieved through use of a standardized protocol tailored   for this examination and automatic exposure control for dose modulation. Kym Raddle FINDINGS:   Global atrophy. Periventricular and subcortical white matter hypoattenuation. There is no intracranial hemorrhage, extra-axial collection, mass, mass effect   or midline shift. The basilar cisterns are open. No acute infarct is   identified.  Intracranial atherosclerosis   The bone windows demonstrate no abnormalities. The visualized portions of the   paranasal sinuses and mastoid air cells are clear. .                     Assessment and Plan :     Atrial fibrillation with rapid ventricular rate: Started on IV diltiazem in the emergency room we adjusted rate as per the condition. Later in the day he developed hypotension, diltiazem is on hold, started on amiodarone as per protocol bolus and infusion. Chronic obstructive pulmonary disease with centrilobular emphysema advanced, on inhalers that he is using. History of dementia with behavioral disturbance on Seroquel    Admitted to ICU as he need titration of the medication, is DO NOT RESUSCITATE status as per my discussion on previous admission with the daughter. We will change the CODE STATUS, home medications reviewed. No anticoagulation due to risk of bleeding as patient is at risk of falls. CC : None  Signed By: Vinh Dee MD     November 24, 2022      This dictation was done by dragon, computer voice recognition software. Often unanticipated grammatical, syntax, [employer] phones and other interpretive errors are inadvertently transcribed. Please excuse errors that have escaped final proofreading.

## 2022-11-24 NOTE — ED PROVIDER NOTES
EMERGENCY DEPARTMENT HISTORY AND PHYSICAL EXAM      Date: 11/23/2022  Patient Name: Katty Turpin    History of Presenting Illness     Chief Complaint   Patient presents with    Fall     Fall from wheelchair. NO thinners. Baseline mentation (Hx of dementia)         History Provided By: Patient    HPI: Katty Turpin, 80 y.o. male fell from a wheelchair tonight and hit the left side of his head. Unknown was to whether or not there was loss of consciousness. Patient's not on any blood thinners and is at his baseline level of mentation. There were no treatments prior to arrival, he does arrive via EMS. No cervical collar in place. There are no other complaints, changes, or physical findings at this time. 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    CHF (congestive heart failure) (HCC)     COPD (chronic obstructive pulmonary disease) (HCC)     HTN (hypertension)     Prostate CA (HCC)        Past Surgical History:  Past Surgical History:   Procedure Laterality Date    HX ORTHOPAEDIC      neck       Family History:  Family History   Family history unknown: Yes       Social History:  Social History     Tobacco Use    Smoking status: Former    Smokeless tobacco: Never   Vaping Use    Vaping Use: Never used   Substance Use Topics    Alcohol use: Never    Drug use: Never       Allergies:  Not on File    PCP: None    No current facility-administered medications on file prior to encounter. Current Outpatient Medications on File Prior to Encounter   Medication Sig Dispense Refill    finasteride (PROSCAR) 5 mg tablet Take 5 mg by mouth in the morning. furosemide (LASIX) 80 mg tablet Take  by mouth daily. sennosides (Senna) 8.6 mg cap Take  by mouth.      guaiFENesin (ORGANIDIN) 200 mg tablet Take 200 mg by mouth every four (4) hours as needed for Congestion.       cholecalciferol (VITAMIN D3) (1000 Units /25 mcg) tablet Take 2 Tablets by mouth daily. 30 Tablet 1    budesonide-formoteroL (Symbicort) 160-4.5 mcg/actuation HFAA Take 2 Puffs by inhalation two (2) times a day. 10.2 g 1    albuterol (ProAir HFA) 90 mcg/actuation inhaler Take 2 Puffs by inhalation every six (6) hours as needed for Wheezing or Shortness of Breath. 18 g 1    aspirin 81 mg chewable tablet Take 1 Tablet by mouth daily. 30 Tablet 0    QUEtiapine (SEROquel) 25 mg tablet Take 0.5 Tablets by mouth nightly. 30 Tablet 0    famotidine (PEPCID) 10 mg tablet Take 1 Tablet by mouth nightly as needed for Heartburn. (Patient not taking: Reported on 7/28/2022) 15 Tablet 0       Review of Systems   Review of Systems   Constitutional: Negative. Negative for appetite change, chills, fatigue and fever. HENT: Negative. Negative for congestion and sinus pain. Eyes: Negative. Negative for pain and visual disturbance. Respiratory: Negative. Negative for chest tightness and shortness of breath. Cardiovascular: Negative. Negative for chest pain. Gastrointestinal: Negative. Negative for abdominal pain, diarrhea, nausea and vomiting. Genitourinary: Negative. Negative for difficulty urinating. No discharge   Musculoskeletal: Negative. Negative for arthralgias. Skin:  Positive for wound. Negative for rash. Neurological: Negative. Negative for weakness and headaches. Hematological: Negative. Psychiatric/Behavioral: Negative. Negative for agitation. The patient is not nervous/anxious. All other systems reviewed and are negative. Physical Exam   Physical Exam  Vitals and nursing note reviewed. Constitutional:       General: He is not in acute distress. Appearance: He is well-developed. HENT:      Head: Normocephalic and atraumatic. Nose: Nose normal.      Mouth/Throat:      Mouth: Mucous membranes are moist.      Pharynx: Oropharynx is clear. No oropharyngeal exudate. Eyes:      General:         Right eye: No discharge.          Left eye: No discharge. Conjunctiva/sclera: Conjunctivae normal.      Pupils: Pupils are equal, round, and reactive to light. Cardiovascular:      Rate and Rhythm: Normal rate and regular rhythm. Chest Wall: PMI is not displaced. No thrill. Heart sounds: Normal heart sounds. No murmur heard. No friction rub. No gallop. Pulmonary:      Effort: Pulmonary effort is normal. No respiratory distress. Breath sounds: Normal breath sounds. No wheezing or rales. Chest:      Chest wall: No tenderness. Abdominal:      General: Bowel sounds are normal. There is no distension. Palpations: Abdomen is soft. There is no mass. Tenderness: There is no abdominal tenderness. There is no guarding or rebound. Musculoskeletal:         General: Normal range of motion. Cervical back: Normal range of motion and neck supple. Lymphadenopathy:      Cervical: No cervical adenopathy. Skin:     General: Skin is warm and dry. Capillary Refill: Capillary refill takes less than 2 seconds. Findings: No erythema or rash. Neurological:      Mental Status: He is alert and oriented to person, place, and time. Cranial Nerves: No cranial nerve deficit. Coordination: Coordination normal.   Psychiatric:         Mood and Affect: Mood normal.         Behavior: Behavior normal.       Lab and Diagnostic Study Results   Labs -   No results found for this or any previous visit (from the past 12 hour(s)). Radiologic Studies -   @lastxrresult@  CT Results  (Last 48 hours)                 11/23/22 2111  CT HEAD WO CONT Final result    Impression:  1. No evidence of acute intracranial abnormality by this modality. Narrative:  EXAM:  CT HEAD WO CONT   INDICATION:   None provided   Additional history: Patient fell out of wheelchair   COMPARISON: CT of the head, 1/2/2022. .   TECHNIQUE:    Unenhanced CT of the head was performed using 5 mm images. Coronal and sagittal   reformats were produced. Brain and bone windows were generated. CT dose reduction was achieved through use of a standardized protocol tailored   for this examination and automatic exposure control for dose modulation. Arlyn Red FINDINGS:   Global atrophy. Periventricular and subcortical white matter hypoattenuation. There is no intracranial hemorrhage, extra-axial collection, mass, mass effect   or midline shift. The basilar cisterns are open. No acute infarct is   identified. Intracranial atherosclerosis   The bone windows demonstrate no abnormalities. The visualized portions of the   paranasal sinuses and mastoid air cells are clear. .             CXR Results  (Last 48 hours)      None            Medical Decision Making and ED Course   Differential Diagnosis & Medical Decision Making Provider Note:   Patient's history of dementia will assess with CT of the head    - I am the first provider for this patient. I reviewed the vital signs, available nursing notes, past medical history, past surgical history, family history and social history. The patients presenting problems have been discussed, and they are in agreement with the care plan formulated and outlined with them. I have encouraged them to ask questions as they arise throughout their visit. Vital Signs-Reviewed the patient's vital signs. Patient Vitals for the past 12 hrs:   Temp Pulse Resp BP SpO2   11/23/22 2101 98 °F (36.7 °C) 67 22 115/66 98 %       ED Course:   Patient has new evidence of acute intracranial injury and is at his neurologic baseline. Will discharge with warnings. Procedures   Performed by: Jie Rowley MD  Procedures      Disposition   Disposition: Condition stable    DISCHARGE PLAN:  1. Current Discharge Medication List        CONTINUE these medications which have NOT CHANGED    Details   finasteride (PROSCAR) 5 mg tablet Take 5 mg by mouth in the morning. furosemide (LASIX) 80 mg tablet Take  by mouth daily.       sennosides (Senna) 8.6 mg cap Take  by mouth.      guaiFENesin (ORGANIDIN) 200 mg tablet Take 200 mg by mouth every four (4) hours as needed for Congestion. cholecalciferol (VITAMIN D3) (1000 Units /25 mcg) tablet Take 2 Tablets by mouth daily. Qty: 30 Tablet, Refills: 1      budesonide-formoteroL (Symbicort) 160-4.5 mcg/actuation HFAA Take 2 Puffs by inhalation two (2) times a day. Qty: 10.2 g, Refills: 1      albuterol (ProAir HFA) 90 mcg/actuation inhaler Take 2 Puffs by inhalation every six (6) hours as needed for Wheezing or Shortness of Breath. Qty: 18 g, Refills: 1      aspirin 81 mg chewable tablet Take 1 Tablet by mouth daily. Qty: 30 Tablet, Refills: 0      QUEtiapine (SEROquel) 25 mg tablet Take 0.5 Tablets by mouth nightly. Qty: 30 Tablet, Refills: 0      famotidine (PEPCID) 10 mg tablet Take 1 Tablet by mouth nightly as needed for Heartburn. Qty: 15 Tablet, Refills: 0           2. Follow-up Information    None       3. Return to ED if worse   4. Current Discharge Medication List         Remove if admitted/transferred    Diagnosis/Clinical Impression     Clinical Impression:   1. Contusion of head, unspecified part of head, initial encounter        Attestations: Homer Callahan MD, am the primary clinician of record. Please note that this dictation was completed with [employer], the computer voice recognition software. Quite often unanticipated grammatical, syntax, homophones, and other interpretive errors are inadvertently transcribed by the computer software. Please disregard these errors. Please excuse any errors that have escaped final proofreading. Thank you.

## 2022-11-25 ENCOUNTER — APPOINTMENT (OUTPATIENT)
Dept: NON INVASIVE DIAGNOSTICS | Age: 87
End: 2022-11-25
Attending: HOSPITALIST
Payer: MEDICARE

## 2022-11-25 LAB
ACC. NO. FROM MICRO ORDER, ACCP: ABNORMAL
ACINETOBACTER CALCOACETICUS-BAUMANII COMPLEX, ACBCX: NOT DETECTED
ANION GAP SERPL CALC-SCNC: 6 MMOL/L (ref 5–15)
ATRIAL RATE: 131 BPM
BACTEROIDES FRAGILIS, BFRA: NOT DETECTED
BIOFIRE COMMENT, BCIDPF: ABNORMAL
BUN SERPL-MCNC: 35 MG/DL (ref 6–20)
BUN/CREAT SERPL: 22 (ref 12–20)
C GLABRATA DNA VAG QL NAA+PROBE: NOT DETECTED
CA-I BLD-MCNC: 8 MG/DL (ref 8.5–10.1)
CALCULATED R AXIS, ECG10: -85 DEGREES
CALCULATED T AXIS, ECG11: 53 DEGREES
CANDIDA ALBICANS: NOT DETECTED
CANDIDA AURIS, CAAU: NOT DETECTED
CANDIDA KRUSEI, CKRP: NOT DETECTED
CANDIDA PARAPSILOSIS, CPAUP: NOT DETECTED
CANDIDA TROPICALIS, CTROP: NOT DETECTED
CHLORIDE SERPL-SCNC: 101 MMOL/L (ref 97–108)
CO2 SERPL-SCNC: 27 MMOL/L (ref 21–32)
CREAT SERPL-MCNC: 1.61 MG/DL (ref 0.7–1.3)
CRYPTO NEOFORMANS/GATTII, CRYNEG: NOT DETECTED
CTX-M (ESBL RESISTANT GENE), CTX: NOT DETECTED
DIAGNOSIS, 93000: NORMAL
ENTEROBACTER CLOACAE COMPLEX, ECCP: NOT DETECTED
ENTEROBACTERALES SP. , ENBLS: DETECTED
ENTEROCOCCUS FAECALIS, ENFA: NOT DETECTED
ENTEROCOCCUS FAECIUM, ENFAM: NOT DETECTED
ERYTHROCYTE [DISTWIDTH] IN BLOOD BY AUTOMATED COUNT: 13.6 % (ref 11.5–14.5)
ESCHERICHIA COLI: NOT DETECTED
GLUCOSE SERPL-MCNC: 161 MG/DL (ref 65–100)
HAEMOPHILUS INFLUENZAE, HMI: NOT DETECTED
HCT VFR BLD AUTO: 36.4 % (ref 36.6–50.3)
HGB BLD-MCNC: 11.8 G/DL (ref 12.1–17)
IMP (CARBAPENEMASE RESISTANT GENE), IMPC: NOT DETECTED
KLEBSIELLA AEROGENES, KLAE: DETECTED
KLEBSIELLA OXYTOCA: NOT DETECTED
KLEBSIELLA PNEUMONIAE GROUP, KPPG: NOT DETECTED
KPC (CARBAPENEM RESISTANCE GENE): NOT DETECTED
LISTERIA MONOCYTOGENES, LMONP: NOT DETECTED
MCH RBC QN AUTO: 32.5 PG (ref 26–34)
MCHC RBC AUTO-ENTMCNC: 32.4 G/DL (ref 30–36.5)
MCR-1 (COLISTIN RESISTANT GENE), MCR: NOT DETECTED
MCV RBC AUTO: 100.3 FL (ref 80–99)
NDM (CARBAPENEMASE RESISTANT GENE), NDM: NOT DETECTED
NEISSERIA MENINGITIDIS, NMNI: NOT DETECTED
NRBC # BLD: 0 K/UL (ref 0–0.01)
NRBC BLD-RTO: 0 PER 100 WBC
OXA-48-LIKE (CARBAPENEMASE RESISTANT GENE), OXA48: NOT DETECTED
PLATELET # BLD AUTO: 213 K/UL (ref 150–400)
PMV BLD AUTO: 9.7 FL (ref 8.9–12.9)
POTASSIUM SERPL-SCNC: 4.4 MMOL/L (ref 3.5–5.1)
PROTEUS, PRP: NOT DETECTED
PSEUDOMONAS AERUGINOSA: NOT DETECTED
Q-T INTERVAL, ECG07: 322 MS
QRS DURATION, ECG06: 120 MS
QTC CALCULATION (BEZET), ECG08: 493 MS
RBC # BLD AUTO: 3.63 M/UL (ref 4.1–5.7)
RESISTANT GENE SPACE, REGENE: ABNORMAL
SALMONELLA, SALMO: NOT DETECTED
SERRATIA MARCESCENS: NOT DETECTED
SODIUM SERPL-SCNC: 134 MMOL/L (ref 136–145)
STAPH EPIDERMIDIS, STEP: NOT DETECTED
STAPH LUGDUNENSIS, STALUG: NOT DETECTED
STAPHYLOCOCCUS AUREUS: NOT DETECTED
STAPHYLOCOCCUS, STAPP: NOT DETECTED
STENO MALTOPHILIA, STMA: NOT DETECTED
STREPTOCOCCUS , STPSP: NOT DETECTED
STREPTOCOCCUS AGALACTIAE (GROUP B): NOT DETECTED
STREPTOCOCCUS PNEUMONIAE , SPNP: NOT DETECTED
STREPTOCOCCUS PYOGENES (GROUP A), SPYOP: NOT DETECTED
VENTRICULAR RATE, ECG03: 141 BPM
VIM (CARBAPENEMASE RESISTANT GENE), VIM: NOT DETECTED
WBC # BLD AUTO: 6.6 K/UL (ref 4.1–11.1)

## 2022-11-25 PROCEDURE — 80048 BASIC METABOLIC PNL TOTAL CA: CPT

## 2022-11-25 PROCEDURE — 77010033678 HC OXYGEN DAILY

## 2022-11-25 PROCEDURE — 65270000029 HC RM PRIVATE

## 2022-11-25 PROCEDURE — 94761 N-INVAS EAR/PLS OXIMETRY MLT: CPT

## 2022-11-25 PROCEDURE — 85027 COMPLETE CBC AUTOMATED: CPT

## 2022-11-25 PROCEDURE — 74011250637 HC RX REV CODE- 250/637: Performed by: INTERNAL MEDICINE

## 2022-11-25 PROCEDURE — 74011250637 HC RX REV CODE- 250/637: Performed by: HOSPITALIST

## 2022-11-25 PROCEDURE — 36415 COLL VENOUS BLD VENIPUNCTURE: CPT

## 2022-11-25 PROCEDURE — 74011000258 HC RX REV CODE- 258: Performed by: INTERNAL MEDICINE

## 2022-11-25 PROCEDURE — 74011250636 HC RX REV CODE- 250/636: Performed by: INTERNAL MEDICINE

## 2022-11-25 PROCEDURE — 74011000250 HC RX REV CODE- 250: Performed by: INTERNAL MEDICINE

## 2022-11-25 PROCEDURE — 94640 AIRWAY INHALATION TREATMENT: CPT

## 2022-11-25 PROCEDURE — 93306 TTE W/DOPPLER COMPLETE: CPT

## 2022-11-25 RX ORDER — DILTIAZEM HYDROCHLORIDE 30 MG/1
30 TABLET, FILM COATED ORAL
Status: DISCONTINUED | OUTPATIENT
Start: 2022-11-25 | End: 2022-11-28

## 2022-11-25 RX ORDER — METOPROLOL TARTRATE 25 MG/1
12.5 TABLET, FILM COATED ORAL EVERY 12 HOURS
Status: DISCONTINUED | OUTPATIENT
Start: 2022-11-25 | End: 2022-11-26

## 2022-11-25 RX ADMIN — BUDESONIDE AND FORMOTEROL FUMARATE DIHYDRATE 2 PUFF: 160; 4.5 AEROSOL RESPIRATORY (INHALATION) at 21:00

## 2022-11-25 RX ADMIN — QUETIAPINE FUMARATE 12.5 MG: 25 TABLET ORAL at 22:03

## 2022-11-25 RX ADMIN — METOPROLOL TARTRATE 12.5 MG: 25 TABLET, FILM COATED ORAL at 22:00

## 2022-11-25 RX ADMIN — DILTIAZEM HYDROCHLORIDE 30 MG: 30 TABLET, FILM COATED ORAL at 12:15

## 2022-11-25 RX ADMIN — DILTIAZEM HYDROCHLORIDE 30 MG: 30 TABLET, FILM COATED ORAL at 15:52

## 2022-11-25 RX ADMIN — BUDESONIDE AND FORMOTEROL FUMARATE DIHYDRATE 2 PUFF: 160; 4.5 AEROSOL RESPIRATORY (INHALATION) at 08:45

## 2022-11-25 RX ADMIN — METHYLPREDNISOLONE SODIUM SUCCINATE 40 MG: 40 INJECTION, POWDER, FOR SOLUTION INTRAMUSCULAR; INTRAVENOUS at 22:03

## 2022-11-25 RX ADMIN — CEFTRIAXONE 2 G: 2 INJECTION, POWDER, FOR SOLUTION INTRAMUSCULAR; INTRAVENOUS at 15:52

## 2022-11-25 RX ADMIN — DOXYCYCLINE 100 MG: 100 INJECTION, POWDER, LYOPHILIZED, FOR SOLUTION INTRAVENOUS at 15:55

## 2022-11-25 RX ADMIN — FINASTERIDE 5 MG: 5 TABLET, FILM COATED ORAL at 10:14

## 2022-11-25 RX ADMIN — DOXYCYCLINE 100 MG: 100 INJECTION, POWDER, LYOPHILIZED, FOR SOLUTION INTRAVENOUS at 22:04

## 2022-11-25 RX ADMIN — METHYLPREDNISOLONE SODIUM SUCCINATE 40 MG: 40 INJECTION, POWDER, FOR SOLUTION INTRAMUSCULAR; INTRAVENOUS at 15:53

## 2022-11-25 RX ADMIN — ASPIRIN 81 MG 81 MG: 81 TABLET ORAL at 10:15

## 2022-11-25 RX ADMIN — METOPROLOL TARTRATE 12.5 MG: 25 TABLET, FILM COATED ORAL at 12:15

## 2022-11-25 RX ADMIN — SENNOSIDES 8.6 MG: 8.6 TABLET, COATED ORAL at 10:14

## 2022-11-25 RX ADMIN — ZIPRASIDONE MESYLATE 10 MG: 20 INJECTION, POWDER, LYOPHILIZED, FOR SOLUTION INTRAMUSCULAR at 18:57

## 2022-11-25 RX ADMIN — Medication 2000 UNITS: at 10:15

## 2022-11-25 NOTE — PROGRESS NOTES
RN walked into room and pt had removed gown, tele, malewick and removed one of his iv's (running cardizem gtt) not sure how long diltiazem not running. Bilateral mittens applied. Pt became combative, hitting and kicking at staff.

## 2022-11-25 NOTE — PROGRESS NOTES
Vascular Access Team Consult - Ultrasound (US)-guided Peripheral IV (PIV) Placement:     SEE AVATAR-Brisk blood return noted and flushed easily with 10 mL NS. Patient tolerated well, no complaints. Patient continues to benefit from US-guided PIV placement. Primary care nurse, Sommer Smith RN, notified. Please enter IP PICC Team consult for any further vascular access needs.

## 2022-11-25 NOTE — PROGRESS NOTES
All piv's removed by pt. Tommy gt paused at 0615 for loss of iv access. Attempt made to notify cardiology. Unable to get through. Passed along in report to day RN.

## 2022-11-25 NOTE — PROGRESS NOTES
Skin assessment performed : Bruise to left temporal, left forearm discoloration, Moles noted on back, and toes pink/red and blanchable.

## 2022-11-25 NOTE — PROGRESS NOTES
Pulmonary/ CC progress note    Subjective:   Date of Consultation:  2022  Referring Physician: J Luis Hines MD    Subjective    Patient seen and examined  Overnight events noted    Lying in bed comfortably  Awake and alert  Sitter at bedside  On 3 L nasal cannula oxygen  No acute distress     Prior to Admission medications    Medication Sig Start Date End Date Taking? Authorizing Provider   finasteride (PROSCAR) 5 mg tablet Take 5 mg by mouth in the morning. Yeny Sharif MD   furosemide (LASIX) 80 mg tablet Take  by mouth daily. Yeny Sharif MD   sennosides (Senna) 8.6 mg cap Take  by mouth. Yeny Sharif MD   guaiFENesin (ORGANIDIN) 200 mg tablet Take 200 mg by mouth every four (4) hours as needed for Congestion. Yeny Sharif MD   cholecalciferol (VITAMIN D3) (1000 Units /25 mcg) tablet Take 2 Tablets by mouth daily. 3/9/22   Nilson Crane PA-C   budesonide-formoteroL (Symbicort) 160-4.5 mcg/actuation HFAA Take 2 Puffs by inhalation two (2) times a day. 3/9/22   Nilson Crane PA-C   albuterol (ProAir HFA) 90 mcg/actuation inhaler Take 2 Puffs by inhalation every six (6) hours as needed for Wheezing or Shortness of Breath. 3/9/22   Nilson Crane PA-C   aspirin 81 mg chewable tablet Take 1 Tablet by mouth daily. 22   Loretta Fry MD   QUEtiapine (SEROquel) 25 mg tablet Take 0.5 Tablets by mouth nightly. 22   Loretta Fry MD     Allergies   Allergen Reactions    Gabapentin Unknown (comments)        Review of Systems:  Review of systems not obtained due to patient factors. Objective:   Blood pressure 123/80, pulse (!) 129, temperature 97.6 °F (36.4 °C), resp. rate 27, height 5' 6\" (1.676 m), weight 74.8 kg (165 lb), SpO2 93 %. Temp (24hrs), Av.5 °F (36.4 °C), Min:97.4 °F (36.3 °C), Max:97.8 °F (36.6 °C)    CT HEAD WO CONT   Final Result   1. No evidence of acute intracranial abnormality by this modality.                 Data Review: CBC:   Recent Labs     11/25/22  0852 11/23/22  2315   WBC 6.6 8.4   RBC 3.63* 4.22   HGB 11.8* 13.9   HCT 36.4* 42.7    187   GRANS  --  80*   LYMPH  --  10*   EOS  --  0       CMP:   Recent Labs     11/25/22  0852 11/24/22  0051 11/23/22  2315   *  --  92   *  --  130*   K 4.4 4.1 Hemolyzed, recollect requested     --  95*   CO2 27  --  33*   BUN 35*  --  38*   CREA 1.61*  --  1.70*   CA 8.0*  --  7.6*   AGAP 6  --  2*   BUCR 22*  --  22*   AP  --   --  74   TP  --   --  7.1   ALB  --   --  2.9*   GLOB  --   --  4.2*   AGRAT  --   --  0.7*       Liver Enzymes:   Recent Labs     11/23/22  2315   TP 7.1   ALB 2.9*   AP 74       ABGs: No results for input(s): PH, PCO2, PO2, HCO3 in the last 72 hours. Current Facility-Administered Medications   Medication Dose Route Frequency    aspirin chewable tablet 81 mg  81 mg Oral DAILY    QUEtiapine (SEROquel) tablet 12.5 mg  12.5 mg Oral QHS    cholecalciferol (VITAMIN D3) (1000 Units /25 mcg) tablet 2,000 Units  2,000 Units Oral DAILY    budesonide-formoteroL (SYMBICORT) 160-4.5 mcg/actuation HFA inhaler 2 Puff  2 Puff Inhalation BID    albuterol (PROVENTIL HFA, VENTOLIN HFA, PROAIR HFA) inhaler 2 Puff  2 Puff Inhalation Q6H PRN    finasteride (PROSCAR) tablet 5 mg  5 mg Oral DAILY    senna (SENOKOT) tablet 8.6 mg  1 Tablet Oral DAILY    sodium chloride (NS) flush 5-40 mL  5-40 mL IntraVENous PRN    acetaminophen (TYLENOL) solution 650 mg  650 mg Oral Q4H PRN    methylPREDNISolone (PF) (SOLU-MEDROL) injection 40 mg  40 mg IntraVENous Q8H    doxycycline (VIBRAMYCIN) 100 mg in 0.9% sodium chloride (MBP/ADV) 100 mL MBP  100 mg IntraVENous Q12H    sodium chloride (NS) flush 5-40 mL  5-40 mL IntraVENous PRN    dilTIAZem (CARDIZEM) 125 mg in 0.9% sodium chloride 125 mL ([id number])  5-15 mg/hr IntraVENous TITRATE        Exam:    This is an elderly male who has poor vision. He is alert and oriented otherwise. He is known to have dementia.   He is on nasal cannula oxygen  Has rhonchi audible on auscultation of chest.  JVD is absent. No cervical lymphadenopathy. Thyroid not enlarged  Heart: S1-S2 irregular. Abdomen: Soft, nontender, no visceromegaly  Extremities: No edema, sinus or clubbing  Neuro: No focal motor deficit. Impression:   80years old  male seen in assisted living facility with supervised care. He has known history of CHF, advanced COPD with central lobular emphysema, hypertension was brought to the emergency department after a fall from wheelchair. It resulted in injury on the left side of the his head. CT scan of head did not show any new intracranial pathology. It was not clear if he lost his consciousness. Patient has history of dementia. Apparently he is on oxygen in the facility. ER he was found to be in atrial fibrillation with rapid ventricular response. He was also noted to have upper respiratory tract congestion. Plan:   COPD with exacerbation  Patient has history of advanced COPD with emphysema. He is on nasal cannula oxygen at home. Currently he has upper respiratory tract congestion with some wheezing. We will treat him with nebulized albuterol and Atrovent, low-dose systemic steroids and IV antibiotics. Doxycycline 100 mg twice daily    2. atrial fibrillation with rapid ventricular response:  Stable    3. Patient's chest x-ray has shown chronic changes without any acute infiltrates    4. Status post fall from wheelchair. CT scan of head is negative    5. Dementia: Supportive care    Questions of patient were answered at bedside in detail  Case discussed in detail with RN, RT, and care team  Thank you for involving me in the care of this patient  I will follow with you closely during hospitalization    Time spent more than 30 minutes under patient care with no overlap reviewing results and records, decision making, and answering questions.     Christian Wen MD  Pulmonary/CC

## 2022-11-25 NOTE — PROGRESS NOTES
CM called to speak to admissions personnel at the [facility]. [facility] does not have a nurse on staff this weekend and therefore will not be able to accept patient back until Monday.

## 2022-11-25 NOTE — PROGRESS NOTES
Problem: Falls - Risk of  Goal: *Absence of Falls  Description: Document Lettie Duverney Fall Risk and appropriate interventions in the flowsheet.   Outcome: Progressing Towards Goal  Note: Fall Risk Interventions:  Mobility Interventions: Bed/chair exit alarm    Mentation Interventions: Bed/chair exit alarm    Medication Interventions: Bed/chair exit alarm    Elimination Interventions: Bed/chair exit alarm    History of Falls Interventions: Bed/chair exit alarm         Problem: Patient Education: Go to Patient Education Activity  Goal: Patient/Family Education  Outcome: Progressing Towards Goal     Problem: Patient Education: Go to Patient Education Activity  Goal: Patient/Family Education  Outcome: Progressing Towards Goal     Problem: Patient Education: Go to Patient Education Activity  Goal: Patient/Family Education  Outcome: Progressing Towards Goal     Problem: Afib Pathway: Day 1  Goal: Off Pathway (Use only if patient is Off Pathway)  Outcome: Progressing Towards Goal  Goal: Activity/Safety  Outcome: Progressing Towards Goal  Goal: Consults, if ordered  Outcome: Progressing Towards Goal  Goal: Diagnostic Test/Procedures  Outcome: Progressing Towards Goal  Goal: Nutrition/Diet  Outcome: Progressing Towards Goal  Goal: Discharge Planning  Outcome: Progressing Towards Goal  Goal: Medications  Outcome: Progressing Towards Goal  Goal: Respiratory  Outcome: Progressing Towards Goal  Goal: Treatments/Interventions/Procedures  Outcome: Progressing Towards Goal  Goal: Psychosocial  Outcome: Progressing Towards Goal  Goal: *Optimal pain control at patient's stated goal  Outcome: Progressing Towards Goal  Goal: *Hemodynamically stable  Outcome: Progressing Towards Goal  Goal: *Stable cardiac rhythm  Outcome: Progressing Towards Goal  Goal: *Lungs clear or at baseline  Outcome: Progressing Towards Goal  Goal: *Labs within defined limits  Outcome: Progressing Towards Goal  Goal: *Describes available resources and support systems  Outcome: Progressing Towards Goal     Problem: Afib Pathway: Day 2  Goal: Off Pathway (Use only if patient is Off Pathway)  Outcome: Progressing Towards Goal  Goal: Activity/Safety  Outcome: Progressing Towards Goal  Goal: Consults, if ordered  Outcome: Progressing Towards Goal  Goal: Diagnostic Test/Procedures  Outcome: Progressing Towards Goal  Goal: Nutrition/Diet  Outcome: Progressing Towards Goal  Goal: Discharge Planning  Outcome: Progressing Towards Goal  Goal: Medications  Outcome: Progressing Towards Goal  Goal: Respiratory  Outcome: Progressing Towards Goal  Goal: Treatments/Interventions/Procedures  Outcome: Progressing Towards Goal  Goal: Psychosocial  Outcome: Progressing Towards Goal  Goal: *Hemodynamically stable  Outcome: Progressing Towards Goal  Goal: *Optimal pain control at patient's stated goal  Outcome: Progressing Towards Goal  Goal: *Stable cardiac rhythm  Outcome: Progressing Towards Goal  Goal: *Lungs clear or at baseline  Outcome: Progressing Towards Goal  Goal: *Describes available resources and support systems  Outcome: Progressing Towards Goal     Problem: Afib Pathway: Day 3  Goal: Off Pathway (Use only if patient is Off Pathway)  Outcome: Progressing Towards Goal  Goal: Activity/Safety  Outcome: Progressing Towards Goal  Goal: Diagnostic Test/Procedures  Outcome: Progressing Towards Goal  Goal: Nutrition/Diet  Outcome: Progressing Towards Goal  Goal: Discharge Planning  Outcome: Progressing Towards Goal  Goal: Medications  Outcome: Progressing Towards Goal  Goal: Respiratory  Outcome: Progressing Towards Goal  Goal: Treatments/Interventions/Procedures  Outcome: Progressing Towards Goal  Goal: Psychosocial  Outcome: Progressing Towards Goal     Problem: Afib: Discharge Outcomes (not in CAT)  Goal: *Hemodynamically stable  Outcome: Progressing Towards Goal  Goal: *Stable cardiac rhythm  Outcome: Progressing Towards Goal  Goal: *Lungs clear or at baseline  Outcome: Progressing Towards Goal  Goal: *Optimal pain control at patient's stated goal  Outcome: Progressing Towards Goal  Goal: *Identifies cardiac risk factors  Outcome: Progressing Towards Goal  Goal: *Verbalizes home exercise program, activity guidelines, cardiac precautions  Outcome: Progressing Towards Goal  Goal: *Verbalizes understanding and describes prescribed diet  Outcome: Progressing Towards Goal  Goal: *Verbalizes understanding and describes medication purposes and frequencies  Outcome: Progressing Towards Goal  Goal: *Anxiety reduced or absent  Outcome: Progressing Towards Goal  Goal: *Understands and describes signs and symptoms to report to providers(Stroke Metric)  Outcome: Progressing Towards Goal  Goal: *Describes follow-up/return visits to physicians  Outcome: Progressing Towards Goal  Goal: *Describes available resources and support systems  Outcome: Progressing Towards Goal  Goal: *Influenza immunization  Outcome: Progressing Towards Goal  Goal: *Pneumococcal immunization  Outcome: Progressing Towards Goal  Goal: *Describes smoking cessation resources  Outcome: Progressing Towards Goal

## 2022-11-25 NOTE — PROGRESS NOTES
[facility] Hospitalist Progress Note  Ceferino Rodriguez MD    Date:2022       Room:[facility]  Patient Kevin Schultz     YOB: 1929     Age:89 y.o.    22 admission course  80 y.o. male lives in an assisted living facility with supervised care with history of heart failure, COPD advanced with centrilobular emphysema, hypertension is brought to the emergency room due to a fall from wheelchair. There is an injury on the left side of his head, unknown unable to find information if he lost any consciousness. At baseline he has dementia but pleasantly confused and tried to talk but cannot provide information. He is on home oxygen for COPD. Evaluation in the emergency room found with atrial fibrillation with rapid ventricular rate and also he is developing hypotension. 22 no new complaints, tolerating medications well  Presently on amiodarone drip for rapid atrial fibrillation    22 transitioned to po agents as he has lost iv access  So far tolerating well    Subjective    Subjective:  Symptoms:  Stable. Review of Systems   All other systems reviewed and are negative. Objective         Vitals Last 24 Hours:  TEMPERATURE:  Temp  Av.6 °F (36.4 °C)  Min: 97.4 °F (36.3 °C)  Max: 97.8 °F (36.6 °C)  RESPIRATIONS RANGE: Resp  Av.5  Min: 20  Max: 27  PULSE OXIMETRY RANGE: SpO2  Av.9 %  Min: 84 %  Max: 98 %  PULSE RANGE: Pulse  Av.6  Min: 103  Max: 142  BLOOD PRESSURE RANGE: Systolic (88YDE), LDZ:993 , Min:110 , WWO:718   ; Diastolic (49FUS), KHR:56, Min:73, Max:88    I/O (24Hr): Intake/Output Summary (Last 24 hours) at 2022 1234  Last data filed at 2022 0838  Gross per 24 hour   Intake 337 ml   Output 400 ml   Net -63 ml     Objective:  General Appearance:  Comfortable. Vital signs: (most recent): Blood pressure 115/80, pulse (!) 108, temperature 97.5 °F (36.4 °C), resp. rate 22, height 5' 6\" (1.676 m), weight 74.8 kg (165 lb), SpO2 96 %.     General : Pleasant, appears mildly respiratory distress. HEENT : PERRLA, normal oral mucosa, atraumatic normocephalic, Normal ear and nose. Neck : Supple, no JVD, no masses noted, no carotid bruit. Lungs : Coarse breath sounds with rhonchi and expiratory wheezing. Prolonged expirations. On nasal cannula  CVS : Rhythm rate irregularly irregular. Tachycardia noted. Difficult to hear heart sounds. Abdomen : Soft, nontender,  bowel sounds active. Extremities : Severe edema bilateral lower extremities from knee down to the feet. Musculoskeletal : Decreased range of motion. Muscle power and tone decreased. Skin : Dry, warm, pale. Lymphatic : No cervical lymphadenopathy. Neurological : Awake, alert, oriented to only place and person. He knows not at his regular place. Wants to get out of here. He is not understanding things. Psychiatric : Pleasantly confused with dementia      Labs/Imaging/Diagnostics    Labs:  CBC:  Recent Labs     11/25/22 0852 11/23/22 2315   WBC 6.6 8.4   RBC 3.63* 4.22   HGB 11.8* 13.9   HCT 36.4* 42.7   .3* 101.2*   RDW 13.6 13.7    187       CHEMISTRIES:  Recent Labs     11/25/22  0852 11/24/22  0250 11/24/22  0051 11/23/22  2315   *  --   --  130*   K 4.4  --  4.1 Hemolyzed, recollect requested     --   --  95*   CO2 27  --   --  33*   BUN 35*  --   --  38*   CA 8.0*  --   --  7.6*   MG  --  2.2  --   --      PT/INR:No results for input(s): INR, INREXT, INREXT in the last 72 hours. No lab exists for component: PROTIME  APTT:No results for input(s): APTT in the last 72 hours. LIVER PROFILE:  Recent Labs     11/23/22  2315   AST Hemolyzed, recollect requested   ALT Hemolyzed, recollect requested       Lab Results   Component Value Date/Time    ALT (SGPT) Hemolyzed, recollect requested 11/23/2022 11:15 PM    AST (SGOT) Hemolyzed, recollect requested 11/23/2022 11:15 PM    Alk.  phosphatase 74 11/23/2022 11:15 PM    Bilirubin, total 1.1 (H) 11/23/2022 11:15 PM Imaging Last 24 Hours:  No results found. Assessment//Plan   Active Problems:    Atrial fibrillation with RVR (HCC) (11/24/2022)      Paroxysmal atrial fibrillation with rapid ventricular response ([address] (11/24/2022)  CT Results  (Last 48 hours)                 11/23/22 2111  CT HEAD WO CONT Final result    Impression:  1. No evidence of acute intracranial abnormality by this modality. Narrative:  EXAM:  CT HEAD WO CONT   INDICATION:   None provided   Additional history: Patient fell out of wheelchair   COMPARISON: CT of the head, 1/2/2022. .   TECHNIQUE:    Unenhanced CT of the head was performed using 5 mm images. Coronal and sagittal   reformats were produced. Brain and bone windows were generated. CT dose reduction was achieved through use of a standardized protocol tailored   for this examination and automatic exposure control for dose modulation. Myla Bloodgood FINDINGS:   Global atrophy. Periventricular and subcortical white matter hypoattenuation. There is no intracranial hemorrhage, extra-axial collection, mass, mass effect   or midline shift. The basilar cisterns are open. No acute infarct is   identified. Intracranial atherosclerosis   The bone windows demonstrate no abnormalities. The visualized portions of the   paranasal sinuses and mastoid air cells are clear. .               Assessment & Plan  11/23/22 admission course  80 y.o. male lives in an assisted living facility with supervised care with history of heart failure, COPD advanced with centrilobular emphysema, hypertension is brought to the emergency room due to a fall from wheelchair. There is an injury on the left side of his head, unknown unable to find information if he lost any consciousness. At baseline he has dementia but pleasantly confused and tried to talk but cannot provide information. He is on home oxygen for COPD.   Evaluation in the emergency room found with atrial fibrillation with rapid ventricular rate and also he is developing hypotension. 11/24/22 no new complaints, tolerating medications well  Presently on amiodarone drip for rapid atrial fibrillation    11/25/22 transitioned to po agents as he has lost iv access  So far tolerating well    MICROBIOLOGY    11/24/22 Blood  Gram Negative Rods growing in 1 of 4 bottles drawn  11/24/22 Covid 19 Negative  11/24/222 Influenza AB Negative    ASSESSMENT AND PLAN    1) Cardiovascular issues including atrial fibrillation with RVR this admission     Telemetry monitoring   Echocardiogram   Aspirin   Diltiazem   Metoprolol    2) COPD exacerbation in a patient with with centrilobular emphysema     Supportive care with respiratory support as needed   Corticosteroids   Bronchodilators   Ceftriaxone and doxycycline    3) Gram negative bacteremia, suspect urinary source though urine cultures were not obtained at admission. Ceftriaxone    4) History of dementia with behavioral disturbance on Seroquel    5) No anticoagulation due to risk of bleeding as patient is at risk of falls.     6) Code status DNR    7) Dispo return to [employer] once stable        Electronically signed by Madeline Zapien MD on 11/25/2022 at 7:51 AM

## 2022-11-25 NOTE — PROGRESS NOTES
Reason for Admission:  fall                     RUR Score:          15%           Plan for utilizing home health:      receives through the [place]     PCP: First and Last name:  None     Name of Practice:    Are you a current patient: Yes/No:    Approximate date of last visit:    Can you participate in a virtual visit with your PCP:                     Current Advanced Directive/Advance Care Plan: DNR  CM confirmed code status with patient's daughter, Fahad North. Healthcare Decision Maker:   Click here to complete [address] including selection of the Healthcare Decision Maker Relationship (ie \"Primary\")             Primary Decision Maker: Koby Gonzalez - Daughter - [phone]    Secondary Decision Maker: Ilsa Mosqueda - [phone]                  Transition of Care Plan:                      CM spoke with patient's daughter, Fahad North ([phone])  to complete DCP assessment. Patient lives at [facility]. He has a cane, walker, and wheelchair to aid in ambulation, and has home oxygen provided by the [place]. He requires some assistance in completing his ADLs. [facility]. Discharge disposition is to return to [facility]. CM will continue to follow.

## 2022-11-25 NOTE — WOUND CARE
Wound Care Note:    Wound Care into see patient because of \"new wound on L hand, deep skin tear\"    Skin Care & Pressure Relief Recommendations:  Minimize layers of linen  Pads under patient to optimize support surface and microclimate  Turn/reposition approximately every 2 hours. Pillow Wedges  Manage incontinence  Promote continence; Skin Protective lotion to buttocks and sacrum daily and as needed with incontinence care    Offload heels with pillows or offloading boots. Skin tear noted to left posterior hand with complete epidermal separation from dermal tissue layer. Apply xeroform gauze daily, see dressing order. Noted that a tegaderm was stuck to left wrist.  Attempted to remove with NS irrigation and alcohol wipes and removed 1/2 way before patient become insistent that I stop due to pulling on arm hair. I explained that the adhesive could get caught on linen or gown and cause skin injury but patient insisted I left it \"fall off\". Please continue to attempt to gently remove remaining tegaderm adhesive from left wrist.  Apply Optifoam Border Sacral foam dressing every three days to redistribute pressure from bony prominence and prevent pressure and friction injury to skin. Rn is to gently peel back for shift skin assessment and re-secure if not soiled. Maintain the PrimoFit to manage  incontinence. Use foam wedge to turn q2h at 30 degree angle to offload sacrum. Float heels with 1-2 pillows lengthwise while in bed for offloading of the heels. Maintain HOB at 30 degrees or less, if not contraindicated, to reduce pressure to buttocks and sacrum. Raise foot of bed to help prevent friction and shear injury from sliding down in the bed. Re-consult [facility] if skin condition changes. Media Information  Document Information    Photographic Image:  Mobile Media Capture   Left Hand   11/25/2022 16:07   Attached To:    Hospital Encounter on 11/23/22     Source Information    Paresh Montoya RN  [facility] Cardiac Telemetry

## 2022-11-25 NOTE — PROGRESS NOTES
Progress Note    Patient: Ulises Cooper MRN: [id number]  SSN: [id number]    YOB: 1929  Age: 80 y.o. Sex: male      Admit Date: 11/23/2022    LOS: 1 day     Subjective:     No acute events overnight    Objective:     Vitals:    11/25/22 0347 11/25/22 0400 11/25/22 0726 11/25/22 0800   BP: 110/73  123/80    Pulse: (!) 129 (!) 135 (!) 142 (!) 129   Resp: 20  27    Temp: 97.8 °F (36.6 °C)  97.6 °F (36.4 °C)    SpO2: (!) 84%  93%    Weight:       Height:            Intake and Output:  Current Shift: 11/25 0701 - 11/25 1900  In: 337 [P.O.:337]  Out: 400 [Urine:400]  Last three shifts: 11/23 1901 - 11/25 0700  In: 100 [I.V.:100]  Out: 325 [Urine:325]    Physical Exam:   General:  Alert, cooperative, no distress, appears stated age. Eyes:  Conjunctivae/corneas clear. PERRL, EOMs intact. Fundi benign   Ears:  Normal TMs and external ear canals both ears. Nose: Nares normal. Septum midline. Mucosa normal. No drainage or sinus tenderness. Mouth/Throat: Lips, mucosa, and tongue normal. Teeth and gums normal.   Neck: Supple, symmetrical, trachea midline, no adenopathy, thyroid: no enlargment/tenderness/nodules, no carotid bruit and no JVD. Back:   Symmetric, no curvature. ROM normal. No CVA tenderness. Lungs:   Clear to auscultation bilaterally. Heart:  Irregular, variable S1, tachycardic   Abdomen:   Soft, non-tender. Bowel sounds normal. No masses,  No organomegaly. Extremities: Extremities normal, atraumatic, no cyanosis or edema. Pulses: 2+ and symmetric all extremities. Skin: Skin color, texture, turgor normal. No rashes or lesions   Lymph nodes: Cervical, supraclavicular, and axillary nodes normal.   Neurologic: CNII-XII intact. Normal strength, sensation and reflexes throughout. Lab/Data Review: All lab results for the last 24 hours reviewed.          Assessment:     Active Problems:    Atrial fibrillation with RVR (HCC) (11/24/2022)      Paroxysmal atrial fibrillation with rapid ventricular response ([facility] [address] (11/24/2022)    Patient is a 31-year-old white male with:  1. Chronic atrial fibrillation  2. Hypotension  3. History of hypertension  4. Bronchial asthma  5. Prostate cancer   6. COPD  7. Dementia  8. Hard of hearing  9. Blind  910.  10.  UTI  11. Hyponatremia  12. Acute kidney injury  13. Mild rhabdomyolysis  14. Thoracolumbar scoliosis  15. Moderate tricuspid regurgitation    Plan:     Hemoglobin 11.8, sodium 134, potassium 4.4, creatinine 1.6, BUN 35. There is no accurate ins and outs. Heart rate is poorly controlled. Anyhow patient lost his IV line. We we will attempt a PICC line. The currently on aspirin. We will add metoprolol and Cardizem orally.     Signed By: Shazia Whipple MD     November 25, 2022

## 2022-11-25 NOTE — PROGRESS NOTES
pt combative, verbal and physical aggressive. refusing his NC/oxygen and refusing for me to check his 02. mittens unsuccessful. also pulled out one of his IV's (running dilt gtt) and tele. he is currently throwing objects and pulling at lines/tubes. MD notified.

## 2022-11-26 LAB
ECHO AO ROOT DIAM: 3.7 CM
ECHO AO ROOT INDEX: 2.01 CM/M2
ECHO AV AREA PEAK VELOCITY: 3 CM2
ECHO AV AREA VTI: 3.4 CM2
ECHO AV AREA/BSA PEAK VELOCITY: 1.6 CM2/M2
ECHO AV AREA/BSA VTI: 1.8 CM2/M2
ECHO AV MEAN GRADIENT: 3 MMHG
ECHO AV MEAN VELOCITY: 0.8 M/S
ECHO AV PEAK GRADIENT: 5 MMHG
ECHO AV PEAK VELOCITY: 1.2 M/S
ECHO AV VELOCITY RATIO: 0.67
ECHO AV VTI: 17 CM
ECHO EST RA PRESSURE: 3 MMHG
ECHO LA AREA 2C: 22.9 CM2
ECHO LA AREA 4C: 23.8 CM2
ECHO LA MAJOR AXIS: 7.7 CM
ECHO LA MINOR AXIS: 7.3 CM
ECHO LA VOL BP: 59 ML (ref 18–58)
ECHO LA VOL/BSA BIPLANE: 32 ML/M2 (ref 16–34)
ECHO LV EDV A2C: 74 ML
ECHO LV EDV A4C: 45 ML
ECHO LV EDV INDEX A4C: 24 ML/M2
ECHO LV EDV NDEX A2C: 40 ML/M2
ECHO LV EJECTION FRACTION A2C: 24 %
ECHO LV EJECTION FRACTION A4C: 40 %
ECHO LV EJECTION FRACTION BIPLANE: 31 % (ref 55–100)
ECHO LV ESV A2C: 57 ML
ECHO LV ESV A4C: 27 ML
ECHO LV ESV INDEX A2C: 31 ML/M2
ECHO LV ESV INDEX A4C: 15 ML/M2
ECHO LV FRACTIONAL SHORTENING: 24 % (ref 28–44)
ECHO LV INTERNAL DIMENSION DIASTOLE INDEX: 2.01 CM/M2
ECHO LV INTERNAL DIMENSION DIASTOLIC: 3.7 CM (ref 4.2–5.9)
ECHO LV INTERNAL DIMENSION SYSTOLIC INDEX: 1.52 CM/M2
ECHO LV INTERNAL DIMENSION SYSTOLIC: 2.8 CM
ECHO LV IVSD: 1 CM (ref 0.6–1)
ECHO LV MASS 2D: 96.9 G (ref 88–224)
ECHO LV MASS INDEX 2D: 52.7 G/M2 (ref 49–115)
ECHO LV POSTERIOR WALL DIASTOLIC: 0.8 CM (ref 0.6–1)
ECHO LV RELATIVE WALL THICKNESS RATIO: 0.43
ECHO LVOT AREA: 4.2 CM2
ECHO LVOT AV VTI INDEX: 0.82
ECHO LVOT DIAM: 2.3 CM
ECHO LVOT MEAN GRADIENT: 1 MMHG
ECHO LVOT PEAK GRADIENT: 3 MMHG
ECHO LVOT PEAK VELOCITY: 0.8 M/S
ECHO LVOT STROKE VOLUME INDEX: 31.4 ML/M2
ECHO LVOT SV: 57.7 ML
ECHO LVOT VTI: 13.9 CM
ECHO MV E DECELERATION TIME (DT): 142 MS
ECHO MV E VELOCITY: 1.02 M/S
ECHO RA AREA 4C: 18.8 CM2
ECHO RA END SYSTOLIC VOLUME APICAL 4 CHAMBER INDEX BSA: 22 ML/M2
ECHO RA VOLUME: 41 ML
ECHO RIGHT VENTRICULAR SYSTOLIC PRESSURE (RVSP): 38 MMHG
ECHO RV BASAL DIMENSION: 3.9 CM
ECHO RV MID DIMENSION: 3.2 CM
ECHO RV TAPSE: 1.8 CM (ref 1.7–?)
ECHO TV REGURGITANT MAX VELOCITY: 2.96 M/S
ECHO TV REGURGITANT PEAK GRADIENT: 35 MMHG

## 2022-11-26 PROCEDURE — 77010033678 HC OXYGEN DAILY

## 2022-11-26 PROCEDURE — 94761 N-INVAS EAR/PLS OXIMETRY MLT: CPT

## 2022-11-26 PROCEDURE — 74011250637 HC RX REV CODE- 250/637: Performed by: INTERNAL MEDICINE

## 2022-11-26 PROCEDURE — 94640 AIRWAY INHALATION TREATMENT: CPT

## 2022-11-26 PROCEDURE — 74011250636 HC RX REV CODE- 250/636: Performed by: INTERNAL MEDICINE

## 2022-11-26 PROCEDURE — 65270000029 HC RM PRIVATE

## 2022-11-26 PROCEDURE — 74011250637 HC RX REV CODE- 250/637: Performed by: HOSPITALIST

## 2022-11-26 PROCEDURE — 74011000258 HC RX REV CODE- 258: Performed by: INTERNAL MEDICINE

## 2022-11-26 PROCEDURE — 74011000250 HC RX REV CODE- 250: Performed by: INTERNAL MEDICINE

## 2022-11-26 RX ORDER — METOPROLOL TARTRATE 25 MG/1
25 TABLET, FILM COATED ORAL EVERY 12 HOURS
Status: DISCONTINUED | OUTPATIENT
Start: 2022-11-26 | End: 2022-12-02 | Stop reason: HOSPADM

## 2022-11-26 RX ORDER — QUETIAPINE FUMARATE 25 MG/1
12.5 TABLET, FILM COATED ORAL 2 TIMES DAILY WITH MEALS
Status: DISCONTINUED | OUTPATIENT
Start: 2022-11-26 | End: 2022-11-29

## 2022-11-26 RX ADMIN — DILTIAZEM HYDROCHLORIDE 30 MG: 30 TABLET, FILM COATED ORAL at 10:06

## 2022-11-26 RX ADMIN — ASPIRIN 81 MG 81 MG: 81 TABLET ORAL at 10:06

## 2022-11-26 RX ADMIN — METHYLPREDNISOLONE SODIUM SUCCINATE 40 MG: 40 INJECTION, POWDER, FOR SOLUTION INTRAMUSCULAR; INTRAVENOUS at 21:47

## 2022-11-26 RX ADMIN — BUDESONIDE AND FORMOTEROL FUMARATE DIHYDRATE 2 PUFF: 160; 4.5 AEROSOL RESPIRATORY (INHALATION) at 21:12

## 2022-11-26 RX ADMIN — DOXYCYCLINE 100 MG: 100 INJECTION, POWDER, LYOPHILIZED, FOR SOLUTION INTRAVENOUS at 10:07

## 2022-11-26 RX ADMIN — DOXYCYCLINE 100 MG: 100 INJECTION, POWDER, LYOPHILIZED, FOR SOLUTION INTRAVENOUS at 21:47

## 2022-11-26 RX ADMIN — DILTIAZEM HYDROCHLORIDE 30 MG: 30 TABLET, FILM COATED ORAL at 12:50

## 2022-11-26 RX ADMIN — CEFTRIAXONE 2 G: 2 INJECTION, POWDER, FOR SOLUTION INTRAMUSCULAR; INTRAVENOUS at 16:40

## 2022-11-26 RX ADMIN — QUETIAPINE FUMARATE 12.5 MG: 25 TABLET ORAL at 10:09

## 2022-11-26 RX ADMIN — SENNOSIDES 8.6 MG: 8.6 TABLET, COATED ORAL at 10:06

## 2022-11-26 RX ADMIN — METOPROLOL TARTRATE 12.5 MG: 25 TABLET, FILM COATED ORAL at 10:06

## 2022-11-26 RX ADMIN — METHYLPREDNISOLONE SODIUM SUCCINATE 40 MG: 40 INJECTION, POWDER, FOR SOLUTION INTRAMUSCULAR; INTRAVENOUS at 16:41

## 2022-11-26 RX ADMIN — Medication 2000 UNITS: at 10:06

## 2022-11-26 RX ADMIN — METOPROLOL TARTRATE 25 MG: 25 TABLET, FILM COATED ORAL at 21:48

## 2022-11-26 RX ADMIN — METHYLPREDNISOLONE SODIUM SUCCINATE 40 MG: 40 INJECTION, POWDER, FOR SOLUTION INTRAMUSCULAR; INTRAVENOUS at 06:38

## 2022-11-26 RX ADMIN — FINASTERIDE 5 MG: 5 TABLET, FILM COATED ORAL at 10:06

## 2022-11-26 RX ADMIN — DILTIAZEM HYDROCHLORIDE 30 MG: 30 TABLET, FILM COATED ORAL at 16:44

## 2022-11-26 RX ADMIN — ZIPRASIDONE MESYLATE 10 MG: 20 INJECTION, POWDER, LYOPHILIZED, FOR SOLUTION INTRAMUSCULAR at 16:43

## 2022-11-26 NOTE — PROGRESS NOTES
Phoned Dr. Solis Lines of patient HR 58 - 135. oriented to person, place and time , normal sensation , short and long term memory intact

## 2022-11-26 NOTE — PROGRESS NOTES
Vascular Access Note: Consult completed, found patient had patent functional PIV with meds infusing without challenge.

## 2022-11-26 NOTE — PROGRESS NOTES
Problem: Falls - Risk of  Goal: *Absence of Falls  Description: Document Lala Pappas Fall Risk and appropriate interventions in the flowsheet.   Outcome: Progressing Towards Goal  Note: Fall Risk Interventions:  Mobility Interventions: Bed/chair exit alarm, PT Consult for mobility concerns, PT Consult for assist device competence, Strengthening exercises (ROM-active/passive)    Mentation Interventions: Adequate sleep, hydration, pain control, Bed/chair exit alarm, Door open when patient unattended, Family/sitter at bedside, Room close to nurse's station    Medication Interventions: Bed/chair exit alarm, Patient to call before getting OOB    Elimination Interventions: Bed/chair exit alarm, Call light in reach    History of Falls Interventions: Bed/chair exit alarm, Door open when patient unattended, Room close to nurse's station         Problem: Patient Education: Go to Patient Education Activity  Goal: Patient/Family Education  Outcome: Progressing Towards Goal     Problem: Patient Education: Go to Patient Education Activity  Goal: Patient/Family Education  Outcome: Progressing Towards Goal     Problem: Patient Education: Go to Patient Education Activity  Goal: Patient/Family Education  Outcome: Progressing Towards Goal     Problem: Afib Pathway: Day 1  Goal: Off Pathway (Use only if patient is Off Pathway)  Outcome: Progressing Towards Goal  Goal: Activity/Safety  Outcome: Progressing Towards Goal  Goal: Consults, if ordered  Outcome: Progressing Towards Goal  Goal: Diagnostic Test/Procedures  Outcome: Progressing Towards Goal  Goal: Nutrition/Diet  Outcome: Progressing Towards Goal  Goal: Discharge Planning  Outcome: Progressing Towards Goal  Goal: Medications  Outcome: Progressing Towards Goal  Goal: Respiratory  Outcome: Progressing Towards Goal  Goal: Treatments/Interventions/Procedures  Outcome: Progressing Towards Goal  Goal: Psychosocial  Outcome: Progressing Towards Goal  Goal: *Optimal pain control at patient's stated goal  Outcome: Progressing Towards Goal  Goal: *Hemodynamically stable  Outcome: Progressing Towards Goal  Goal: *Stable cardiac rhythm  Outcome: Progressing Towards Goal  Goal: *Lungs clear or at baseline  Outcome: Progressing Towards Goal  Goal: *Labs within defined limits  Outcome: Progressing Towards Goal  Goal: *Describes available resources and support systems  Outcome: Progressing Towards Goal     Problem: Afib Pathway: Day 2  Goal: Off Pathway (Use only if patient is Off Pathway)  Outcome: Progressing Towards Goal  Goal: Activity/Safety  Outcome: Progressing Towards Goal  Goal: Consults, if ordered  Outcome: Progressing Towards Goal  Goal: Diagnostic Test/Procedures  Outcome: Progressing Towards Goal  Goal: Nutrition/Diet  Outcome: Progressing Towards Goal  Goal: Discharge Planning  Outcome: Progressing Towards Goal  Goal: Medications  Outcome: Progressing Towards Goal  Goal: Respiratory  Outcome: Progressing Towards Goal  Goal: Treatments/Interventions/Procedures  Outcome: Progressing Towards Goal  Goal: Psychosocial  Outcome: Progressing Towards Goal  Goal: *Hemodynamically stable  Outcome: Progressing Towards Goal  Goal: *Optimal pain control at patient's stated goal  Outcome: Progressing Towards Goal  Goal: *Stable cardiac rhythm  Outcome: Progressing Towards Goal  Goal: *Lungs clear or at baseline  Outcome: Progressing Towards Goal  Goal: *Describes available resources and support systems  Outcome: Progressing Towards Goal     Problem: Afib Pathway: Day 3  Goal: Off Pathway (Use only if patient is Off Pathway)  Outcome: Progressing Towards Goal  Goal: Activity/Safety  Outcome: Progressing Towards Goal  Goal: Diagnostic Test/Procedures  Outcome: Progressing Towards Goal  Goal: Nutrition/Diet  Outcome: Progressing Towards Goal  Goal: Discharge Planning  Outcome: Progressing Towards Goal  Goal: Medications  Outcome: Progressing Towards Goal  Goal: Respiratory  Outcome: Progressing Towards Goal  Goal: Treatments/Interventions/Procedures  Outcome: Progressing Towards Goal  Goal: Psychosocial  Outcome: Progressing Towards Goal     Problem: Afib: Discharge Outcomes (not in CAT)  Goal: *Hemodynamically stable  Outcome: Progressing Towards Goal  Goal: *Stable cardiac rhythm  Outcome: Progressing Towards Goal  Goal: *Lungs clear or at baseline  Outcome: Progressing Towards Goal  Goal: *Optimal pain control at patient's stated goal  Outcome: Progressing Towards Goal  Goal: *Identifies cardiac risk factors  Outcome: Progressing Towards Goal  Goal: *Verbalizes home exercise program, activity guidelines, cardiac precautions  Outcome: Progressing Towards Goal  Goal: *Verbalizes understanding and describes prescribed diet  Outcome: Progressing Towards Goal  Goal: *Verbalizes understanding and describes medication purposes and frequencies  Outcome: Progressing Towards Goal  Goal: *Anxiety reduced or absent  Outcome: Progressing Towards Goal  Goal: *Understands and describes signs and symptoms to report to providers(Stroke Metric)  Outcome: Progressing Towards Goal  Goal: *Describes follow-up/return visits to physicians  Outcome: Progressing Towards Goal  Goal: *Describes available resources and support systems  Outcome: Progressing Towards Goal  Goal: *Influenza immunization  Outcome: Progressing Towards Goal  Goal: *Pneumococcal immunization  Outcome: Progressing Towards Goal  Goal: *Describes smoking cessation resources  Outcome: Progressing Towards Goal     Problem: Non-Violent Restraints  Goal: Removal from restraints as soon as assessed to be safe  Outcome: Progressing Towards Goal  Goal: No harm/injury to patient while restraints in use  Outcome: Progressing Towards Goal  Goal: Patient's dignity will be maintained  Outcome: Progressing Towards Goal  Goal: Patient Interventions  Outcome: Progressing Towards Goal

## 2022-11-26 NOTE — PROGRESS NOTES
Progress Note    Patient: Tricia Garcia MRN: [id number]  SSN: [id number]    YOB: 1929  Age: 80 y.o. Sex: male      Admit Date: 11/23/2022    LOS: 2 days     Subjective:     No acute events overnight. Patient was confused yesterday. He received Geodon. Objective:     Vitals:    11/26/22 0637 11/26/22 0714 11/26/22 0727 11/26/22 1124   BP:  122/83  126/87   Pulse:  (!) 118  (!) 124   Resp:  28  23   Temp:  98.2 °F (36.8 °C)  97.5 °F (36.4 °C)   SpO2:  94% 94% 92%   Weight: 73.9 kg (162 lb 14.4 oz)      Height:            Intake and Output:  Current Shift: 11/26 0701 - 11/26 1900  In: 436 [P.O.:436]  Out: -   Last three shifts: 11/24 1901 - 11/26 0700  In: 659 [P.O.:659]  Out: 5203 [Urine:1475]    Physical Exam:   General:  Alert, cooperative, no distress, appears stated age. Eyes:  Conjunctivae/corneas clear. PERRL, EOMs intact. Fundi benign   Ears:  Normal TMs and external ear canals both ears. Nose: Nares normal. Septum midline. Mucosa normal. No drainage or sinus tenderness. Mouth/Throat: Lips, mucosa, and tongue normal. Teeth and gums normal.   Neck: Supple, symmetrical, trachea midline, no adenopathy, thyroid: no enlargment/tenderness/nodules, no carotid bruit and no JVD. Back:   Symmetric, no curvature. ROM normal. No CVA tenderness. Lungs:   Clear to auscultation bilaterally. Heart:  Irregular, variable S1, tachycardic   Abdomen:   Soft, non-tender. Bowel sounds normal. No masses,  No organomegaly. Extremities: Extremities normal, atraumatic, no cyanosis or edema. Pulses: 2+ and symmetric all extremities. Skin: Skin color, texture, turgor normal. No rashes or lesions   Lymph nodes: Cervical, supraclavicular, and axillary nodes normal.   Neurologic: CNII-XII intact. Normal strength, sensation and reflexes throughout. Lab/Data Review: All lab results for the last 24 hours reviewed.          Assessment:     Active Problems:    Atrial fibrillation with RVR (HCC) (11/24/2022)      Paroxysmal atrial fibrillation with rapid ventricular response ([address] (11/24/2022)  Patient is a 72-year-old white male with:  1. Chronic atrial fibrillation  2. Hypotension  3. History of hypertension  4. Bronchial asthma  5. Prostate cancer   6. COPD  7. Dementia  8. Hard of hearing  9. Blind  910.  10.  UTI  11. Hyponatremia  12. Acute kidney injury  13. Mild rhabdomyolysis  14. Thoracolumbar scoliosis  15. Moderate tricuspid regurgitation    Plan:     Output 1.4 overnight. Hemoglobin 11.8. Platelet 764. Sodium 134, potassium 4.4, creatinine 1.6, BUN 35. Calcium 8.0. Follow-up on echocardiogram.  On Cardizem, metoprolol. Increase metoprolol dose to 25 mg twice a day.   Signed By: Octaviano Peter MD     November 26, 2022

## 2022-11-26 NOTE — PROGRESS NOTES
Pulmonary/ CC progress note    Subjective:   Date of Consultation:  2022  Referring Physician: Boris Wade MD    Subjective    Patient seen and examined  Overnight events noted    Lying in bed comfortably  Awake and alert, hard of hearing  Sitter at bedside  On 3 L nasal cannula oxygen  No acute distress     Prior to Admission medications    Medication Sig Start Date End Date Taking? Authorizing Provider   finasteride (PROSCAR) 5 mg tablet Take 5 mg by mouth in the morning. Yeny Sharif MD   furosemide (LASIX) 80 mg tablet Take  by mouth daily. Yeny Sharif MD   sennosides (Senna) 8.6 mg cap Take  by mouth. Yeny Sharif MD   guaiFENesin (ORGANIDIN) 200 mg tablet Take 200 mg by mouth every four (4) hours as needed for Congestion. Yeny Sharif MD   cholecalciferol (VITAMIN D3) (1000 Units /25 mcg) tablet Take 2 Tablets by mouth daily. 3/9/22   Kevin Waldrop PA-C   budesonide-formoteroL (Symbicort) 160-4.5 mcg/actuation HFAA Take 2 Puffs by inhalation two (2) times a day. 3/9/22   Kevin Waldrop PA-C   albuterol (ProAir HFA) 90 mcg/actuation inhaler Take 2 Puffs by inhalation every six (6) hours as needed for Wheezing or Shortness of Breath. 3/9/22   Kevin Waldrop PA-C   aspirin 81 mg chewable tablet Take 1 Tablet by mouth daily. 22   Jessica Whittaker MD   QUEtiapine (SEROquel) 25 mg tablet Take 0.5 Tablets by mouth nightly. 22   Jessica Whittaker MD     Allergies   Allergen Reactions    Gabapentin Unknown (comments)        Review of Systems:  Review of systems not obtained due to patient factors. Objective:   Blood pressure 126/87, pulse (!) 116, temperature 97.5 °F (36.4 °C), resp. rate 23, height 5' 6\" (1.676 m), weight 73.9 kg (162 lb 14.4 oz), SpO2 92 %. Temp (24hrs), Av.8 °F (36.6 °C), Min:97.3 °F (36.3 °C), Max:98.5 °F (36.9 °C)    CT HEAD WO CONT   Final Result   1. No evidence of acute intracranial abnormality by this modality. Data Review: CBC:   Recent Labs     11/25/22  0852 11/23/22  2315   WBC 6.6 8.4   RBC 3.63* 4.22   HGB 11.8* 13.9   HCT 36.4* 42.7    187   GRANS  --  80*   LYMPH  --  10*   EOS  --  0     CMP:   Recent Labs     11/25/22  0852 11/24/22  0051 11/23/22  2315   *  --  92   *  --  130*   K 4.4 4.1 Hemolyzed, recollect requested     --  95*   CO2 27  --  33*   BUN 35*  --  38*   CREA 1.61*  --  1.70*   CA 8.0*  --  7.6*   AGAP 6  --  2*   BUCR 22*  --  22*   AP  --   --  74   TP  --   --  7.1   ALB  --   --  2.9*   GLOB  --   --  4.2*   AGRAT  --   --  0.7*     Liver Enzymes:   Recent Labs     11/23/22  2315   TP 7.1   ALB 2.9*   AP 74     ABGs: No results for input(s): PH, PCO2, PO2, HCO3 in the last 72 hours.   Current Facility-Administered Medications   Medication Dose Route Frequency    QUEtiapine (SEROquel) tablet 12.5 mg  12.5 mg Oral BID WITH MEALS    metoprolol tartrate (LOPRESSOR) tablet 25 mg  25 mg Oral Q12H    dilTIAZem IR (CARDIZEM) tablet 30 mg  30 mg Oral TIDAC    cefTRIAXone (ROCEPHIN) 2 g in sterile water (preservative free) 20 mL IV syringe  2 g IntraVENous Q24H    aspirin chewable tablet 81 mg  81 mg Oral DAILY    cholecalciferol (VITAMIN D3) (1000 Units /25 mcg) tablet 2,000 Units  2,000 Units Oral DAILY    budesonide-formoteroL (SYMBICORT) 160-4.5 mcg/actuation HFA inhaler 2 Puff  2 Puff Inhalation BID    albuterol (PROVENTIL HFA, VENTOLIN HFA, PROAIR HFA) inhaler 2 Puff  2 Puff Inhalation Q6H PRN    finasteride (PROSCAR) tablet 5 mg  5 mg Oral DAILY    senna (SENOKOT) tablet 8.6 mg  1 Tablet Oral DAILY    sodium chloride (NS) flush 5-40 mL  5-40 mL IntraVENous PRN    acetaminophen (TYLENOL) solution 650 mg  650 mg Oral Q4H PRN    methylPREDNISolone (PF) (SOLU-MEDROL) injection 40 mg  40 mg IntraVENous Q8H    doxycycline (VIBRAMYCIN) 100 mg in 0.9% sodium chloride (MBP/ADV) 100 mL MBP  100 mg IntraVENous Q12H    sodium chloride (NS) flush 5-40 mL  5-40 mL IntraVENous PRN Exam:    This is an elderly male who has poor vision. He is alert and oriented otherwise. He is known to have dementia. He is on nasal cannula oxygen  Has rhonchi audible on auscultation of chest.  JVD is absent. No cervical lymphadenopathy. Thyroid not enlarged  Heart: S1-S2 irregular. Abdomen: Soft, nontender, no visceromegaly  Extremities: No edema, sinus or clubbing  Neuro: No focal motor deficit. Impression:   80years old  male seen in assisted living facility with supervised care. He has known history of CHF, advanced COPD with central lobular emphysema, hypertension was brought to the emergency department after a fall from wheelchair. It resulted in injury on the left side of the his head. CT scan of head did not show any new intracranial pathology. It was not clear if he lost his consciousness. Patient has history of dementia. Apparently he is on oxygen in the facility. ER he was found to be in atrial fibrillation with rapid ventricular response. He was also noted to have upper respiratory tract congestion. Plan:   COPD with exacerbation  Patient has history of advanced COPD with emphysema. He is on nasal cannula oxygen at home. Currently he has upper respiratory tract congestion with some wheezing. We will treat him with nebulized albuterol and Atrovent, low-dose systemic steroids and IV antibiotics. Doxycycline 100 mg twice daily  . Robitussin cough and cold for congestion    2. atrial fibrillation with rapid ventricular response: Follow-up echocardiogram, on Cardizem and getting adjusted his metoprolol dosage     3. Patient's chest x-ray has shown chronic changes without any acute infiltrates    4. Status post fall from wheelchair. CT scan of head is negative    5.   Dementia: Supportive care    Questions of patient were answered at bedside in detail  Case discussed in detail with RN, RT, and care team  Thank you for involving me in the care of this patient  I will follow with you closely during hospitalization    Time spent more than 30 minutes under patient care with no overlap reviewing results and records, decision making, and answering questions.     Huan Storey MD  [facility]

## 2022-11-26 NOTE — PROGRESS NOTES
[facility] Hospitalist Progress Note  Ceferino Valles MD    Date:2022       Room:[facility]  Patient Nabil Bill     YOB: 1929     Age:89 y.o.    22 admission course  80 y.o. male lives in an assisted living facility with supervised care with history of heart failure, COPD advanced with centrilobular emphysema, hypertension is brought to the emergency room due to a fall from wheelchair. There is an injury on the left side of his head, unknown unable to find information if he lost any consciousness. At baseline he has dementia but pleasantly confused and tried to talk but cannot provide information. He is on home oxygen for COPD. Evaluation in the emergency room found with atrial fibrillation with rapid ventricular rate and also he is developing hypotension. 22 no new complaints, tolerating medications well  Presently on amiodarone drip for rapid atrial fibrillation    22 transitioned to po agents as he has lost iv access  So far tolerating well    22 needed Geodon for agitation and nonviolent restraints overnight, removed this morning  No agitation this morning    Subjective    Subjective:  Symptoms:  Stable. Review of Systems   All other systems reviewed and are negative. Objective         Vitals Last 24 Hours:  TEMPERATURE:  Temp  Av.8 °F (36.6 °C)  Min: 97.3 °F (36.3 °C)  Max: 98.5 °F (36.9 °C)  RESPIRATIONS RANGE: Resp  Av  Min: 22  Max: 28  PULSE OXIMETRY RANGE: SpO2  Av.3 %  Min: 91 %  Max: 96 %  PULSE RANGE: Pulse  Av.4  Min: 105  Max: 148  BLOOD PRESSURE RANGE: Systolic (15AYL), FZR:841 , Min:114 , ROSIBEL:339   ; Diastolic (78QYB), XBJ:06, Min:68, Max:87    I/O (24Hr): Intake/Output Summary (Last 24 hours) at 2022 0853  Last data filed at 2022 0844  Gross per 24 hour   Intake 640 ml   Output 1075 ml   Net -435 ml       Objective:  General Appearance:  Comfortable.     Vital signs: (most recent): Blood pressure 122/83, pulse (!) 118, temperature 98.2 °F (36.8 °C), resp. rate 28, height 5' 6\" (1.676 m), weight 73.9 kg (162 lb 14.4 oz), SpO2 94 %. General : Pleasant, appears mildly respiratory distress. HEENT : PERRLA, normal oral mucosa, atraumatic normocephalic, Normal ear and nose. Neck : Supple, no JVD, no masses noted, no carotid bruit. Lungs : Coarse breath sounds with rhonchi and expiratory wheezing. Prolonged expirations. On nasal cannula  CVS : Rhythm rate irregularly irregular. Tachycardia noted. Difficult to hear heart sounds. Abdomen : Soft, nontender,  bowel sounds active. Extremities : Severe edema bilateral lower extremities from knee down to the feet. Musculoskeletal : Decreased range of motion. Muscle power and tone decreased. Skin : Dry, warm, pale. Lymphatic : No cervical lymphadenopathy. Neurological : Awake, alert, oriented to only place and person. He knows not at his regular place. Wants to get out of here. He is not understanding things. Psychiatric : Pleasantly confused with dementia      Labs/Imaging/Diagnostics    Labs:  CBC:  Recent Labs     11/25/22  0852 11/23/22  2315   WBC 6.6 8.4   RBC 3.63* 4.22   HGB 11.8* 13.9   HCT 36.4* 42.7   .3* 101.2*   RDW 13.6 13.7    187       CHEMISTRIES:  Recent Labs     11/25/22  0852 11/24/22  0250 11/24/22  0051 11/23/22  2315   *  --   --  130*   K 4.4  --  4.1 Hemolyzed, recollect requested     --   --  95*   CO2 27  --   --  33*   BUN 35*  --   --  38*   CA 8.0*  --   --  7.6*   MG  --  2.2  --   --      PT/INR:No results for input(s): INR, INREXT, INREXT in the last 72 hours. No lab exists for component: PROTIME  APTT:No results for input(s): APTT in the last 72 hours.   LIVER PROFILE:  Recent Labs     11/23/22  2315   AST Hemolyzed, recollect requested   ALT Hemolyzed, recollect requested       Lab Results   Component Value Date/Time    ALT (SGPT) Hemolyzed, recollect requested 11/23/2022 11:15 PM    AST (SGOT) Hemolyzed, recollect requested 11/23/2022 11:15 PM    Alk. phosphatase 74 11/23/2022 11:15 PM    Bilirubin, total 1.1 (H) 11/23/2022 11:15 PM       Imaging Last 24 Hours:  No results found. Assessment//Plan   Active Problems:    Atrial fibrillation with RVR (HCC) (11/24/2022)      Paroxysmal atrial fibrillation with rapid ventricular response (HCC) (11/24/2022)  CT Results  (Last 48 hours)      None            Assessment & Plan  11/23/22 admission course  80 y.o. male lives in an assisted living facility with supervised care with history of heart failure, COPD advanced with centrilobular emphysema, hypertension is brought to the emergency room due to a fall from wheelchair. There is an injury on the left side of his head, unknown unable to find information if he lost any consciousness. At baseline he has dementia but pleasantly confused and tried to talk but cannot provide information. He is on home oxygen for COPD. Evaluation in the emergency room found with atrial fibrillation with rapid ventricular rate and also he is developing hypotension.     11/24/22 no new complaints, tolerating medications well  Presently on amiodarone drip for rapid atrial fibrillation    11/25/22 transitioned to po agents as he has lost iv access  So far tolerating well    11/26/22 needed Geodon for agitation and nonviolent restraints overnight, removed this morning  No agitation this morning    MICROBIOLOGY    11/24/22 Blood  Klebsiella growing in 1 of 4 bottles drawn  11/24/22 Covid 19 Negative  11/24/222 Influenza AB Negative    ASSESSMENT AND PLAN    1) Cardiovascular issues including atrial fibrillation with RVR this admission     Telemetry monitoring   Echocardiogram   Aspirin   Diltiazem   Metoprolol    2) COPD exacerbation in a patient with with centrilobular emphysema     Supportive care with respiratory support as needed   Corticosteroids   Bronchodilators   Ceftriaxone and doxycycline    3) Gram negative bacteremia, suspect urinary source though urine cultures were not obtained at admission. Ceftriaxone    4) History of dementia with behavioral disturbance on Seroquel, dose increased this admission. 5) No anticoagulation due to risk of bleeding as patient is at risk of falls.     6) Code status DNR    7) Dispo return to [employer] once stable        Electronically signed by Lanette Shook MD on 11/26/2022 at 7:51 AM

## 2022-11-27 LAB
ANION GAP SERPL CALC-SCNC: 7 MMOL/L (ref 5–15)
BACTERIA SPEC CULT: NORMAL
BACTERIA SPEC CULT: NORMAL
BUN SERPL-MCNC: 42 MG/DL (ref 6–20)
BUN/CREAT SERPL: 31 (ref 12–20)
CA-I BLD-MCNC: 8.2 MG/DL (ref 8.5–10.1)
CHLORIDE SERPL-SCNC: 103 MMOL/L (ref 97–108)
CO2 SERPL-SCNC: 26 MMOL/L (ref 21–32)
CREAT SERPL-MCNC: 1.36 MG/DL (ref 0.7–1.3)
ERYTHROCYTE [DISTWIDTH] IN BLOOD BY AUTOMATED COUNT: 14 % (ref 11.5–14.5)
GLUCOSE SERPL-MCNC: 113 MG/DL (ref 65–100)
HCT VFR BLD AUTO: 38.6 % (ref 36.6–50.3)
HGB BLD-MCNC: 12 G/DL (ref 12.1–17)
MCH RBC QN AUTO: 32.3 PG (ref 26–34)
MCHC RBC AUTO-ENTMCNC: 31.1 G/DL (ref 30–36.5)
MCV RBC AUTO: 104 FL (ref 80–99)
NRBC # BLD: 0 K/UL (ref 0–0.01)
NRBC BLD-RTO: 0 PER 100 WBC
PLATELET # BLD AUTO: 226 K/UL (ref 150–400)
PMV BLD AUTO: 9.4 FL (ref 8.9–12.9)
POTASSIUM SERPL-SCNC: 4.8 MMOL/L (ref 3.5–5.1)
RBC # BLD AUTO: 3.71 M/UL (ref 4.1–5.7)
SODIUM SERPL-SCNC: 136 MMOL/L (ref 136–145)
SPECIAL REQUESTS,SREQ: NORMAL
WBC # BLD AUTO: 9.9 K/UL (ref 4.1–11.1)

## 2022-11-27 PROCEDURE — 94640 AIRWAY INHALATION TREATMENT: CPT

## 2022-11-27 PROCEDURE — 74011250637 HC RX REV CODE- 250/637: Performed by: INTERNAL MEDICINE

## 2022-11-27 PROCEDURE — 85027 COMPLETE CBC AUTOMATED: CPT

## 2022-11-27 PROCEDURE — 74011250636 HC RX REV CODE- 250/636: Performed by: INTERNAL MEDICINE

## 2022-11-27 PROCEDURE — 94761 N-INVAS EAR/PLS OXIMETRY MLT: CPT

## 2022-11-27 PROCEDURE — 74011000250 HC RX REV CODE- 250: Performed by: INTERNAL MEDICINE

## 2022-11-27 PROCEDURE — 77010033678 HC OXYGEN DAILY

## 2022-11-27 PROCEDURE — 80048 BASIC METABOLIC PNL TOTAL CA: CPT

## 2022-11-27 PROCEDURE — 65270000029 HC RM PRIVATE

## 2022-11-27 PROCEDURE — 36415 COLL VENOUS BLD VENIPUNCTURE: CPT

## 2022-11-27 PROCEDURE — 87040 BLOOD CULTURE FOR BACTERIA: CPT

## 2022-11-27 PROCEDURE — 74011250637 HC RX REV CODE- 250/637: Performed by: HOSPITALIST

## 2022-11-27 PROCEDURE — 74011000258 HC RX REV CODE- 258: Performed by: INTERNAL MEDICINE

## 2022-11-27 RX ORDER — LEVOFLOXACIN 250 MG/1
250 TABLET ORAL DAILY
Status: DISCONTINUED | OUTPATIENT
Start: 2022-11-27 | End: 2022-11-30

## 2022-11-27 RX ORDER — PREDNISONE 20 MG/1
20 TABLET ORAL
Status: DISCONTINUED | OUTPATIENT
Start: 2022-11-28 | End: 2022-11-30

## 2022-11-27 RX ORDER — AMIODARONE HYDROCHLORIDE 200 MG/1
200 TABLET ORAL DAILY
Status: DISCONTINUED | OUTPATIENT
Start: 2022-11-28 | End: 2022-12-02 | Stop reason: HOSPADM

## 2022-11-27 RX ADMIN — ZIPRASIDONE MESYLATE 10 MG: 20 INJECTION, POWDER, LYOPHILIZED, FOR SOLUTION INTRAMUSCULAR at 09:40

## 2022-11-27 RX ADMIN — LEVOFLOXACIN 250 MG: 250 TABLET, FILM COATED ORAL at 16:29

## 2022-11-27 RX ADMIN — CEFTRIAXONE 2 G: 2 INJECTION, POWDER, FOR SOLUTION INTRAMUSCULAR; INTRAVENOUS at 14:00

## 2022-11-27 RX ADMIN — DOXYCYCLINE 100 MG: 100 INJECTION, POWDER, LYOPHILIZED, FOR SOLUTION INTRAVENOUS at 08:24

## 2022-11-27 RX ADMIN — DOXYCYCLINE 100 MG: 100 INJECTION, POWDER, LYOPHILIZED, FOR SOLUTION INTRAVENOUS at 09:57

## 2022-11-27 RX ADMIN — DILTIAZEM HYDROCHLORIDE 30 MG: 30 TABLET, FILM COATED ORAL at 14:00

## 2022-11-27 RX ADMIN — METHYLPREDNISOLONE SODIUM SUCCINATE 40 MG: 40 INJECTION, POWDER, FOR SOLUTION INTRAMUSCULAR; INTRAVENOUS at 14:00

## 2022-11-27 RX ADMIN — METHYLPREDNISOLONE SODIUM SUCCINATE 40 MG: 40 INJECTION, POWDER, FOR SOLUTION INTRAMUSCULAR; INTRAVENOUS at 05:21

## 2022-11-27 RX ADMIN — QUETIAPINE FUMARATE 12.5 MG: 25 TABLET ORAL at 16:29

## 2022-11-27 RX ADMIN — METOPROLOL TARTRATE 25 MG: 25 TABLET, FILM COATED ORAL at 21:38

## 2022-11-27 RX ADMIN — BUDESONIDE AND FORMOTEROL FUMARATE DIHYDRATE 2 PUFF: 160; 4.5 AEROSOL RESPIRATORY (INHALATION) at 19:57

## 2022-11-27 RX ADMIN — DILTIAZEM HYDROCHLORIDE 30 MG: 30 TABLET, FILM COATED ORAL at 16:29

## 2022-11-27 NOTE — PROGRESS NOTES
Client very agitated, combative, verbally and physically abusive. Client grabbing at nurses' arms, clothing and client stating, \"Bend over, I'll give it to you. \" Client also screaming, \"Help, you're killing me. \" Client refusing PO medications and ripping oxygen nasal cannula and telemetry leads off. Dr. Zenaida Hernández aware as he rounded at bedside. Obtained one-time, IM 10mg Geodon verbal order from Dr. Kirstie Erickson for patient safety as verbal redirection, environment and other non-invasive descalation techniques failed. Administered via Right deltoid, alcohol prepped, and bandage placed over insertion site. Client was obtunded most of the morning following administration of Geodon but was arousable, cooperative and pleasant thereafter. Client verbalized appreciation of visit from a [employer] friend. PIV x2 DC due to pain and mild swelling at insertion sites. Dr. Zenaida Hernández aware via telephone and he changed IV medications to PO. Client safety was monitored via 1:1 sitter, bed alarm, assistance with toileting and feeding and personal needs.

## 2022-11-27 NOTE — PROGRESS NOTES
Progress Note    Patient: Marilyn Pacheco MRN: [id number]  SSN: [id number]    YOB: 1929  Age: 80 y.o. Sex: male      Admit Date: 11/23/2022    LOS: 3 days     Subjective:     No acute events overnight. Was agitated this morning n  Objective:     Vitals:    11/27/22 0400 11/27/22 0425 11/27/22 0716 11/27/22 0800   BP:   (!) 140/87    Pulse: (!) 119  (!) 120 (!) 120   Resp:   25    Temp:   98 °F (36.7 °C)    SpO2:   95%    Weight:  75.8 kg (167 lb)     Height:            Intake and Output:  Current Shift: 11/27 0701 - 11/27 1900  In: -   Out: 600 [Urine:600]  Last three shifts: 11/25 1901 - 11/27 0700  In: 547 [P.O.:547]  Out: 1175 [Urine:1175]    Physical Exam:   General:  Alert, cooperative, no distress, appears stated age. Eyes:  Conjunctivae/corneas clear. PERRL, EOMs intact. Fundi benign   Ears:  Normal TMs and external ear canals both ears. Nose: Nares normal. Septum midline. Mucosa normal. No drainage or sinus tenderness. Mouth/Throat: Lips, mucosa, and tongue normal. Teeth and gums normal.   Neck: Supple, symmetrical, trachea midline, no adenopathy, thyroid: no enlargment/tenderness/nodules, no carotid bruit and no JVD. Back:   Symmetric, no curvature. ROM normal. No CVA tenderness. Lungs:   Clear to auscultation bilaterally. Heart:  Irregular, variable S1, tachycardic   Abdomen:   Soft, non-tender. Bowel sounds normal. No masses,  No organomegaly. Extremities: Extremities normal, atraumatic, no cyanosis or edema. Pulses: 2+ and symmetric all extremities. Skin: Skin color, texture, turgor normal. No rashes or lesions   Lymph nodes: Cervical, supraclavicular, and axillary nodes normal.   Neurologic: CNII-XII intact. Normal strength, sensation and reflexes throughout. Lab/Data Review: All lab results for the last 24 hours reviewed.          Assessment:     Active Problems:    Atrial fibrillation with RVR (HCC) (11/24/2022)      Paroxysmal atrial fibrillation with rapid ventricular response ([facility] [address] (11/24/2022)  Patient is a 22-year-old white male with:  1. Chronic atrial fibrillation  2. Hypotension  3. History of hypertension  4. Bronchial asthma  5. Prostate cancer   6. COPD  7. Dementia  8. Hard of hearing  9. Blind  910.  10.  UTI  11. Hyponatremia  12. Acute kidney injury  13. Mild rhabdomyolysis  14. Thoracolumbar scoliosis  15. Moderate tricuspid regurgitation    Plan:     Currently in A. fib with RVR. Patient agitated. Received Geodon. Psychiatry consulted.     Add oral amiodarone  Signed By: Kai Chen MD     November 27, 2022

## 2022-11-27 NOTE — PROGRESS NOTES
[facility] Hospitalist Progress Note  Ceferino Valles MD    Date:2022       Room:[facility]  Patient Nabil Bill     YOB: 1929     Age:89 y.o.    22 admission course  80 y.o. male lives in an assisted living facility with supervised care with history of heart failure, COPD advanced with centrilobular emphysema, hypertension is brought to the emergency room due to a fall from wheelchair. There is an injury on the left side of his head, unknown unable to find information if he lost any consciousness. At baseline he has dementia but pleasantly confused and tried to talk but cannot provide information. He is on home oxygen for COPD. Evaluation in the emergency room found with atrial fibrillation with rapid ventricular rate and also he is developing hypotension. 22 no new complaints, tolerating medications well  Presently on amiodarone drip for rapid atrial fibrillation    22 transitioned to po agents as he has lost iv access  So far tolerating well    22 echocardiogram    Left Ventricle: Reduced left ventricular systolic function with a visually estimated EF of 40 - 45%. Left ventricle size is normal. Normal wall thickness. Moderate hypokinesis of the following segments: basal anterolateral, basal inferolateral, mid anterolateral and apical lateral.    Aortic Valve: Not well visualized. Tricuspid valve. Mitral Valve: Mild regurgitation. Tricuspid Valve: Mildly thickened leaflets. Technical qualifiers: Echo study was technically difficult due to patient's heart rhythm. 22 needed Geodon for agitation this morning  Psychiatry to see    Subjective    Subjective:  Symptoms:  Stable. Review of Systems   All other systems reviewed and are negative.   Objective         Vitals Last 24 Hours:  TEMPERATURE:  Temp  Av.7 °F (36.5 °C)  Min: 97.3 °F (36.3 °C)  Max: 98.3 °F (36.8 °C)  RESPIRATIONS RANGE: Resp  Av.3  Min: 23  Max: 27  PULSE OXIMETRY RANGE: SpO2  Av.3 %  Min: 92 %  Max: 96 %  PULSE RANGE: Pulse  Av.7  Min: 112  Max: 131  BLOOD PRESSURE RANGE: Systolic (76WEJ), YIA:795 , Min:109 , LCW:078   ; Diastolic (46XIX), NHW:96, Min:74, Max:88    I/O (24Hr): Intake/Output Summary (Last 24 hours) at 2022 0992  Last data filed at 2022 0842  Gross per 24 hour   Intake 111 ml   Output 950 ml   Net -839 ml       Objective:  General Appearance:  Comfortable. Vital signs: (most recent): Blood pressure (!) 140/87, pulse (!) 120, temperature 98 °F (36.7 °C), resp. rate 25, height 5' 6\" (1.676 m), weight 75.8 kg (167 lb), SpO2 95 %. General : Pleasant, appears mildly respiratory distress. HEENT : PERRLA, normal oral mucosa, atraumatic normocephalic, Normal ear and nose. Neck : Supple, no JVD, no masses noted, no carotid bruit. Lungs : Coarse breath sounds with rhonchi and expiratory wheezing. Prolonged expirations. On nasal cannula  CVS : Rhythm rate irregularly irregular. Tachycardia noted. Difficult to hear heart sounds. Abdomen : Soft, nontender,  bowel sounds active. Extremities : Severe edema bilateral lower extremities from knee down to the feet. Musculoskeletal : Decreased range of motion. Muscle power and tone decreased. Skin : Dry, warm, pale. Lymphatic : No cervical lymphadenopathy. Neurological : Awake, alert, oriented to only place and person. He knows not at his regular place. Wants to get out of here. He is not understanding things. Psychiatric : Pleasantly confused with dementia      Labs/Imaging/Diagnostics    Labs:  CBC:  Recent Labs     22  0852   WBC 6.6   RBC 3.63*   HGB 11.8*   HCT 36.4*   .3*   RDW 13.6          CHEMISTRIES:  Recent Labs     22  0852   *   K 4.4      CO2 27   BUN 35*   CA 8.0*     PT/INR:No results for input(s): INR, INREXT, INREXT in the last 72 hours. No lab exists for component: PROTIME  APTT:No results for input(s):  APTT in the last 72 hours.  LIVER PROFILE:  No results for input(s): AST, ALT in the last 72 hours. No lab exists for component: Katelynn Chavezings, ALKPHOS    Lab Results   Component Value Date/Time    ALT (SGPT) Hemolyzed, recollect requested 11/23/2022 11:15 PM    AST (SGOT) Hemolyzed, recollect requested 11/23/2022 11:15 PM    Alk. phosphatase 74 11/23/2022 11:15 PM    Bilirubin, total 1.1 (H) 11/23/2022 11:15 PM       Imaging Last 24 Hours:  No results found. Assessment//Plan   Active Problems:    Atrial fibrillation with RVR (HCC) (11/24/2022)      Paroxysmal atrial fibrillation with rapid ventricular response (HCC) (11/24/2022)  CT Results  (Last 48 hours)      None            Assessment & Plan  11/23/22 admission course  80 y.o. male lives in an assisted living facility with supervised care with history of heart failure, COPD advanced with centrilobular emphysema, hypertension is brought to the emergency room due to a fall from wheelchair. There is an injury on the left side of his head, unknown unable to find information if he lost any consciousness. At baseline he has dementia but pleasantly confused and tried to talk but cannot provide information. He is on home oxygen for COPD. Evaluation in the emergency room found with atrial fibrillation with rapid ventricular rate and also he is developing hypotension. 11/24/22 no new complaints, tolerating medications well  Presently on amiodarone drip for rapid atrial fibrillation    11/25/22 transitioned to po agents as he has lost iv access  So far tolerating well    11/25/22 echocardiogram    Left Ventricle: Reduced left ventricular systolic function with a visually estimated EF of 40 - 45%. Left ventricle size is normal. Normal wall thickness. Moderate hypokinesis of the following segments: basal anterolateral, basal inferolateral, mid anterolateral and apical lateral.    Aortic Valve: Not well visualized. Tricuspid valve. Mitral Valve: Mild regurgitation.     Tricuspid Valve: Mildly thickened leaflets. Technical qualifiers: Echo study was technically difficult due to patient's heart rhythm. 11/27/22 needed Geodon for agitation this morning  Psychiatry to see    MICROBIOLOGY    11/24/22 Blood  Klebsiella (Enterobacter) aerogenes  11/24/22 Covid 19 Negative  11/24/22 Influenza AB Negative    11/27/22 Blood  Pending    ASSESSMENT AND PLAN    1) Cardiovascular issues including atrial fibrillation with RVR this admission     Telemetry monitoring   Echocardiogram as above   Aspirin   Diltiazem   Metoprolol    2) COPD exacerbation in a patient with with centrilobular emphysema     Supportive care with respiratory support as needed   Corticosteroids   Bronchodilators     Ceftriaxone and doxycycline while in hospital   Once stable for discharge levofloxacin to complete outpatient course    Levofloxacin 250mg po daily through 12/2/22    3) Klebsiella (Enterobacter) aerogenes bacteremia at admission, suspect urinary source though urine cultures were not obtained at admission. Ceftriaxone iv while in hospital   Once stable for discharge levofloxacin to complete outpatient course    Levofloxacin 250mg po daily through 12/2/22    4) History of dementia with behavioral disturbance on Seroquel, dose increased this admission. Seroquel if he takes po, else PRN Geodon im  Medication adjustments per psychiatry    5) No anticoagulation due to risk of bleeding as patient is at risk of falls.     6) Code status DNR    7) Dispo return to [employer] once stable        Electronically signed by Jason Kidd MD on 11/27/2022 at 7:51 AM

## 2022-11-27 NOTE — PROGRESS NOTES
Problem: Falls - Risk of  Goal: *Absence of Falls  Description: Document Kianna Cruz Fall Risk and appropriate interventions in the flowsheet.   Outcome: Progressing Towards Goal  Note: Fall Risk Interventions:  Mobility Interventions: Bed/chair exit alarm, Patient to call before getting OOB    Mentation Interventions: Bed/chair exit alarm, Door open when patient unattended, Room close to nurse's station, Family/sitter at bedside    Medication Interventions: Evaluate medications/consider consulting pharmacy, Bed/chair exit alarm    Elimination Interventions: Call light in reach, Bed/chair exit alarm, Stay With Me (per policy)    History of Falls Interventions: Bed/chair exit alarm, Door open when patient unattended, Room close to nurse's station         Problem: Patient Education: Go to Patient Education Activity  Goal: Patient/Family Education  Outcome: Progressing Towards Goal     Problem: Patient Education: Go to Patient Education Activity  Goal: Patient/Family Education  Outcome: Progressing Towards Goal     Problem: Patient Education: Go to Patient Education Activity  Goal: Patient/Family Education  Outcome: Progressing Towards Goal     Problem: Afib Pathway: Day 1  Goal: Off Pathway (Use only if patient is Off Pathway)  Outcome: Progressing Towards Goal  Goal: Activity/Safety  Outcome: Progressing Towards Goal  Goal: Consults, if ordered  Outcome: Progressing Towards Goal  Goal: Diagnostic Test/Procedures  Outcome: Progressing Towards Goal  Goal: Nutrition/Diet  Outcome: Progressing Towards Goal  Goal: Discharge Planning  Outcome: Progressing Towards Goal  Goal: Medications  Outcome: Progressing Towards Goal  Goal: Respiratory  Outcome: Progressing Towards Goal  Goal: Treatments/Interventions/Procedures  Outcome: Progressing Towards Goal  Goal: Psychosocial  Outcome: Progressing Towards Goal  Goal: *Optimal pain control at patient's stated goal  Outcome: Progressing Towards Goal  Goal: *Hemodynamically stable  Outcome: Progressing Towards Goal  Goal: *Stable cardiac rhythm  Outcome: Progressing Towards Goal  Goal: *Lungs clear or at baseline  Outcome: Progressing Towards Goal  Goal: *Labs within defined limits  Outcome: Progressing Towards Goal  Goal: *Describes available resources and support systems  Outcome: Progressing Towards Goal     Problem: Afib Pathway: Day 2  Goal: Off Pathway (Use only if patient is Off Pathway)  Outcome: Progressing Towards Goal  Goal: Activity/Safety  Outcome: Progressing Towards Goal  Goal: Consults, if ordered  Outcome: Progressing Towards Goal  Goal: Diagnostic Test/Procedures  Outcome: Progressing Towards Goal  Goal: Nutrition/Diet  Outcome: Progressing Towards Goal  Goal: Discharge Planning  Outcome: Progressing Towards Goal  Goal: Medications  Outcome: Progressing Towards Goal  Goal: Respiratory  Outcome: Progressing Towards Goal  Goal: Treatments/Interventions/Procedures  Outcome: Progressing Towards Goal  Goal: Psychosocial  Outcome: Progressing Towards Goal  Goal: *Hemodynamically stable  Outcome: Progressing Towards Goal  Goal: *Optimal pain control at patient's stated goal  Outcome: Progressing Towards Goal  Goal: *Stable cardiac rhythm  Outcome: Progressing Towards Goal  Goal: *Lungs clear or at baseline  Outcome: Progressing Towards Goal  Goal: *Describes available resources and support systems  Outcome: Progressing Towards Goal     Problem: Afib Pathway: Day 3  Goal: Off Pathway (Use only if patient is Off Pathway)  Outcome: Progressing Towards Goal  Goal: Activity/Safety  Outcome: Progressing Towards Goal  Goal: Diagnostic Test/Procedures  Outcome: Progressing Towards Goal  Goal: Nutrition/Diet  Outcome: Progressing Towards Goal  Goal: Discharge Planning  Outcome: Progressing Towards Goal  Goal: Medications  Outcome: Progressing Towards Goal  Goal: Respiratory  Outcome: Progressing Towards Goal  Goal: Treatments/Interventions/Procedures  Outcome: Progressing Towards Goal  Goal: Psychosocial  Outcome: Progressing Towards Goal     Problem: Afib: Discharge Outcomes (not in CAT)  Goal: *Hemodynamically stable  Outcome: Progressing Towards Goal  Goal: *Stable cardiac rhythm  Outcome: Progressing Towards Goal  Goal: *Lungs clear or at baseline  Outcome: Progressing Towards Goal  Goal: *Optimal pain control at patient's stated goal  Outcome: Progressing Towards Goal  Goal: *Identifies cardiac risk factors  Outcome: Progressing Towards Goal  Goal: *Verbalizes home exercise program, activity guidelines, cardiac precautions  Outcome: Progressing Towards Goal  Goal: *Verbalizes understanding and describes prescribed diet  Outcome: Progressing Towards Goal  Goal: *Verbalizes understanding and describes medication purposes and frequencies  Outcome: Progressing Towards Goal  Goal: *Anxiety reduced or absent  Outcome: Progressing Towards Goal  Goal: *Understands and describes signs and symptoms to report to providers(Stroke Metric)  Outcome: Progressing Towards Goal  Goal: *Describes follow-up/return visits to physicians  Outcome: Progressing Towards Goal  Goal: *Describes available resources and support systems  Outcome: Progressing Towards Goal  Goal: *Influenza immunization  Outcome: Progressing Towards Goal  Goal: *Pneumococcal immunization  Outcome: Progressing Towards Goal  Goal: *Describes smoking cessation resources  Outcome: Progressing Towards Goal     Problem: Non-Violent Restraints  Goal: Removal from restraints as soon as assessed to be safe  Outcome: Progressing Towards Goal  Goal: No harm/injury to patient while restraints in use  Outcome: Progressing Towards Goal  Goal: Patient's dignity will be maintained  Outcome: Progressing Towards Goal  Goal: Patient Interventions  Outcome: Progressing Towards Goal     Problem: Pressure Injury - Risk of  Goal: *Prevention of pressure injury  Description: Document Demarcus Scale and appropriate interventions in the flowsheet.   Outcome: Progressing Towards Goal     Problem: Patient Education: Go to Patient Education Activity  Goal: Patient/Family Education  Outcome: Progressing Towards Goal

## 2022-11-27 NOTE — PROGRESS NOTES
Problem: Falls - Risk of  Goal: *Absence of Falls  Description: Document Deo Counts Fall Risk and appropriate interventions in the flowsheet.   Outcome: Progressing Towards Goal  Note: Fall Risk Interventions:  Mobility Interventions: Bed/chair exit alarm    Mentation Interventions: Room close to nurse's station, Reorient patient, More frequent rounding    Medication Interventions: Bed/chair exit alarm    Elimination Interventions: Bed/chair exit alarm    History of Falls Interventions: Bed/chair exit alarm         Problem: Patient Education: Go to Patient Education Activity  Goal: Patient/Family Education  Outcome: Progressing Towards Goal     Problem: Patient Education: Go to Patient Education Activity  Goal: Patient/Family Education  Outcome: Progressing Towards Goal     Problem: Patient Education: Go to Patient Education Activity  Goal: Patient/Family Education  Outcome: Progressing Towards Goal     Problem: Afib Pathway: Day 1  Goal: Off Pathway (Use only if patient is Off Pathway)  Outcome: Progressing Towards Goal  Goal: Activity/Safety  Outcome: Progressing Towards Goal  Goal: Consults, if ordered  Outcome: Progressing Towards Goal  Goal: Diagnostic Test/Procedures  Outcome: Progressing Towards Goal  Goal: Nutrition/Diet  Outcome: Progressing Towards Goal  Goal: Discharge Planning  Outcome: Progressing Towards Goal  Goal: Medications  Outcome: Progressing Towards Goal  Goal: Respiratory  Outcome: Progressing Towards Goal  Goal: Treatments/Interventions/Procedures  Outcome: Progressing Towards Goal  Goal: Psychosocial  Outcome: Progressing Towards Goal  Goal: *Optimal pain control at patient's stated goal  Outcome: Progressing Towards Goal  Goal: *Hemodynamically stable  Outcome: Progressing Towards Goal  Goal: *Stable cardiac rhythm  Outcome: Progressing Towards Goal  Goal: *Lungs clear or at baseline  Outcome: Progressing Towards Goal  Goal: *Labs within defined limits  Outcome: Progressing Towards Goal  Goal: *Describes available resources and support systems  Outcome: Progressing Towards Goal     Problem: Afib Pathway: Day 2  Goal: Off Pathway (Use only if patient is Off Pathway)  Outcome: Progressing Towards Goal  Goal: Activity/Safety  Outcome: Progressing Towards Goal  Goal: Consults, if ordered  Outcome: Progressing Towards Goal  Goal: Diagnostic Test/Procedures  Outcome: Progressing Towards Goal  Goal: Nutrition/Diet  Outcome: Progressing Towards Goal  Goal: Discharge Planning  Outcome: Progressing Towards Goal  Goal: Medications  Outcome: Progressing Towards Goal  Goal: Respiratory  Outcome: Progressing Towards Goal  Goal: Treatments/Interventions/Procedures  Outcome: Progressing Towards Goal  Goal: Psychosocial  Outcome: Progressing Towards Goal  Goal: *Hemodynamically stable  Outcome: Progressing Towards Goal  Goal: *Optimal pain control at patient's stated goal  Outcome: Progressing Towards Goal  Goal: *Stable cardiac rhythm  Outcome: Progressing Towards Goal  Goal: *Lungs clear or at baseline  Outcome: Progressing Towards Goal  Goal: *Describes available resources and support systems  Outcome: Progressing Towards Goal     Problem: Afib Pathway: Day 3  Goal: Off Pathway (Use only if patient is Off Pathway)  Outcome: Progressing Towards Goal  Goal: Activity/Safety  Outcome: Progressing Towards Goal  Goal: Diagnostic Test/Procedures  Outcome: Progressing Towards Goal  Goal: Nutrition/Diet  Outcome: Progressing Towards Goal  Goal: Discharge Planning  Outcome: Progressing Towards Goal  Goal: Medications  Outcome: Progressing Towards Goal  Goal: Respiratory  Outcome: Progressing Towards Goal  Goal: Treatments/Interventions/Procedures  Outcome: Progressing Towards Goal  Goal: Psychosocial  Outcome: Progressing Towards Goal     Problem: Afib: Discharge Outcomes (not in CAT)  Goal: *Hemodynamically stable  Outcome: Progressing Towards Goal  Goal: *Stable cardiac rhythm  Outcome: Progressing Towards Goal  Goal: *Lungs clear or at baseline  Outcome: Progressing Towards Goal  Goal: *Optimal pain control at patient's stated goal  Outcome: Progressing Towards Goal  Goal: *Identifies cardiac risk factors  Outcome: Progressing Towards Goal  Goal: *Verbalizes home exercise program, activity guidelines, cardiac precautions  Outcome: Progressing Towards Goal  Goal: *Verbalizes understanding and describes prescribed diet  Outcome: Progressing Towards Goal  Goal: *Verbalizes understanding and describes medication purposes and frequencies  Outcome: Progressing Towards Goal  Goal: *Anxiety reduced or absent  Outcome: Progressing Towards Goal  Goal: *Understands and describes signs and symptoms to report to providers(Stroke Metric)  Outcome: Progressing Towards Goal  Goal: *Describes follow-up/return visits to physicians  Outcome: Progressing Towards Goal  Goal: *Describes available resources and support systems  Outcome: Progressing Towards Goal  Goal: *Influenza immunization  Outcome: Progressing Towards Goal  Goal: *Pneumococcal immunization  Outcome: Progressing Towards Goal  Goal: *Describes smoking cessation resources  Outcome: Progressing Towards Goal     Problem: Non-Violent Restraints  Goal: Removal from restraints as soon as assessed to be safe  Outcome: Progressing Towards Goal  Goal: No harm/injury to patient while restraints in use  Outcome: Progressing Towards Goal  Goal: Patient's dignity will be maintained  Outcome: Progressing Towards Goal  Goal: Patient Interventions  Outcome: Progressing Towards Goal     Problem: Pressure Injury - Risk of  Goal: *Prevention of pressure injury  Description: Document Demarcus Scale and appropriate interventions in the flowsheet.   Outcome: Progressing Towards Goal     Problem: Patient Education: Go to Patient Education Activity  Goal: Patient/Family Education  Outcome: Progressing Towards Goal

## 2022-11-27 NOTE — PROGRESS NOTES
Pulmonary/ CC progress note    Subjective:   Date of Consultation:  2022  Referring Physician: Gera Hinkle MD    Subjective    Patient seen and examined  Overnight events noted  Became agitated. Got Geodan. Sleepy now  Sitter at bedside. On 3 L nasal cannula oxygen  No acute distress     Prior to Admission medications    Medication Sig Start Date End Date Taking? Authorizing Provider   finasteride (PROSCAR) 5 mg tablet Take 5 mg by mouth in the morning. Yeny Sharif MD   furosemide (LASIX) 80 mg tablet Take  by mouth daily. Yeny Sharif MD   sennosides (Senna) 8.6 mg cap Take  by mouth. Yeny Sharif MD   guaiFENesin (ORGANIDIN) 200 mg tablet Take 200 mg by mouth every four (4) hours as needed for Congestion. Yeny Sharif MD   cholecalciferol (VITAMIN D3) (1000 Units /25 mcg) tablet Take 2 Tablets by mouth daily. 3/9/22   Ayaz Alvarado PA-C   budesonide-formoteroL (Symbicort) 160-4.5 mcg/actuation HFAA Take 2 Puffs by inhalation two (2) times a day. 3/9/22   Ayaz Alvarado PA-C   albuterol (ProAir HFA) 90 mcg/actuation inhaler Take 2 Puffs by inhalation every six (6) hours as needed for Wheezing or Shortness of Breath. 3/9/22   Ayaz Alvarado PA-C   aspirin 81 mg chewable tablet Take 1 Tablet by mouth daily. 22   Bhakti uJles MD   QUEtiapine (SEROquel) 25 mg tablet Take 0.5 Tablets by mouth nightly. 22   Bhakti Jules MD     Allergies   Allergen Reactions    Gabapentin Unknown (comments)        Review of Systems:  Review of systems not obtained due to patient factors. Objective:   Blood pressure (!) 140/87, pulse (!) 120, temperature 98 °F (36.7 °C), resp. rate 25, height 5' 6\" (1.676 m), weight 75.8 kg (167 lb), SpO2 95 %. Temp (24hrs), Av.7 °F (36.5 °C), Min:97.3 °F (36.3 °C), Max:98.3 °F (36.8 °C)    CT HEAD WO CONT   Final Result   1. No evidence of acute intracranial abnormality by this modality.                 Data Review: CBC: Recent Labs     11/25/22  0852   WBC 6.6   RBC 3.63*   HGB 11.8*   HCT 36.4*        CMP:   Recent Labs     11/25/22  0852   *   *   K 4.4      CO2 27   BUN 35*   CREA 1.61*   CA 8.0*   AGAP 6   BUCR 22*     Liver Enzymes:   No results for input(s): TP, ALB, TBIL, AP in the last 72 hours. No lab exists for component: SGOT, GPT, DBIL    ABGs: No results for input(s): PH, PCO2, PO2, HCO3 in the last 72 hours. Current Facility-Administered Medications   Medication Dose Route Frequency    [START ON 11/28/2022] amiodarone (CORDARONE) tablet 200 mg  200 mg Oral DAILY    QUEtiapine (SEROquel) tablet 12.5 mg  12.5 mg Oral BID WITH MEALS    metoprolol tartrate (LOPRESSOR) tablet 25 mg  25 mg Oral Q12H    dilTIAZem IR (CARDIZEM) tablet 30 mg  30 mg Oral TIDAC    cefTRIAXone (ROCEPHIN) 2 g in sterile water (preservative free) 20 mL IV syringe  2 g IntraVENous Q24H    aspirin chewable tablet 81 mg  81 mg Oral DAILY    cholecalciferol (VITAMIN D3) (1000 Units /25 mcg) tablet 2,000 Units  2,000 Units Oral DAILY    budesonide-formoteroL (SYMBICORT) 160-4.5 mcg/actuation HFA inhaler 2 Puff  2 Puff Inhalation BID    albuterol (PROVENTIL HFA, VENTOLIN HFA, PROAIR HFA) inhaler 2 Puff  2 Puff Inhalation Q6H PRN    finasteride (PROSCAR) tablet 5 mg  5 mg Oral DAILY    senna (SENOKOT) tablet 8.6 mg  1 Tablet Oral DAILY    sodium chloride (NS) flush 5-40 mL  5-40 mL IntraVENous PRN    acetaminophen (TYLENOL) solution 650 mg  650 mg Oral Q4H PRN    methylPREDNISolone (PF) (SOLU-MEDROL) injection 40 mg  40 mg IntraVENous Q8H    doxycycline (VIBRAMYCIN) 100 mg in 0.9% sodium chloride (MBP/ADV) 100 mL MBP  100 mg IntraVENous Q12H    sodium chloride (NS) flush 5-40 mL  5-40 mL IntraVENous PRN        Exam:    This is an elderly male who has poor vision. He is alert and oriented otherwise. He is known to have dementia. He is on nasal cannula oxygen  Has rhonchi audible on auscultation of chest.  JVD is absent. No cervical lymphadenopathy. Thyroid not enlarged  Heart: S1-S2 irregular. Abdomen: Soft, nontender, no visceromegaly  Extremities: No edema, sinus or clubbing  Neuro: No focal motor deficit. Impression:   80years old  male seen in assisted living facility with supervised care. He has known history of CHF, advanced COPD with central lobular emphysema, hypertension was brought to the emergency department after a fall from wheelchair. It resulted in injury on the left side of the his head. CT scan of head did not show any new intracranial pathology. It was not clear if he lost his consciousness. Patient has history of dementia. Apparently he is on oxygen in the facility. ER he was found to be in atrial fibrillation with rapid ventricular response. He was also noted to have upper respiratory tract congestion. Plan:   COPD with exacerbation  Patient has history of advanced COPD with emphysema. He is on nasal cannula oxygen at home. Currently he has upper respiratory tract congestion with some wheezing. We will treat him with nebulized albuterol and Atrovent, low-dose systemic steroids and IV antibiotics. Doxycycline 100 mg twice daily  . Robitussin cough and cold for congestion    2. atrial fibrillation with rapid ventricular response: Follow-up echocardiogram, on Cardizem and getting adjusted his metoprolol dosage     3. Patient's chest x-ray has shown chronic changes without any acute infiltrates    4. Status post fall from wheelchair. CT scan of head is negative    5. Dementia: Supportive care  . Prescription education. Was given Geodon. He is going to get psych consult.     Questions of patient were answered at bedside in detail  Case discussed in detail with RN, RT, and care team  Thank you for involving me in the care of this patient  I will follow with you closely during hospitalization    Time spent more than 30 minutes under patient care with no overlap reviewing results and records, decision making, and answering questions.     Sherlyn Gonzales MD  [facility]

## 2022-11-28 ENCOUNTER — APPOINTMENT (OUTPATIENT)
Dept: GENERAL RADIOLOGY | Age: 87
End: 2022-11-28
Attending: INTERNAL MEDICINE
Payer: MEDICARE

## 2022-11-28 LAB
ANION GAP SERPL CALC-SCNC: 5 MMOL/L (ref 5–15)
BUN SERPL-MCNC: 45 MG/DL (ref 6–20)
BUN/CREAT SERPL: 29 (ref 12–20)
CA-I BLD-MCNC: 8.6 MG/DL (ref 8.5–10.1)
CHLORIDE SERPL-SCNC: 101 MMOL/L (ref 97–108)
CO2 SERPL-SCNC: 31 MMOL/L (ref 21–32)
CREAT SERPL-MCNC: 1.54 MG/DL (ref 0.7–1.3)
ERYTHROCYTE [DISTWIDTH] IN BLOOD BY AUTOMATED COUNT: 14.1 % (ref 11.5–14.5)
GLUCOSE SERPL-MCNC: 115 MG/DL (ref 65–100)
HCT VFR BLD AUTO: 40.3 % (ref 36.6–50.3)
HGB BLD-MCNC: 12.7 G/DL (ref 12.1–17)
MCH RBC QN AUTO: 32.9 PG (ref 26–34)
MCHC RBC AUTO-ENTMCNC: 31.5 G/DL (ref 30–36.5)
MCV RBC AUTO: 104.4 FL (ref 80–99)
NRBC # BLD: 0 K/UL (ref 0–0.01)
NRBC BLD-RTO: 0 PER 100 WBC
PLATELET # BLD AUTO: 219 K/UL (ref 150–400)
PMV BLD AUTO: 9.4 FL (ref 8.9–12.9)
POTASSIUM SERPL-SCNC: 5.2 MMOL/L (ref 3.5–5.1)
RBC # BLD AUTO: 3.86 M/UL (ref 4.1–5.7)
SODIUM SERPL-SCNC: 137 MMOL/L (ref 136–145)
WBC # BLD AUTO: 9.6 K/UL (ref 4.1–11.1)

## 2022-11-28 PROCEDURE — 77010033678 HC OXYGEN DAILY

## 2022-11-28 PROCEDURE — 74011250637 HC RX REV CODE- 250/637: Performed by: HOSPITALIST

## 2022-11-28 PROCEDURE — 74011000250 HC RX REV CODE- 250: Performed by: INTERNAL MEDICINE

## 2022-11-28 PROCEDURE — 74011250636 HC RX REV CODE- 250/636: Performed by: INTERNAL MEDICINE

## 2022-11-28 PROCEDURE — 94761 N-INVAS EAR/PLS OXIMETRY MLT: CPT

## 2022-11-28 PROCEDURE — 36415 COLL VENOUS BLD VENIPUNCTURE: CPT

## 2022-11-28 PROCEDURE — 80048 BASIC METABOLIC PNL TOTAL CA: CPT

## 2022-11-28 PROCEDURE — 74011250637 HC RX REV CODE- 250/637: Performed by: INTERNAL MEDICINE

## 2022-11-28 PROCEDURE — 94640 AIRWAY INHALATION TREATMENT: CPT

## 2022-11-28 PROCEDURE — 85027 COMPLETE CBC AUTOMATED: CPT

## 2022-11-28 PROCEDURE — 71045 X-RAY EXAM CHEST 1 VIEW: CPT

## 2022-11-28 PROCEDURE — 74011636637 HC RX REV CODE- 636/637: Performed by: INTERNAL MEDICINE

## 2022-11-28 PROCEDURE — 65270000029 HC RM PRIVATE

## 2022-11-28 RX ORDER — DOXYCYCLINE 100 MG/1
100 CAPSULE ORAL EVERY 12 HOURS
Status: DISCONTINUED | OUTPATIENT
Start: 2022-11-29 | End: 2022-11-28

## 2022-11-28 RX ORDER — DILTIAZEM HYDROCHLORIDE 120 MG/1
120 CAPSULE, COATED, EXTENDED RELEASE ORAL DAILY
Status: DISCONTINUED | OUTPATIENT
Start: 2022-11-28 | End: 2022-11-30

## 2022-11-28 RX ADMIN — Medication 2000 UNITS: at 10:20

## 2022-11-28 RX ADMIN — QUETIAPINE FUMARATE 12.5 MG: 25 TABLET ORAL at 10:20

## 2022-11-28 RX ADMIN — LEVOFLOXACIN 250 MG: 250 TABLET, FILM COATED ORAL at 10:20

## 2022-11-28 RX ADMIN — SENNOSIDES 8.6 MG: 8.6 TABLET, COATED ORAL at 10:17

## 2022-11-28 RX ADMIN — AMIODARONE HYDROCHLORIDE 200 MG: 200 TABLET ORAL at 10:18

## 2022-11-28 RX ADMIN — BUDESONIDE AND FORMOTEROL FUMARATE DIHYDRATE 2 PUFF: 160; 4.5 AEROSOL RESPIRATORY (INHALATION) at 08:54

## 2022-11-28 RX ADMIN — FINASTERIDE 5 MG: 5 TABLET, FILM COATED ORAL at 10:20

## 2022-11-28 RX ADMIN — PREDNISONE 20 MG: 20 TABLET ORAL at 10:21

## 2022-11-28 RX ADMIN — QUETIAPINE FUMARATE 12.5 MG: 25 TABLET ORAL at 18:21

## 2022-11-28 RX ADMIN — ZIPRASIDONE MESYLATE 10 MG: 20 INJECTION, POWDER, LYOPHILIZED, FOR SOLUTION INTRAMUSCULAR at 03:54

## 2022-11-28 RX ADMIN — ASPIRIN 81 MG 81 MG: 81 TABLET ORAL at 10:18

## 2022-11-28 RX ADMIN — METOPROLOL TARTRATE 25 MG: 25 TABLET, FILM COATED ORAL at 10:18

## 2022-11-28 RX ADMIN — METOPROLOL TARTRATE 25 MG: 25 TABLET, FILM COATED ORAL at 21:58

## 2022-11-28 RX ADMIN — DILTIAZEM HYDROCHLORIDE 120 MG: 120 CAPSULE, COATED, EXTENDED RELEASE ORAL at 10:21

## 2022-11-28 NOTE — PROGRESS NOTES
[facility] Hospitalist Progress Note  Ceferino Navarro MD    Date:2022       Room:[facility]  Patient Zuhair Salomon     YOB: 1929     Age:89 y.o.    22 admission course  80 y.o. male lives in an assisted living facility with supervised care with history of heart failure, COPD advanced with centrilobular emphysema, hypertension is brought to the emergency room due to a fall from wheelchair. There is an injury on the left side of his head, unknown unable to find information if he lost any consciousness. At baseline he has dementia but pleasantly confused and tried to talk but cannot provide information. He is on home oxygen for COPD. Evaluation in the emergency room found with atrial fibrillation with rapid ventricular rate and also he is developing hypotension. 22 no new complaints, tolerating medications well  Presently on amiodarone drip for rapid atrial fibrillation    22 transitioned to po agents as he has lost iv access  So far tolerating well    22 echocardiogram    Left Ventricle: Reduced left ventricular systolic function with a visually estimated EF of 40 - 45%. Left ventricle size is normal. Normal wall thickness. Moderate hypokinesis of the following segments: basal anterolateral, basal inferolateral, mid anterolateral and apical lateral.    Aortic Valve: Not well visualized. Tricuspid valve. Mitral Valve: Mild regurgitation. Tricuspid Valve: Mildly thickened leaflets. Technical qualifiers: Echo study was technically difficult due to patient's heart rhythm. 22 needed Geodon for agitation this morning  Psychiatry to see    22 slightly increased oxygen requirement, XR chest pending  Afib medication regimen adjustments per cardiology    Subjective    Subjective:  Symptoms:  Stable. Review of Systems   All other systems reviewed and are negative.   Objective         Vitals Last 24 Hours:  TEMPERATURE:  Temp  Av.5 °F (36.4 °C)  Min: 97.3 °F (36.3 °C)  Max: 97.8 °F (36.6 °C)  RESPIRATIONS RANGE: Resp  Av.8  Min: 15  Max: 21  PULSE OXIMETRY RANGE: SpO2  Av %  Min: 91 %  Max: 100 %  PULSE RANGE: Pulse  Av  Min: 70  Max: 133  BLOOD PRESSURE RANGE: Systolic (36KMK), XBF:105 , Min:126 , CTH:626   ; Diastolic (56ZKR), JZJ:30, Min:86, Max:95    I/O (24Hr): Intake/Output Summary (Last 24 hours) at 2022 1034  Last data filed at 2022 2206  Gross per 24 hour   Intake 200 ml   Output 700 ml   Net -500 ml       Objective:  General Appearance:  Comfortable. Vital signs: (most recent): Blood pressure 129/87, pulse (!) 125, temperature 97.6 °F (36.4 °C), resp. rate 21, height 5' 6\" (1.676 m), weight 75.5 kg (166 lb 6.4 oz), SpO2 91 %. General : Pleasant, appears mildly respiratory distress. HEENT : PERRLA, normal oral mucosa, atraumatic normocephalic, Normal ear and nose. Neck : Supple, no JVD, no masses noted, no carotid bruit. Lungs : Coarse breath sounds with rhonchi and expiratory wheezing. Prolonged expirations. On nasal cannula  CVS : Rhythm rate irregularly irregular. Tachycardia noted. Difficult to hear heart sounds. Abdomen : Soft, nontender,  bowel sounds active. Extremities : Severe edema bilateral lower extremities from knee down to the feet. Musculoskeletal : Decreased range of motion. Muscle power and tone decreased. Skin : Dry, warm, pale. Lymphatic : No cervical lymphadenopathy. Neurological : Awake, alert, oriented to only place and person. He knows not at his regular place. Wants to get out of here. He is not understanding things.   Psychiatric : Pleasantly confused with dementia      Labs/Imaging/Diagnostics    Labs:  CBC:  Recent Labs     22  0654 22  1224   WBC 9.6 9.9   RBC 3.86* 3.71*   HGB 12.7 12.0*   HCT 40.3 38.6   .4* 104.0*   RDW 14.1 14.0    226       CHEMISTRIES:  Recent Labs     22  0654 22  1224    136   K 5.2* 4.8    103 CO2 31 26   BUN 45* 42*   CA 8.6 8.2*     PT/INR:No results for input(s): INR, INREXT, INREXT in the last 72 hours. No lab exists for component: PROTIME  APTT:No results for input(s): APTT in the last 72 hours. LIVER PROFILE:  No results for input(s): AST, ALT in the last 72 hours. No lab exists for component: Raquel Zabala ALKPHOS    Lab Results   Component Value Date/Time    ALT (SGPT) Hemolyzed, recollect requested 11/23/2022 11:15 PM    AST (SGOT) Hemolyzed, recollect requested 11/23/2022 11:15 PM    Alk. phosphatase 74 11/23/2022 11:15 PM    Bilirubin, total 1.1 (H) 11/23/2022 11:15 PM       Imaging Last 24 Hours:  No results found. Assessment//Plan   Active Problems:    Atrial fibrillation with RVR (HCC) (11/24/2022)      Paroxysmal atrial fibrillation with rapid ventricular response (HCC) (11/24/2022)  CT Results  (Last 48 hours)      None            Assessment & Plan  11/23/22 admission course  80 y.o. male lives in an assisted living facility with supervised care with history of heart failure, COPD advanced with centrilobular emphysema, hypertension is brought to the emergency room due to a fall from wheelchair. There is an injury on the left side of his head, unknown unable to find information if he lost any consciousness. At baseline he has dementia but pleasantly confused and tried to talk but cannot provide information. He is on home oxygen for COPD. Evaluation in the emergency room found with atrial fibrillation with rapid ventricular rate and also he is developing hypotension. 11/24/22 no new complaints, tolerating medications well  Presently on amiodarone drip for rapid atrial fibrillation    11/25/22 transitioned to po agents as he has lost iv access  So far tolerating well    11/25/22 echocardiogram    Left Ventricle: Reduced left ventricular systolic function with a visually estimated EF of 40 - 45%. Left ventricle size is normal. Normal wall thickness.  Moderate hypokinesis of the following segments: basal anterolateral, basal inferolateral, mid anterolateral and apical lateral.    Aortic Valve: Not well visualized. Tricuspid valve. Mitral Valve: Mild regurgitation. Tricuspid Valve: Mildly thickened leaflets. Technical qualifiers: Echo study was technically difficult due to patient's heart rhythm. 11/27/22 needed Geodon for agitation this morning  Psychiatry to see    11/28/22 slightly increased oxygen requirement, XR chest pending  Afib medication regimen adjustments per cardiology    MICROBIOLOGY    11/24/22 Blood  Klebsiella (Enterobacter) aerogenes  11/24/22 Covid 19 Negative  11/24/22 Influenza AB Negative    11/27/22 Blood  Negative so far    ASSESSMENT AND PLAN    1) Cardiovascular issues including atrial fibrillation with RVR this admission     Telemetry monitoring   Echocardiogram as above   Amiodarone   Aspirin   Diltiazem   Metoprolol    2) COPD exacerbation in a patient with with centrilobular emphysema     Supportive care with respiratory support as needed   Patient refusing iv corticosteroids, switched to po prednisone   Bronchodilators    Patient refusing iv    Levofloxacin 250mg po daily through 12/2/22    3) Klebsiella (Enterobacter) aerogenes bacteremia at admission, suspect urinary source though urine cultures were not obtained at admission. Levofloxacin 250mg po daily through 12/2/22    4) History of dementia with behavioral disturbance on Seroquel, dose increased this admission. Seroquel if he takes po, else PRN Geodon im  Medication adjustments per psychiatry    5) No anticoagulation due to risk of bleeding as patient is at risk of falls.     6) Code status DNR    7) Dispo return to [employer] once stable        Electronically signed by Octaviano Rice MD on 11/28/2022 at 7:51 AM

## 2022-11-28 NOTE — PROGRESS NOTES
CM spoke with Petros Beltrán at the [facility] regarding patient. Informed [employer] patient is still on a 1:1 and questioned [employer] on patient return. Petros Beltrán states patient must be off 1:1 for at least 24 hours before they can return. Nurse at [facility] states patient is combative and needs to be on a 1:1 at this time. Per cardiology, patient remains in afib today.

## 2022-11-28 NOTE — PROGRESS NOTES
Progress Note    Patient: Keely Richards MRN: [id number]  SSN: [id number]    YOB: 1929  Age: 80 y.o. Sex: male      Admit Date: 11/23/2022    LOS: 4 days     Subjective:     No acute events overnight. Objective:     Vitals:    11/28/22 0000 11/28/22 0359 11/28/22 0449 11/28/22 0727   BP:  (!) 143/94  129/87   Pulse: (!) 114 70  (!) 125   Resp:  19  21   Temp:  97.3 °F (36.3 °C)  97.6 °F (36.4 °C)   SpO2:  92%  100%   Weight:   75.5 kg (166 lb 6.4 oz)    Height:            Intake and Output:  Current Shift: No intake/output data recorded. Last three shifts: 11/26 1901 - 11/28 0700  In: 700 [P.O.:700]  Out: 1300 [Urine:1300]    Physical Exam:   General:  Alert, cooperative, no distress, appears stated age. Eyes:  Conjunctivae/corneas clear. PERRL, EOMs intact. Fundi benign   Ears:  Normal TMs and external ear canals both ears. Nose: Nares normal. Septum midline. Mucosa normal. No drainage or sinus tenderness. Mouth/Throat: Lips, mucosa, and tongue normal. Teeth and gums normal.   Neck: Supple, symmetrical, trachea midline, no adenopathy, thyroid: no enlargment/tenderness/nodules, no carotid bruit and no JVD. Back:   Symmetric, no curvature. ROM normal. No CVA tenderness. Lungs:   Clear to auscultation bilaterally. Heart:  Irregular, variable S1, tachycardic   Abdomen:   Soft, non-tender. Bowel sounds normal. No masses,  No organomegaly. Extremities: Extremities normal, atraumatic, no cyanosis or edema. Pulses: 2+ and symmetric all extremities. Skin: Skin color, texture, turgor normal. No rashes or lesions   Lymph nodes: Cervical, supraclavicular, and axillary nodes normal.   Neurologic: CNII-XII intact. Normal strength, sensation and reflexes throughout. Lab/Data Review: All lab results for the last 24 hours reviewed.          Assessment:     Active Problems:    Atrial fibrillation with RVR ([facility]) (11/24/2022)      [facility]ox[facility] with [facility] (11/24/2022)  Patient is a 22-year-old white male with:  1. Chronic atrial fibrillation  2. Hypotension  3. History of hypertension  4. Bronchial asthma  5. Prostate cancer   6. COPD  7. Dementia  8. Hard of hearing  9. Blind  910.  10.  UTI  11. Hyponatremia  12. Acute kidney injury  13. Mild rhabdomyolysis  14. Thoracolumbar scoliosis  15. Moderate tricuspid regurgitation    Plan:     Currently in A. fib with RVR. Continue amiodarone 200 mg daily, aspirin, diltiazem. We will switch Cardizem to long-acting. Continue metoprolol. Total dose of Cardizem will be increased.   Signed By: Evette Ellis MD     November 28, 2022

## 2022-11-28 NOTE — PROGRESS NOTES
Problem: Falls - Risk of  Goal: *Absence of Falls  Description: Document Brian Pak Fall Risk and appropriate interventions in the flowsheet.   Outcome: Progressing Towards Goal  Note: Fall Risk Interventions:  Mobility Interventions: Bed/chair exit alarm    Mentation Interventions: Bed/chair exit alarm, More frequent rounding    Medication Interventions: Bed/chair exit alarm, Patient to call before getting OOB    Elimination Interventions: Bed/chair exit alarm, Call light in reach    History of Falls Interventions: Bed/chair exit alarm, Door open when patient unattended, Room close to nurse's station         Problem: Patient Education: Go to Patient Education Activity  Goal: Patient/Family Education  Outcome: Progressing Towards Goal     Problem: Patient Education: Go to Patient Education Activity  Goal: Patient/Family Education  Outcome: Progressing Towards Goal     Problem: Patient Education: Go to Patient Education Activity  Goal: Patient/Family Education  Outcome: Progressing Towards Goal     Problem: Afib Pathway: Day 1  Goal: Off Pathway (Use only if patient is Off Pathway)  Outcome: Progressing Towards Goal  Goal: Activity/Safety  Outcome: Progressing Towards Goal  Goal: Consults, if ordered  Outcome: Progressing Towards Goal  Goal: Diagnostic Test/Procedures  Outcome: Progressing Towards Goal  Goal: Nutrition/Diet  Outcome: Progressing Towards Goal  Goal: Discharge Planning  Outcome: Progressing Towards Goal  Goal: Medications  Outcome: Progressing Towards Goal  Goal: Respiratory  Outcome: Progressing Towards Goal  Goal: Treatments/Interventions/Procedures  Outcome: Progressing Towards Goal  Goal: Psychosocial  Outcome: Progressing Towards Goal  Goal: *Optimal pain control at patient's stated goal  Outcome: Progressing Towards Goal  Goal: *Hemodynamically stable  Outcome: Progressing Towards Goal  Goal: *Stable cardiac rhythm  Outcome: Progressing Towards Goal  Goal: *Lungs clear or at baseline  Outcome: Progressing Towards Goal  Goal: *Labs within defined limits  Outcome: Progressing Towards Goal  Goal: *Describes available resources and support systems  Outcome: Progressing Towards Goal     Problem: Afib Pathway: Day 2  Goal: Off Pathway (Use only if patient is Off Pathway)  Outcome: Progressing Towards Goal  Goal: Activity/Safety  Outcome: Progressing Towards Goal  Goal: Consults, if ordered  Outcome: Progressing Towards Goal  Goal: Diagnostic Test/Procedures  Outcome: Progressing Towards Goal  Goal: Nutrition/Diet  Outcome: Progressing Towards Goal  Goal: Discharge Planning  Outcome: Progressing Towards Goal  Goal: Medications  Outcome: Progressing Towards Goal  Goal: Respiratory  Outcome: Progressing Towards Goal  Goal: Treatments/Interventions/Procedures  Outcome: Progressing Towards Goal  Goal: Psychosocial  Outcome: Progressing Towards Goal  Goal: *Hemodynamically stable  Outcome: Progressing Towards Goal  Goal: *Optimal pain control at patient's stated goal  Outcome: Progressing Towards Goal  Goal: *Stable cardiac rhythm  Outcome: Progressing Towards Goal  Goal: *Lungs clear or at baseline  Outcome: Progressing Towards Goal  Goal: *Describes available resources and support systems  Outcome: Progressing Towards Goal     Problem: Afib Pathway: Day 3  Goal: Off Pathway (Use only if patient is Off Pathway)  Outcome: Progressing Towards Goal  Goal: Activity/Safety  Outcome: Progressing Towards Goal  Goal: Diagnostic Test/Procedures  Outcome: Progressing Towards Goal  Goal: Nutrition/Diet  Outcome: Progressing Towards Goal  Goal: Discharge Planning  Outcome: Progressing Towards Goal  Goal: Medications  Outcome: Progressing Towards Goal  Goal: Respiratory  Outcome: Progressing Towards Goal  Goal: Treatments/Interventions/Procedures  Outcome: Progressing Towards Goal  Goal: Psychosocial  Outcome: Progressing Towards Goal     Problem: Afib: Discharge Outcomes (not in CAT)  Goal: *Hemodynamically stable  Outcome: Progressing Towards Goal  Goal: *Stable cardiac rhythm  Outcome: Progressing Towards Goal  Goal: *Lungs clear or at baseline  Outcome: Progressing Towards Goal  Goal: *Optimal pain control at patient's stated goal  Outcome: Progressing Towards Goal  Goal: *Identifies cardiac risk factors  Outcome: Progressing Towards Goal  Goal: *Verbalizes home exercise program, activity guidelines, cardiac precautions  Outcome: Progressing Towards Goal  Goal: *Verbalizes understanding and describes prescribed diet  Outcome: Progressing Towards Goal  Goal: *Verbalizes understanding and describes medication purposes and frequencies  Outcome: Progressing Towards Goal  Goal: *Anxiety reduced or absent  Outcome: Progressing Towards Goal  Goal: *Understands and describes signs and symptoms to report to providers(Stroke Metric)  Outcome: Progressing Towards Goal  Goal: *Describes follow-up/return visits to physicians  Outcome: Progressing Towards Goal  Goal: *Describes available resources and support systems  Outcome: Progressing Towards Goal  Goal: *Influenza immunization  Outcome: Progressing Towards Goal  Goal: *Pneumococcal immunization  Outcome: Progressing Towards Goal  Goal: *Describes smoking cessation resources  Outcome: Progressing Towards Goal     Problem: Non-Violent Restraints  Goal: Removal from restraints as soon as assessed to be safe  Outcome: Progressing Towards Goal  Goal: No harm/injury to patient while restraints in use  Outcome: Progressing Towards Goal  Goal: Patient's dignity will be maintained  Outcome: Progressing Towards Goal  Goal: Patient Interventions  Outcome: Progressing Towards Goal     Problem: Pressure Injury - Risk of  Goal: *Prevention of pressure injury  Description: Document Demarcus Scale and appropriate interventions in the flowsheet.   Outcome: Progressing Towards Goal     Problem: Patient Education: Go to Patient Education Activity  Goal: Patient/Family Education  Outcome: Progressing Towards Goal

## 2022-11-28 NOTE — PROGRESS NOTES
Pulmonary/ CC progress note    Subjective:   Date of Consultation:  2022  Referring Physician: Alyssa Green MD    Subjective    Patient seen and examined  Overnight events noted  Became agitated. Got Geodan. Sleepy now  Sitter at bedside. On 3 L nasal cannula oxygen  No acute distress     Prior to Admission medications    Medication Sig Start Date End Date Taking? Authorizing Provider   finasteride (PROSCAR) 5 mg tablet Take 5 mg by mouth in the morning. Yeny Sharif MD   furosemide (LASIX) 80 mg tablet Take  by mouth daily. Yeny Sharif MD   sennosides (Senna) 8.6 mg cap Take  by mouth. Yeny Sharif MD   guaiFENesin (ORGANIDIN) 200 mg tablet Take 200 mg by mouth every four (4) hours as needed for Congestion. Yeny Sharif MD   cholecalciferol (VITAMIN D3) (1000 Units /25 mcg) tablet Take 2 Tablets by mouth daily. 3/9/22   Ap San PA-C   budesonide-formoteroL (Symbicort) 160-4.5 mcg/actuation HFAA Take 2 Puffs by inhalation two (2) times a day. 3/9/22   Ap San PA-C   albuterol (ProAir HFA) 90 mcg/actuation inhaler Take 2 Puffs by inhalation every six (6) hours as needed for Wheezing or Shortness of Breath. 3/9/22   Ap San PA-C   aspirin 81 mg chewable tablet Take 1 Tablet by mouth daily. 22   Uzair Smith MD   QUEtiapine (SEROquel) 25 mg tablet Take 0.5 Tablets by mouth nightly. 22   Uzair Smith MD     Allergies   Allergen Reactions    Gabapentin Unknown (comments)        Review of Systems:  Review of systems not obtained due to patient factors. Objective:   Blood pressure (!) 100/58, pulse 91, temperature 97.4 °F (36.3 °C), resp. rate 19, height 5' 6\" (1.676 m), weight 75.5 kg (166 lb 6.4 oz), SpO2 93 %. Temp (24hrs), Av.6 °F (36.4 °C), Min:97.3 °F (36.3 °C), Max:98.2 °F (36.8 °C)    XR CHEST PORT   Final Result      Diffuse interstitial prominence again noted without evidence for focal lung   consolidation. Hypoinflated lungs with probable bibasilar atelectasis. CT HEAD WO CONT   Final Result   1. No evidence of acute intracranial abnormality by this modality. Data Review: CBC:   Recent Labs     11/28/22  0654 11/27/22  1224   WBC 9.6 9.9   RBC 3.86* 3.71*   HGB 12.7 12.0*   HCT 40.3 38.6    226       CMP:   Recent Labs     11/28/22  0654 11/27/22  1224   * 113*    136   K 5.2* 4.8    103   CO2 31 26   BUN 45* 42*   CREA 1.54* 1.36*   CA 8.6 8.2*   AGAP 5 7   BUCR 29* 31*       Liver Enzymes:   No results for input(s): TP, ALB, TBIL, AP in the last 72 hours. No lab exists for component: SGOT, GPT, DBIL    ABGs: No results for input(s): PH, PCO2, PO2, HCO3 in the last 72 hours.   Current Facility-Administered Medications   Medication Dose Route Frequency    dilTIAZem ER (CARDIZEM CD) capsule 120 mg  120 mg Oral DAILY    amiodarone (CORDARONE) tablet 200 mg  200 mg Oral DAILY    levoFLOXacin (LEVAQUIN) tablet 250 mg  250 mg Oral DAILY    predniSONE (DELTASONE) tablet 20 mg  20 mg Oral DAILY WITH BREAKFAST    QUEtiapine (SEROquel) tablet 12.5 mg  12.5 mg Oral BID WITH MEALS    metoprolol tartrate (LOPRESSOR) tablet 25 mg  25 mg Oral Q12H    aspirin chewable tablet 81 mg  81 mg Oral DAILY    cholecalciferol (VITAMIN D3) (1000 Units /25 mcg) tablet 2,000 Units  2,000 Units Oral DAILY    budesonide-formoteroL (SYMBICORT) 160-4.5 mcg/actuation HFA inhaler 2 Puff  2 Puff Inhalation BID    albuterol (PROVENTIL HFA, VENTOLIN HFA, PROAIR HFA) inhaler 2 Puff  2 Puff Inhalation Q6H PRN    finasteride (PROSCAR) tablet 5 mg  5 mg Oral DAILY    senna (SENOKOT) tablet 8.6 mg  1 Tablet Oral DAILY    sodium chloride (NS) flush 5-40 mL  5-40 mL IntraVENous PRN    acetaminophen (TYLENOL) solution 650 mg  650 mg Oral Q4H PRN    doxycycline (VIBRAMYCIN) 100 mg in 0.9% sodium chloride (MBP/ADV) 100 mL MBP  100 mg IntraVENous Q12H    sodium chloride (NS) flush 5-40 mL  5-40 mL IntraVENous PRN        Exam:    This is an elderly male who has poor vision. He is alert and oriented otherwise. He is known to have dementia. He is on nasal cannula oxygen  Has rhonchi audible on auscultation of chest.  JVD is absent. No cervical lymphadenopathy. Thyroid not enlarged  Heart: S1-S2 irregular. Abdomen: Soft, nontender, no visceromegaly  Extremities: No edema, sinus or clubbing  Neuro: No focal motor deficit. Impression:   80years old  male seen in assisted living facility with supervised care. He has known history of CHF, advanced COPD with central lobular emphysema, hypertension was brought to the emergency department after a fall from wheelchair. It resulted in injury on the left side of the his head. CT scan of head did not show any new intracranial pathology. It was not clear if he lost his consciousness. Patient has history of dementia. Apparently he is on oxygen in the facility. ER he was found to be in atrial fibrillation with rapid ventricular response. He was also noted to have upper respiratory tract congestion. Plan:   COPD with exacerbation  Patient has history of advanced COPD with emphysema. He is on nasal cannula oxygen at home. Currently he has upper respiratory tract congestion with some wheezing. We will treat him with nebulized albuterol and Atrovent, low-dose systemic steroids and IV antibiotics. Doxycycline 100 mg twice daily  . Robitussin cough and cold for congestion    2. atrial fibrillation with rapid ventricular response: Follow-up echocardiogram, on Cardizem and getting adjusted his metoprolol dosage     3. Patient's chest x-ray has shown chronic changes without any acute infiltrates    4. Status post fall from wheelchair. CT scan of head is negative    5. Dementia: Supportive care  . Prescription education. Was given Geodon. He is going to get psych consult.     Questions of patient were answered at bedside in detail  Case discussed in detail with RN, RT, and care team  Thank you for involving me in the care of this patient  I will follow with you closely during hospitalization    Time spent more than 30 minutes under patient care with no overlap reviewing results and records, decision making, and answering questions.     Farhad Costa MD  [facility]

## 2022-11-29 PROCEDURE — 74011250636 HC RX REV CODE- 250/636: Performed by: INTERNAL MEDICINE

## 2022-11-29 PROCEDURE — 65270000029 HC RM PRIVATE

## 2022-11-29 PROCEDURE — 74011250637 HC RX REV CODE- 250/637: Performed by: HOSPITALIST

## 2022-11-29 PROCEDURE — 94761 N-INVAS EAR/PLS OXIMETRY MLT: CPT

## 2022-11-29 PROCEDURE — 74011250637 HC RX REV CODE- 250/637: Performed by: INTERNAL MEDICINE

## 2022-11-29 PROCEDURE — 74011000250 HC RX REV CODE- 250: Performed by: INTERNAL MEDICINE

## 2022-11-29 PROCEDURE — 77010033678 HC OXYGEN DAILY

## 2022-11-29 PROCEDURE — 74011636637 HC RX REV CODE- 636/637: Performed by: INTERNAL MEDICINE

## 2022-11-29 RX ORDER — QUETIAPINE FUMARATE 25 MG/1
25 TABLET, FILM COATED ORAL 2 TIMES DAILY WITH MEALS
Status: DISCONTINUED | OUTPATIENT
Start: 2022-11-29 | End: 2022-12-01

## 2022-11-29 RX ORDER — OLANZAPINE 5 MG/1
5 TABLET, ORALLY DISINTEGRATING ORAL ONCE
Status: ACTIVE | OUTPATIENT
Start: 2022-11-29 | End: 2022-11-29

## 2022-11-29 RX ORDER — OLANZAPINE 10 MG/1
5 INJECTION, POWDER, LYOPHILIZED, FOR SOLUTION INTRAMUSCULAR ONCE
Status: COMPLETED | OUTPATIENT
Start: 2022-11-29 | End: 2022-11-29

## 2022-11-29 RX ADMIN — OLANZAPINE 5 MG: 10 INJECTION, POWDER, LYOPHILIZED, FOR SOLUTION INTRAMUSCULAR at 04:40

## 2022-11-29 RX ADMIN — PREDNISONE 20 MG: 20 TABLET ORAL at 13:22

## 2022-11-29 RX ADMIN — Medication 2000 UNITS: at 09:00

## 2022-11-29 RX ADMIN — FINASTERIDE 5 MG: 5 TABLET, FILM COATED ORAL at 09:00

## 2022-11-29 RX ADMIN — ZIPRASIDONE MESYLATE 10 MG: 20 INJECTION, POWDER, LYOPHILIZED, FOR SOLUTION INTRAMUSCULAR at 20:08

## 2022-11-29 NOTE — PROGRESS NOTES
Problem: Falls - Risk of  Goal: *Absence of Falls  Description: Document Mackay Fall Risk and appropriate interventions in the flowsheet.   Outcome: Progressing Towards Goal  Note: Fall Risk Interventions:  Mobility Interventions: Bed/chair exit alarm, PT Consult for mobility concerns, OT consult for ADLs, PT Consult for assist device competence    Mentation Interventions: Adequate sleep, hydration, pain control, Bed/chair exit alarm, Room close to nurse's station, Toileting rounds, Increase mobility    Medication Interventions: Bed/chair exit alarm    Elimination Interventions: Bed/chair exit alarm    History of Falls Interventions: Bed/chair exit alarm, Room close to nurse's station         Problem: Patient Education: Go to Patient Education Activity  Goal: Patient/Family Education  Outcome: Progressing Towards Goal     Problem: Patient Education: Go to Patient Education Activity  Goal: Patient/Family Education  Outcome: Progressing Towards Goal     Problem: Patient Education: Go to Patient Education Activity  Goal: Patient/Family Education  Outcome: Progressing Towards Goal     Problem: Afib Pathway: Day 1  Goal: Off Pathway (Use only if patient is Off Pathway)  Outcome: Progressing Towards Goal  Goal: Activity/Safety  Outcome: Progressing Towards Goal  Goal: Consults, if ordered  Outcome: Progressing Towards Goal  Goal: Diagnostic Test/Procedures  Outcome: Progressing Towards Goal  Goal: Nutrition/Diet  Outcome: Progressing Towards Goal  Goal: Discharge Planning  Outcome: Progressing Towards Goal  Goal: Medications  Outcome: Progressing Towards Goal  Goal: Respiratory  Outcome: Progressing Towards Goal  Goal: Treatments/Interventions/Procedures  Outcome: Progressing Towards Goal  Goal: Psychosocial  Outcome: Progressing Towards Goal  Goal: *Optimal pain control at patient's stated goal  Outcome: Progressing Towards Goal  Goal: *Hemodynamically stable  Outcome: Progressing Towards Goal  Goal: *Stable cardiac rhythm  Outcome: Progressing Towards Goal  Goal: *Lungs clear or at baseline  Outcome: Progressing Towards Goal  Goal: *Labs within defined limits  Outcome: Progressing Towards Goal  Goal: *Describes available resources and support systems  Outcome: Progressing Towards Goal     Problem: Afib Pathway: Day 2  Goal: Off Pathway (Use only if patient is Off Pathway)  Outcome: Progressing Towards Goal  Goal: Activity/Safety  Outcome: Progressing Towards Goal  Goal: Consults, if ordered  Outcome: Progressing Towards Goal  Goal: Diagnostic Test/Procedures  Outcome: Progressing Towards Goal  Goal: Nutrition/Diet  Outcome: Progressing Towards Goal  Goal: Discharge Planning  Outcome: Progressing Towards Goal  Goal: Medications  Outcome: Progressing Towards Goal  Goal: Respiratory  Outcome: Progressing Towards Goal  Goal: Treatments/Interventions/Procedures  Outcome: Progressing Towards Goal  Goal: Psychosocial  Outcome: Progressing Towards Goal  Goal: *Hemodynamically stable  Outcome: Progressing Towards Goal  Goal: *Optimal pain control at patient's stated goal  Outcome: Progressing Towards Goal  Goal: *Stable cardiac rhythm  Outcome: Progressing Towards Goal  Goal: *Lungs clear or at baseline  Outcome: Progressing Towards Goal  Goal: *Describes available resources and support systems  Outcome: Progressing Towards Goal     Problem: Afib Pathway: Day 3  Goal: Off Pathway (Use only if patient is Off Pathway)  Outcome: Progressing Towards Goal  Goal: Activity/Safety  Outcome: Progressing Towards Goal  Goal: Diagnostic Test/Procedures  Outcome: Progressing Towards Goal  Goal: Nutrition/Diet  Outcome: Progressing Towards Goal  Goal: Discharge Planning  Outcome: Progressing Towards Goal  Goal: Medications  Outcome: Progressing Towards Goal  Goal: Respiratory  Outcome: Progressing Towards Goal  Goal: Treatments/Interventions/Procedures  Outcome: Progressing Towards Goal  Goal: Psychosocial  Outcome: Progressing Towards Goal Problem: Afib: Discharge Outcomes (not in CAT)  Goal: *Hemodynamically stable  Outcome: Progressing Towards Goal  Goal: *Stable cardiac rhythm  Outcome: Progressing Towards Goal  Goal: *Lungs clear or at baseline  Outcome: Progressing Towards Goal  Goal: *Optimal pain control at patient's stated goal  Outcome: Progressing Towards Goal  Goal: *Identifies cardiac risk factors  Outcome: Progressing Towards Goal  Goal: *Verbalizes home exercise program, activity guidelines, cardiac precautions  Outcome: Progressing Towards Goal  Goal: *Verbalizes understanding and describes prescribed diet  Outcome: Progressing Towards Goal  Goal: *Verbalizes understanding and describes medication purposes and frequencies  Outcome: Progressing Towards Goal  Goal: *Anxiety reduced or absent  Outcome: Progressing Towards Goal  Goal: *Understands and describes signs and symptoms to report to providers(Stroke Metric)  Outcome: Progressing Towards Goal  Goal: *Describes follow-up/return visits to physicians  Outcome: Progressing Towards Goal  Goal: *Describes available resources and support systems  Outcome: Progressing Towards Goal  Goal: *Influenza immunization  Outcome: Progressing Towards Goal  Goal: *Pneumococcal immunization  Outcome: Progressing Towards Goal  Goal: *Describes smoking cessation resources  Outcome: Progressing Towards Goal

## 2022-11-29 NOTE — CONSULTS
[address] [place] [address] Road    Name:  Val Richter  MR#:  [id number]  :  1929  ACCOUNT #:  [de-identified]  DATE OF SERVICE:  2022    PSYCHIATRIC CONSULTATION    Found out about the consult today on 2022. REASON FOR CONSULTATION:  Psychosis. CONSULT ORDERED BY:  Nils Rojas MD    Saw the patient, chart reviewed, spoke with one-to-one staff, also spoke with the family representative. HISTORY OF PRESENT ILLNESS:  This is a 80-year-old  male patient admitted to medical floor with a chief complaint of fall from wheelchair, found with atrial fibrillation with rapid ventricular rate. Records from ED, the patient with dementia. Right now, he was resting, in deep sleep. Information came from the chart, one-to-staff and the family representative that was visiting him. He is a . At baseline, pleasantly confused, has a history of heart failure, COPD advanced with centrilobular emphysema and hypertension, brought to the emergency room due to fall from wheelchair. Apparently, an injury on the left side of his head, does not know about the details. Apparently, he was having a COVID infection early part of 2022, was at [facility] for one week. He lives at [facility] which is an assisted living facility place. He is at home on oxygen for COPD. Also, when he had in the past urinary tract infection, he was pretty disruptive. Reportedly, yesterday, he was disruptive, agitated and not cooperative. Currently, on Seroquel 12.5 mg twice a day and p.r.n. Geodon. PAST MEDICAL HISTORY:  Asthma, blind, CHF, COPD, hypertension, prostate CA, has had orthopedic surgery on the neck. Former smoker. Alcohol use:  Never. From the records and also from the family friend, there is no prior psychiatric history. MEDICATIONS:  He was taking prior to coming were, I believe:  1. Proscar. 2.  Lasix. 3.  Senna. 4.  Organidin. 5.  Vitamin D3.   6. Symbicort. 7.  ProAir. 8.  Aspirin 81 mg.  9.  Seroquel 25 mg half a tablet nightly. ALLERGIES TO MEDICATIONS:  GABAPENTIN. LABORATORY DATA:  CBC:  At the time of admission, .2, neutrophils 80, lymphocytes 10. Metabolic panel:  Sodium 676, chloride 95, CO2 of 33, glucose 92, BUN 38, creatinine 1.70, GFR 37. Lactic acid 2.6. Urinalysis:  Turbid, leuko esterase large, and wbc's more than 100. Potassium 4.1, magnesium 2.2. TSH 3.85. . COVID-19 and influenza A and B were not detected on 11/24/2022. Blood culture:  Klebsiella aerogenes. CT head, impression:  No evidence of acute intracranial abnormality by this modality. EKG:  AFib with rapid ventricular response. VITAL SIGNS:  Temperature 97.4, pulse 91, blood pressure 100/58, respirations 19, and SpO2 of 93% on nasal cannula. MENTAL STATUS EXAMINATION:  A tall, well-built male patient, resting, in no distress. I did not wake him up. He has a history of dementia, confusion, not able to give much information. No point in waking up. Agitated. He has been resting right now. Formal mental status could not be done. Obviously, reviewing the information, insight poor, judgment is poor, but at baseline pleasantly confused. DIAGNOSES:  History of dementia. 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 DISPOSITION:  Continue treating the underlying metabolic infectious issues, seems to be responding to the Seroquel 12.5 mg. Continue present regimen. Reassess the case tomorrow.       Ivy Rodriguez MD      [id number]  D:  11/28/2022 15:37  T:  11/28/2022 21:48  JOB #:  [id number]

## 2022-11-29 NOTE — PROGRESS NOTES
Progress Note    Patient: Fer Mcfarland MRN: [id number]  SSN: [id number]    YOB: 1929  Age: 80 y.o. Sex: male      Admit Date: 11/23/2022    LOS: 5 days     Subjective:     No acute events overnight. Objective:     Vitals:    11/29/22 0818 11/29/22 0848 11/29/22 0900 11/29/22 1207   BP: 104/66   119/73   Pulse: (!) 101      Resp: 16      Temp: (!) 96.4 °F (35.8 °C)   97.3 °F (36.3 °C)   SpO2: 99% 99% 95%    Weight:       Height:            Intake and Output:  Current Shift: No intake/output data recorded. Last three shifts: 11/27 1901 - 11/29 0700  In: 300 [P.O.:300]  Out: 950 [Urine:950]    Physical Exam:   General:  Alert, cooperative, no distress, appears stated age. Eyes:  Conjunctivae/corneas clear. PERRL, EOMs intact. Fundi benign   Ears:  Normal TMs and external ear canals both ears. Nose: Nares normal. Septum midline. Mucosa normal. No drainage or sinus tenderness. Mouth/Throat: Lips, mucosa, and tongue normal. Teeth and gums normal.   Neck: Supple, symmetrical, trachea midline, no adenopathy, thyroid: no enlargment/tenderness/nodules, no carotid bruit and no JVD. Back:   Symmetric, no curvature. ROM normal. No CVA tenderness. Lungs:   Clear to auscultation bilaterally. Heart:  Irregular, variable S1, tachycardic   Abdomen:   Soft, non-tender. Bowel sounds normal. No masses,  No organomegaly. Extremities: Extremities normal, atraumatic, no cyanosis or edema. Pulses: 2+ and symmetric all extremities. Skin: Skin color, texture, turgor normal. No rashes or lesions   Lymph nodes: Cervical, supraclavicular, and axillary nodes normal.   Neurologic: CNII-XII intact. Normal strength, sensation and reflexes throughout. Lab/Data Review: All lab results for the last 24 hours reviewed.          Assessment:     Active Problems:    Atrial fibrillation with RVR (HCC) (11/24/2022)      Paroxysmal atrial fibrillation with rapid ventricular response (HCC) (11/24/2022)  Patient is a 77-year-old white male with:  1. Chronic atrial fibrillation  2. Hypotension  3. History of hypertension  4. Bronchial asthma  5. Prostate cancer   6. COPD  7. Dementia  8. Hard of hearing  9. Blind  910.  10.  UTI  11. Hyponatremia  12. Acute kidney injury  13. Mild rhabdomyolysis  14. Thoracolumbar scoliosis  15. Moderate tricuspid regurgitation    Plan:     Heart rate has improved. No further cardiac testing is planned at this time. I will sign off and remain available if needed.   Signed By: Jacquelyn Mitchell MD     November 29, 2022

## 2022-11-29 NOTE — PROGRESS NOTES
Hospitalist Progress Note         Romulo Gutierrez MD          Daily Progress Note: 11/29/2022      Subjective: The patient is seen for follow  up. 81 yo male, hx of a-fib with RVR, CHF, COPD, emphysema, HTN, here s/p fall out of her wheelchair on 11/23. In the ER was found to be in a-fib with RVR and hypotensive. Amiodarone drip was started but no resolution in a-fib as of yet. Currently, pt is on Amiodarone 200 mg daily, ASA, and Diltiazem, as well as long-acting Cardizem. Was given Geoden on 11/27 for agitation. --  The patient is seen for follow  up. He is asleep in bed, has recently been given another dose of Geoden.  Remain on a 1:1 this morning for safety     Problem List:  Problem List as of 11/29/2022 Date Reviewed: 11/24/2022            Codes Class Noted - Resolved    Atrial fibrillation with RVR ([facility] ICD-10-CM: I48.91  ICD-9-CM: 427.31  11/24/2022 - Present        Paroxysmal atrial fibrillation with rapid ventricular response (HCC) ICD-10-CM: I48.0  ICD-9-CM: 427.31  11/24/2022 - Present        History of home oxygen therapy ICD-10-CM: Z99.81  ICD-9-CM: V46.2  6/5/2022 - Present        CHF (congestive heart failure) (HCC) ICD-10-CM: I50.9  ICD-9-CM: 428.0  6/5/2022 - Present        Acute bronchitis ICD-10-CM: J20.9  ICD-9-CM: 466.0  3/9/2022 - Present        COPD with acute exacerbation ([facility] ICD-10-CM: J44.1  ICD-9-CM: 491.21  3/6/2022 - Present        Elevated troponin ICD-10-CM: R77.8  ICD-9-CM: 790.6  2/20/2022 - Present        Respiratory failure ([facility] ICD-10-CM: J96.90  ICD-9-CM: 518.81  1/2/2022 - Present           Medications reviewed  Current Facility-Administered Medications   Medication Dose Route Frequency    OLANZapine (ZyPREXA zydis) disintegrating tablet 5 mg  5 mg Oral ONCE    QUEtiapine (SEROquel) tablet 25 mg  25 mg Oral BID WITH MEALS    dilTIAZem ER (CARDIZEM CD) capsule 120 mg  120 mg Oral DAILY    amiodarone (CORDARONE) tablet 200 mg  200 mg Oral DAILY    levoFLOXacin (LEVAQUIN) tablet 250 mg  250 mg Oral DAILY    predniSONE (DELTASONE) tablet 20 mg  20 mg Oral DAILY WITH BREAKFAST    metoprolol tartrate (LOPRESSOR) tablet 25 mg  25 mg Oral Q12H    aspirin chewable tablet 81 mg  81 mg Oral DAILY    cholecalciferol (VITAMIN D3) (1000 Units /25 mcg) tablet 2,000 Units  2,000 Units Oral DAILY    budesonide-formoteroL (SYMBICORT) 160-4.5 mcg/actuation HFA inhaler 2 Puff  2 Puff Inhalation BID    albuterol (PROVENTIL HFA, VENTOLIN HFA, PROAIR HFA) inhaler 2 Puff  2 Puff Inhalation Q6H PRN    finasteride (PROSCAR) tablet 5 mg  5 mg Oral DAILY    senna (SENOKOT) tablet 8.6 mg  1 Tablet Oral DAILY    sodium chloride (NS) flush 5-40 mL  5-40 mL IntraVENous PRN    acetaminophen (TYLENOL) solution 650 mg  650 mg Oral Q4H PRN    sodium chloride (NS) flush 5-40 mL  5-40 mL IntraVENous PRN       Review of Systems:   Review of systems not obtained due to patient factors. Review of systems not obtained due to patient factors. Not required  Objective:   Physical Exam:     Visit Vitals  /66 (BP 1 Location: Right upper arm, BP Patient Position: At rest)   Pulse (!) 101 Comment: Rn Notified   Temp (!) 96.4 °F (35.8 °C) Comment: Rn Notified   Resp 16   Ht 5' 6\" (1.676 m)   Wt 76.2 kg (168 lb 1.6 oz)   SpO2 95%   BMI 27.13 kg/m²    O2 Flow Rate (L/min): 3 l/min O2 Device: Nasal cannula    Temp (24hrs), Av.4 °F (36.3 °C), Min:96.4 °F (35.8 °C), Max:98.2 °F (36.8 °C)    No intake/output data recorded.  1901 -  0700  In: 300 [P.O.:300]  Out: 950 [Urine:950]    General:  Arousable but confused   Lungs:   Clear to auscultation bilaterally. Chest wall:  No tenderness or deformity. Heart:  Regular rate and rhythm, S1, S2 normal, no murmur, click, rub or gallop. Abdomen:   Soft, non-tender. Bowel sounds normal. No masses,  No organomegaly. Extremities: Extremities normal, atraumatic, no cyanosis or edema. Pulses: 2+ and symmetric all extremities.    Skin: Skin color, texture, turgor normal. No rashes or lesions   Neurologic: CNII-XII intact. No gross sensory or motor deficits     Data Review:       Recent Days:  Recent Labs     11/28/22  0654 11/27/22  1224   WBC 9.6 9.9   HGB 12.7 12.0*   HCT 40.3 38.6    226     Recent Labs     11/28/22  0654 11/27/22  1224    136   K 5.2* 4.8    103   CO2 31 26   * 113*   BUN 45* 42*   CREA 1.54* 1.36*   CA 8.6 8.2*     No results for input(s): PH, PCO2, PO2, HCO3, FIO2 in the last 72 hours. 24 Hour Results:  No results found for this or any previous visit (from the past 24 hour(s)). Assessment/     Atrial fibrillation with rapid ventricular response    -Rate controlled on oral amiodarone, Cardizem and Lopressor    -Consider low-dose Eliquis for stroke prevention. Patient at risk for falls    COPD on 3 L home O2   -Appears stable    Klebsiella aerogenes bacteremia    -Continue Levaquin through December 2    Dementia with behavioral disturbance    -On Seroquel 25 mg oral twice daily        Plan:  Continued supportive care  Discontinue one-to-one  Anticipate discharge to [address] [place] [address] discussed with: Nurse    Total time spent with patient: 30 minutes.     Deanna Bennett MD

## 2022-11-29 NOTE — PROGRESS NOTES
*ATTENTION:  This note has been created by a medical student for educational purposes only. Please do not refer to the content of this note for clinical decision-making, billing, or other purposes. Please see attending physicians note to obtain clinical information on this patient. *                                     Hospitalist Progress Note         Nyra Cera          Daily Progress Note: 11/29/2022      Subjective:     79 yo male, hx of a-fib with RVR, CHF, COPD, emphysema, HTN, here s/p fall out of her wheelchair on 11/23. In the ER was found to be in a-fib with RVR and hypotensive. Amiodarone drip was started but no resolution in a-fib as of yet. Currently, pt is on Amiodarone 200 mg daily, ASA, and Diltiazem, as well as long-acting Cardizem. Was given Geoden on 11/27 for agitation. --  The patient is seen for follow  up. He is asleep in bed, has recently been given another dose of Geoden.  No acute events noted overnight per nursing team.    Problem List:  Problem List as of 11/29/2022 Date Reviewed: 11/24/2022            Codes Class Noted - Resolved    Atrial fibrillation with RVR ([facility] ICD-10-CM: I48.91  ICD-9-CM: 427.31  11/24/2022 - Present        Paroxysmal atrial fibrillation with rapid ventricular response ([facility] ICD-10-CM: I48.0  ICD-9-CM: 427.31  11/24/2022 - Present        History of home oxygen therapy ICD-10-CM: Z99.81  ICD-9-CM: V46.2  6/5/2022 - Present        CHF (congestive heart failure) ([facility] ICD-10-CM: I50.9  ICD-9-CM: 428.0  6/5/2022 - Present        Acute bronchitis ICD-10-CM: J20.9  ICD-9-CM: 466.0  3/9/2022 - Present        COPD with acute exacerbation ([facility] ICD-10-CM: J44.1  ICD-9-CM: 491.21  3/6/2022 - Present        Elevated troponin ICD-10-CM: R77.8  ICD-9-CM: 790.6  2/20/2022 - Present        Respiratory failure ([facility] ICD-10-CM: J96.90  ICD-9-CM: 518.81  1/2/2022 - Present           Medications reviewed  Current Facility-Administered Medications   Medication Dose Route Frequency    OLANZapine (ZyPREXA zydis) disintegrating tablet 5 mg  5 mg Oral ONCE    dilTIAZem ER (CARDIZEM CD) capsule 120 mg  120 mg Oral DAILY    amiodarone (CORDARONE) tablet 200 mg  200 mg Oral DAILY    levoFLOXacin (LEVAQUIN) tablet 250 mg  250 mg Oral DAILY    predniSONE (DELTASONE) tablet 20 mg  20 mg Oral DAILY WITH BREAKFAST    QUEtiapine (SEROquel) tablet 12.5 mg  12.5 mg Oral BID WITH MEALS    metoprolol tartrate (LOPRESSOR) tablet 25 mg  25 mg Oral Q12H    aspirin chewable tablet 81 mg  81 mg Oral DAILY    cholecalciferol (VITAMIN D3) (1000 Units /25 mcg) tablet 2,000 Units  2,000 Units Oral DAILY    budesonide-formoteroL (SYMBICORT) 160-4.5 mcg/actuation HFA inhaler 2 Puff  2 Puff Inhalation BID    albuterol (PROVENTIL HFA, VENTOLIN HFA, PROAIR HFA) inhaler 2 Puff  2 Puff Inhalation Q6H PRN    finasteride (PROSCAR) tablet 5 mg  5 mg Oral DAILY    senna (SENOKOT) tablet 8.6 mg  1 Tablet Oral DAILY    sodium chloride (NS) flush 5-40 mL  5-40 mL IntraVENous PRN    acetaminophen (TYLENOL) solution 650 mg  650 mg Oral Q4H PRN    sodium chloride (NS) flush 5-40 mL  5-40 mL IntraVENous PRN       Review of Systems:   A comprehensive review of systems was negative except for that written in the HPI. Objective:   Physical Exam:     Visit Vitals  /66 (BP 1 Location: Right upper arm, BP Patient Position: At rest)   Pulse (!) 101 Comment: Rn Notified   Temp (!) 96.4 °F (35.8 °C) Comment: Rn Notified   Resp 16   Ht 5' 6\" (1.676 m)   Wt 76.2 kg (168 lb 1.6 oz)   SpO2 95%   BMI 27.13 kg/m²    O2 Flow Rate (L/min): 3 l/min O2 Device: Nasal cannula    Temp (24hrs), Av.4 °F (36.3 °C), Min:96.4 °F (35.8 °C), Max:98.2 °F (36.8 °C)    No intake/output data recorded.   0700  In: 300 [P.O.:300]  Out: 950 [Urine:950]    General:  Alert, cooperative, no distress, appears stated age. Lungs:   Clear to auscultation bilaterally. Chest wall:  No tenderness or deformity.    Heart:  Regular rate and rhythm, S1, S2 normal, no murmur, click, rub or gallop. Abdomen:   Soft, non-tender. Bowel sounds normal. No masses,  No organomegaly. Extremities: Extremities normal, atraumatic, no cyanosis or edema. Pulses: 2+ and symmetric all extremities. Skin: Skin color, texture, turgor normal. No rashes or lesions   Neurologic: CNII-XII intact. No gross sensory or motor deficits     Data Review:       Recent Days:  Recent Labs     11/28/22  0654 11/27/22  1224   WBC 9.6 9.9   HGB 12.7 12.0*   HCT 40.3 38.6    226     Recent Labs     11/28/22  0654 11/27/22  1224    136   K 5.2* 4.8    103   CO2 31 26   * 113*   BUN 45* 42*   CREA 1.54* 1.36*   CA 8.6 8.2*     No results for input(s): PH, PCO2, PO2, HCO3, FIO2 in the last 72 hours. 24 Hour Results:  No results found for this or any previous visit (from the past 24 hour(s)). Assessment/     A-fib with RVR  -- On Amiodarone, Aspirin, Diltiazem, Cardizem, Metoprolol  -- Cardiology following for recommendations  -- Continue cardiac monitoring    COPD  -- Continue supportive respiratory care as needed  -- On PO Prednisone    Sepsis  -- On Levofloxacin    Dementia  -- On Seroquel at baseline  -- Geoden as needed  -- Psychiatry following, can adjust medication as needed    Plan:    Plan as above  Continue monitoring on floor  CODE STATUS: DNR    Care Plan discussed with: Patient/Family    Total time spent with patient: 30 minutes.     Tam Mai

## 2022-11-30 ENCOUNTER — APPOINTMENT (OUTPATIENT)
Dept: GENERAL RADIOLOGY | Age: 87
End: 2022-11-30
Attending: INTERNAL MEDICINE
Payer: MEDICARE

## 2022-11-30 LAB
ALBUMIN SERPL-MCNC: 2.4 G/DL (ref 3.5–5)
ALBUMIN/GLOB SERPL: 0.8 {RATIO} (ref 1.1–2.2)
ALP SERPL-CCNC: 54 U/L (ref 45–117)
ALT SERPL-CCNC: 42 U/L (ref 12–78)
ANION GAP SERPL CALC-SCNC: 5 MMOL/L (ref 5–15)
ARTERIAL PATENCY WRIST A: YES
AST SERPL W P-5'-P-CCNC: 31 U/L (ref 15–37)
BASE EXCESS BLDA CALC-SCNC: 5.1 MMOL/L (ref 0–3)
BDY SITE: ABNORMAL
BILIRUB SERPL-MCNC: 0.7 MG/DL (ref 0.2–1)
BNP SERPL-MCNC: 4628 PG/ML
BODY TEMPERATURE: 96.4
BUN SERPL-MCNC: 39 MG/DL (ref 6–20)
BUN/CREAT SERPL: 34 (ref 12–20)
CA-I BLD-MCNC: 8.1 MG/DL (ref 8.5–10.1)
CHLORIDE SERPL-SCNC: 105 MMOL/L (ref 97–108)
CO2 SERPL-SCNC: 29 MMOL/L (ref 21–32)
COHGB MFR BLD: 0.7 % (ref 1–2)
CREAT SERPL-MCNC: 1.14 MG/DL (ref 0.7–1.3)
FIO2 ON VENT: 36 %
GAS FLOW.O2 O2 DELIVERY SYS: 4 L/MIN
GLOBULIN SER CALC-MCNC: 2.9 G/DL (ref 2–4)
GLUCOSE SERPL-MCNC: 108 MG/DL (ref 65–100)
HCO3 BLDA-SCNC: 32 MMOL/L (ref 22–26)
METHGB MFR BLD: 0.2 % (ref 0–1.4)
OXYHGB MFR BLD: 96.2 % (ref 95–99)
PCO2 BLDA: 51 MMHG (ref 35–45)
PERFORMED BY, TECHID: ABNORMAL
PH BLDA: 7.41 [PH] (ref 7.35–7.45)
PO2 BLDA: 95 MMHG (ref 80–100)
POTASSIUM SERPL-SCNC: 5 MMOL/L (ref 3.5–5.1)
PROT SERPL-MCNC: 5.3 G/DL (ref 6.4–8.2)
SAO2 % BLD: 97 % (ref 95–99)
SAO2% DEVICE SAO2% SENSOR NAME: ABNORMAL
SODIUM SERPL-SCNC: 139 MMOL/L (ref 136–145)
SPECIMEN SITE: ABNORMAL

## 2022-11-30 PROCEDURE — 74011250636 HC RX REV CODE- 250/636: Performed by: INTERNAL MEDICINE

## 2022-11-30 PROCEDURE — 80053 COMPREHEN METABOLIC PANEL: CPT

## 2022-11-30 PROCEDURE — 71045 X-RAY EXAM CHEST 1 VIEW: CPT

## 2022-11-30 PROCEDURE — 36415 COLL VENOUS BLD VENIPUNCTURE: CPT

## 2022-11-30 PROCEDURE — 36600 WITHDRAWAL OF ARTERIAL BLOOD: CPT

## 2022-11-30 PROCEDURE — 74011250637 HC RX REV CODE- 250/637: Performed by: HOSPITALIST

## 2022-11-30 PROCEDURE — 74011250637 HC RX REV CODE- 250/637: Performed by: INTERNAL MEDICINE

## 2022-11-30 PROCEDURE — 65610000006 HC RM INTENSIVE CARE

## 2022-11-30 PROCEDURE — 82803 BLOOD GASES ANY COMBINATION: CPT

## 2022-11-30 PROCEDURE — 74011000250 HC RX REV CODE- 250: Performed by: INTERNAL MEDICINE

## 2022-11-30 PROCEDURE — 83880 ASSAY OF NATRIURETIC PEPTIDE: CPT

## 2022-11-30 RX ORDER — LEVOFLOXACIN 5 MG/ML
500 INJECTION, SOLUTION INTRAVENOUS EVERY 24 HOURS
Status: DISCONTINUED | OUTPATIENT
Start: 2022-11-30 | End: 2022-11-30

## 2022-11-30 RX ORDER — DILTIAZEM HYDROCHLORIDE 5 MG/ML
5 INJECTION INTRAVENOUS ONCE
Status: DISCONTINUED | OUTPATIENT
Start: 2022-11-30 | End: 2022-11-30

## 2022-11-30 RX ORDER — DILTIAZEM HYDROCHLORIDE 30 MG/1
30 TABLET, FILM COATED ORAL
Status: DISCONTINUED | OUTPATIENT
Start: 2022-11-30 | End: 2022-12-02

## 2022-11-30 RX ORDER — PREDNISONE 5 MG/1
15 TABLET ORAL
Status: DISCONTINUED | OUTPATIENT
Start: 2022-12-01 | End: 2022-12-02 | Stop reason: HOSPADM

## 2022-11-30 RX ORDER — DEXMEDETOMIDINE HYDROCHLORIDE 4 UG/ML
.1-1.5 INJECTION, SOLUTION INTRAVENOUS
Status: DISCONTINUED | OUTPATIENT
Start: 2022-11-30 | End: 2022-12-02 | Stop reason: HOSPADM

## 2022-11-30 RX ORDER — DIPHENHYDRAMINE HYDROCHLORIDE 50 MG/ML
25 INJECTION, SOLUTION INTRAMUSCULAR; INTRAVENOUS ONCE
Status: ACTIVE | OUTPATIENT
Start: 2022-11-30 | End: 2022-11-30

## 2022-11-30 RX ORDER — LANOLIN ALCOHOL/MO/W.PET/CERES
3 CREAM (GRAM) TOPICAL EVERY EVENING
Status: DISCONTINUED | OUTPATIENT
Start: 2022-11-30 | End: 2022-12-02 | Stop reason: HOSPADM

## 2022-11-30 RX ORDER — LEVOFLOXACIN 5 MG/ML
750 INJECTION, SOLUTION INTRAVENOUS
Status: DISCONTINUED | OUTPATIENT
Start: 2022-11-30 | End: 2022-12-02 | Stop reason: HOSPADM

## 2022-11-30 RX ORDER — LANOLIN ALCOHOL/MO/W.PET/CERES
3 CREAM (GRAM) TOPICAL
Status: DISCONTINUED | OUTPATIENT
Start: 2022-11-30 | End: 2022-11-30

## 2022-11-30 RX ADMIN — DILTIAZEM HYDROCHLORIDE 30 MG: 30 TABLET, FILM COATED ORAL at 17:52

## 2022-11-30 RX ADMIN — LEVOFLOXACIN 750 MG: 5 INJECTION, SOLUTION INTRAVENOUS at 05:38

## 2022-11-30 RX ADMIN — QUETIAPINE FUMARATE 25 MG: 25 TABLET ORAL at 17:52

## 2022-11-30 RX ADMIN — MELATONIN TAB 3 MG 3 MG: 3 TAB at 17:52

## 2022-11-30 RX ADMIN — FINASTERIDE 5 MG: 5 TABLET, FILM COATED ORAL at 09:39

## 2022-11-30 RX ADMIN — AMIODARONE HYDROCHLORIDE 200 MG: 200 TABLET ORAL at 09:39

## 2022-11-30 RX ADMIN — ASPIRIN 81 MG 81 MG: 81 TABLET ORAL at 09:39

## 2022-11-30 RX ADMIN — METOPROLOL TARTRATE 25 MG: 25 TABLET, FILM COATED ORAL at 09:39

## 2022-11-30 RX ADMIN — DEXMEDETOMIDINE HYDROCHLORIDE 0.2 MCG/KG/HR: 4 INJECTION, SOLUTION INTRAVENOUS at 05:15

## 2022-11-30 RX ADMIN — DEXMEDETOMIDINE HYDROCHLORIDE 0.1 MCG/KG/HR: 4 INJECTION, SOLUTION INTRAVENOUS at 06:08

## 2022-11-30 RX ADMIN — DEXMEDETOMIDINE HYDROCHLORIDE 0.4 MCG/KG/HR: 4 INJECTION, SOLUTION INTRAVENOUS at 03:33

## 2022-11-30 NOTE — PROGRESS NOTES
Attempted to notify family Lewis Jessica) of the current situation: patient refuses to keep oxygen on and is sustaining in the 70s to 80s  without oxygen. I did not reach anyone. Dr. Josseline Turner spoke with the nursing manager and the decision was made to transfer patient to ICU, room 262.

## 2022-11-30 NOTE — PROGRESS NOTES
Patient is resting in bed, not agitated, but continue to refuses nasal cannula. Patient has been desaturating to SpO2 of 70s on R. A.. I do not think Geodon and haldol appropriate for now, given he is not agitated and that will not sedate him enough. An option is to sedate him more with precedex drip and put him on supplementary oxygen, however that can only be safely done in ICU. Will attempt to reach out to family to see if they want to escalate care with ICU and more sedation. Also need to clarify patient's adverse reaction to seroquel, as that would be most appropriate for delirium in hospital.     Unable to reach family members. Discussed case with nurse supervisor: As patient is not in comfort care, will escalate care as appropriate. Will transfer to ICU, place on oxygen supplement, non-rebreather or BiPAP, and start precedex ggt.      Daniel Mendez MD  Internal Medicine  2:29 AM   11/30/22

## 2022-11-30 NOTE — PROGRESS NOTES
Patient's skin is appropriate for ethnicity. It is clean, dry, and intact. Patient has a skin tear/laceration on his left hand. It has xeroform, 4x4, and kerlix on. Patient continues to pull of the dressing. No other wounds to note.

## 2022-11-30 NOTE — PROGRESS NOTES
Hospitalist Progress Note         Britni Anne MD          Daily Progress Note: 11/30/2022      Subjective: The patient is seen for follow  up. 81 yo male, hx of a-fib with RVR, CHF, COPD, emphysema, HTN, here s/p fall out of her wheelchair on 11/23. In the ER was found to be in a-fib with RVR and hypotensive. Amiodarone drip was started but no resolution in a-fib as of yet. Currently, pt is on Amiodarone 200 mg daily, ASA, and Diltiazem, as well as long-acting Cardizem. Was given Geoden on 11/27 for agitation. --  The patient is seen for follow  up. He is asleep in bed. Transferred to the intensive care unit last night due to agitation and hypoxemia. He was started on Precedex drip. Off Precedex at this time and has mitts on both hands.     Problem List:  Problem List as of 11/30/2022 Date Reviewed: 11/24/2022            Codes Class Noted - Resolved    Atrial fibrillation with RVR ([facility] ICD-10-CM: I48.91  ICD-9-CM: 427.31  11/24/2022 - Present        Paroxysmal atrial fibrillation with rapid ventricular response ([facility] ICD-10-CM: I48.0  ICD-9-CM: 427.31  11/24/2022 - Present        History of home oxygen therapy ICD-10-CM: Z99.81  ICD-9-CM: V46.2  6/5/2022 - Present        CHF (congestive heart failure) (HCC) ICD-10-CM: I50.9  ICD-9-CM: 428.0  6/5/2022 - Present        Acute bronchitis ICD-10-CM: J20.9  ICD-9-CM: 466.0  3/9/2022 - Present        COPD with acute exacerbation ([facility] ICD-10-CM: J44.1  ICD-9-CM: 491.21  3/6/2022 - Present        Elevated troponin ICD-10-CM: R77.8  ICD-9-CM: 790.6  2/20/2022 - Present        Respiratory failure (HCC) ICD-10-CM: J96.90  ICD-9-CM: 518.81  1/2/2022 - Present         Medications reviewed  Current Facility-Administered Medications   Medication Dose Route Frequency    melatonin tablet 3 mg  3 mg Oral QHS    diphenhydrAMINE (BENADRYL) injection 25 mg  25 mg IntraVENous ONCE    dexmedeTOMidine in 0.9 % NaCl (PRECEDEX) 400 mcg/100 mL (4 mcg/mL) infusion soln  0.1-1.5 mcg/kg/hr IntraVENous TITRATE    levoFLOXacin (LEVAQUIN) 750 mg in D5W IVPB  750 mg IntraVENous Q48H    dilTIAZem IR (CARDIZEM) tablet 30 mg  30 mg Oral AC&HS    QUEtiapine (SEROquel) tablet 25 mg  25 mg Oral BID WITH MEALS    amiodarone (CORDARONE) tablet 200 mg  200 mg Oral DAILY    predniSONE (DELTASONE) tablet 20 mg  20 mg Oral DAILY WITH BREAKFAST    metoprolol tartrate (LOPRESSOR) tablet 25 mg  25 mg Oral Q12H    aspirin chewable tablet 81 mg  81 mg Oral DAILY    cholecalciferol (VITAMIN D3) (1000 Units /25 mcg) tablet 2,000 Units  2,000 Units Oral DAILY    budesonide-formoteroL (SYMBICORT) 160-4.5 mcg/actuation HFA inhaler 2 Puff  2 Puff Inhalation BID    albuterol (PROVENTIL HFA, VENTOLIN HFA, PROAIR HFA) inhaler 2 Puff  2 Puff Inhalation Q6H PRN    finasteride (PROSCAR) tablet 5 mg  5 mg Oral DAILY    senna (SENOKOT) tablet 8.6 mg  1 Tablet Oral DAILY    sodium chloride (NS) flush 5-40 mL  5-40 mL IntraVENous PRN    acetaminophen (TYLENOL) solution 650 mg  650 mg Oral Q4H PRN    sodium chloride (NS) flush 5-40 mL  5-40 mL IntraVENous PRN       Review of Systems:   Review of systems not obtained due to patient factors. Review of systems not obtained due to patient factors. Not required  Objective:   Physical Exam:     Visit Vitals  BP (!) 74/46   Pulse (!) 112   Temp 97.1 °F (36.2 °C)   Resp 20   Ht 5' 6\" (1.676 m)   Wt 76.2 kg (168 lb 1.6 oz)   SpO2 (!) 70%   BMI 27.13 kg/m²    O2 Flow Rate (L/min): (S) 4 l/min (titrated down from 4lpm/nc) O2 Device: Nasal cannula    Temp (24hrs), Av.2 °F (36.2 °C), Min:96.4 °F (35.8 °C), Max:97.7 °F (36.5 °C)    No intake/output data recorded. [id number] -  0700  In: -   Out: 950 [Urine:950]    General:  Arousable but confused   Lungs:   Clear to auscultation bilaterally. Chest wall:  No tenderness or deformity. Heart:  IRRegular rate and rhythm, S1, S2 normal, no murmur, click, rub or gallop. Abdomen:   Soft, non-tender. Bowel sounds normal. No masses,  No organomegaly. Extremities: Extremities normal, atraumatic, no cyanosis or edema. Pulses: 2+ and symmetric all extremities. Skin: Skin color, texture, turgor normal. No rashes or lesions   Neurologic: CNII-XII intact. No gross sensory or motor deficits     Data Review:       Recent Days:  Recent Labs     11/28/22  0654 11/27/22  1224   WBC 9.6 9.9   HGB 12.7 12.0*   HCT 40.3 38.6    226       Recent Labs     11/30/22  0606 11/28/22  0654 11/27/22  1224    137 136   K 5.0 5.2* 4.8    101 103   CO2 29 31 26   * 115* 113*   BUN 39* 45* 42*   CREA 1.14 1.54* 1.36*   CA 8.1* 8.6 8.2*   ALB 2.4*  --   --    TBILI 0.7  --   --    ALT 42  --   --        Recent Labs     11/30/22  0607   PH 7.41   PCO2 51*   PO2 95   HCO3 32*   FIO2 36.0       24 Hour Results:  Recent Results (from the past 24 hour(s))   NT-PRO BNP    Collection Time: 11/30/22  6:06 AM   Result Value Ref Range    NT pro-BNP 4,628 (H) <406 pg/mL   METABOLIC PANEL, COMPREHENSIVE    Collection Time: 11/30/22  6:06 AM   Result Value Ref Range    Sodium 139 136 - 145 mmol/L    Potassium 5.0 3.5 - 5.1 mmol/L    Chloride 105 97 - 108 mmol/L    CO2 29 21 - 32 mmol/L    Anion gap 5 5 - 15 mmol/L    Glucose 108 (H) 65 - 100 mg/dL    BUN 39 (H) 6 - 20 mg/dL    Creatinine 1.14 0.70 - 1.30 mg/dL    BUN/Creatinine ratio 34 (H) 12 - 20      eGFR 60 (L) >60 ml/min/1.73m2    Calcium 8.1 (L) 8.5 - 10.1 mg/dL    Bilirubin, total 0.7 0.2 - 1.0 mg/dL    AST (SGOT) 31 15 - 37 U/L    ALT (SGPT) 42 12 - 78 U/L    Alk.  phosphatase 54 45 - 117 U/L    Protein, total 5.3 (L) 6.4 - 8.2 g/dL    Albumin 2.4 (L) 3.5 - 5.0 g/dL    Globulin 2.9 2.0 - 4.0 g/dL    A-G Ratio 0.8 (L) 1.1 - 2.2     BLOOD GAS, ARTERIAL    Collection Time: 11/30/22  6:07 AM   Result Value Ref Range    pH 7.41 7.35 - 7.45      PCO2 51 (H) 35 - 45 mmHg    PO2 95 80 - 100 mmHg    O2 SATURATION 97 95 - 99 %    BICARBONATE 32 (H) 22 - 26 mmol/L    BASE EXCESS 5.1 (H) 0 - 3 mmol/L    O2 METHOD Nasal Cannula      O2 FLOW RATE 4.00 L/min    FIO2 36.0 %    Sample source Arterial      SITE Right Radial      OLIVIA'S TEST YES      Carboxy-Hgb 0.7 (L) 1 - 2 %    Methemoglobin 0.2 0 - 1.4 %    Oxyhemoglobin 96.2 95 - 99 %    Performed by Cristina Or     TEMPERATURE 96.4             Assessment/     Atrial fibrillation with rapid ventricular response    -Rate controlled on oral amiodarone, Cardizem and Lopressor    -Consider low-dose Eliquis for stroke prevention. Patient at risk for falls    COPD on 3 L home O2   -Appears stable    Klebsiella aerogenes bacteremia    -Continue Levaquin through December 2    Dementia with behavioral disturbance    -On Seroquel 25 mg oral twice daily    -No prior history of dementia per daughter. This might be delirium    History of prostate cancer    -No clear details    Acute kidney injury    -Improving    Hypotension    -Likely secondary to polypharmacy    -Monitor BP closely      Plan:  Continued supportive care  Anticipate transfer to telemetry floor when vutals remain stable  Updated daughter Tor Relic on overall clinicals. Care Plan discussed with: Nurse    Total time spent with patient: 30 minutes.     Romulo Gutierrez MD

## 2022-11-30 NOTE — PROGRESS NOTES
Problem: Falls - Risk of  Goal: *Absence of Falls  Description: Document Grace Gamma Fall Risk and appropriate interventions in the flowsheet.   Outcome: Progressing Towards Goal  Note: Fall Risk Interventions:  Mobility Interventions: Bed/chair exit alarm    Mentation Interventions: Adequate sleep, hydration, pain control, Bed/chair exit alarm    Medication Interventions: Bed/chair exit alarm    Elimination Interventions: Bed/chair exit alarm, Call light in reach    History of Falls Interventions: Bed/chair exit alarm         Problem: Patient Education: Go to Patient Education Activity  Goal: Patient/Family Education  Outcome: Progressing Towards Goal     Problem: Patient Education: Go to Patient Education Activity  Goal: Patient/Family Education  Outcome: Progressing Towards Goal     Problem: Patient Education: Go to Patient Education Activity  Goal: Patient/Family Education  Outcome: Progressing Towards Goal

## 2022-11-30 NOTE — PROGRESS NOTES
Progress Note    Patient: Rik Donovan MRN: [id number]  SSN: [id number]    YOB: 1929  Age: 80 y.o. Sex: male      Admit Date: 11/23/2022    LOS: 6 days     Subjective:     No acute events overnight. Objective:     Vitals:    11/30/22 1000 11/30/22 1100 11/30/22 1200 11/30/22 1300   BP: 103/77 96/70 91/64 90/70   Pulse: (!) 134 (!) 118 (!) 108 89   Resp: 23 24 21 23   Temp:  97.5 °F (36.4 °C)     SpO2:  100%  100%   Weight:       Height:            Intake and Output:  Current Shift: No intake/output data recorded. Last three shifts: 11/28 1901 - 11/30 0700  In: 19.2 [I.V.:19.2]  Out: 950 [Urine:950]    Physical Exam:   General:  Alert, cooperative, no distress, appears stated age. Eyes:  Conjunctivae/corneas clear. PERRL, EOMs intact. Fundi benign   Ears:  Normal TMs and external ear canals both ears. Nose: Nares normal. Septum midline. Mucosa normal. No drainage or sinus tenderness. Mouth/Throat: Lips, mucosa, and tongue normal. Teeth and gums normal.   Neck: Supple, symmetrical, trachea midline, no adenopathy, thyroid: no enlargment/tenderness/nodules, no carotid bruit and no JVD. Back:   Symmetric, no curvature. ROM normal. No CVA tenderness. Lungs:   Clear to auscultation bilaterally. Heart:  Irregular, variable S1, tachycardic   Abdomen:   Soft, non-tender. Bowel sounds normal. No masses,  No organomegaly. Extremities: Extremities normal, atraumatic, no cyanosis or edema. Pulses: 2+ and symmetric all extremities. Skin: Skin color, texture, turgor normal. No rashes or lesions   Lymph nodes: Cervical, supraclavicular, and axillary nodes normal.   Neurologic: CNII-XII intact. Normal strength, sensation and reflexes throughout. Lab/Data Review: All lab results for the last 24 hours reviewed.          Assessment:     Active Problems:    Atrial fibrillation with RVR (HCC) (11/24/2022)      Paroxysmal atrial fibrillation with rapid ventricular response (HCC) (11/24/2022)  Patient is a 80-year-old white male with:  1. Chronic atrial fibrillation  2. Hypotension  3. History of hypertension  4. Bronchial asthma  5. Prostate cancer   6. COPD  7. Dementia  8. Hard of hearing  9. Blind  910.  10.  UTI  11. Hyponatremia  12. Acute kidney injury  13. Mild rhabdomyolysis  14. Thoracolumbar scoliosis  15. Moderate tricuspid regurgitation    Plan:     Patient was transferred to the ICU due to agitation and hypoxemia. He was started on Precedex but this was taken off due to her hypotension. Patient has been refusing to take his medications. Okay to transfer out of the ICU from cardiac standpoint anyhow probably should consider hospice care.   Signed By: Regine Alexis MD     November 30, 2022

## 2022-11-30 NOTE — PROGRESS NOTES
0900   Pt is combative and verbally abusive while attempting to assess for the third time this morning. Pt has continuously taken nasal canula off. Sitter is present and assists this nurse in putting pt's O2 back on. Oxygen is back on , pt is 90 % on 4 L NC. This nurse sat in pts room with sitter until pt calmed down and fell back to sleep. 1015  Pt has refused to take his morning meds. This nurse tried pills in applesauce because pt likes it, however he spit them out. Spoke with pts daughter on phone, informed daughter that pt was combative and confused this morning and refusing meds. 1230   Pt sleeping in bed at this time. Pt is 94% on 4L NC. No acute distress noted, will continue to monitor.

## 2022-11-30 NOTE — PROGRESS NOTES
Problem: Falls - Risk of  Goal: *Absence of Falls  Description: Document Jordyn Shaw Fall Risk and appropriate interventions in the flowsheet. Outcome: Progressing Towards Goal  Note: Fall Risk Interventions:  Mobility Interventions: Bed/chair exit alarm    Mentation Interventions: Adequate sleep, hydration, pain control    Medication Interventions: Bed/chair exit alarm    Elimination Interventions: Bed/chair exit alarm    History of Falls Interventions: Bed/chair exit alarm         Problem: Patient Education: Go to Patient Education Activity  Goal: Patient/Family Education  Outcome: Progressing Towards Goal     Problem: Patient Education: Go to Patient Education Activity  Goal: Patient/Family Education  Outcome: Progressing Towards Goal     Problem: Pressure Injury - Risk of  Goal: *Prevention of pressure injury  Description: Document Demarcus Scale and appropriate interventions in the flowsheet.   Outcome: Progressing Towards Goal  Note: Pressure Injury Interventions:  Sensory Interventions: Assess changes in LOC    Moisture Interventions: Absorbent underpads    Activity Interventions: PT/OT evaluation    Mobility Interventions: PT/OT evaluation    Nutrition Interventions: Document food/fluid/supplement intake, Discuss nutritional consult with provider, Offer support with meals,snacks and hydration    Friction and Shear Interventions: Apply protective barrier, creams and emollients, HOB 30 degrees or less

## 2022-11-30 NOTE — PROGRESS NOTES
Multiple attempts have been made to keep the patient's oxygen on his face. Without oxygen his oxygen stats are sustaining in the 70's and 80's. He has been repositioned multiple times. Multiple attempts have been made to put the oxygen back on his face. The patient refuses to keep it on. I notified Dr. Megan Dimas about the patient's current status. He said he will come see him.

## 2022-11-30 NOTE — PROGRESS NOTES
Patient currently on OT caseload, however patient transferred to ICU from [facility] due to medical decline. Per verbal order from Dr. Joseph Ribera, hold and discontinue OT order at this time. Will need new OT evaluation order once pt medically stable for evaluation. Thank you.

## 2022-11-30 NOTE — PROGRESS NOTES
CM reviewed Pt medicals, as of this time he will return to [facility]. Attempted to contact María from [facility] at [phone], ext [phone], left a VM.

## 2022-11-30 NOTE — H&P
Patient agitated, not cooperative with supplementary oxygen. Patient received Geodon at 4am this morning and has reportedly been somnolent throughout the day. Will try Geodon again now, hopefully keep patient calm tonight but not drowsy during the day. Patient has been refusing his medications today, including Seroquel. Family also reports patient had side effects with Seroquel previously, \"makes him crazy\", per nursing staff.  Need to discuss goals of care with family, breanna if patient does not desire medical intervention, defer to day team.      Felipa Larson MD  Internal Medicine  7:47 PM   11/29/22

## 2022-11-30 NOTE — PROGRESS NOTES
Pulmonary/ CC progress note    Subjective:   Date of Consultation:  November 30, 2022  Referring Physician: Edi Johns MD    Subjective    Patient seen and examined in his room. Apparently patient was transferred over to the ICU last evening because of persistent agitation. He was started on Precedex but his blood pressure dropped. Currently nursing staff at the bedside. Care provider Alyson Blessing is also at the bedside. The patient is now sleeping soundly. On 4 L oxygen. No distress. Actually hypotensive. He is a DNR. Please see discussion below. Prior to Admission medications    Medication Sig Start Date End Date Taking? Authorizing Provider   finasteride (PROSCAR) 5 mg tablet Take 5 mg by mouth in the morning. Other, MD Yeny   furosemide (LASIX) 80 mg tablet Take  by mouth daily. Other, MD Yeny   sennosides (Senna) 8.6 mg cap Take  by mouth. Other, MD Yeny   guaiFENesin (ORGANIDIN) 200 mg tablet Take 200 mg by mouth every four (4) hours as needed for Congestion. Yeny Sharif MD   cholecalciferol (VITAMIN D3) (1000 Units /25 mcg) tablet Take 2 Tablets by mouth daily. 3/9/22   Deejay Jaramillo PA-C   budesonide-formoteroL (Symbicort) 160-4.5 mcg/actuation HFAA Take 2 Puffs by inhalation two (2) times a day. 3/9/22   Deejay Jaramillo PA-C   albuterol (ProAir HFA) 90 mcg/actuation inhaler Take 2 Puffs by inhalation every six (6) hours as needed for Wheezing or Shortness of Breath. 3/9/22   Deejay Jaramillo PA-C   aspirin 81 mg chewable tablet Take 1 Tablet by mouth daily. 2/22/22   Megan Quispe MD   QUEtiapine (SEROquel) 25 mg tablet Take 0.5 Tablets by mouth nightly. 2/22/22   Megan Quispe MD     Allergies   Allergen Reactions    Gabapentin Unknown (comments)        Review of Systems:  Review of systems not obtained due to patient factors. Objective:   Blood pressure 96/70, pulse (!) 118, temperature 97.1 °F (36.2 °C), resp.  rate 24, height 5' 6\" (1.676 m), weight 76.2 kg (168 lb 1.6 oz), SpO2 100 %. Temp (24hrs), Av.2 °F (36.2 °C), Min:96.4 °F (35.8 °C), Max:97.7 °F (36.5 °C)    XR CHEST PORT   Final Result      Stable appearance of the chest         XR CHEST PORT   Final Result      Diffuse interstitial prominence again noted without evidence for focal lung   consolidation. Hypoinflated lungs with probable bibasilar atelectasis. CT HEAD WO CONT   Final Result   1. No evidence of acute intracranial abnormality by this modality.                 Data Review: CBC:   Recent Labs     22  0654 22  1224   WBC 9.6 9.9   RBC 3.86* 3.71*   HGB 12.7 12.0*   HCT 40.3 38.6    226       CMP:   Recent Labs     22  0606 22  0654 22  1224   * 115* 113*    137 136   K 5.0 5.2* 4.8    101 103   CO2 29 31 26   BUN 39* 45* 42*   CREA 1.14 1.54* 1.36*   CA 8.1* 8.6 8.2*   AGAP 5 5 7   BUCR 34* 29* 31*   AP 54  --   --    TP 5.3*  --   --    ALB 2.4*  --   --    GLOB 2.9  --   --    AGRAT 0.8*  --   --        Liver Enzymes:   Recent Labs     22  0606   TP 5.3*   ALB 2.4*   AP 54       ABGs:   Recent Labs     22  0607   PH 7.41   PCO2 51*   PO2 95   HCO3 32*     Current Facility-Administered Medications   Medication Dose Route Frequency    melatonin tablet 3 mg  3 mg Oral QHS    diphenhydrAMINE (BENADRYL) injection 25 mg  25 mg IntraVENous ONCE    dexmedeTOMidine in 0.9 % NaCl (PRECEDEX) 400 mcg/100 mL (4 mcg/mL) infusion soln  0.1-1.5 mcg/kg/hr IntraVENous TITRATE    levoFLOXacin (LEVAQUIN) 750 mg in D5W IVPB  750 mg IntraVENous Q48H    dilTIAZem IR (CARDIZEM) tablet 30 mg  30 mg Oral AC&HS    QUEtiapine (SEROquel) tablet 25 mg  25 mg Oral BID WITH MEALS    amiodarone (CORDARONE) tablet 200 mg  200 mg Oral DAILY    predniSONE (DELTASONE) tablet 20 mg  20 mg Oral DAILY WITH BREAKFAST    metoprolol tartrate (LOPRESSOR) tablet 25 mg  25 mg Oral Q12H    aspirin chewable tablet 81 mg  81 mg Oral DAILY cholecalciferol (VITAMIN D3) (1000 Units /25 mcg) tablet 2,000 Units  2,000 Units Oral DAILY    budesonide-formoteroL (SYMBICORT) 160-4.5 mcg/actuation HFA inhaler 2 Puff  2 Puff Inhalation BID    albuterol (PROVENTIL HFA, VENTOLIN HFA, PROAIR HFA) inhaler 2 Puff  2 Puff Inhalation Q6H PRN    finasteride (PROSCAR) tablet 5 mg  5 mg Oral DAILY    senna (SENOKOT) tablet 8.6 mg  1 Tablet Oral DAILY    sodium chloride (NS) flush 5-40 mL  5-40 mL IntraVENous PRN    acetaminophen (TYLENOL) solution 650 mg  650 mg Oral Q4H PRN    sodium chloride (NS) flush 5-40 mL  5-40 mL IntraVENous PRN        Exam:    This is an elderly male who has poor vision. He is now sleeping. He is on 4 L of oxygen. JVD is absent. No cervical lymphadenopathy. Thyroid not enlarged  He has scattered rhonchi. He also appears to have some minor retained upper airway secretions. Heart: S1-S2 irregular. Abdomen: Soft, nontender, no visceromegaly  Extremities: Trace edema, sinus or clubbing  Neuro: No focal motor deficit. Impression:   80years old  male seen in assisted living facility with supervised care. He has known history of CHF, advanced COPD with central lobular emphysema, hypertension was brought to the emergency department after a fall from wheelchair. It resulted in injury on the left side of the his head. CT scan of head did not show any new intracranial pathology. It was not clear if he lost his consciousness. Patient has history of dementia. Apparently he is on oxygen in the facility. ER he was found to be in atrial fibrillation with rapid ventricular response. He was also noted to have upper respiratory tract congestion. Plan:   COPD with exacerbation  Patient has history of advanced COPD with emphysema. He is on nasal cannula oxygen at home, 3 L/min. He is at risk for aspiration. He is ordered a modified diet but his appetite is poor.       Continue with Symbicort 160/4.5 I doubt that he is able to use it properly. Continue with nebulized bronchodilator treatments if he allows it. On p.o. prednisone and Levaquin. He had Klebsiella aeruginosa bacteremia. Source is not clear, but possible pneumonia. Will decrease prednisone dose. Patient is currently on 4 L oxygen. Saturation in the high 90% range. Blood gases done on 4 L showing 7.4 1/51/95. Chest x-ray personally reviewed. He has bibasilar atelectasis versus infiltrates. We will treat him as aspiration pneumonia. 2. Atrial fibrillation with rapid ventricular response: Follow-up echocardiogram, on amiodarone and diltiazem. 3.  History of prostate cancer. Details are not clear. Also acute kidney injury, improving. He has relative hypotension. Monitoring. 4.  Status post fall from wheelchair. CT scan of head is negative    5. Dementia and delirium. Psych is also on the case. I discussed in detail with a care provider at the bedside, Erik Cornell. She called the daughter Regina Brice on speaker phone. Nurse Shraddha also present in the room. Apparently patient has been quite active up until a few years ago. There is history of patient getting confused in unfamiliar environment. He may have underlying dementia but they are not aware of it. When the care provider is present he is calm. She fed him also without any problem yesterday. Discussed with the nursing staff in detail. He is DNR. Prognosis appears to be poor. Thank you for involving me in the care of this patient  I will follow with you closely during hospitalization    Time spent more than 30 minutes under patient care with reviewing results and records, decision making, and answering questions.     Ronita Hamman MD  [facility]

## 2022-11-30 NOTE — PROGRESS NOTES
Patient currently on PT/OT caseload, however patient transferred to ICU from 4W due to medical decline. Per verbal order/ perfect serve from Dr. Radha Alejandro, hold and discontinue PT/OT order at this time. Will need new PT/OT evaluation order once pt medically stable for re-assessment. Thank you.

## 2022-11-30 NOTE — PROGRESS NOTES
Pt  awake, combative, cursing at staff and trying to hit staff. Pt has taken oxygen off again and has torn his tele monitor leads off. With help of staff, oxygen and tele leads were replaced on pt. Dr. Franky Betts was paged at this time, New Order: Geodon 10mg IM x 1 time now.

## 2022-11-30 NOTE — PROGRESS NOTES
Report was called to ICU Nurse Jatin Storey. Patient was transferred by stretcher with oxygen, defibrillator. Telemetry box was removed upon arrival to ICU room 262.

## 2022-11-30 NOTE — PROGRESS NOTES
Upon coming on shift, patient is aggressive, combative, and verbally abusing staff. Patient is refusing to wear his oxygen at this time. Dr. Juliano Shaw responds to assist and assess the patient. Dr. Juliano Shaw orders 10mg of ziprasidone. It is given in his left thigh, with the assistance of other staff members. At this time, we also replaced the patient's oxygen tube in his nose. We will monitor the patient throughout the night.

## 2022-11-30 NOTE — PROGRESS NOTES
Hospitalist Progress Note         Roxann Rodriguez          Daily Progress Note: 11/30/2022      Subjective: The patient is seen for follow  up. 81 yo male, hx of a-fib with RVR, CHF, COPD, emphysema, HTN, here s/p fall out of her wheelchair on 11/23. In the ER was found to be in a-fib with RVR and hypotensive. Amiodarone drip was started but no resolution in a-fib as of yet. Currently, pt is on Amiodarone 200 mg daily, ASA, and Diltiazem, as well as long-acting Cardizem. Was given Geoden on 11/27 for agitation. --  The patient is seen for follow up. He was recently transferred to the ICU and is asleep in bed.     NT PRO-BNP 4,628  Hypocalcemia 8.1  Hypoalbuminemia 2.4    PCO2 51    BP 74/46 hypotensive    Problem List:  Problem List as of 11/30/2022 Date Reviewed: 11/24/2022            Codes Class Noted - Resolved    Atrial fibrillation with RVR ([facility] [address] ICD-10-CM: I48.91  ICD-9-CM: 427.31  11/24/2022 - Present        Paroxysmal atrial fibrillation with rapid ventricular response ([facility] [address] ICD-10-CM: I48.0  ICD-9-CM: 427.31  11/24/2022 - Present        History of home oxygen therapy ICD-10-CM: Z99.81  ICD-9-CM: V46.2  6/5/2022 - Present        CHF (congestive heart failure) (HCC) ICD-10-CM: I50.9  ICD-9-CM: 428.0  6/5/2022 - Present        Acute bronchitis ICD-10-CM: J20.9  ICD-9-CM: 466.0  3/9/2022 - Present        COPD with acute exacerbation ([facility] [address] ICD-10-CM: J44.1  ICD-9-CM: 491.21  3/6/2022 - Present        Elevated troponin ICD-10-CM: R77.8  ICD-9-CM: 790.6  2/20/2022 - Present        Respiratory failure (HCC) ICD-10-CM: J96.90  ICD-9-CM: 518.81  1/2/2022 - Present         Medications reviewed  Current Facility-Administered Medications   Medication Dose Route Frequency    melatonin tablet 3 mg  3 mg Oral QHS    diphenhydrAMINE (BENADRYL) injection 25 mg  25 mg IntraVENous ONCE    dexmedeTOMidine in 0.9 % NaCl (PRECEDEX) 400 mcg/100 mL (4 mcg/mL) infusion soln  0.1-1.5 mcg/kg/hr IntraVENous TITRATE    levoFLOXacin (LEVAQUIN) 750 mg in D5W IVPB  750 mg IntraVENous Q48H    QUEtiapine (SEROquel) tablet 25 mg  25 mg Oral BID WITH MEALS    dilTIAZem ER (CARDIZEM CD) capsule 120 mg  120 mg Oral DAILY    amiodarone (CORDARONE) tablet 200 mg  200 mg Oral DAILY    predniSONE (DELTASONE) tablet 20 mg  20 mg Oral DAILY WITH BREAKFAST    metoprolol tartrate (LOPRESSOR) tablet 25 mg  25 mg Oral Q12H    aspirin chewable tablet 81 mg  81 mg Oral DAILY    cholecalciferol (VITAMIN D3) (1000 Units /25 mcg) tablet 2,000 Units  2,000 Units Oral DAILY    budesonide-formoteroL (SYMBICORT) 160-4.5 mcg/actuation HFA inhaler 2 Puff  2 Puff Inhalation BID    albuterol (PROVENTIL HFA, VENTOLIN HFA, PROAIR HFA) inhaler 2 Puff  2 Puff Inhalation Q6H PRN    finasteride (PROSCAR) tablet 5 mg  5 mg Oral DAILY    senna (SENOKOT) tablet 8.6 mg  1 Tablet Oral DAILY    sodium chloride (NS) flush 5-40 mL  5-40 mL IntraVENous PRN    acetaminophen (TYLENOL) solution 650 mg  650 mg Oral Q4H PRN    sodium chloride (NS) flush 5-40 mL  5-40 mL IntraVENous PRN       Review of Systems:   Review of systems not obtained due to patient factors. Review of systems not obtained due to patient factors. Not required  Objective:   Physical Exam:     Visit Vitals  BP (!) 74/46   Pulse (!) 112   Temp 97.7 °F (36.5 °C)   Resp 20   Ht 5' 6\" (1.676 m)   Wt 76.2 kg (168 lb 1.6 oz)   SpO2 (!) 70%   BMI 27.13 kg/m²    O2 Flow Rate (L/min): (S) 4 l/min (titrated down from 4lpm/nc) O2 Device: Nasal cannula    Temp (24hrs), Av °F (36.1 °C), Min:96.4 °F (35.8 °C), Max:97.7 °F (36.5 °C)    No intake/output data recorded.  1901 -  0700  In: -   Out: 950 [Urine:950]    General:  Arousable but confused   Lungs:   Clear to auscultation bilaterally. Chest wall:  No tenderness or deformity. Heart:  Regular rate and rhythm, S1, S2 normal, no murmur, click, rub or gallop. Abdomen:   Soft, non-tender.  Bowel sounds normal. No masses,  No organomegaly. Extremities: Extremities normal, atraumatic, no cyanosis or edema. Pulses: 2+ and symmetric all extremities. Skin: Skin color, texture, turgor normal. No rashes or lesions   Neurologic: CNII-XII intact. No gross sensory or motor deficits     Data Review:       Recent Days:  Recent Labs     11/28/22  0654 11/27/22  1224   WBC 9.6 9.9   HGB 12.7 12.0*   HCT 40.3 38.6    226       Recent Labs     11/30/22  0606 11/28/22  0654 11/27/22  1224    137 136   K 5.0 5.2* 4.8    101 103   CO2 29 31 26   * 115* 113*   BUN 39* 45* 42*   CREA 1.14 1.54* 1.36*   CA 8.1* 8.6 8.2*   ALB 2.4*  --   --    TBILI 0.7  --   --    ALT 42  --   --        Recent Labs     11/30/22  0607   PH 7.41   PCO2 51*   PO2 95   HCO3 32*   FIO2 36.0       24 Hour Results:  Recent Results (from the past 24 hour(s))   NT-PRO BNP    Collection Time: 11/30/22  6:06 AM   Result Value Ref Range    NT pro-BNP 4,628 (H) <907 pg/mL   METABOLIC PANEL, COMPREHENSIVE    Collection Time: 11/30/22  6:06 AM   Result Value Ref Range    Sodium 139 136 - 145 mmol/L    Potassium 5.0 3.5 - 5.1 mmol/L    Chloride 105 97 - 108 mmol/L    CO2 29 21 - 32 mmol/L    Anion gap 5 5 - 15 mmol/L    Glucose 108 (H) 65 - 100 mg/dL    BUN 39 (H) 6 - 20 mg/dL    Creatinine 1.14 0.70 - 1.30 mg/dL    BUN/Creatinine ratio 34 (H) 12 - 20      eGFR 60 (L) >60 ml/min/1.73m2    Calcium 8.1 (L) 8.5 - 10.1 mg/dL    Bilirubin, total 0.7 0.2 - 1.0 mg/dL    AST (SGOT) 31 15 - 37 U/L    ALT (SGPT) 42 12 - 78 U/L    Alk.  phosphatase 54 45 - 117 U/L    Protein, total 5.3 (L) 6.4 - 8.2 g/dL    Albumin 2.4 (L) 3.5 - 5.0 g/dL    Globulin 2.9 2.0 - 4.0 g/dL    A-G Ratio 0.8 (L) 1.1 - 2.2     BLOOD GAS, ARTERIAL    Collection Time: 11/30/22  6:07 AM   Result Value Ref Range    pH 7.41 7.35 - 7.45      PCO2 51 (H) 35 - 45 mmHg    PO2 95 80 - 100 mmHg    O2 SATURATION 97 95 - 99 %    BICARBONATE 32 (H) 22 - 26 mmol/L    BASE EXCESS 5.1 (H) 0 - 3 mmol/L    O2 METHOD Nasal Cannula      O2 FLOW RATE 4.00 L/min    FIO2 36.0 %    Sample source Arterial      SITE Right Radial      OLIVIA'S TEST YES      Carboxy-Hgb 0.7 (L) 1 - 2 %    Methemoglobin 0.2 0 - 1.4 %    Oxyhemoglobin 96.2 95 - 99 %    Performed by Yurpynedra Louis     TEMPERATURE 96.4         Assessment/     Atrial fibrillation with rapid ventricular response    -Rate controlled on oral amiodarone, Cardizem and Lopressor    -Consider low-dose Eliquis for stroke prevention. Patient at risk for falls    COPD on 3 L home O2   -Appears stable    Klebsiella aerogenes bacteremia    -Continue Levaquin through December 2    Dementia with behavioral disturbance    -On Seroquel 25 mg oral twice daily    Plan:  Continued supportive care  Monitor ABG levels    Care Plan discussed with: Nurse    Total time spent with patient: 30 minutes.     Bismark Draper

## 2022-11-30 NOTE — PROGRESS NOTES
Pulmonary/ CC progress note    Subjective:   Date of Consultation:  November 29, 2022  Referring Physician: Chung Gonzalez MD    Subjective    Patient seen and examined in his room. Nursing staff and aids at the bedside. He is becoming very agitated. He will not allow me to examine him or touch him. He ripped off his leads and nasal cannula. Unable to get a pulse ox on him. RN informs me that he is not to get Geodon anymore because he remains very sleepy. However he appears to have underlying significant dementia and I would rather give him another dose of Geodon. It is noted that he is DNR. Prior to Admission medications    Medication Sig Start Date End Date Taking? Authorizing Provider   finasteride (PROSCAR) 5 mg tablet Take 5 mg by mouth in the morning. Chante, MD Yeny   furosemide (LASIX) 80 mg tablet Take  by mouth daily. Yeny Sharif MD   sennosides (Senna) 8.6 mg cap Take  by mouth. Yeny Sharif MD   guaiFENesin (ORGANIDIN) 200 mg tablet Take 200 mg by mouth every four (4) hours as needed for Congestion. Yeny Sharif MD   cholecalciferol (VITAMIN D3) (1000 Units /25 mcg) tablet Take 2 Tablets by mouth daily. 3/9/22   Yue Bell PA-C   budesonide-formoteroL (Symbicort) 160-4.5 mcg/actuation HFAA Take 2 Puffs by inhalation two (2) times a day. 3/9/22   Yue Bell PA-C   albuterol (ProAir HFA) 90 mcg/actuation inhaler Take 2 Puffs by inhalation every six (6) hours as needed for Wheezing or Shortness of Breath. 3/9/22   Yue Bell PA-C   aspirin 81 mg chewable tablet Take 1 Tablet by mouth daily. 2/22/22   Rosalinda Dixon MD   QUEtiapine (SEROquel) 25 mg tablet Take 0.5 Tablets by mouth nightly. 2/22/22   Rosalinda Dixon MD     Allergies   Allergen Reactions    Gabapentin Unknown (comments)        Review of Systems:  Review of systems not obtained due to patient factors.     Objective:   Blood pressure (!) 129/59, pulse (!) 49, temperature 97.3 °F (36.3 °C), resp. rate 20, height 5' 6\" (1.676 m), weight 76.2 kg (168 lb 1.6 oz), SpO2 (!) 85 %. Temp (24hrs), Av.1 °F (36.2 °C), Min:96.4 °F (35.8 °C), Max:97.4 °F (36.3 °C)    XR CHEST PORT   Final Result      Diffuse interstitial prominence again noted without evidence for focal lung   consolidation. Hypoinflated lungs with probable bibasilar atelectasis. CT HEAD WO CONT   Final Result   1. No evidence of acute intracranial abnormality by this modality. Data Review: CBC:   Recent Labs     22  0654 22  1224   WBC 9.6 9.9   RBC 3.86* 3.71*   HGB 12.7 12.0*   HCT 40.3 38.6    226       CMP:   Recent Labs     22  0654 22  1224   * 113*    136   K 5.2* 4.8    103   CO2 31 26   BUN 45* 42*   CREA 1.54* 1.36*   CA 8.6 8.2*   AGAP 5 7   BUCR 29* 31*       Liver Enzymes:   No results for input(s): TP, ALB, TBIL, AP in the last 72 hours. No lab exists for component: SGOT, GPT, DBIL    ABGs: No results for input(s): PH, PCO2, PO2, HCO3 in the last 72 hours.   Current Facility-Administered Medications   Medication Dose Route Frequency    QUEtiapine (SEROquel) tablet 25 mg  25 mg Oral BID WITH MEALS    ziprasidone (GEODON) 10 mg in sterile water (preservative free) 0.5 mL injection  10 mg IntraMUSCular ONCE    dilTIAZem ER (CARDIZEM CD) capsule 120 mg  120 mg Oral DAILY    amiodarone (CORDARONE) tablet 200 mg  200 mg Oral DAILY    levoFLOXacin (LEVAQUIN) tablet 250 mg  250 mg Oral DAILY    predniSONE (DELTASONE) tablet 20 mg  20 mg Oral DAILY WITH BREAKFAST    metoprolol tartrate (LOPRESSOR) tablet 25 mg  25 mg Oral Q12H    aspirin chewable tablet 81 mg  81 mg Oral DAILY    cholecalciferol (VITAMIN D3) (1000 Units /25 mcg) tablet 2,000 Units  2,000 Units Oral DAILY    budesonide-formoteroL (SYMBICORT) 160-4.5 mcg/actuation HFA inhaler 2 Puff  2 Puff Inhalation BID    albuterol (PROVENTIL HFA, VENTOLIN HFA, PROAIR HFA) inhaler 2 Puff  2 Puff Inhalation Q6H PRN    finasteride (PROSCAR) tablet 5 mg  5 mg Oral DAILY    senna (SENOKOT) tablet 8.6 mg  1 Tablet Oral DAILY    sodium chloride (NS) flush 5-40 mL  5-40 mL IntraVENous PRN    acetaminophen (TYLENOL) solution 650 mg  650 mg Oral Q4H PRN    sodium chloride (NS) flush 5-40 mL  5-40 mL IntraVENous PRN        Exam:    This is an elderly male who has poor vision. He is currently quite agitated. Took himself off oxygen. Has rhonchi audible on auscultation of chest.  JVD is absent. No cervical lymphadenopathy. Thyroid not enlarged  Heart: S1-S2 irregular. Abdomen: Soft, nontender, no visceromegaly  Extremities: No edema, sinus or clubbing  Neuro: No focal motor deficit. Impression:   80years old  male seen in assisted living facility with supervised care. He has known history of CHF, advanced COPD with central lobular emphysema, hypertension was brought to the emergency department after a fall from wheelchair. It resulted in injury on the left side of the his head. CT scan of head did not show any new intracranial pathology. It was not clear if he lost his consciousness. Patient has history of dementia. Apparently he is on oxygen in the facility. ER he was found to be in atrial fibrillation with rapid ventricular response. He was also noted to have upper respiratory tract congestion. Plan:   COPD with exacerbation  Patient has history of advanced COPD with emphysema. He is on nasal cannula oxygen at home. He has underlying advanced dementia. He is at risk for aspiration. He is ordered a modified diet but his appetite is poor. Currently is agitated. He took himself off oxygen. Continue with Symbicort 160/4.5 I doubt that he is able to use it properly. I will start him on nebulized bronchodilator treatments but again he will not allow anything on his face. On p.o. prednisone and Levaquin. 2. atrial fibrillation with rapid ventricular response:   Follow-up echocardiogram, on Cardizem and getting adjusted his metoprolol dosage     3. Patient's chest x-ray has shown chronic changes without any acute infiltrates    4. Status post fall from wheelchair. CT scan of head is negative    5. Dementia: Supportive care  He has periods of significant agitation requiring Geodon. Psych is consulted. Discussed with the nursing staff in detail. He is DNR. Prognosis appears to be poor. Thank you for involving me in the care of this patient  I will follow with you closely during hospitalization    Time spent more than 30 minutes under patient care with reviewing results and records, decision making, and answering questions.     Bernadette Rincon MD  [facility]

## 2022-12-01 ENCOUNTER — HOSPICE ADMISSION (OUTPATIENT)
Dept: HOSPICE | Facility: HOSPICE | Age: 87
End: 2022-12-01

## 2022-12-01 LAB
ANION GAP SERPL CALC-SCNC: 7 MMOL/L (ref 5–15)
BASOPHILS # BLD: 0 K/UL (ref 0–0.1)
BASOPHILS NFR BLD: 0 % (ref 0–1)
BUN SERPL-MCNC: 35 MG/DL (ref 6–20)
BUN/CREAT SERPL: 29 (ref 12–20)
CA-I BLD-MCNC: 8.7 MG/DL (ref 8.5–10.1)
CHLORIDE SERPL-SCNC: 107 MMOL/L (ref 97–108)
CO2 SERPL-SCNC: 28 MMOL/L (ref 21–32)
CREAT SERPL-MCNC: 1.19 MG/DL (ref 0.7–1.3)
DIFFERENTIAL METHOD BLD: ABNORMAL
EOSINOPHIL # BLD: 0.1 K/UL (ref 0–0.4)
EOSINOPHIL NFR BLD: 1 % (ref 0–7)
ERYTHROCYTE [DISTWIDTH] IN BLOOD BY AUTOMATED COUNT: 13.9 % (ref 11.5–14.5)
GLUCOSE BLD STRIP.AUTO-MCNC: 105 MG/DL (ref 65–100)
GLUCOSE SERPL-MCNC: 84 MG/DL (ref 65–100)
HCT VFR BLD AUTO: 41.8 % (ref 36.6–50.3)
HGB BLD-MCNC: 13.3 G/DL (ref 12.1–17)
IMM GRANULOCYTES # BLD AUTO: 0.1 K/UL (ref 0–0.04)
IMM GRANULOCYTES NFR BLD AUTO: 1 % (ref 0–0.5)
LYMPHOCYTES # BLD: 1.2 K/UL (ref 0.8–3.5)
LYMPHOCYTES NFR BLD: 15 % (ref 12–49)
MCH RBC QN AUTO: 32.8 PG (ref 26–34)
MCHC RBC AUTO-ENTMCNC: 31.8 G/DL (ref 30–36.5)
MCV RBC AUTO: 103 FL (ref 80–99)
MONOCYTES # BLD: 0.7 K/UL (ref 0–1)
MONOCYTES NFR BLD: 9 % (ref 5–13)
NEUTS SEG # BLD: 5.6 K/UL (ref 1.8–8)
NEUTS SEG NFR BLD: 74 % (ref 32–75)
NRBC # BLD: 0 K/UL (ref 0–0.01)
NRBC BLD-RTO: 0 PER 100 WBC
PERFORMED BY, TECHID: ABNORMAL
PLATELET # BLD AUTO: 207 K/UL (ref 150–400)
PMV BLD AUTO: 9.2 FL (ref 8.9–12.9)
POTASSIUM SERPL-SCNC: 5 MMOL/L (ref 3.5–5.1)
RBC # BLD AUTO: 4.06 M/UL (ref 4.1–5.7)
SODIUM SERPL-SCNC: 142 MMOL/L (ref 136–145)
WBC # BLD AUTO: 7.6 K/UL (ref 4.1–11.1)

## 2022-12-01 PROCEDURE — 85025 COMPLETE CBC W/AUTO DIFF WBC: CPT

## 2022-12-01 PROCEDURE — 36415 COLL VENOUS BLD VENIPUNCTURE: CPT

## 2022-12-01 PROCEDURE — 82962 GLUCOSE BLOOD TEST: CPT

## 2022-12-01 PROCEDURE — 65610000006 HC RM INTENSIVE CARE

## 2022-12-01 PROCEDURE — 74011250637 HC RX REV CODE- 250/637: Performed by: INTERNAL MEDICINE

## 2022-12-01 PROCEDURE — 92610 EVALUATE SWALLOWING FUNCTION: CPT

## 2022-12-01 PROCEDURE — 94761 N-INVAS EAR/PLS OXIMETRY MLT: CPT

## 2022-12-01 PROCEDURE — 74011250637 HC RX REV CODE- 250/637: Performed by: HOSPITALIST

## 2022-12-01 PROCEDURE — 77010033678 HC OXYGEN DAILY

## 2022-12-01 PROCEDURE — 74011636637 HC RX REV CODE- 636/637: Performed by: INTERNAL MEDICINE

## 2022-12-01 PROCEDURE — 80048 BASIC METABOLIC PNL TOTAL CA: CPT

## 2022-12-01 RX ORDER — MIDODRINE HYDROCHLORIDE 5 MG/1
5 TABLET ORAL
Status: DISCONTINUED | OUTPATIENT
Start: 2022-12-01 | End: 2022-12-02 | Stop reason: HOSPADM

## 2022-12-01 RX ADMIN — ASPIRIN 81 MG 81 MG: 81 TABLET ORAL at 09:05

## 2022-12-01 RX ADMIN — PREDNISONE 15 MG: 5 TABLET ORAL at 09:06

## 2022-12-01 RX ADMIN — DILTIAZEM HYDROCHLORIDE 30 MG: 30 TABLET, FILM COATED ORAL at 09:05

## 2022-12-01 RX ADMIN — METOPROLOL TARTRATE 25 MG: 25 TABLET, FILM COATED ORAL at 09:05

## 2022-12-01 RX ADMIN — AMIODARONE HYDROCHLORIDE 200 MG: 200 TABLET ORAL at 09:05

## 2022-12-01 RX ADMIN — FINASTERIDE 5 MG: 5 TABLET, FILM COATED ORAL at 09:06

## 2022-12-01 RX ADMIN — MIDODRINE HYDROCHLORIDE 5 MG: 5 TABLET ORAL at 12:21

## 2022-12-01 RX ADMIN — DILTIAZEM HYDROCHLORIDE 30 MG: 30 TABLET, FILM COATED ORAL at 12:21

## 2022-12-01 RX ADMIN — Medication 2000 UNITS: at 09:06

## 2022-12-01 RX ADMIN — SENNOSIDES 8.6 MG: 8.6 TABLET, COATED ORAL at 09:07

## 2022-12-01 RX ADMIN — QUETIAPINE FUMARATE 25 MG: 25 TABLET ORAL at 09:06

## 2022-12-01 NOTE — PROGRESS NOTES
Hospitalist Progress Note         Yovani Valiente          Daily Progress Note: 12/1/2022      Subjective: The patient is seen for follow  up. 79 yo male, hx of a-fib with RVR, CHF, COPD, emphysema, HTN, here s/p fall out of her wheelchair on 11/23. In the ER was found to be in a-fib with RVR and hypotensive. Amiodarone drip was started but no resolution in a-fib as of yet. Currently, pt is on Amiodarone 200 mg daily, ASA, and Diltiazem, as well as long-acting Cardizem. Was given Geoden on 11/27 for agitation. --  The patient is seen for follow  up. He is asleep in bed, wakes to voice. Remains in soft restraints. Persistent A-fib with RVR, pressure of 83/62 today. No fevers today.     Problem List:  Problem List as of 12/1/2022 Date Reviewed: 11/24/2022            Codes Class Noted - Resolved    Atrial fibrillation with RVR ([facility] ICD-10-CM: I48.91  ICD-9-CM: 427.31  11/24/2022 - Present        Paroxysmal atrial fibrillation with rapid ventricular response (HCC) ICD-10-CM: I48.0  ICD-9-CM: 427.31  11/24/2022 - Present        History of home oxygen therapy ICD-10-CM: Z99.81  ICD-9-CM: V46.2  6/5/2022 - Present        CHF (congestive heart failure) ([facility] ICD-10-CM: I50.9  ICD-9-CM: 428.0  6/5/2022 - Present        Acute bronchitis ICD-10-CM: J20.9  ICD-9-CM: 466.0  3/9/2022 - Present        COPD with acute exacerbation ([facility] ICD-10-CM: J44.1  ICD-9-CM: 491.21  3/6/2022 - Present        Elevated troponin ICD-10-CM: R77.8  ICD-9-CM: 790.6  2/20/2022 - Present        Respiratory failure (HCC) ICD-10-CM: J96.90  ICD-9-CM: 518.81  1/2/2022 - Present       Medications reviewed  Current Facility-Administered Medications   Medication Dose Route Frequency    midodrine (PROAMATINE) tablet 5 mg  5 mg Oral TID WITH MEALS    dexmedeTOMidine in 0.9 % NaCl (PRECEDEX) 400 mcg/100 mL (4 mcg/mL) infusion soln  0.1-1.5 mcg/kg/hr IntraVENous TITRATE    levoFLOXacin (LEVAQUIN) 750 mg in D5W IVPB  750 mg IntraVENous Q48H    dilTIAZem IR (CARDIZEM) tablet 30 mg  30 mg Oral AC&HS    predniSONE (DELTASONE) tablet 15 mg  15 mg Oral DAILY WITH BREAKFAST    melatonin tablet 3 mg  3 mg Oral QPM    QUEtiapine (SEROquel) tablet 25 mg  25 mg Oral BID WITH MEALS    amiodarone (CORDARONE) tablet 200 mg  200 mg Oral DAILY    metoprolol tartrate (LOPRESSOR) tablet 25 mg  25 mg Oral Q12H    aspirin chewable tablet 81 mg  81 mg Oral DAILY    cholecalciferol (VITAMIN D3) (1000 Units /25 mcg) tablet 2,000 Units  2,000 Units Oral DAILY    budesonide-formoteroL (SYMBICORT) 160-4.5 mcg/actuation HFA inhaler 2 Puff  2 Puff Inhalation BID    albuterol (PROVENTIL HFA, VENTOLIN HFA, PROAIR HFA) inhaler 2 Puff  2 Puff Inhalation Q6H PRN    finasteride (PROSCAR) tablet 5 mg  5 mg Oral DAILY    senna (SENOKOT) tablet 8.6 mg  1 Tablet Oral DAILY    sodium chloride (NS) flush 5-40 mL  5-40 mL IntraVENous PRN    acetaminophen (TYLENOL) solution 650 mg  650 mg Oral Q4H PRN    sodium chloride (NS) flush 5-40 mL  5-40 mL IntraVENous PRN       Review of Systems:   Review of systems not obtained due to patient factors. Review of systems not obtained due to patient factors. Not required  Objective:   Physical Exam:     Visit Vitals  BP (!) 88/74 (BP 1 Location: Right upper arm, BP Patient Position: At rest)   Pulse (!) 125   Temp 98.1 °F (36.7 °C)   Resp 24   Ht 5' 5.98\" (1.676 m)   Wt 76.2 kg (168 lb 1.6 oz)   SpO2 92%   BMI 27.15 kg/m²    O2 Flow Rate (L/min): 2 l/min O2 Device: Nasal cannula    Temp (24hrs), Av.6 °F (36.4 °C), Min:97.1 °F (36.2 °C), Max:98.1 °F (36.7 °C)    No intake/output data recorded.  1901 -  0700  In: 259.2 [P.O.:240; I.V.:19.2]  Out: 1850 [Urine:1850]    General:  Arousable but confused   Lungs:   Clear to auscultation bilaterally. Chest wall:  No tenderness or deformity. Heart:  IRRegular rate and rhythm, S1, S2 normal, no murmur, click, rub or gallop. Abdomen:   Soft, non-tender.  Bowel sounds normal. No masses,  No organomegaly. Extremities: Extremities normal, atraumatic, no cyanosis or edema. Pulses: 2+ and symmetric all extremities. Skin: Skin color, texture, turgor normal. No rashes or lesions   Neurologic: CNII-XII intact. No gross sensory or motor deficits     Data Review:       Recent Days:  Recent Labs     12/01/22  0340   WBC 7.6   HGB 13.3   HCT 41.8          Recent Labs     12/01/22  0340 11/30/22  0606    139   K 5.0 5.0    105   CO2 28 29   GLU 84 108*   BUN 35* 39*   CREA 1.19 1.14   CA 8.7 8.1*   ALB  --  2.4*   TBILI  --  0.7   ALT  --  42       Recent Labs     11/30/22  0607   PH 7.41   PCO2 51*   PO2 95   HCO3 32*   FIO2 36.0         24 Hour Results:  Recent Results (from the past 24 hour(s))   CBC WITH AUTOMATED DIFF    Collection Time: 12/01/22  3:40 AM   Result Value Ref Range    WBC 7.6 4.1 - 11.1 K/uL    RBC 4.06 (L) 4.10 - 5.70 M/uL    HGB 13.3 12.1 - 17.0 g/dL    HCT 41.8 36.6 - 50.3 %    .0 (H) 80.0 - 99.0 FL    MCH 32.8 26.0 - 34.0 PG    MCHC 31.8 30.0 - 36.5 g/dL    RDW 13.9 11.5 - 14.5 %    PLATELET 132 730 - 456 K/uL    MPV 9.2 8.9 - 12.9 FL    NRBC 0.0 0.0  WBC    ABSOLUTE NRBC 0.00 0.00 - 0.01 K/uL    NEUTROPHILS 74 32 - 75 %    LYMPHOCYTES 15 12 - 49 %    MONOCYTES 9 5 - 13 %    EOSINOPHILS 1 0 - 7 %    BASOPHILS 0 0 - 1 %    IMMATURE GRANULOCYTES 1 (H) 0 - 0.5 %    ABS. NEUTROPHILS 5.6 1.8 - 8.0 K/UL    ABS. LYMPHOCYTES 1.2 0.8 - 3.5 K/UL    ABS. MONOCYTES 0.7 0.0 - 1.0 K/UL    ABS. EOSINOPHILS 0.1 0.0 - 0.4 K/UL    ABS. BASOPHILS 0.0 0.0 - 0.1 K/UL    ABS. IMM.  GRANS. 0.1 (H) 0.00 - 0.04 K/UL    DF AUTOMATED     METABOLIC PANEL, BASIC    Collection Time: 12/01/22  3:40 AM   Result Value Ref Range    Sodium 142 136 - 145 mmol/L    Potassium 5.0 3.5 - 5.1 mmol/L    Chloride 107 97 - 108 mmol/L    CO2 28 21 - 32 mmol/L    Anion gap 7 5 - 15 mmol/L    Glucose 84 65 - 100 mg/dL    BUN 35 (H) 6 - 20 mg/dL    Creatinine 1.19 0.70 - 1.30 mg/dL BUN/Creatinine ratio 29 (H) 12 - 20      eGFR 57 (L) >60 ml/min/1.73m2    Calcium 8.7 8.5 - 10.1 mg/dL           Assessment/     Atrial fibrillation with rapid ventricular response    -Rate controlled on oral amiodarone, Cardizem and Lopressor    -Consider low-dose Eliquis for stroke prevention. Patient at risk for falls    COPD on 3 L home O2   -Appears stable    Klebsiella aerogenes bacteremia    -Continue Levaquin through December 2    Dementia with behavioral disturbance    -On Seroquel 25 mg oral twice daily    -No prior history of dementia per daughter. This might be delirium    History of prostate cancer    -No clear details    Acute kidney injury    -Improving    Hypotension    -Likely secondary to polypharmacy    -Monitor BP closely      Plan:  Continued supportive care  Anticipate transfer to telemetry floor when vutals remain stable  Updated daughter Gia Altamirano on overall clinicals. Care Plan discussed with: Nurse    Total time spent with patient: 30 minutes.     Vladislav Iniguez

## 2022-12-01 NOTE — HOSPICE
[facility]  [phone]     Patient Name: Erickson Solorzano  YOB: 1929  Age: 80 y.o. [facility] RN Note:  Hospice consult received, chart reviewed and patient assessed. Patient resting quietly, eyes closed. Spoke with friend The godwin at bedside and he called daughter Edilson Dobbs. Katy Barcenas feels his agitation and belligerance is due to Seroquel and she requested it be discontinued. Order obtained from Dr. Bj Zuleta and entered. Katy Barcenas believes patient can return to independent living and was not interested in discussing higher level of care. Shared with her that patient does not meet criteria for GIP and we would continue to assess. Expressed appreciation for the information and call. Thank you for the opportunity to be of service to Mr. Yasmany España and his family.     Antonette Da Silva RN  Clinical Nurse Liaison  [phone]

## 2022-12-01 NOTE — PROGRESS NOTES
Problem: Dysphagia (Adult)  Goal: *Acute Goals and Plan of Care (Insert Text)  Description: Speech Therapy Goals  Initiated 12/1/2022  -Patient stated goal: Pt unable to state due to AMS  -Patient will participate in modified barium swallow study within 7 day(s), as indicated pending pt's progress, when medically appropriate. [ ] Not met  [ ]  MET   [ ] Progressing  [ ] Diego Montgomeryy  -Patient will tolerate puree diet with moderately/honey thick liquids without signs/symptoms of aspiration given minimal cues within 7 day(s). [ ] Not met  [ ]  MET   [ ] Progressing  [ ] Ardine Rily  -Patient will demonstrate understanding of swallow safety precautions and aspiration precautions, diet recs with maximal cues within 7 day(s). [ ] Not met  [ ]  MET   [ ] Progressing  [ ] Discontinue    Outcome: Not Met   SPEECH [address] EVALUATION  Patient: Karyle Crook (15 y.o. male)  Date: 12/1/2022  Primary Diagnosis: Paroxysmal atrial fibrillation with rapid ventricular response ([address] [I48.0]       Precautions: aspiration       ASSESSMENT :  Based on the objective data described below, the patient presents with moderate oropharyngeal dysphagia with aspiration concerns present. Pt seen in ICU, positioned upright in bed with assistance from nsg. Nsg reportedly held PO diet due to concerns for aspiration with breakfast meal. Pt's diet per hard chart review was reg/thin prior to current admission. Pt admitted with a-fib with RVR, s/p fall from wheelchair, cough, SOB, AMS, UTI, LYNNETTE. Pt is legally blind, hx includes athsma, [place], dementia. Pt is talkative with unclear and confused speech. Pt is edentulous, dentures not present during assessment but in denture cup being cleaned. Pt with hoarse/wet vocal quality. Pt given trials of thin via cup/straw, puree, mildly thick via straw and moistened solids. Oral phase weak with reduced efficacy of mastication and bolus control.  Pharyngeal phase with mild-mod delay and mild weakness to palpation. Pt with coughing intermittently present with all trials, but is more pronounced with thin and mildly thick trials. Patient will benefit from skilled intervention to address the above impairments. Patients rehabilitation potential is considered to be Fair     PLAN :  Recommendations and Planned Interventions: At this time, recommend diet downgrade to puree/mod thick liquids with 1:1 assistance with ALL PO intake, STRICT aspiration and GERD precautions, monitor pt closely for s/s aspiration, meds crushed if able in puree, FEED ONLY IF AWAKE AND ALERT. Will cont to follow and request MBS if/as indicated pending pt's progress and diet tolerance. Frequency/Duration: Patient will be followed by speech-language pathology 5 times a week to address goals. Discharge Recommendations: cont SLP tx at this time     SUBJECTIVE:   Patient alert, agreeable, pleasant. OBJECTIVE:     CXR Results  (Last 48 hours)                 11/30/22 0442  XR CHEST PORT Final result    Impression:      Stable appearance of the chest           Narrative:  EXAM:  XR CHEST PORT       INDICATION: Hypoxia       COMPARISON: November 28, 2022       TECHNIQUE: Portable chest AP view       FINDINGS: The thoracic aorta remains severely tortuous. The cardiac silhouette   is stable. Lung lines remain low. There is unchanged bibasilar atelectasis. No   new airspace disease or pleural effusion is evident. Right shoulder prosthesis   is again seen. There are degenerative changes in the thoracolumbar spine.                   Past Medical History:   Diagnosis Date    Asthma     Blind 03/06/2022    CHF (congestive heart failure) (HCC)     COPD (chronic obstructive pulmonary disease) (HCC)     HTN (hypertension)     Prostate CA ([facility]      Past Surgical History:   Procedure Laterality Date    HX ORTHOPAEDIC      neck     Prior Level of Function/Home Situation:   Home Situation  Home Environment: Assisted living  Care Facility Name: [facility]  One/Two Story Residence: One story  Living Alone: No  Support Systems: Child(rio)  Patient Expects to be Discharged to[de-identified] Group home  Current DME Used/Available at Home: Mery Rota, Wheelchair  Diet prior to admission: reg/thin  Current Diet:  soft and bite size/thin, made NPO by nsg due to aspiration concerns. Cognitive and Communication Status:  Neurologic State: Alert, Confused  Orientation Level: Oriented to person  Cognition: Follows commands, Impaired decision making, Decreased attention/concentration, Poor safety awareness      Pain:  Pain Scale 1: Numeric (0 - 10)  Pain Intensity 1: 0       After treatment:   Patient left in no apparent distress in bed, Call bell within reach, and Nursing notified    COMMUNICATION/EDUCATION:   Patient was educated regarding purpose of SLP assessment, POC, diet recs and sw safety precautions. Patient demonstrated Guarded understanding as evidenced by AMS, dementia. The patient's plan of care including recommendations, planned interventions, and recommended diet changes were discussed with: Registered nurse. Patient is unable to participate in goal setting and plan of care.     Thank you for this referral.  Gloria Molina M.S. CCC-SLP  Time Calculation: 14 mins

## 2022-12-01 NOTE — PROGRESS NOTES
Bedside shift change report given to Ghada sTe (oncoming nurse) by Katerin Rowley  (offgoing nurse). Report included the following information SBAR.

## 2022-12-01 NOTE — PROGRESS NOTES
Problem: Falls - Risk of  Goal: *Absence of Falls  Description: Document Dennie [place] Fall Risk and appropriate interventions in the flowsheet.   Outcome: Progressing Towards Goal  Note: Fall Risk Interventions:  Mobility Interventions: Bed/chair exit alarm    Mentation Interventions: Bed/chair exit alarm    Medication Interventions: Bed/chair exit alarm    Elimination Interventions: Bed/chair exit alarm    History of Falls Interventions: Bed/chair exit alarm         Problem: Patient Education: Go to Patient Education Activity  Goal: Patient/Family Education  Outcome: Progressing Towards Goal     Problem: Patient Education: Go to Patient Education Activity  Goal: Patient/Family Education  Outcome: Progressing Towards Goal     Problem: Afib Pathway: Day 1  Goal: Off Pathway (Use only if patient is Off Pathway)  Outcome: Progressing Towards Goal  Goal: Activity/Safety  Outcome: Progressing Towards Goal  Goal: Consults, if ordered  Outcome: Progressing Towards Goal  Goal: Diagnostic Test/Procedures  Outcome: Progressing Towards Goal  Goal: Nutrition/Diet  Outcome: Progressing Towards Goal  Goal: Discharge Planning  Outcome: Progressing Towards Goal  Goal: Medications  Outcome: Progressing Towards Goal  Goal: Respiratory  Outcome: Progressing Towards Goal  Goal: Treatments/Interventions/Procedures  Outcome: Progressing Towards Goal  Goal: Psychosocial  Outcome: Progressing Towards Goal  Goal: *Optimal pain control at patient's stated goal  Outcome: Progressing Towards Goal  Goal: *Hemodynamically stable  Outcome: Progressing Towards Goal  Goal: *Stable cardiac rhythm  Outcome: Progressing Towards Goal  Goal: *Lungs clear or at baseline  Outcome: Progressing Towards Goal  Goal: *Labs within defined limits  Outcome: Progressing Towards Goal  Goal: *Describes available resources and support systems  Outcome: Progressing Towards Goal     Problem: Afib Pathway: Day 1  Goal: Psychosocial  Outcome: Progressing Towards Goal Problem: Afib Pathway: Day 2  Goal: Off Pathway (Use only if patient is Off Pathway)  Outcome: Progressing Towards Goal  Goal: Activity/Safety  Outcome: Progressing Towards Goal  Goal: Consults, if ordered  Outcome: Progressing Towards Goal  Goal: Diagnostic Test/Procedures  Outcome: Progressing Towards Goal  Goal: Nutrition/Diet  Outcome: Progressing Towards Goal  Goal: Discharge Planning  Outcome: Progressing Towards Goal  Goal: Medications  Outcome: Progressing Towards Goal  Goal: Respiratory  Outcome: Progressing Towards Goal  Goal: Treatments/Interventions/Procedures  Outcome: Progressing Towards Goal  Goal: Psychosocial  Outcome: Progressing Towards Goal  Goal: *Hemodynamically stable  Outcome: Progressing Towards Goal  Goal: *Optimal pain control at patient's stated goal  Outcome: Progressing Towards Goal  Goal: *Stable cardiac rhythm  Outcome: Progressing Towards Goal  Goal: *Lungs clear or at baseline  Outcome: Progressing Towards Goal  Goal: *Describes available resources and support systems  Outcome: Progressing Towards Goal     Problem: Afib Pathway: Day 3  Goal: Off Pathway (Use only if patient is Off Pathway)  Outcome: Progressing Towards Goal  Goal: Activity/Safety  Outcome: Progressing Towards Goal  Goal: Diagnostic Test/Procedures  Outcome: Progressing Towards Goal  Goal: Nutrition/Diet  Outcome: Progressing Towards Goal  Goal: Discharge Planning  Outcome: Progressing Towards Goal  Goal: Medications  Outcome: Progressing Towards Goal  Goal: Respiratory  Outcome: Progressing Towards Goal  Goal: Treatments/Interventions/Procedures  Outcome: Progressing Towards Goal  Goal: Psychosocial  Outcome: Progressing Towards Goal     Problem: Afib: Discharge Outcomes (not in CAT)  Goal: *Hemodynamically stable  Outcome: Progressing Towards Goal  Goal: *Stable cardiac rhythm  Outcome: Progressing Towards Goal  Goal: *Lungs clear or at baseline  Outcome: Progressing Towards Goal  Goal: *Optimal pain control at patient's stated goal  Outcome: Progressing Towards Goal  Goal: *Identifies cardiac risk factors  Outcome: Progressing Towards Goal  Goal: *Verbalizes home exercise program, activity guidelines, cardiac precautions  Outcome: Progressing Towards Goal  Goal: *Verbalizes understanding and describes prescribed diet  Outcome: Progressing Towards Goal  Goal: *Verbalizes understanding and describes medication purposes and frequencies  Outcome: Progressing Towards Goal  Goal: *Anxiety reduced or absent  Outcome: Progressing Towards Goal  Goal: *Understands and describes signs and symptoms to report to providers(Stroke Metric)  Outcome: Progressing Towards Goal  Goal: *Describes follow-up/return visits to physicians  Outcome: Progressing Towards Goal  Goal: *Describes available resources and support systems  Outcome: Progressing Towards Goal  Goal: *Influenza immunization  Outcome: Progressing Towards Goal  Goal: *Pneumococcal immunization  Outcome: Progressing Towards Goal  Goal: *Describes smoking cessation resources  Outcome: Progressing Towards Goal

## 2022-12-01 NOTE — PROGRESS NOTES
Pulmonary/ CC progress note    Subjective:   Date of Consultation:  December 1, 2022  Referring Physician: Carmen Blanca MD    Subjective    Patient seen and examined in his room. Apparently patient was transferred over to the ICU 11/29/22 evening because of persistent agitation. He was started on Precedex but his blood pressure dropped. Discussed with nursing staff at the bedside. His agitation is somewhat better. On 4 L of oxygen. Evaluated by speech. They have recommended puréed diet with moderately thickened liquids. Prior to Admission medications    Medication Sig Start Date End Date Taking? Authorizing Provider   finasteride (PROSCAR) 5 mg tablet Take 5 mg by mouth in the morning. Other, MD Yeny   furosemide (LASIX) 80 mg tablet Take  by mouth daily. Yeny Sharif MD   sennosides (Senna) 8.6 mg cap Take  by mouth. Yeny Sharif MD   guaiFENesin (ORGANIDIN) 200 mg tablet Take 200 mg by mouth every four (4) hours as needed for Congestion. Yeny Sharif MD   cholecalciferol (VITAMIN D3) (1000 Units /25 mcg) tablet Take 2 Tablets by mouth daily. 3/9/22   Anastacio Pickard PA-C   budesonide-formoteroL (Symbicort) 160-4.5 mcg/actuation HFAA Take 2 Puffs by inhalation two (2) times a day. 3/9/22   Anastacio Pickard PA-C   albuterol (ProAir HFA) 90 mcg/actuation inhaler Take 2 Puffs by inhalation every six (6) hours as needed for Wheezing or Shortness of Breath. 3/9/22   Anastacio Pickard PA-C   aspirin 81 mg chewable tablet Take 1 Tablet by mouth daily. 2/22/22   Gayathri Interiano MD   QUEtiapine (SEROquel) 25 mg tablet Take 0.5 Tablets by mouth nightly. 2/22/22   Gayathri Interiano MD     Allergies   Allergen Reactions    Gabapentin Unknown (comments)        Review of Systems:  Review of systems not obtained due to patient factors. Objective:   Blood pressure (!) 88/74, pulse (!) 125, temperature 98.1 °F (36.7 °C), resp.  rate 24, height 5' 5.98\" (1.676 m), weight 76.2 kg (168 lb 1.6 oz), SpO2 92 %. Temp (24hrs), Av.6 °F (36.4 °C), Min:97.1 °F (36.2 °C), Max:98.1 °F (36.7 °C)    XR CHEST PORT   Final Result      Stable appearance of the chest         XR CHEST PORT   Final Result      Diffuse interstitial prominence again noted without evidence for focal lung   consolidation. Hypoinflated lungs with probable bibasilar atelectasis. CT HEAD WO CONT   Final Result   1. No evidence of acute intracranial abnormality by this modality.                 Data Review: CBC:   Recent Labs     22  0340   WBC 7.6   RBC 4.06*   HGB 13.3   HCT 41.8      GRANS 74   LYMPH 15   EOS 1       CMP:   Recent Labs     22  0340 22  0606   GLU 84 108*    139   K 5.0 5.0    105   CO2 28 29   BUN 35* 39*   CREA 1.19 1.14   CA 8.7 8.1*   AGAP 7 5   BUCR 29* 34*   AP  --  54   TP  --  5.3*   ALB  --  2.4*   GLOB  --  2.9   AGRAT  --  0.8*       Liver Enzymes:   Recent Labs     22  0606   TP 5.3*   ALB 2.4*   AP 54         ABGs:   Recent Labs     22  0607   PH 7.41   PCO2 51*   PO2 95   HCO3 32*       Current Facility-Administered Medications   Medication Dose Route Frequency    midodrine (PROAMATINE) tablet 5 mg  5 mg Oral TID WITH MEALS    dexmedeTOMidine in 0.9 % NaCl (PRECEDEX) 400 mcg/100 mL (4 mcg/mL) infusion soln  0.1-1.5 mcg/kg/hr IntraVENous TITRATE    levoFLOXacin (LEVAQUIN) 750 mg in D5W IVPB  750 mg IntraVENous Q48H    dilTIAZem IR (CARDIZEM) tablet 30 mg  30 mg Oral AC&HS    predniSONE (DELTASONE) tablet 15 mg  15 mg Oral DAILY WITH BREAKFAST    melatonin tablet 3 mg  3 mg Oral QPM    QUEtiapine (SEROquel) tablet 25 mg  25 mg Oral BID WITH MEALS    amiodarone (CORDARONE) tablet 200 mg  200 mg Oral DAILY    metoprolol tartrate (LOPRESSOR) tablet 25 mg  25 mg Oral Q12H    aspirin chewable tablet 81 mg  81 mg Oral DAILY    cholecalciferol (VITAMIN D3) (1000 Units /25 mcg) tablet 2,000 Units  2,000 Units Oral DAILY    budesonide-formoteroL (SYMBICORT) 160-4.5 mcg/actuation HFA inhaler 2 Puff  2 Puff Inhalation BID    albuterol (PROVENTIL HFA, VENTOLIN HFA, PROAIR HFA) inhaler 2 Puff  2 Puff Inhalation Q6H PRN    finasteride (PROSCAR) tablet 5 mg  5 mg Oral DAILY    senna (SENOKOT) tablet 8.6 mg  1 Tablet Oral DAILY    sodium chloride (NS) flush 5-40 mL  5-40 mL IntraVENous PRN    acetaminophen (TYLENOL) solution 650 mg  650 mg Oral Q4H PRN    sodium chloride (NS) flush 5-40 mL  5-40 mL IntraVENous PRN        Exam:    This is an elderly male who has poor vision. He is now sleeping. He is on 4 L of oxygen. JVD is absent. No cervical lymphadenopathy. Thyroid not enlarged  He has scattered rhonchi. Mostly unchanged. He also appears to have some minor retained upper airway secretions. Heart: S1-S2 irregular. Abdomen: Soft, nontender, no visceromegaly  Extremities: Trace edema, sinus or clubbing  Neuro: No focal motor deficit. Impression:   80years old  male seen in assisted living facility with supervised care. He has known history of CHF, advanced COPD with central lobular emphysema, hypertension was brought to the emergency department after a fall from wheelchair. It resulted in injury on the left side of the his head. CT scan of head did not show any new intracranial pathology. It was not clear if he lost his consciousness. Patient has history of dementia. Apparently he is on oxygen in the facility. ER he was found to be in atrial fibrillation with rapid ventricular response. He was also noted to have upper respiratory tract congestion. Plan:   COPD with exacerbation  Patient has history of advanced COPD with emphysema. He is on nasal cannula oxygen at home, 3 L/min. He is at risk for aspiration. He was evaluated by speech. Ordered. Diet with moderately thickened liquids. Continue with Symbicort 160/4.5 I doubt that he is able to use it properly.    Continue with nebulized bronchodilator treatments if he allows it. On p.o. prednisone and Levaquin. He had Klebsiella aeruginosa bacteremia. Source is not clear, but possible pneumonia. Patient is currently on 4 L oxygen. Saturation in the high 90% range. Blood gases done on 11/30 on 4 L showing 7.4 1/51/95. Chest x-ray 11/30 personally reviewed. He has bibasilar atelectasis versus infiltrates. We will treat him as aspiration pneumonia. 2. Atrial fibrillation with rapid ventricular response: Follow-up echocardiogram, on amiodarone and diltiazem. 3.  History of prostate cancer. Details are not clear. Also acute kidney injury, improving. He has relative hypotension. Monitoring. 4.  Status post fall from wheelchair. CT scan of head is negative    5. Dementia and delirium. Psych is also on the case. On 11/30/2022: I discussed in detail with a care provider at the bedside, Chaim Lombardo. She called the daughter Jamal Richardson on speaker phone. Nurse Shraddha also present in the room. Apparently patient has been quite active up until a few years ago. There is history of patient getting confused in unfamiliar environment. He may have underlying dementia but they are not aware of it. When the care provider is present he is calm. She fed him also without any problem yesterday. Discussed with the nursing staff in detail. He is DNR. Prognosis appears to be poor. He may be a candidate for hospice care. Thank you for involving me in the care of this patient  I will follow with you closely during hospitalization    Time spent more than 30 minutes under patient care with reviewing results and records, decision making, and answering questions.     Lola Gandara MD  [facility]

## 2022-12-01 NOTE — PROGRESS NOTES
Hospitalist Progress Note         Babita Cornell MD          Daily Progress Note: 12/1/2022      Subjective: The patient is seen for follow  up. 79 yo male, hx of a-fib with RVR, CHF, COPD, emphysema, HTN, here s/p fall out of her wheelchair on 11/23. In the ER was found to be in a-fib with RVR and hypotensive. Amiodarone drip was started but no resolution in a-fib as of yet. Currently, pt is on Amiodarone 200 mg daily, ASA, and Diltiazem, as well as long-acting Cardizem. Was given Geoden on 11/27 for agitation. --  The patient is seen for follow  up. He is asleep in bed. Transferred to the intensive care unit 11/29 due to agitation and hypoxemia. Periods of confusion overnight. No fevers.  Persistent afib with RVR       Problem List:  Problem List as of 12/1/2022 Date Reviewed: 11/24/2022            Codes Class Noted - Resolved    Atrial fibrillation with RVR ([facility] ICD-10-CM: I48.91  ICD-9-CM: 427.31  11/24/2022 - Present        Paroxysmal atrial fibrillation with rapid ventricular response (H[facility]) ICD-10-CM: I48.0  ICD-9-CM: 427.31  11/24/2022 - Present        History of home oxygen therapy ICD-10-CM: Z99.81  ICD-9-CM: V46.2  6/5/2022 - Present        CHF (congestive heart failure) ([facility] ICD-10-CM: I50.9  ICD-9-CM: 428.0  6/5/2022 - Present        Acute bronchitis ICD-10-CM: J20.9  ICD-9-CM: 466.0  3/9/2022 - Present        COPD with acute exacerbation ([facility] ICD-10-CM: J44.1  ICD-9-CM: 491.21  3/6/2022 - Present        Elevated troponin ICD-10-CM: R77.8  ICD-9-CM: 790.6  2/20/2022 - Present        Respiratory failure ([facility] ICD-10-CM: J96.90  ICD-9-CM: 518.81  1/2/2022 - Present       Medications reviewed  Current Facility-Administered Medications   Medication Dose Route Frequency    midodrine (PROAMATINE) tablet 5 mg  5 mg Oral TID WITH MEALS    dexmedeTOMidine in 0.9 % NaCl (PRECEDEX) 400 mcg/100 mL (4 mcg/mL) infusion soln  0.1-1.5 mcg/kg/hr IntraVENous TITRATE    levoFLOXacin (LEVAQUIN) 750 mg in D5W IVPB  750 mg IntraVENous Q48H    dilTIAZem IR (CARDIZEM) tablet 30 mg  30 mg Oral AC&HS    predniSONE (DELTASONE) tablet 15 mg  15 mg Oral DAILY WITH BREAKFAST    melatonin tablet 3 mg  3 mg Oral QPM    QUEtiapine (SEROquel) tablet 25 mg  25 mg Oral BID WITH MEALS    amiodarone (CORDARONE) tablet 200 mg  200 mg Oral DAILY    metoprolol tartrate (LOPRESSOR) tablet 25 mg  25 mg Oral Q12H    aspirin chewable tablet 81 mg  81 mg Oral DAILY    cholecalciferol (VITAMIN D3) (1000 Units /25 mcg) tablet 2,000 Units  2,000 Units Oral DAILY    budesonide-formoteroL (SYMBICORT) 160-4.5 mcg/actuation HFA inhaler 2 Puff  2 Puff Inhalation BID    albuterol (PROVENTIL HFA, VENTOLIN HFA, PROAIR HFA) inhaler 2 Puff  2 Puff Inhalation Q6H PRN    finasteride (PROSCAR) tablet 5 mg  5 mg Oral DAILY    senna (SENOKOT) tablet 8.6 mg  1 Tablet Oral DAILY    sodium chloride (NS) flush 5-40 mL  5-40 mL IntraVENous PRN    acetaminophen (TYLENOL) solution 650 mg  650 mg Oral Q4H PRN    sodium chloride (NS) flush 5-40 mL  5-40 mL IntraVENous PRN       Review of Systems:   Review of systems not obtained due to patient factors. Review of systems not obtained due to patient factors. Not required  Objective:   Physical Exam:     Visit Vitals  BP (!) 88/74 (BP 1 Location: Right upper arm, BP Patient Position: At rest)   Pulse (!) 125   Temp 98.1 °F (36.7 °C)   Resp 24   Ht 5' 5.98\" (1.676 m)   Wt 76.2 kg (168 lb 1.6 oz)   SpO2 92%   BMI 27.15 kg/m²    O2 Flow Rate (L/min): 2 l/min O2 Device: Nasal cannula    Temp (24hrs), Av.6 °F (36.4 °C), Min:97.1 °F (36.2 °C), Max:98.1 °F (36.7 °C)    No intake/output data recorded.  1901 -  0700  In: 259.2 [P.O.:240; I.V.:19.2]  Out: 0 [Urine:1850]    General:  Arousable but confused   Lungs:   Clear to auscultation bilaterally. Chest wall:  No tenderness or deformity. Heart:  IRRegular rate and rhythm, S1, S2 normal, no murmur, click, rub or gallop.    Abdomen: Soft, non-tender. Bowel sounds normal. No masses,  No organomegaly. Extremities: Extremities normal, atraumatic, no cyanosis or edema. Pulses: 2+ and symmetric all extremities. Skin: Skin color, texture, turgor normal. No rashes or lesions   Neurologic: CNII-XII intact. No gross sensory or motor deficits     Data Review:       Recent Days:  Recent Labs     12/01/22  0340   WBC 7.6   HGB 13.3   HCT 41.8          Recent Labs     12/01/22  0340 11/30/22  0606    139   K 5.0 5.0    105   CO2 28 29   GLU 84 108*   BUN 35* 39*   CREA 1.19 1.14   CA 8.7 8.1*   ALB  --  2.4*   TBILI  --  0.7   ALT  --  42       Recent Labs     11/30/22  0607   PH 7.41   PCO2 51*   PO2 95   HCO3 32*   FIO2 36.0         24 Hour Results:  Recent Results (from the past 24 hour(s))   CBC WITH AUTOMATED DIFF    Collection Time: 12/01/22  3:40 AM   Result Value Ref Range    WBC 7.6 4.1 - 11.1 K/uL    RBC 4.06 (L) 4.10 - 5.70 M/uL    HGB 13.3 12.1 - 17.0 g/dL    HCT 41.8 36.6 - 50.3 %    .0 (H) 80.0 - 99.0 FL    MCH 32.8 26.0 - 34.0 PG    MCHC 31.8 30.0 - 36.5 g/dL    RDW 13.9 11.5 - 14.5 %    PLATELET 167 026 - 513 K/uL    MPV 9.2 8.9 - 12.9 FL    NRBC 0.0 0.0  WBC    ABSOLUTE NRBC 0.00 0.00 - 0.01 K/uL    NEUTROPHILS 74 32 - 75 %    LYMPHOCYTES 15 12 - 49 %    MONOCYTES 9 5 - 13 %    EOSINOPHILS 1 0 - 7 %    BASOPHILS 0 0 - 1 %    IMMATURE GRANULOCYTES 1 (H) 0 - 0.5 %    ABS. NEUTROPHILS 5.6 1.8 - 8.0 K/UL    ABS. LYMPHOCYTES 1.2 0.8 - 3.5 K/UL    ABS. MONOCYTES 0.7 0.0 - 1.0 K/UL    ABS. EOSINOPHILS 0.1 0.0 - 0.4 K/UL    ABS. BASOPHILS 0.0 0.0 - 0.1 K/UL    ABS. IMM.  GRANS. 0.1 (H) 0.00 - 0.04 K/UL    DF AUTOMATED     METABOLIC PANEL, BASIC    Collection Time: 12/01/22  3:40 AM   Result Value Ref Range    Sodium 142 136 - 145 mmol/L    Potassium 5.0 3.5 - 5.1 mmol/L    Chloride 107 97 - 108 mmol/L    CO2 28 21 - 32 mmol/L    Anion gap 7 5 - 15 mmol/L    Glucose 84 65 - 100 mg/dL    BUN 35 (H) 6 - 20 mg/dL Creatinine 1.19 0.70 - 1.30 mg/dL    BUN/Creatinine ratio 29 (H) 12 - 20      eGFR 57 (L) >60 ml/min/1.73m2    Calcium 8.7 8.5 - 10.1 mg/dL           Assessment/     Atrial fibrillation with rapid ventricular response    -Rate uncontrolled on oral amiodarone, Cardizem and Lopressor    -Consider low-dose Eliquis for stroke prevention. Patient at risk for falls    COPD on 3 L home O2   -Appears stable    Klebsiella aerogenes bacteremia    -Continue Levaquin through December 2    Dementia with behavioral disturbance    -On Seroquel 25 mg oral twice daily    -No prior history of dementia per daughter. This might be delirium    History of prostate cancer    -No clear details    Acute kidney injury    -Improving    Hypotension    -Likely secondary to polypharmacy    -Monitor BP closely      Plan:  Continued supportive care  Updated daughter Cornel Barker on overall clinicals. Recommended hospice care. Daughter agreed to hospice care in the facility      Care Plan discussed with: Nurse    Total time spent with patient: 30 minutes.     Rich Ogden MD

## 2022-12-01 NOTE — PROGRESS NOTES
Montse Ovalle , his daughter called .  [address] number should bed added to contact list [place] , but she Is local .

## 2022-12-01 NOTE — PROGRESS NOTES
Received an order for Hospice. Met f/f with a friend of Pt that was sitting bedside. Discussed with Pt friend about hospice, he called Pt daughter and placed her on speaker. Pt daughter stated that the doctor told her that he would call hospice and have an extra set of eyes on Pt and get him home. Anil Mauricio was under the impression that once Pt got back to [facility] he would get up and walk again. CM discussed with Anil Martínez about what hospice can do and that it would appear that they could assist Pt. Anil Martínez stated, \"if hospice is going to do end of life then forget it\"    CM talked with Anil Martínez about how hospice can make Pt more comfortable and could be an extra set of eyes. Anil Martínez then agreed to have CM contact [address] [place] [address]. Verbal choice letter signed for [address] [place] [address], will send a referral, will place choice letter on Pt chart. Called María from [facility], left a VM.     1:44 pm    María from [facility] called and stated that they can accept Pt back on Hospice. Erlinda Edmond stated that they use [facility] or [facility]. Erlinda Edmond stated that if they can get an order from the doctor they can set up hospice once Pt gets back to them. CM called Pt daughter, Anil Martínez, she ask CM to contact [facility]. Informed Pt daughter that Pt will D/C tomorrow back to [facility]. 3:00 pm    [facility] has accepted Pt,     3:15  Grace Chang from [facility] called and stated that she spoke with Pt daughter and that she wants intensive rehab for Pt. Grace Chang stated that they cannot accept Pt if he is going to be getting rehab. Called María at [address] stated that if she wants Pt to get stronger then he needs to go to a rehab for 28 days. Erlinda Edmond stated that they can accept Pt back on Hospice. Called Pt daughter, Kerry Dill, she stated that if Pt can go home he will get up and walk. Attempted to talk with  Anil Martínez about Pt going to [address] [place] [address] and Rehab.  Anil Martínez stated, Karon Carlisle if you can guarantee me 100% that he will get therapy everyday\"  CM ask Marilee Faria to call Awilda Yates at [facility] and have her get back with CM to let me know what they decide. PT CANNOT BE D/C AT THIS TIME.

## 2022-12-01 NOTE — PROGRESS NOTES
Comprehensive Nutrition Assessment    Type and Reason for Visit: RD nutrition re-screen/LOS    Nutrition Recommendations/Plan:   Initiate PO diet per SLP recs  Ensure pdg 2x/d (340kcals, 8g pro)  Continue bowel regimen, no BM x4d     Malnutrition Assessment:  Malnutrition Status:  No malnutrition (12/01/22 1032)    Context:  Chronic illness     Findings of the 6 clinical characteristics of malnutrition:   Energy Intake:  No significant decrease in energy intake  Weight Loss:  No significant weight loss     Body Fat Loss:  Unable to assess,     Muscle Mass Loss:  Unable to assess,    Fluid Accumulation:  No significant fluid accumulation,     Strength:  Not performed     Nutrition Assessment:    Admitted for fall out of wheelchair. Found +Afib RVR, remains inpatient for continued AFIb management and now worsening agitation  &hypoxemia that brought him to ICU on 11/30. Pt now off precedex drip and with BL hand mitts. Unknown appetite and declining intakes now <25% but >50% at admit-- likely r/t agitation and need for precedex. Add mild ONS and monitor for improved intakes now that agitation improved. Labs: BUN 35, aLB 2.4. Meds: vit D3 , Finasteride, evofloxacin, prednisone, seroquel, senna. Nutrition Related Findings:    NFPE deferred, will complete at follow-up. No SLP eval this admit, at previous admit SLP rec'd SBS/Thins with supervision. No N/V, last BM 11/27, no BM x4d +constipation. +3 BL LE edema. Wound Type: Skin tears (Skin tear- L hand, traumatic)      Current Nutrition Intake & Therapies:  Average Meal Intake: NPO (prev intakes  >50%)  Average Supplement Intake: None ordered  ADULT DIET Dysphagia - Pureed; Moderately Thick (Honey)  ADULT ORAL NUTRITION SUPPLEMENT Lunch, Dinner; Fortified Pudding    Anthropometric Measures:  Height: 5' 5.98\" (167.6 cm)  Ideal Body Weight (IBW): 142 lbs (65 kg)     Current Body Wt:  76.2 kg (167 lb 15.9 oz) (11/29), 118.3 % IBW.  Bed scale  Current BMI (kg/m2): 27.1        Weight Adjustment: No adjustment                 BMI Category: Overweight (BMI 25.0-29. 9)  Wt Readings from Last 10 Encounters:   11/29/22 76.2 kg (168 lb 1.6 oz)   07/28/22 81.6 kg (180 lb)   06/05/22 79.4 kg (175 lb)   03/28/22 73.5 kg (162 lb)   03/06/22 68 kg (150 lb)   02/21/22 70.3 kg (155 lb)   01/07/22 70.5 kg (155 lb 6.8 oz)   11/24/20 75.8 kg (167 lb)   Appears with wt gain x7 months, then loss of 5.4kg x4 Months (6.6%, not significant)    Estimated Daily Nutrient Needs:  Energy Requirements Based On: Kcal/kg  Weight Used for Energy Requirements: Current  Energy (kcal/day): 1905kcals (25kcals/kg)  Weight Used for Protein Requirements: Current  Protein (g/day): 76-91g (1-1.2g/kg)  Method Used for Fluid Requirements: 1 ml/kcal  Fluid (ml/day): 1905ml    Nutrition Diagnosis:   Inadequate energy intake related to cognitive or neurological impairment (agitation) as evidenced by NPO or clear liquid status due to medical condition    Nutrition Interventions:   Food and/or Nutrient Delivery: Start oral diet, Start oral nutrition supplement  Nutrition Education/Counseling: No recommendations at this time          Goals:     Goals: Initiate PO diet, PO intake 50% or greater, within 7 days       Nutrition Monitoring and Evaluation:   Behavioral-Environmental Outcomes: None identified  Food/Nutrient Intake Outcomes: Diet advancement/tolerance, Food and nutrient intake, Supplement intake  Physical Signs/Symptoms Outcomes: Meal time behavior, Constipation    Discharge Planning:     Too soon to determine    [employer]: Ext [phone], or via [facility]

## 2022-12-02 VITALS
WEIGHT: 168.1 LBS | HEIGHT: 66 IN | OXYGEN SATURATION: 97 % | HEART RATE: 85 BPM | RESPIRATION RATE: 23 BRPM | TEMPERATURE: 97.9 F | DIASTOLIC BLOOD PRESSURE: 73 MMHG | BODY MASS INDEX: 27.02 KG/M2 | SYSTOLIC BLOOD PRESSURE: 106 MMHG

## 2022-12-02 LAB
ANION GAP SERPL CALC-SCNC: 3 MMOL/L (ref 5–15)
BASOPHILS # BLD: 0 K/UL (ref 0–0.1)
BASOPHILS NFR BLD: 0 % (ref 0–1)
BUN SERPL-MCNC: 33 MG/DL (ref 6–20)
BUN/CREAT SERPL: 33 (ref 12–20)
CA-I BLD-MCNC: 8.6 MG/DL (ref 8.5–10.1)
CHLORIDE SERPL-SCNC: 108 MMOL/L (ref 97–108)
CO2 SERPL-SCNC: 31 MMOL/L (ref 21–32)
CREAT SERPL-MCNC: 1 MG/DL (ref 0.7–1.3)
DIFFERENTIAL METHOD BLD: ABNORMAL
EOSINOPHIL # BLD: 0 K/UL (ref 0–0.4)
EOSINOPHIL NFR BLD: 0 % (ref 0–7)
ERYTHROCYTE [DISTWIDTH] IN BLOOD BY AUTOMATED COUNT: 14.1 % (ref 11.5–14.5)
GLUCOSE SERPL-MCNC: 109 MG/DL (ref 65–100)
HCT VFR BLD AUTO: 40.2 % (ref 36.6–50.3)
HGB BLD-MCNC: 12.4 G/DL (ref 12.1–17)
IMM GRANULOCYTES # BLD AUTO: 0.1 K/UL (ref 0–0.04)
IMM GRANULOCYTES NFR BLD AUTO: 1 % (ref 0–0.5)
LYMPHOCYTES # BLD: 0.6 K/UL (ref 0.8–3.5)
LYMPHOCYTES NFR BLD: 10 % (ref 12–49)
MCH RBC QN AUTO: 32.3 PG (ref 26–34)
MCHC RBC AUTO-ENTMCNC: 30.8 G/DL (ref 30–36.5)
MCV RBC AUTO: 104.7 FL (ref 80–99)
MONOCYTES # BLD: 0.4 K/UL (ref 0–1)
MONOCYTES NFR BLD: 7 % (ref 5–13)
NEUTS SEG # BLD: 4.9 K/UL (ref 1.8–8)
NEUTS SEG NFR BLD: 82 % (ref 32–75)
NRBC # BLD: 0 K/UL (ref 0–0.01)
NRBC BLD-RTO: 0 PER 100 WBC
PLATELET # BLD AUTO: 195 K/UL (ref 150–400)
PMV BLD AUTO: 9.2 FL (ref 8.9–12.9)
POTASSIUM SERPL-SCNC: 4.7 MMOL/L (ref 3.5–5.1)
RBC # BLD AUTO: 3.84 M/UL (ref 4.1–5.7)
SODIUM SERPL-SCNC: 142 MMOL/L (ref 136–145)
WBC # BLD AUTO: 6 K/UL (ref 4.1–11.1)

## 2022-12-02 PROCEDURE — 74011250637 HC RX REV CODE- 250/637: Performed by: INTERNAL MEDICINE

## 2022-12-02 PROCEDURE — 74011250637 HC RX REV CODE- 250/637: Performed by: HOSPITALIST

## 2022-12-02 PROCEDURE — 36415 COLL VENOUS BLD VENIPUNCTURE: CPT

## 2022-12-02 PROCEDURE — 85025 COMPLETE CBC W/AUTO DIFF WBC: CPT

## 2022-12-02 PROCEDURE — 74011250636 HC RX REV CODE- 250/636: Performed by: INTERNAL MEDICINE

## 2022-12-02 PROCEDURE — 77010033678 HC OXYGEN DAILY

## 2022-12-02 PROCEDURE — 80048 BASIC METABOLIC PNL TOTAL CA: CPT

## 2022-12-02 PROCEDURE — 74011636637 HC RX REV CODE- 636/637: Performed by: INTERNAL MEDICINE

## 2022-12-02 RX ORDER — METOPROLOL TARTRATE 25 MG/1
25 TABLET, FILM COATED ORAL EVERY 12 HOURS
Qty: 60 TABLET | Refills: 0 | Status: SHIPPED | OUTPATIENT
Start: 2022-12-02 | End: 2023-01-01

## 2022-12-02 RX ORDER — PREDNISONE 20 MG/1
20 TABLET ORAL
Qty: 5 TABLET | Refills: 0 | Status: SHIPPED | OUTPATIENT
Start: 2022-12-02 | End: 2022-12-07

## 2022-12-02 RX ORDER — MIDODRINE HYDROCHLORIDE 5 MG/1
5 TABLET ORAL
Qty: 90 TABLET | Refills: 0 | Status: SHIPPED | OUTPATIENT
Start: 2022-12-02 | End: 2023-01-01

## 2022-12-02 RX ORDER — DILTIAZEM HYDROCHLORIDE 120 MG/1
120 CAPSULE, COATED, EXTENDED RELEASE ORAL DAILY
Qty: 30 CAPSULE | Refills: 0 | Status: SHIPPED | OUTPATIENT
Start: 2022-12-03 | End: 2023-01-02

## 2022-12-02 RX ORDER — FUROSEMIDE 80 MG/1
40 TABLET ORAL DAILY
Qty: 30 TABLET | Refills: 0 | Status: SHIPPED | OUTPATIENT
Start: 2022-12-02 | End: 2023-01-01

## 2022-12-02 RX ORDER — DILTIAZEM HYDROCHLORIDE 120 MG/1
120 CAPSULE, COATED, EXTENDED RELEASE ORAL DAILY
Status: DISCONTINUED | OUTPATIENT
Start: 2022-12-03 | End: 2022-12-02 | Stop reason: HOSPADM

## 2022-12-02 RX ORDER — AMIODARONE HYDROCHLORIDE 200 MG/1
200 TABLET ORAL DAILY
Qty: 30 TABLET | Refills: 0 | Status: SHIPPED | OUTPATIENT
Start: 2022-12-03 | End: 2023-01-02

## 2022-12-02 RX ADMIN — Medication 2000 UNITS: at 08:34

## 2022-12-02 RX ADMIN — ASPIRIN 81 MG 81 MG: 81 TABLET ORAL at 08:33

## 2022-12-02 RX ADMIN — AMIODARONE HYDROCHLORIDE 200 MG: 200 TABLET ORAL at 08:33

## 2022-12-02 RX ADMIN — MIDODRINE HYDROCHLORIDE 5 MG: 5 TABLET ORAL at 08:34

## 2022-12-02 RX ADMIN — PREDNISONE 15 MG: 5 TABLET ORAL at 08:34

## 2022-12-02 RX ADMIN — LEVOFLOXACIN 750 MG: 5 INJECTION, SOLUTION INTRAVENOUS at 05:53

## 2022-12-02 RX ADMIN — DILTIAZEM HYDROCHLORIDE 30 MG: 30 TABLET, FILM COATED ORAL at 08:33

## 2022-12-02 RX ADMIN — METOPROLOL TARTRATE 25 MG: 25 TABLET, FILM COATED ORAL at 08:30

## 2022-12-02 RX ADMIN — SENNOSIDES 8.6 MG: 8.6 TABLET, COATED ORAL at 08:32

## 2022-12-02 RX ADMIN — FINASTERIDE 5 MG: 5 TABLET, FILM COATED ORAL at 08:33

## 2022-12-02 NOTE — PROGRESS NOTES
Hospitalist Progress Note         Candie Gallegos          Daily Progress Note: 12/2/2022      Subjective: The patient is seen for follow  up. 79 yo male, hx of a-fib with RVR, CHF, COPD, emphysema, HTN, here s/p fall out of her wheelchair on 11/23. In the ER was found to be in a-fib with RVR and hypotensive. Amiodarone drip was started but no resolution in a-fib as of yet. Currently, pt is on Amiodarone 200 mg daily, ASA, and Diltiazem, as well as long-acting Cardizem. Was given Geoden on 11/27 for agitation. --  The patient is seen for follow  up. He is asleep in bed. Transferred to the intensive care unit 11/29 due to agitation and hypoxemia. Periods of confusion overnight. No fevers. Persistent afib with RVR. Pt wakes to voice and seems somewhat disoriented but can communicate well. Denies any symptoms, states he wants to go home.       Problem List:  Problem List as of 12/2/2022 Date Reviewed: 11/24/2022            Codes Class Noted - Resolved    Atrial fibrillation with RVR ([facility] ICD-10-CM: I48.91  ICD-9-CM: 427.31  11/24/2022 - Present        Paroxysmal atrial fibrillation with rapid ventricular response (HCC) ICD-10-CM: I48.0  ICD-9-CM: 427.31  11/24/2022 - Present        History of home oxygen therapy ICD-10-CM: Z99.81  ICD-9-CM: V46.2  6/5/2022 - Present        CHF (congestive heart failure) ([facility] ICD-10-CM: I50.9  ICD-9-CM: 428.0  6/5/2022 - Present        Acute bronchitis ICD-10-CM: J20.9  ICD-9-CM: 466.0  3/9/2022 - Present        COPD with acute exacerbation ([facility] ICD-10-CM: J44.1  ICD-9-CM: 491.21  3/6/2022 - Present        Elevated troponin ICD-10-CM: R77.8  ICD-9-CM: 790.6  2/20/2022 - Present        Respiratory failure ([facility] ICD-10-CM: J96.90  ICD-9-CM: 518.81  1/2/2022 - Present       Medications reviewed  Current Facility-Administered Medications   Medication Dose Route Frequency    midodrine (PROAMATINE) tablet 5 mg  5 mg Oral TID WITH MEALS    dexmedeTOMidine in 0.9 % NaCl (PRECEDEX) 400 mcg/100 mL (4 mcg/mL) infusion soln  0.1-1.5 mcg/kg/hr IntraVENous TITRATE    levoFLOXacin (LEVAQUIN) 750 mg in D5W IVPB  750 mg IntraVENous Q48H    dilTIAZem IR (CARDIZEM) tablet 30 mg  30 mg Oral AC&HS    predniSONE (DELTASONE) tablet 15 mg  15 mg Oral DAILY WITH BREAKFAST    melatonin tablet 3 mg  3 mg Oral QPM    amiodarone (CORDARONE) tablet 200 mg  200 mg Oral DAILY    metoprolol tartrate (LOPRESSOR) tablet 25 mg  25 mg Oral Q12H    aspirin chewable tablet 81 mg  81 mg Oral DAILY    cholecalciferol (VITAMIN D3) (1000 Units /25 mcg) tablet 2,000 Units  2,000 Units Oral DAILY    budesonide-formoteroL (SYMBICORT) 160-4.5 mcg/actuation HFA inhaler 2 Puff  2 Puff Inhalation BID    albuterol (PROVENTIL HFA, VENTOLIN HFA, PROAIR HFA) inhaler 2 Puff  2 Puff Inhalation Q6H PRN    finasteride (PROSCAR) tablet 5 mg  5 mg Oral DAILY    senna (SENOKOT) tablet 8.6 mg  1 Tablet Oral DAILY    sodium chloride (NS) flush 5-40 mL  5-40 mL IntraVENous PRN    acetaminophen (TYLENOL) solution 650 mg  650 mg Oral Q4H PRN    sodium chloride (NS) flush 5-40 mL  5-40 mL IntraVENous PRN       Review of Systems:   Review of systems not obtained due to patient factors. Review of systems not obtained due to patient factors. Not required  Objective:   Physical Exam:     Visit Vitals  BP 95/64 (BP 1 Location: Left upper arm, BP Patient Position: At rest)   Pulse 76   Temp 97 °F (36.1 °C)   Resp 24   Ht 5' 5.98\" (1.676 m)   Wt 76.2 kg (168 lb 1.6 oz)   SpO2 97%   BMI 27.15 kg/m²    O2 Flow Rate (L/min): 4 l/min O2 Device: Nasal cannula    Temp (24hrs), Av.5 °F (36.4 °C), Min:96.8 °F (36 °C), Max:98.3 °F (36.8 °C)    No intake/output data recorded. 111901 -  0700  In: 1000 [P.O.:1000]  Out: 1100 [Urine:1100]    General:  Arousable but confused   Lungs:   Clear to auscultation bilaterally. Chest wall:  No tenderness or deformity.    Heart:  IRRegular rate and rhythm, S1, S2 normal, no murmur, click, rub or gallop. Abdomen:   Soft, non-tender. Bowel sounds normal. No masses,  No organomegaly. Extremities: Extremities normal, atraumatic, no cyanosis or edema. Pulses: 2+ and symmetric all extremities. Skin: Skin color, texture, turgor normal. No rashes or lesions   Neurologic: CNII-XII intact. No gross sensory or motor deficits     Data Review:       Recent Days:  Recent Labs     12/02/22 0415 12/01/22  0340   WBC 6.0 7.6   HGB 12.4 13.3   HCT 40.2 41.8    207       Recent Labs     12/02/22  0415 12/01/22  0340 11/30/22  0606    142 139   K 4.7 5.0 5.0    107 105   CO2 31 28 29   * 84 108*   BUN 33* 35* 39*   CREA 1.00 1.19 1.14   CA 8.6 8.7 8.1*   ALB  --   --  2.4*   TBILI  --   --  0.7   ALT  --   --  42       Recent Labs     11/30/22  0607   PH 7.41   PCO2 51*   PO2 95   HCO3 32*   FIO2 36.0         24 Hour Results:  Recent Results (from the past 24 hour(s))   GLUCOSE, POC    Collection Time: 12/01/22 11:19 PM   Result Value Ref Range    Glucose (POC) 105 (H) 65 - 100 mg/dL    Performed by Alexa Childress    CBC WITH AUTOMATED DIFF    Collection Time: 12/02/22  4:15 AM   Result Value Ref Range    WBC 6.0 4.1 - 11.1 K/uL    RBC 3.84 (L) 4.10 - 5.70 M/uL    HGB 12.4 12.1 - 17.0 g/dL    HCT 40.2 36.6 - 50.3 %    .7 (H) 80.0 - 99.0 FL    MCH 32.3 26.0 - 34.0 PG    MCHC 30.8 30.0 - 36.5 g/dL    RDW 14.1 11.5 - 14.5 %    PLATELET 263 533 - 375 K/uL    MPV 9.2 8.9 - 12.9 FL    NRBC 0.0 0.0  WBC    ABSOLUTE NRBC 0.00 0.00 - 0.01 K/uL    NEUTROPHILS 82 (H) 32 - 75 %    LYMPHOCYTES 10 (L) 12 - 49 %    MONOCYTES 7 5 - 13 %    EOSINOPHILS 0 0 - 7 %    BASOPHILS 0 0 - 1 %    IMMATURE GRANULOCYTES 1 (H) 0 - 0.5 %    ABS. NEUTROPHILS 4.9 1.8 - 8.0 K/UL    ABS. LYMPHOCYTES 0.6 (L) 0.8 - 3.5 K/UL    ABS. MONOCYTES 0.4 0.0 - 1.0 K/UL    ABS. EOSINOPHILS 0.0 0.0 - 0.4 K/UL    ABS. BASOPHILS 0.0 0.0 - 0.1 K/UL    ABS. IMM.  GRANS. 0.1 (H) 0.00 - 0.04 K/UL    DF AUTOMATED METABOLIC PANEL, BASIC    Collection Time: 12/02/22  4:15 AM   Result Value Ref Range    Sodium 142 136 - 145 mmol/L    Potassium 4.7 3.5 - 5.1 mmol/L    Chloride 108 97 - 108 mmol/L    CO2 31 21 - 32 mmol/L    Anion gap 3 (L) 5 - 15 mmol/L    Glucose 109 (H) 65 - 100 mg/dL    BUN 33 (H) 6 - 20 mg/dL    Creatinine 1.00 0.70 - 1.30 mg/dL    BUN/Creatinine ratio 33 (H) 12 - 20      eGFR >60 >60 ml/min/1.73m2    Calcium 8.6 8.5 - 10.1 mg/dL           Assessment/     Atrial fibrillation with rapid ventricular response    -Rate uncontrolled on oral amiodarone, Cardizem and Lopressor    -Consider low-dose Eliquis for stroke prevention. Patient at risk for falls    COPD on 3 L home O2   -Appears stable    Klebsiella aerogenes bacteremia    -Continue Levaquin through December 2    Dementia with behavioral disturbance    -On Seroquel 25 mg oral twice daily    -No prior history of dementia per daughter. This might be delirium    History of prostate cancer    -No clear details    Acute kidney injury    -Improving    Hypotension    -Likely secondary to polypharmacy    -Monitor BP closely      Plan:  Continued supportive care  Recommended hospice care. Daughter agreed to hospice care in the facility      Care Plan discussed with: Nurse    Total time spent with patient: 30 minutes.     Leatha Stinson

## 2022-12-02 NOTE — PROGRESS NOTES
Pulmonary/ CC progress note    Subjective:   Date of Consultation:  December 2, 2022  Referring Physician: Héctor Dang MD    Subjective    Apparently patient was transferred over to the ICU 11/29/22 evening because of persistent agitation. He was started on Precedex but his blood pressure dropped. Patient seen and examined in his room. Discussed with nursing staff at the bedside. His agitation is somewhat better. On 3 L of oxygen. Evaluated by speech. They have recommended puréed diet with moderately thickened liquids. He was able to take some food this morning. Apparently he is for discharge to his nursing facility this afternoon. Prior to Admission medications    Medication Sig Start Date End Date Taking? Authorizing Provider   furosemide (LASIX) 80 mg tablet Take 0.5 Tablets by mouth daily for 30 days. 12/2/22 1/1/23 Yes Rosa Newton MD   amiodarone (CORDARONE) 200 mg tablet Take 1 Tablet by mouth daily for 30 days. 12/3/22 1/2/23 Yes Rosa Newton MD   dilTIAZem ER (CARDIZEM CD) 120 mg capsule Take 1 Capsule by mouth daily for 30 days. 12/3/22 1/2/23 Yes Rosa Newton MD   metoprolol tartrate (LOPRESSOR) 25 mg tablet Take 1 Tablet by mouth every twelve (12) hours for 30 days. 12/2/22 1/1/23 Yes Rosa Newton MD   midodrine (PROAMATINE) 5 mg tablet Take 1 Tablet by mouth three (3) times daily (with meals) for 30 days. 12/2/22 1/1/23 Yes Rosa Newton MD   predniSONE (DELTASONE) 20 mg tablet Take 1 Tablet by mouth daily (with breakfast) for 5 days. 12/2/22 12/7/22 Yes Rosa Newton MD   finasteride (PROSCAR) 5 mg tablet Take 5 mg by mouth in the morning. Chante, MD Yeny   sennosides (Senna) 8.6 mg cap Take  by mouth. Yeny Sharif MD   guaiFENesin (ORGANIDIN) 200 mg tablet Take 200 mg by mouth every four (4) hours as needed for Congestion. Yeny Sharif MD   cholecalciferol (VITAMIN D3) (1000 Units /25 mcg) tablet Take 2 Tablets by mouth daily.  3/9/22 Naldo Schuster PA-C   budesonide-formoteroL (Symbicort) 160-4.5 mcg/actuation HFAA Take 2 Puffs by inhalation two (2) times a day. 3/9/22   Naldo Schuster PA-C   albuterol (ProAir HFA) 90 mcg/actuation inhaler Take 2 Puffs by inhalation every six (6) hours as needed for Wheezing or Shortness of Breath. 3/9/22   Naldo Schuster PA-C   aspirin 81 mg chewable tablet Take 1 Tablet by mouth daily. 22   Luke Mcdonald MD   QUEtiapine (SEROquel) 25 mg tablet Take 0.5 Tablets by mouth nightly. 22   Luke Mcdonald MD     Allergies   Allergen Reactions    Gabapentin Unknown (comments)        Review of Systems:  Review of systems not obtained due to patient factors. Objective:   Blood pressure 106/73, pulse 85, temperature 97 °F (36.1 °C), resp. rate 23, height 5' 5.98\" (1.676 m), weight 76.2 kg (168 lb 1.6 oz), SpO2 97 %. Temp (24hrs), Av.4 °F (36.3 °C), Min:96.8 °F (36 °C), Max:98.3 °F (36.8 °C)    XR CHEST PORT   Final Result      Stable appearance of the chest         XR CHEST PORT   Final Result      Diffuse interstitial prominence again noted without evidence for focal lung   consolidation. Hypoinflated lungs with probable bibasilar atelectasis. CT HEAD WO CONT   Final Result   1. No evidence of acute intracranial abnormality by this modality.                 Data Review: CBC:   Recent Labs     225 22   WBC 6.0 7.6   RBC 3.84* 4.06*   HGB 12.4 13.3   HCT 40.2 41.8    207   GRANS 82* 74   LYMPH 10* 15   EOS 0 1       CMP:   Recent Labs     225 220 22  0606   * 84 108*    142 139   K 4.7 5.0 5.0    107 105   CO2 31 28 29   BUN 33* 35* 39*   CREA 1.00 1.19 1.14   CA 8.6 8.7 8.1*   AGAP 3* 7 5   BUCR 33* 29* 34*   AP  --   --  54   TP  --   --  5.3*   ALB  --   --  2.4*   GLOB  --   --  2.9   AGRAT  --   --  0.8*       Liver Enzymes:   Recent Labs     22  0606   TP 5.3*   ALB 2.4*   AP 54 ABGs:   Recent Labs     11/30/22  0607   PH 7.41   PCO2 51*   PO2 95   HCO3 32*       Current Facility-Administered Medications   Medication Dose Route Frequency    [START ON 12/3/2022] dilTIAZem ER (CARDIZEM CD) capsule 120 mg  120 mg Oral DAILY    midodrine (PROAMATINE) tablet 5 mg  5 mg Oral TID WITH MEALS    dexmedeTOMidine in 0.9 % NaCl (PRECEDEX) 400 mcg/100 mL (4 mcg/mL) infusion soln  0.1-1.5 mcg/kg/hr IntraVENous TITRATE    levoFLOXacin (LEVAQUIN) 750 mg in D5W IVPB  750 mg IntraVENous Q48H    predniSONE (DELTASONE) tablet 15 mg  15 mg Oral DAILY WITH BREAKFAST    melatonin tablet 3 mg  3 mg Oral QPM    amiodarone (CORDARONE) tablet 200 mg  200 mg Oral DAILY    metoprolol tartrate (LOPRESSOR) tablet 25 mg  25 mg Oral Q12H    aspirin chewable tablet 81 mg  81 mg Oral DAILY    cholecalciferol (VITAMIN D3) (1000 Units /25 mcg) tablet 2,000 Units  2,000 Units Oral DAILY    budesonide-formoteroL (SYMBICORT) 160-4.5 mcg/actuation HFA inhaler 2 Puff  2 Puff Inhalation BID    albuterol (PROVENTIL HFA, VENTOLIN HFA, PROAIR HFA) inhaler 2 Puff  2 Puff Inhalation Q6H PRN    finasteride (PROSCAR) tablet 5 mg  5 mg Oral DAILY    senna (SENOKOT) tablet 8.6 mg  1 Tablet Oral DAILY    sodium chloride (NS) flush 5-40 mL  5-40 mL IntraVENous PRN    acetaminophen (TYLENOL) solution 650 mg  650 mg Oral Q4H PRN    sodium chloride (NS) flush 5-40 mL  5-40 mL IntraVENous PRN        Exam:    This is an elderly male who has poor vision. He is now sleeping. He is on 3 L of oxygen. JVD is absent. No cervical lymphadenopathy. Thyroid not enlarged  He has scattered rhonchi. Mostly unchanged. He also appears to have some minor retained upper airway secretions. Heart: S1-S2 irregular. Abdomen: Soft, nontender, no visceromegaly  Extremities: Trace edema, sinus or clubbing  Neuro: No focal motor deficit. Impression:   80years old  male seen in assisted living facility with supervised care.   He has known history of CHF, advanced COPD with central lobular emphysema, hypertension was brought to the emergency department after a fall from wheelchair. It resulted in injury on the left side of the his head. CT scan of head did not show any new intracranial pathology. It was not clear if he lost his consciousness. Patient has history of dementia. Apparently he is on oxygen in the facility. ER he was found to be in atrial fibrillation with rapid ventricular response. He was also noted to have upper respiratory tract congestion. Plan:   COPD with exacerbation  Patient has history of advanced COPD with emphysema. He is on nasal cannula oxygen at home, 3 L/min. He is at risk for aspiration. He was evaluated by speech. Ordered. Diet with moderately thickened liquids. Continue with Symbicort 160/4.5 I doubt that he is able to use it properly. Continue with nebulized bronchodilator treatments if he allows it. On p.o. prednisone and Levaquin. He had Klebsiella aeruginosa bacteremia. Source is not clear, but probable pneumonia. Patient is currently on 3 L oxygen. Saturation in the high 90% range. Blood gases done on 11/30 on 4 L showing 7.4 1/51/95. Chest x-ray 11/30 personally reviewed. He has bibasilar atelectasis versus infiltrates. We will treat him as aspiration pneumonia. 2. Atrial fibrillation with rapid ventricular response: Follow-up echocardiogram, on amiodarone and diltiazem. 3.  History of prostate cancer. Details are not clear. Also acute kidney injury, improving. He has relative hypotension. Monitoring. 4.  Status post fall from wheelchair. CT scan of head is negative    5. Dementia and delirium. Psych is also on the case. On 11/30/2022: I discussed in detail with a care provider at the bedside, Tushar Daley. She called the daughter Estefania Power on speaker phone. Nurse Shraddha also present in the room.   Apparently patient has been quite active up until a few years ago. There is history of patient getting confused in unfamiliar environment. He may have underlying dementia but they are not aware of it. When the care provider is present he is calm. She fed him also without any problem yesterday. Discussed with the nursing staff in detail. He is DNR. Prognosis appears to be poor. Family did not want hospice. He is for discharge to nursing facility this afternoon. Thank you for involving me in the care of this patient  I will follow with you closely during hospitalization    Time spent more than 30 minutes under patient care with reviewing results and records, decision making, and answering questions.     Kennedy Moore MD  [facility]

## 2022-12-02 NOTE — PROGRESS NOTES
[facility] in to transfer patient to [address]. Report given. Patient transferred to [facility] without incident. [address] given report.

## 2022-12-02 NOTE — DISCHARGE SUMMARY
Physician Discharge Summary     Patient ID:    Marilyn Pacheco  [id number]  80 y.o.  6/3/1929    Admit date: 11/23/2022    Discharge date : 12/2/2022    Chronic Diagnoses:    Problem List as of 12/2/2022 Date Reviewed: 11/24/2022            Codes Class Noted - Resolved    Atrial fibrillation with RVR ([facility] ICD-10-CM: I48.91  ICD-9-CM: 427.31  11/24/2022 - Present        Paroxysmal atrial fibrillation with rapid ventricular response ([facility] ICD-10-CM: I48.0  ICD-9-CM: 427.31  11/24/2022 - Present        History of home oxygen therapy ICD-10-CM: Z99.81  ICD-9-CM: V46.2  6/5/2022 - Present        CHF (congestive heart failure) ([facility] ICD-10-CM: I50.9  ICD-9-CM: 428.0  6/5/2022 - Present        Acute bronchitis ICD-10-CM: J20.9  ICD-9-CM: 466.0  3/9/2022 - Present        COPD with acute exacerbation ([facility] ICD-10-CM: J44.1  ICD-9-CM: 491.21  3/6/2022 - Present        Elevated troponin ICD-10-CM: R77.8  ICD-9-CM: 790.6  2/20/2022 - Present        Respiratory failure ([facility] ICD-10-CM: J96.90  ICD-9-CM: 518.81  1/2/2022 - Present       22    Final Diagnoses:   Persistent atrial fibrillation  COPD on 3 L oxygen  Klebsiella erogenous bacteremia. Completed antibiotics  History of prostate carcinoma  Acute kidney injury. Hypotension  Probable dementia with behavioral disturbance    Reason for Hospitalization:  Fall from wheelchair      Hospital Course:    81 yo male, hx of a-fib with RVR, CHF, COPD, emphysema, HTN, here s/p fall out of her wheelchair on 11/23. In the ER was found to be in a-fib with RVR and hypotensive. Amiodarone drip was started but no resolution in a-fib as of yet. Currently, pt is on Amiodarone 200 mg daily, ASA, and  long-acting Cardizem. He was moved to the intensive care unit for close follow-up due to persistent agitation and hypotension. Remained in atrial fibrillation and on oral diltiazem Lopressor and amiodarone.   Per daughter, patient recently discharged from the [facility] and was told that it will be difficult to control heart rate. Patient is felt stable for discharge to [address] with home health              Discharge Medications:   Current Discharge Medication List        START taking these medications    Details   amiodarone (CORDARONE) 200 mg tablet Take 1 Tablet by mouth daily for 30 days. Qty: 30 Tablet, Refills: 0  Start date: 12/3/2022, End date: 1/2/2023      dilTIAZem ER (CARDIZEM CD) 120 mg capsule Take 1 Capsule by mouth daily for 30 days. Qty: 30 Capsule, Refills: 0  Start date: 12/3/2022, End date: 1/2/2023      metoprolol tartrate (LOPRESSOR) 25 mg tablet Take 1 Tablet by mouth every twelve (12) hours for 30 days. Qty: 60 Tablet, Refills: 0  Start date: 12/2/2022, End date: 1/1/2023      midodrine (PROAMATINE) 5 mg tablet Take 1 Tablet by mouth three (3) times daily (with meals) for 30 days. Qty: 90 Tablet, Refills: 0  Start date: 12/2/2022, End date: 1/1/2023      predniSONE (DELTASONE) 20 mg tablet Take 1 Tablet by mouth daily (with breakfast) for 5 days. Qty: 5 Tablet, Refills: 0  Start date: 12/2/2022, End date: 12/7/2022           CONTINUE these medications which have CHANGED    Details   furosemide (LASIX) 80 mg tablet Take 0.5 Tablets by mouth daily for 30 days. Qty: 30 Tablet, Refills: 0  Start date: 12/2/2022, End date: 1/1/2023           CONTINUE these medications which have NOT CHANGED    Details   finasteride (PROSCAR) 5 mg tablet Take 5 mg by mouth in the morning. sennosides (Senna) 8.6 mg cap Take  by mouth.      guaiFENesin (ORGANIDIN) 200 mg tablet Take 200 mg by mouth every four (4) hours as needed for Congestion. cholecalciferol (VITAMIN D3) (1000 Units /25 mcg) tablet Take 2 Tablets by mouth daily. Qty: 30 Tablet, Refills: 1      budesonide-formoteroL (Symbicort) 160-4.5 mcg/actuation HFAA Take 2 Puffs by inhalation two (2) times a day.   Qty: 10.2 g, Refills: 1      albuterol (ProAir HFA) 90 mcg/actuation inhaler Take 2 Puffs by inhalation every six (6) hours as needed for Wheezing or Shortness of Breath. Qty: 18 g, Refills: 1      aspirin 81 mg chewable tablet Take 1 Tablet by mouth daily. Qty: 30 Tablet, Refills: 0      QUEtiapine (SEROquel) 25 mg tablet Take 0.5 Tablets by mouth nightly. Qty: 30 Tablet, Refills: 0               Follow up Care:    1. None in 1-2 weeks. Please call to set up an appointment shortly after discharge. Diet:  Cardiac Diet    Disposition:  Home. Advanced Directive:   FULL    DNR      Discharge Exam:  Visit Vitals  BP 96/65 (BP 1 Location: Left upper arm, BP Patient Position: At rest)   Pulse 80   Temp 97 °F (36.1 °C)   Resp 19   Ht 5' 5.98\" (1.676 m)   Wt 76.2 kg (168 lb 1.6 oz)   SpO2 93%   BMI 27.15 kg/m²    O2 Flow Rate (L/min): 4 l/min O2 Device: Nasal cannula    Temp (24hrs), Av.4 °F (36.3 °C), Min:96.8 °F (36 °C), Max:98.3 °F (36.8 °C)    No intake/output data recorded.  1901 -  0700  In: 1000 [P.O.:1000]  Out: 1100 [Urine:1100]    General:  Alert, cooperative, no distress, appears stated age. Lungs:   Clear to auscultation bilaterally. Chest wall:  No tenderness or deformity. Heart:  IRRegular rate and rhythm, S1, S2 normal, no murmur, click, rub or gallop. Abdomen:   Soft, non-tender. Bowel sounds normal. No masses,  No organomegaly. Extremities: Extremities normal, atraumatic, no cyanosis or edema. Pulses: 2+ and symmetric all extremities. Skin: Skin color, texture, turgor normal. No rashes or lesions   Neurologic: CNII-XII intact. No gross sensory or motor deficits         CONSULTATIONS: Cardiology    Significant Diagnostic Studies:   2022: BUN 38 mg/dL (H; Ref range: 6 - 20 mg/dL); Calcium 7.6 mg/dL (L; Ref range: 8.5 - 10.1 mg/dL); CO2 33 mmol/L (H; Ref range: 21 - 32 mmol/L); Creatinine 1.70 mg/dL (H; Ref range: 0.70 - 1.30 mg/dL); Glucose 92 mg/dL (Ref range: 65 - 100 mg/dL); HCT 42.7 % (Ref range: 36.6 - 50.3 %);  HGB 13.9 g/dL (Ref range: 12.1 - 17.0 g/dL); Potassium Hemolyzed, recollect requested mmol/L (Ref range: 3.5 - 5.1 mmol/L); Sodium 130 mmol/L (L; Ref range: 136 - 145 mmol/L)  11/24/2022: Potassium 4.1 mmol/L (Ref range: 3.5 - 5.1 mmol/L)  Recent Labs     12/02/22  0415 12/01/22  0340   WBC 6.0 7.6   HGB 12.4 13.3   HCT 40.2 41.8    207     Recent Labs     12/02/22  0415 12/01/22  0340 11/30/22  0606    142 139   K 4.7 5.0 5.0    107 105   CO2 31 28 29   BUN 33* 35* 39*   CREA 1.00 1.19 1.14   * 84 108*   CA 8.6 8.7 8.1*     Recent Labs     11/30/22  0606   ALT 42   AP 54   TBILI 0.7   TP 5.3*   ALB 2.4*   GLOB 2.9     No results for input(s): INR, PTP, APTT, INREXT in the last 72 hours. No results for input(s): FE, TIBC, PSAT, FERR in the last 72 hours. Recent Labs     11/30/22  0607   PH 7.41   PCO2 51*   PO2 95     No results for input(s): CPK, CKMB in the last 72 hours.     No lab exists for component: TROPONINI  Lab Results   Component Value Date/Time    Glucose (POC) 105 (H) 12/01/2022 11:19 PM    Glucose (POC) 100 01/03/2022 09:48 PM       Total Time: 35 minutes    Signed:  Babita Cornell MD  12/2/2022  11:33 AM

## 2022-12-02 NOTE — PROGRESS NOTES
3002 am  CM spoke to nurse Kwan Nicolas this am regarding patient case. Nurse states daughter is now interested in her dad going back to [facility] on hospice. CM called daughter Jaison Fields at [phone] and went straight to [facility]. Message left for Brentnedra Diamond to contact CM ASAP. [facility] will not accept patients back over the weekend. CM received return call from Jaison Millerlexi, patient's daughter. Jaison Fields wants her dad to go back to [facility] today with [facility]. Choice letter obtained for [facility], [facility] as daughter requested. Referral sent. PARAMJIT called to speak with Yehuda Gallego at [facility] to confirm [facility] would be willing to accept patient with [facility] services. Message left for return call. 1055 am  CM received call back from Yehuda Gallego at the [facility]. Patient will be able to return today, but Yehuda Gallego stated [facility] has an inpatient therapy program and would like to do that and then transition patient to [facility].     1130 am  Spoke to primary MD. Informed him patient can discharge today to [facility] and patient will likely require oxygen. MD to put in discharge. 1140 am  CM spoke to Jaison Fields, daughter to inform of discharge back to [facility] today. Daughter verbalized understanding and had no questions. Ambulance transport arranged for 1 pm. Nurse can call report to [phone]. Patient already has oxygen at 3 lpm set up at the [facility] through the [place].

## 2022-12-03 LAB
BACTERIA SPEC CULT: NORMAL
SPECIAL REQUESTS,SREQ: NORMAL